# Patient Record
Sex: MALE | Race: WHITE | Employment: FULL TIME | URBAN - METROPOLITAN AREA
[De-identification: names, ages, dates, MRNs, and addresses within clinical notes are randomized per-mention and may not be internally consistent; named-entity substitution may affect disease eponyms.]

---

## 2018-01-01 ENCOUNTER — HOSPITAL ENCOUNTER (OUTPATIENT)
Dept: RADIATION ONCOLOGY | Age: 53
Discharge: HOME | End: 2018-08-20
Payer: COMMERCIAL

## 2018-01-01 ENCOUNTER — HOSPITAL ENCOUNTER (OUTPATIENT)
Dept: INFUSION THERAPY | Age: 53
Discharge: HOME | End: 2018-12-07
Payer: COMMERCIAL

## 2018-01-01 ENCOUNTER — HOSPITAL ENCOUNTER (OUTPATIENT)
Dept: RADIATION ONCOLOGY | Age: 53
End: 2018-01-01
Payer: COMMERCIAL

## 2018-01-01 ENCOUNTER — HOSPITAL ENCOUNTER (OUTPATIENT)
Dept: LAB | Age: 53
Discharge: HOME | End: 2018-12-05
Payer: COMMERCIAL

## 2018-01-01 ENCOUNTER — HOSPITAL ENCOUNTER (OUTPATIENT)
Dept: PET IMAGING | Age: 53
Discharge: HOME | End: 2018-12-27
Payer: COMMERCIAL

## 2018-01-01 ENCOUNTER — HOSPITAL ENCOUNTER (OUTPATIENT)
Dept: RADIATION ONCOLOGY | Age: 53
Discharge: HOME | End: 2018-09-04
Payer: COMMERCIAL

## 2018-01-01 ENCOUNTER — HOSPITAL ENCOUNTER (OUTPATIENT)
Dept: RADIATION ONCOLOGY | Age: 53
Discharge: HOME | End: 2018-08-31
Payer: COMMERCIAL

## 2018-01-01 ENCOUNTER — HOSPITAL ENCOUNTER (OUTPATIENT)
Dept: RADIATION ONCOLOGY | Age: 53
Discharge: HOME | End: 2018-08-23
Payer: COMMERCIAL

## 2018-01-01 ENCOUNTER — HOSPITAL ENCOUNTER (OUTPATIENT)
Dept: RADIATION ONCOLOGY | Age: 53
Discharge: HOME | End: 2018-09-26
Payer: COMMERCIAL

## 2018-01-01 ENCOUNTER — PATIENT OUTREACH (OUTPATIENT)
Dept: CASE MANAGEMENT | Age: 53
End: 2018-01-01

## 2018-01-01 ENCOUNTER — HOSPITAL ENCOUNTER (OUTPATIENT)
Dept: INFUSION THERAPY | Age: 53
Discharge: HOME | End: 2018-11-23
Payer: COMMERCIAL

## 2018-01-01 ENCOUNTER — APPOINTMENT (OUTPATIENT)
Dept: RADIATION ONCOLOGY | Age: 53
End: 2018-01-01

## 2018-01-01 ENCOUNTER — HOSPITAL ENCOUNTER (OUTPATIENT)
Dept: INFUSION THERAPY | Age: 53
Discharge: HOME | End: 2018-11-09
Payer: COMMERCIAL

## 2018-01-01 ENCOUNTER — HOSPITAL ENCOUNTER (OUTPATIENT)
Dept: LAB | Age: 53
Discharge: HOME | End: 2018-11-21
Payer: COMMERCIAL

## 2018-01-01 ENCOUNTER — HOSPITAL ENCOUNTER (OUTPATIENT)
Dept: LAB | Age: 53
Discharge: HOME | End: 2018-12-19
Payer: COMMERCIAL

## 2018-01-01 ENCOUNTER — HOSPITAL ENCOUNTER (OUTPATIENT)
Dept: RADIATION ONCOLOGY | Age: 53
Discharge: HOME | End: 2018-08-29
Payer: COMMERCIAL

## 2018-01-01 ENCOUNTER — HOSPITAL ENCOUNTER (OUTPATIENT)
Dept: RADIATION ONCOLOGY | Age: 53
Discharge: HOME | End: 2018-09-06
Payer: COMMERCIAL

## 2018-01-01 ENCOUNTER — HOSPITAL ENCOUNTER (OUTPATIENT)
Dept: CT IMAGING | Age: 53
Discharge: HOME | End: 2018-10-17
Attending: INTERNAL MEDICINE
Payer: COMMERCIAL

## 2018-01-01 ENCOUNTER — HOSPITAL ENCOUNTER (OUTPATIENT)
Dept: INFUSION THERAPY | Age: 53
Discharge: HOME | End: 2018-12-19
Payer: COMMERCIAL

## 2018-01-01 ENCOUNTER — HOSPITAL ENCOUNTER (OUTPATIENT)
Dept: RADIATION ONCOLOGY | Age: 53
Discharge: HOME | End: 2018-09-18
Payer: COMMERCIAL

## 2018-01-01 ENCOUNTER — HOSPITAL ENCOUNTER (OUTPATIENT)
Dept: RADIATION ONCOLOGY | Age: 53
Discharge: HOME | End: 2018-09-11
Payer: COMMERCIAL

## 2018-01-01 ENCOUNTER — HOSPITAL ENCOUNTER (OUTPATIENT)
Dept: RADIATION ONCOLOGY | Age: 53
Discharge: HOME | End: 2018-09-12
Payer: COMMERCIAL

## 2018-01-01 ENCOUNTER — HOSPITAL ENCOUNTER (OUTPATIENT)
Dept: RADIATION ONCOLOGY | Age: 53
Discharge: HOME | End: 2018-09-13
Payer: COMMERCIAL

## 2018-01-01 ENCOUNTER — HOSPITAL ENCOUNTER (OUTPATIENT)
Dept: RADIATION ONCOLOGY | Age: 53
Discharge: HOME | End: 2018-09-17
Payer: COMMERCIAL

## 2018-01-01 ENCOUNTER — HOSPITAL ENCOUNTER (OUTPATIENT)
Dept: LAB | Age: 53
Discharge: HOME | End: 2018-09-18
Payer: COMMERCIAL

## 2018-01-01 ENCOUNTER — HOSPITAL ENCOUNTER (OUTPATIENT)
Dept: RADIATION ONCOLOGY | Age: 53
Discharge: HOME | End: 2018-08-28
Payer: COMMERCIAL

## 2018-01-01 ENCOUNTER — HOSPITAL ENCOUNTER (OUTPATIENT)
Dept: RADIATION ONCOLOGY | Age: 53
Discharge: HOME | End: 2018-08-24
Payer: COMMERCIAL

## 2018-01-01 ENCOUNTER — HOSPITAL ENCOUNTER (OUTPATIENT)
Dept: RADIATION ONCOLOGY | Age: 53
End: 2018-01-01

## 2018-01-01 ENCOUNTER — HOSPITAL ENCOUNTER (OUTPATIENT)
Dept: LAB | Age: 53
Discharge: HOME | End: 2018-08-27
Payer: COMMERCIAL

## 2018-01-01 ENCOUNTER — ANESTHESIA (OUTPATIENT)
Dept: SURGERY | Age: 53
End: 2018-01-01
Payer: COMMERCIAL

## 2018-01-01 ENCOUNTER — HOSPITAL ENCOUNTER (OUTPATIENT)
Dept: RADIATION ONCOLOGY | Age: 53
Discharge: HOME | End: 2018-09-19
Payer: COMMERCIAL

## 2018-01-01 ENCOUNTER — HOSPITAL ENCOUNTER (OUTPATIENT)
Dept: RADIATION ONCOLOGY | Age: 53
Discharge: HOME | End: 2018-08-27
Payer: COMMERCIAL

## 2018-01-01 ENCOUNTER — HOSPITAL ENCOUNTER (OUTPATIENT)
Dept: INFUSION THERAPY | Age: 53
Discharge: HOME | End: 2018-11-07
Payer: COMMERCIAL

## 2018-01-01 ENCOUNTER — HOSPITAL ENCOUNTER (OUTPATIENT)
Dept: LAB | Age: 53
Discharge: HOME | End: 2018-09-12
Payer: COMMERCIAL

## 2018-01-01 ENCOUNTER — HOSPITAL ENCOUNTER (OUTPATIENT)
Dept: LAB | Age: 53
Discharge: HOME | End: 2018-09-04
Payer: COMMERCIAL

## 2018-01-01 ENCOUNTER — HOSPITAL ENCOUNTER (OUTPATIENT)
Dept: RADIATION ONCOLOGY | Age: 53
Discharge: HOME | End: 2018-09-25
Payer: COMMERCIAL

## 2018-01-01 ENCOUNTER — HOSPITAL ENCOUNTER (OUTPATIENT)
Dept: PET IMAGING | Age: 53
Discharge: HOME | End: 2018-11-06
Payer: COMMERCIAL

## 2018-01-01 ENCOUNTER — TELEPHONE (OUTPATIENT)
Dept: RADIATION ONCOLOGY | Age: 53
End: 2018-01-01

## 2018-01-01 ENCOUNTER — HOSPITAL ENCOUNTER (OUTPATIENT)
Dept: INFUSION THERAPY | Age: 53
Discharge: HOME | End: 2018-11-21
Payer: COMMERCIAL

## 2018-01-01 ENCOUNTER — HOSPITAL ENCOUNTER (OUTPATIENT)
Dept: RADIATION ONCOLOGY | Age: 53
Discharge: HOME | End: 2018-09-28
Payer: COMMERCIAL

## 2018-01-01 ENCOUNTER — HOSPITAL ENCOUNTER (OUTPATIENT)
Dept: RADIATION ONCOLOGY | Age: 53
Discharge: HOME | End: 2018-09-24
Payer: COMMERCIAL

## 2018-01-01 ENCOUNTER — HOSPITAL ENCOUNTER (OUTPATIENT)
Dept: LAB | Age: 53
Discharge: HOME | End: 2018-10-24
Payer: COMMERCIAL

## 2018-01-01 ENCOUNTER — HOSPITAL ENCOUNTER (OUTPATIENT)
Dept: LAB | Age: 53
Discharge: HOME | End: 2018-11-06
Payer: COMMERCIAL

## 2018-01-01 ENCOUNTER — HOSPITAL ENCOUNTER (OUTPATIENT)
Dept: RADIATION ONCOLOGY | Age: 53
Discharge: HOME | End: 2018-08-30
Payer: COMMERCIAL

## 2018-01-01 ENCOUNTER — HOSPITAL ENCOUNTER (OUTPATIENT)
Dept: RADIATION ONCOLOGY | Age: 53
Discharge: HOME | End: 2018-10-24
Payer: COMMERCIAL

## 2018-01-01 ENCOUNTER — HOSPITAL ENCOUNTER (OUTPATIENT)
Dept: INFUSION THERAPY | Age: 53
Discharge: HOME | End: 2018-12-21
Payer: COMMERCIAL

## 2018-01-01 ENCOUNTER — DOCUMENTATION ONLY (OUTPATIENT)
Dept: HEMATOLOGY | Age: 53
End: 2018-01-01

## 2018-01-01 ENCOUNTER — ANESTHESIA EVENT (OUTPATIENT)
Dept: SURGERY | Age: 53
End: 2018-01-01
Payer: COMMERCIAL

## 2018-01-01 ENCOUNTER — HOSPITAL ENCOUNTER (OUTPATIENT)
Dept: RADIATION ONCOLOGY | Age: 53
Discharge: HOME | End: 2018-09-07
Payer: COMMERCIAL

## 2018-01-01 ENCOUNTER — HOSPITAL ENCOUNTER (OUTPATIENT)
Dept: RADIATION ONCOLOGY | Age: 53
Discharge: HOME | End: 2018-09-27
Payer: COMMERCIAL

## 2018-01-01 ENCOUNTER — HOSPITAL ENCOUNTER (OUTPATIENT)
Dept: INFUSION THERAPY | Age: 53
Discharge: HOME | End: 2018-12-05
Payer: COMMERCIAL

## 2018-01-01 ENCOUNTER — HOSPITAL ENCOUNTER (OUTPATIENT)
Dept: RADIATION ONCOLOGY | Age: 53
Discharge: HOME | End: 2018-09-20
Payer: COMMERCIAL

## 2018-01-01 ENCOUNTER — HOSPITAL ENCOUNTER (OUTPATIENT)
Dept: RADIATION ONCOLOGY | Age: 53
Discharge: HOME | End: 2018-09-14
Payer: COMMERCIAL

## 2018-01-01 ENCOUNTER — HOSPITAL ENCOUNTER (OUTPATIENT)
Dept: RADIATION ONCOLOGY | Age: 53
Discharge: HOME | End: 2018-08-21
Payer: COMMERCIAL

## 2018-01-01 ENCOUNTER — HOSPITAL ENCOUNTER (OUTPATIENT)
Dept: LAB | Age: 53
Discharge: HOME | End: 2018-09-25
Payer: COMMERCIAL

## 2018-01-01 ENCOUNTER — HOSPITAL ENCOUNTER (OUTPATIENT)
Dept: RADIATION ONCOLOGY | Age: 53
Discharge: HOME | End: 2018-09-10
Payer: COMMERCIAL

## 2018-01-01 ENCOUNTER — HOSPITAL ENCOUNTER (OUTPATIENT)
Dept: RADIATION ONCOLOGY | Age: 53
Discharge: HOME | End: 2018-09-05
Payer: COMMERCIAL

## 2018-01-01 ENCOUNTER — HOSPITAL ENCOUNTER (OUTPATIENT)
Dept: INTERVENTIONAL RADIOLOGY/VASCULAR | Age: 53
Discharge: HOME | End: 2018-10-29
Attending: INTERNAL MEDICINE | Admitting: INTERNAL MEDICINE
Payer: COMMERCIAL

## 2018-01-01 ENCOUNTER — HOSPITAL ENCOUNTER (OUTPATIENT)
Dept: LAB | Age: 53
Discharge: HOME | End: 2018-08-20
Payer: COMMERCIAL

## 2018-01-01 ENCOUNTER — HOSPITAL ENCOUNTER (OUTPATIENT)
Dept: RADIATION ONCOLOGY | Age: 53
Discharge: HOME | End: 2018-08-22
Payer: COMMERCIAL

## 2018-01-01 VITALS
SYSTOLIC BLOOD PRESSURE: 151 MMHG | DIASTOLIC BLOOD PRESSURE: 87 MMHG | HEART RATE: 70 BPM | RESPIRATION RATE: 18 BRPM | WEIGHT: 158.4 LBS | TEMPERATURE: 97.9 F | OXYGEN SATURATION: 100 % | BODY MASS INDEX: 23.06 KG/M2

## 2018-01-01 VITALS
WEIGHT: 155.8 LBS | OXYGEN SATURATION: 100 % | BODY MASS INDEX: 23.01 KG/M2 | HEART RATE: 84 BPM | SYSTOLIC BLOOD PRESSURE: 160 MMHG | TEMPERATURE: 98.5 F | DIASTOLIC BLOOD PRESSURE: 83 MMHG

## 2018-01-01 VITALS
RESPIRATION RATE: 20 BRPM | OXYGEN SATURATION: 100 % | OXYGEN SATURATION: 100 % | RESPIRATION RATE: 18 BRPM | DIASTOLIC BLOOD PRESSURE: 76 MMHG | TEMPERATURE: 98 F | WEIGHT: 155 LBS | BODY MASS INDEX: 23.22 KG/M2 | WEIGHT: 158.8 LBS | SYSTOLIC BLOOD PRESSURE: 165 MMHG | HEART RATE: 69 BPM | HEART RATE: 82 BPM | SYSTOLIC BLOOD PRESSURE: 129 MMHG | DIASTOLIC BLOOD PRESSURE: 79 MMHG | TEMPERATURE: 97.8 F | BODY MASS INDEX: 22.79 KG/M2

## 2018-01-01 VITALS
TEMPERATURE: 98 F | SYSTOLIC BLOOD PRESSURE: 154 MMHG | DIASTOLIC BLOOD PRESSURE: 86 MMHG | OXYGEN SATURATION: 100 % | HEART RATE: 85 BPM | RESPIRATION RATE: 18 BRPM

## 2018-01-01 VITALS
DIASTOLIC BLOOD PRESSURE: 85 MMHG | WEIGHT: 160 LBS | OXYGEN SATURATION: 99 % | RESPIRATION RATE: 18 BRPM | BODY MASS INDEX: 23.29 KG/M2 | TEMPERATURE: 98.1 F | SYSTOLIC BLOOD PRESSURE: 156 MMHG | HEART RATE: 70 BPM

## 2018-01-01 VITALS
BODY MASS INDEX: 22.85 KG/M2 | RESPIRATION RATE: 16 BRPM | DIASTOLIC BLOOD PRESSURE: 76 MMHG | TEMPERATURE: 98.6 F | OXYGEN SATURATION: 99 % | HEART RATE: 85 BPM | WEIGHT: 157 LBS | SYSTOLIC BLOOD PRESSURE: 126 MMHG

## 2018-01-01 VITALS
RESPIRATION RATE: 18 BRPM | SYSTOLIC BLOOD PRESSURE: 196 MMHG | HEART RATE: 67 BPM | WEIGHT: 154.5 LBS | DIASTOLIC BLOOD PRESSURE: 94 MMHG | BODY MASS INDEX: 22.17 KG/M2

## 2018-01-01 VITALS — DIASTOLIC BLOOD PRESSURE: 85 MMHG | SYSTOLIC BLOOD PRESSURE: 157 MMHG

## 2018-01-01 VITALS — DIASTOLIC BLOOD PRESSURE: 88 MMHG | SYSTOLIC BLOOD PRESSURE: 151 MMHG | HEART RATE: 65 BPM

## 2018-01-01 DIAGNOSIS — C20 RECTAL ADENOCARCINOMA (HCC): ICD-10-CM

## 2018-01-01 DIAGNOSIS — C20 RECTAL ADENOCARCINOMA (HCC): Primary | ICD-10-CM

## 2018-01-01 DIAGNOSIS — C20 RECTAL CANCER (HCC): ICD-10-CM

## 2018-01-01 LAB
ALBUMIN SERPL-MCNC: 3.1 G/DL (ref 3.5–5)
ALBUMIN SERPL-MCNC: 3.2 G/DL (ref 3.5–5)
ALBUMIN SERPL-MCNC: 3.3 G/DL (ref 3.5–5)
ALBUMIN SERPL-MCNC: 3.4 G/DL (ref 3.5–5)
ALBUMIN SERPL-MCNC: 3.5 G/DL (ref 3.5–5)
ALBUMIN/GLOB SERPL: 0.7 {RATIO} (ref 1.2–3.5)
ALBUMIN/GLOB SERPL: 0.7 {RATIO} (ref 1.2–3.5)
ALBUMIN/GLOB SERPL: 0.8 {RATIO} (ref 1.2–3.5)
ALBUMIN/GLOB SERPL: 0.9 {RATIO} (ref 1.2–3.5)
ALP SERPL-CCNC: 87 U/L (ref 50–136)
ALP SERPL-CCNC: 88 U/L (ref 50–136)
ALP SERPL-CCNC: 89 U/L (ref 50–136)
ALP SERPL-CCNC: 91 U/L (ref 50–136)
ALP SERPL-CCNC: 92 U/L (ref 50–136)
ALP SERPL-CCNC: 93 U/L (ref 50–136)
ALP SERPL-CCNC: 95 U/L (ref 50–136)
ALP SERPL-CCNC: 96 U/L (ref 50–136)
ALP SERPL-CCNC: 97 U/L (ref 50–136)
ALT SERPL-CCNC: 18 U/L (ref 12–65)
ALT SERPL-CCNC: 19 U/L (ref 12–65)
ALT SERPL-CCNC: 20 U/L (ref 12–65)
ALT SERPL-CCNC: 20 U/L (ref 12–65)
ALT SERPL-CCNC: 21 U/L (ref 12–65)
ALT SERPL-CCNC: 21 U/L (ref 12–65)
ALT SERPL-CCNC: 22 U/L (ref 12–65)
ALT SERPL-CCNC: 23 U/L (ref 12–65)
ALT SERPL-CCNC: 23 U/L (ref 12–65)
ALT SERPL-CCNC: 24 U/L (ref 12–65)
ALT SERPL-CCNC: 29 U/L (ref 12–65)
ANION GAP SERPL CALC-SCNC: 5 MMOL/L (ref 7–16)
ANION GAP SERPL CALC-SCNC: 6 MMOL/L (ref 7–16)
ANION GAP SERPL CALC-SCNC: 7 MMOL/L (ref 7–16)
ANION GAP SERPL CALC-SCNC: 8 MMOL/L (ref 7–16)
AST SERPL-CCNC: 11 U/L (ref 15–37)
AST SERPL-CCNC: 13 U/L (ref 15–37)
AST SERPL-CCNC: 14 U/L (ref 15–37)
AST SERPL-CCNC: 15 U/L (ref 15–37)
AST SERPL-CCNC: 17 U/L (ref 15–37)
AST SERPL-CCNC: 17 U/L (ref 15–37)
AST SERPL-CCNC: 19 U/L (ref 15–37)
AST SERPL-CCNC: 19 U/L (ref 15–37)
BASOPHILS # BLD: 0 K/UL (ref 0–0.2)
BASOPHILS # BLD: 0.1 K/UL (ref 0–0.2)
BASOPHILS NFR BLD: 0 % (ref 0–2)
BASOPHILS NFR BLD: 1 % (ref 0–2)
BILIRUB SERPL-MCNC: 0.4 MG/DL (ref 0.2–1.1)
BILIRUB SERPL-MCNC: 0.5 MG/DL (ref 0.2–1.1)
BILIRUB SERPL-MCNC: 0.6 MG/DL (ref 0.2–1.1)
BILIRUB SERPL-MCNC: 0.7 MG/DL (ref 0.2–1.1)
BILIRUB SERPL-MCNC: 0.8 MG/DL (ref 0.2–1.1)
BILIRUB SERPL-MCNC: 1 MG/DL (ref 0.2–1.1)
BUN SERPL-MCNC: 4 MG/DL (ref 6–23)
BUN SERPL-MCNC: 5 MG/DL (ref 6–23)
BUN SERPL-MCNC: 6 MG/DL (ref 6–23)
BUN SERPL-MCNC: 7 MG/DL (ref 6–23)
BUN SERPL-MCNC: 7 MG/DL (ref 6–23)
BUN SERPL-MCNC: 8 MG/DL (ref 6–23)
CALCIUM SERPL-MCNC: 8.3 MG/DL (ref 8.3–10.4)
CALCIUM SERPL-MCNC: 8.4 MG/DL (ref 8.3–10.4)
CALCIUM SERPL-MCNC: 8.5 MG/DL (ref 8.3–10.4)
CALCIUM SERPL-MCNC: 8.5 MG/DL (ref 8.3–10.4)
CALCIUM SERPL-MCNC: 8.6 MG/DL (ref 8.3–10.4)
CALCIUM SERPL-MCNC: 8.7 MG/DL (ref 8.3–10.4)
CALCIUM SERPL-MCNC: 8.7 MG/DL (ref 8.3–10.4)
CALCIUM SERPL-MCNC: 8.8 MG/DL (ref 8.3–10.4)
CALCIUM SERPL-MCNC: 8.8 MG/DL (ref 8.3–10.4)
CEA SERPL-MCNC: 2 NG/ML (ref 0–3)
CEA SERPL-MCNC: 2.1 NG/ML (ref 0–3)
CEA SERPL-MCNC: 2.5 NG/ML (ref 0–3)
CEA SERPL-MCNC: 2.5 NG/ML (ref 0–3)
CEA SERPL-MCNC: 3.6 NG/ML (ref 0–3)
CHLORIDE SERPL-SCNC: 101 MMOL/L (ref 98–107)
CHLORIDE SERPL-SCNC: 101 MMOL/L (ref 98–107)
CHLORIDE SERPL-SCNC: 102 MMOL/L (ref 98–107)
CHLORIDE SERPL-SCNC: 103 MMOL/L (ref 98–107)
CHLORIDE SERPL-SCNC: 105 MMOL/L (ref 98–107)
CHLORIDE SERPL-SCNC: 106 MMOL/L (ref 98–107)
CHLORIDE SERPL-SCNC: 107 MMOL/L (ref 98–107)
CO2 SERPL-SCNC: 25 MMOL/L (ref 21–32)
CO2 SERPL-SCNC: 26 MMOL/L (ref 21–32)
CO2 SERPL-SCNC: 26 MMOL/L (ref 21–32)
CO2 SERPL-SCNC: 27 MMOL/L (ref 21–32)
CO2 SERPL-SCNC: 28 MMOL/L (ref 21–32)
CO2 SERPL-SCNC: 30 MMOL/L (ref 21–32)
CO2 SERPL-SCNC: 31 MMOL/L (ref 21–32)
CO2 SERPL-SCNC: 31 MMOL/L (ref 21–32)
CREAT SERPL-MCNC: 0.71 MG/DL (ref 0.8–1.5)
CREAT SERPL-MCNC: 0.82 MG/DL (ref 0.8–1.5)
CREAT SERPL-MCNC: 0.83 MG/DL (ref 0.8–1.5)
CREAT SERPL-MCNC: 0.83 MG/DL (ref 0.8–1.5)
CREAT SERPL-MCNC: 0.86 MG/DL (ref 0.8–1.5)
CREAT SERPL-MCNC: 0.87 MG/DL (ref 0.8–1.5)
CREAT SERPL-MCNC: 0.88 MG/DL (ref 0.8–1.5)
CREAT SERPL-MCNC: 0.89 MG/DL (ref 0.8–1.5)
CREAT SERPL-MCNC: 0.94 MG/DL (ref 0.8–1.5)
CREAT SERPL-MCNC: 0.98 MG/DL (ref 0.8–1.5)
CREAT SERPL-MCNC: 1.03 MG/DL (ref 0.8–1.5)
DIFFERENTIAL METHOD BLD: ABNORMAL
DIFFERENTIAL METHOD BLD: NORMAL
EOSINOPHIL # BLD: 0.1 K/UL (ref 0–0.8)
EOSINOPHIL # BLD: 0.2 K/UL (ref 0–0.8)
EOSINOPHIL NFR BLD: 1 % (ref 0.5–7.8)
EOSINOPHIL NFR BLD: 2 % (ref 0.5–7.8)
EOSINOPHIL NFR BLD: 3 % (ref 0.5–7.8)
ERYTHROCYTE [DISTWIDTH] IN BLOOD BY AUTOMATED COUNT: 13.6 % (ref 11.9–14.6)
ERYTHROCYTE [DISTWIDTH] IN BLOOD BY AUTOMATED COUNT: 13.6 % (ref 11.9–14.6)
ERYTHROCYTE [DISTWIDTH] IN BLOOD BY AUTOMATED COUNT: 14.8 % (ref 11.9–14.6)
ERYTHROCYTE [DISTWIDTH] IN BLOOD BY AUTOMATED COUNT: 14.9 % (ref 11.9–14.6)
ERYTHROCYTE [DISTWIDTH] IN BLOOD BY AUTOMATED COUNT: 15.3 % (ref 11.9–14.6)
ERYTHROCYTE [DISTWIDTH] IN BLOOD BY AUTOMATED COUNT: 15.6 % (ref 11.9–14.6)
ERYTHROCYTE [DISTWIDTH] IN BLOOD BY AUTOMATED COUNT: 15.8 % (ref 11.9–14.6)
ERYTHROCYTE [DISTWIDTH] IN BLOOD BY AUTOMATED COUNT: 16.2 % (ref 11.9–14.6)
ERYTHROCYTE [DISTWIDTH] IN BLOOD BY AUTOMATED COUNT: 16.8 % (ref 11.9–14.6)
ERYTHROCYTE [DISTWIDTH] IN BLOOD BY AUTOMATED COUNT: 17.4 % (ref 11.9–14.6)
ERYTHROCYTE [DISTWIDTH] IN BLOOD BY AUTOMATED COUNT: 18.5 % (ref 11.9–14.6)
FERRITIN SERPL-MCNC: 211 NG/ML (ref 8–388)
GLOBULIN SER CALC-MCNC: 3.7 G/DL (ref 2.3–3.5)
GLOBULIN SER CALC-MCNC: 3.8 G/DL (ref 2.3–3.5)
GLOBULIN SER CALC-MCNC: 4 G/DL (ref 2.3–3.5)
GLOBULIN SER CALC-MCNC: 4 G/DL (ref 2.3–3.5)
GLOBULIN SER CALC-MCNC: 4.1 G/DL (ref 2.3–3.5)
GLOBULIN SER CALC-MCNC: 4.3 G/DL (ref 2.3–3.5)
GLOBULIN SER CALC-MCNC: 4.3 G/DL (ref 2.3–3.5)
GLOBULIN SER CALC-MCNC: 4.4 G/DL (ref 2.3–3.5)
GLOBULIN SER CALC-MCNC: 4.5 G/DL (ref 2.3–3.5)
GLUCOSE SERPL-MCNC: 102 MG/DL (ref 65–100)
GLUCOSE SERPL-MCNC: 104 MG/DL (ref 65–100)
GLUCOSE SERPL-MCNC: 104 MG/DL (ref 65–100)
GLUCOSE SERPL-MCNC: 108 MG/DL (ref 65–100)
GLUCOSE SERPL-MCNC: 109 MG/DL (ref 65–100)
GLUCOSE SERPL-MCNC: 133 MG/DL (ref 65–100)
GLUCOSE SERPL-MCNC: 145 MG/DL (ref 65–100)
GLUCOSE SERPL-MCNC: 161 MG/DL (ref 65–100)
GLUCOSE SERPL-MCNC: 182 MG/DL (ref 65–100)
GLUCOSE SERPL-MCNC: 86 MG/DL (ref 65–100)
GLUCOSE SERPL-MCNC: 91 MG/DL (ref 65–100)
HCT VFR BLD AUTO: 40.2 % (ref 41.1–50.3)
HCT VFR BLD AUTO: 40.2 % (ref 41.1–50.3)
HCT VFR BLD AUTO: 42.8 % (ref 41.1–50.3)
HCT VFR BLD AUTO: 43.2 % (ref 41.1–50.3)
HCT VFR BLD AUTO: 43.7 % (ref 41.1–50.3)
HCT VFR BLD AUTO: 44 % (ref 41.1–50.3)
HCT VFR BLD AUTO: 44.9 % (ref 41.1–50.3)
HCT VFR BLD AUTO: 45.2 % (ref 41.1–50.3)
HCT VFR BLD AUTO: 45.6 % (ref 41.1–50.3)
HCT VFR BLD AUTO: 45.8 % (ref 41.1–50.3)
HCT VFR BLD AUTO: 48.5 % (ref 41.1–50.3)
HGB BLD-MCNC: 13.5 G/DL (ref 13.6–17.2)
HGB BLD-MCNC: 13.7 G/DL (ref 13.6–17.2)
HGB BLD-MCNC: 14.3 G/DL (ref 13.6–17.2)
HGB BLD-MCNC: 14.6 G/DL (ref 13.6–17.2)
HGB BLD-MCNC: 14.9 G/DL (ref 13.6–17.2)
HGB BLD-MCNC: 14.9 G/DL (ref 13.6–17.2)
HGB BLD-MCNC: 15.2 G/DL (ref 13.6–17.2)
HGB BLD-MCNC: 15.2 G/DL (ref 13.6–17.2)
HGB BLD-MCNC: 15.3 G/DL (ref 13.6–17.2)
HGB BLD-MCNC: 15.4 G/DL (ref 13.6–17.2)
HGB BLD-MCNC: 15.7 G/DL (ref 13.6–17.2)
HGB RETIC QN AUTO: 43 PG (ref 29–35)
IMM GRANULOCYTES # BLD: 0 K/UL (ref 0–0.5)
IMM GRANULOCYTES # BLD: 0.1 K/UL (ref 0–0.5)
IMM GRANULOCYTES # BLD: 0.2 K/UL (ref 0–0.5)
IMM GRANULOCYTES NFR BLD AUTO: 0 % (ref 0–5)
IMM GRANULOCYTES NFR BLD AUTO: 1 % (ref 0–5)
IMM GRANULOCYTES NFR BLD AUTO: 2 % (ref 0–5)
IMM RETICS NFR: 17.6 % (ref 2.3–13.4)
IRON SATN MFR SERPL: 22 %
IRON SERPL-MCNC: 77 UG/DL (ref 35–150)
LYMPHOCYTES # BLD: 0.5 K/UL (ref 0.5–4.6)
LYMPHOCYTES # BLD: 0.6 K/UL (ref 0.5–4.6)
LYMPHOCYTES # BLD: 0.8 K/UL (ref 0.5–4.6)
LYMPHOCYTES # BLD: 1.5 K/UL (ref 0.5–4.6)
LYMPHOCYTES NFR BLD: 10 % (ref 13–44)
LYMPHOCYTES NFR BLD: 11 % (ref 13–44)
LYMPHOCYTES NFR BLD: 11 % (ref 13–44)
LYMPHOCYTES NFR BLD: 12 % (ref 13–44)
LYMPHOCYTES NFR BLD: 19 % (ref 13–44)
LYMPHOCYTES NFR BLD: 7 % (ref 13–44)
LYMPHOCYTES NFR BLD: 8 % (ref 13–44)
LYMPHOCYTES NFR BLD: 9 % (ref 13–44)
LYMPHOCYTES NFR BLD: 9 % (ref 13–44)
MAGNESIUM SERPL-MCNC: 2 MG/DL (ref 1.8–2.4)
MAGNESIUM SERPL-MCNC: 2 MG/DL (ref 1.8–2.4)
MAGNESIUM SERPL-MCNC: 2.2 MG/DL (ref 1.8–2.4)
MAGNESIUM SERPL-MCNC: 2.3 MG/DL (ref 1.8–2.4)
MCH RBC QN AUTO: 29.1 PG (ref 26.1–32.9)
MCH RBC QN AUTO: 29.6 PG (ref 26.1–32.9)
MCH RBC QN AUTO: 29.9 PG (ref 26.1–32.9)
MCH RBC QN AUTO: 30.1 PG (ref 26.1–32.9)
MCH RBC QN AUTO: 30.5 PG (ref 26.1–32.9)
MCH RBC QN AUTO: 30.9 PG (ref 26.1–32.9)
MCH RBC QN AUTO: 30.9 PG (ref 26.1–32.9)
MCH RBC QN AUTO: 31.1 PG (ref 26.1–32.9)
MCH RBC QN AUTO: 31.2 PG (ref 26.1–32.9)
MCH RBC QN AUTO: 31.2 PG (ref 26.1–32.9)
MCH RBC QN AUTO: 31.6 PG (ref 26.1–32.9)
MCHC RBC AUTO-ENTMCNC: 32.4 G/DL (ref 31.4–35)
MCHC RBC AUTO-ENTMCNC: 33.3 G/DL (ref 31.4–35)
MCHC RBC AUTO-ENTMCNC: 33.4 G/DL (ref 31.4–35)
MCHC RBC AUTO-ENTMCNC: 33.6 G/DL (ref 31.4–35)
MCHC RBC AUTO-ENTMCNC: 33.8 G/DL (ref 31.4–35)
MCHC RBC AUTO-ENTMCNC: 33.9 G/DL (ref 31.4–35)
MCHC RBC AUTO-ENTMCNC: 34.1 G/DL (ref 31.4–35)
MCV RBC AUTO: 88.9 FL (ref 79.6–97.8)
MCV RBC AUTO: 89 FL (ref 79.6–97.8)
MCV RBC AUTO: 89.6 FL (ref 79.6–97.8)
MCV RBC AUTO: 89.8 FL (ref 79.6–97.8)
MCV RBC AUTO: 90.2 FL (ref 79.6–97.8)
MCV RBC AUTO: 90.7 FL (ref 79.6–97.8)
MCV RBC AUTO: 91.3 FL (ref 79.6–97.8)
MCV RBC AUTO: 91.4 FL (ref 79.6–97.8)
MCV RBC AUTO: 92.6 FL (ref 79.6–97.8)
MCV RBC AUTO: 92.6 FL (ref 79.6–97.8)
MCV RBC AUTO: 93.4 FL (ref 79.6–97.8)
MONOCYTES # BLD: 0.6 K/UL (ref 0.1–1.3)
MONOCYTES # BLD: 0.7 K/UL (ref 0.1–1.3)
MONOCYTES # BLD: 0.8 K/UL (ref 0.1–1.3)
MONOCYTES # BLD: 0.9 K/UL (ref 0.1–1.3)
MONOCYTES # BLD: 1.1 K/UL (ref 0.1–1.3)
MONOCYTES NFR BLD: 10 % (ref 4–12)
MONOCYTES NFR BLD: 11 % (ref 4–12)
MONOCYTES NFR BLD: 12 % (ref 4–12)
MONOCYTES NFR BLD: 13 % (ref 4–12)
MONOCYTES NFR BLD: 14 % (ref 4–12)
MONOCYTES NFR BLD: 8 % (ref 4–12)
MONOCYTES NFR BLD: 8 % (ref 4–12)
NEUTS SEG # BLD: 3.4 K/UL (ref 1.7–8.2)
NEUTS SEG # BLD: 4 K/UL (ref 1.7–8.2)
NEUTS SEG # BLD: 4.3 K/UL (ref 1.7–8.2)
NEUTS SEG # BLD: 4.6 K/UL (ref 1.7–8.2)
NEUTS SEG # BLD: 4.6 K/UL (ref 1.7–8.2)
NEUTS SEG # BLD: 4.8 K/UL (ref 1.7–8.2)
NEUTS SEG # BLD: 5.4 K/UL (ref 1.7–8.2)
NEUTS SEG # BLD: 5.5 K/UL (ref 1.7–8.2)
NEUTS SEG # BLD: 5.6 K/UL (ref 1.7–8.2)
NEUTS SEG # BLD: 5.6 K/UL (ref 1.7–8.2)
NEUTS SEG # BLD: 8.9 K/UL (ref 1.7–8.2)
NEUTS SEG NFR BLD: 69 % (ref 43–78)
NEUTS SEG NFR BLD: 73 % (ref 43–78)
NEUTS SEG NFR BLD: 73 % (ref 43–78)
NEUTS SEG NFR BLD: 74 % (ref 43–78)
NEUTS SEG NFR BLD: 75 % (ref 43–78)
NEUTS SEG NFR BLD: 76 % (ref 43–78)
NEUTS SEG NFR BLD: 76 % (ref 43–78)
NEUTS SEG NFR BLD: 77 % (ref 43–78)
NEUTS SEG NFR BLD: 79 % (ref 43–78)
NRBC # BLD: 0 K/UL (ref 0–0.2)
NRBC # BLD: 0.01 K/UL (ref 0–0.2)
PLATELET # BLD AUTO: 151 K/UL (ref 150–450)
PLATELET # BLD AUTO: 152 K/UL (ref 150–450)
PLATELET # BLD AUTO: 231 K/UL (ref 150–450)
PLATELET # BLD AUTO: 232 K/UL (ref 150–450)
PLATELET # BLD AUTO: 235 K/UL (ref 150–450)
PLATELET # BLD AUTO: 239 K/UL (ref 150–450)
PLATELET # BLD AUTO: 240 K/UL (ref 150–450)
PLATELET # BLD AUTO: 279 K/UL (ref 150–450)
PLATELET # BLD AUTO: 290 K/UL (ref 150–450)
PLATELET # BLD AUTO: 307 K/UL (ref 150–450)
PLATELET # BLD AUTO: 333 K/UL (ref 150–450)
PMV BLD AUTO: 8.3 FL (ref 9.4–12.3)
PMV BLD AUTO: 8.4 FL (ref 9.4–12.3)
PMV BLD AUTO: 8.5 FL (ref 9.4–12.3)
PMV BLD AUTO: 8.8 FL (ref 9.4–12.3)
PMV BLD AUTO: 9 FL (ref 9.4–12.3)
PMV BLD AUTO: 9.2 FL (ref 9.4–12.3)
PMV BLD AUTO: 9.3 FL (ref 9.4–12.3)
PMV BLD AUTO: 9.6 FL (ref 9.4–12.3)
PMV BLD AUTO: 9.7 FL (ref 9.4–12.3)
POTASSIUM SERPL-SCNC: 3 MMOL/L (ref 3.5–5.1)
POTASSIUM SERPL-SCNC: 3.3 MMOL/L (ref 3.5–5.1)
POTASSIUM SERPL-SCNC: 3.4 MMOL/L (ref 3.5–5.1)
POTASSIUM SERPL-SCNC: 3.5 MMOL/L (ref 3.5–5.1)
POTASSIUM SERPL-SCNC: 3.6 MMOL/L (ref 3.5–5.1)
POTASSIUM SERPL-SCNC: 3.7 MMOL/L (ref 3.5–5.1)
POTASSIUM SERPL-SCNC: 3.7 MMOL/L (ref 3.5–5.1)
POTASSIUM SERPL-SCNC: 3.8 MMOL/L (ref 3.5–5.1)
POTASSIUM SERPL-SCNC: 3.9 MMOL/L (ref 3.5–5.1)
PROT SERPL-MCNC: 6.8 G/DL (ref 6.3–8.2)
PROT SERPL-MCNC: 6.9 G/DL (ref 6.3–8.2)
PROT SERPL-MCNC: 7.2 G/DL (ref 6.3–8.2)
PROT SERPL-MCNC: 7.3 G/DL (ref 6.3–8.2)
PROT SERPL-MCNC: 7.4 G/DL (ref 6.3–8.2)
PROT SERPL-MCNC: 7.4 G/DL (ref 6.3–8.2)
PROT SERPL-MCNC: 7.5 G/DL (ref 6.3–8.2)
PROT SERPL-MCNC: 7.5 G/DL (ref 6.3–8.2)
PROT SERPL-MCNC: 7.6 G/DL (ref 6.3–8.2)
PROT SERPL-MCNC: 7.7 G/DL (ref 6.3–8.2)
PROT SERPL-MCNC: 7.7 G/DL (ref 6.3–8.2)
PROT UR-MCNC: 17 MG/DL
PROT UR-MCNC: 19 MG/DL
PROT UR-MCNC: 26 MG/DL
PROT UR-MCNC: <5 MG/DL
RBC # BLD AUTO: 4.34 M/UL (ref 4.23–5.67)
RBC # BLD AUTO: 4.34 M/UL (ref 4.23–5.67)
RBC # BLD AUTO: 4.58 M/UL (ref 4.23–5.67)
RBC # BLD AUTO: 4.73 M/UL (ref 4.23–5.67)
RBC # BLD AUTO: 4.78 M/UL (ref 4.23–5.67)
RBC # BLD AUTO: 4.88 M/UL (ref 4.23–5.67)
RBC # BLD AUTO: 4.95 M/UL (ref 4.23–5.67)
RBC # BLD AUTO: 5.08 M/UL (ref 4.23–5.67)
RBC # BLD AUTO: 5.11 M/UL (ref 4.23–5.67)
RBC # BLD AUTO: 5.13 M/UL (ref 4.23–5.67)
RBC # BLD AUTO: 5.4 M/UL (ref 4.23–5.67)
RETICS # AUTO: 0.08 M/UL (ref 0.03–0.1)
RETICS/RBC NFR AUTO: 1.7 % (ref 0.3–2)
SODIUM SERPL-SCNC: 137 MMOL/L (ref 136–145)
SODIUM SERPL-SCNC: 138 MMOL/L (ref 136–145)
SODIUM SERPL-SCNC: 139 MMOL/L (ref 136–145)
SODIUM SERPL-SCNC: 140 MMOL/L (ref 136–145)
TIBC SERPL-MCNC: 357 UG/DL (ref 250–450)
WBC # BLD AUTO: 11.3 K/UL (ref 4.3–11.1)
WBC # BLD AUTO: 4.7 K/UL (ref 4.3–11.1)
WBC # BLD AUTO: 5.4 K/UL (ref 4.3–11.1)
WBC # BLD AUTO: 5.8 K/UL (ref 4.3–11.1)
WBC # BLD AUTO: 5.9 K/UL (ref 4.3–11.1)
WBC # BLD AUTO: 6 K/UL (ref 4.3–11.1)
WBC # BLD AUTO: 6.6 K/UL (ref 4.3–11.1)
WBC # BLD AUTO: 7.1 K/UL (ref 4.3–11.1)
WBC # BLD AUTO: 7.2 K/UL (ref 4.3–11.1)
WBC # BLD AUTO: 7.3 K/UL (ref 4.3–11.1)
WBC # BLD AUTO: 7.9 K/UL (ref 4.3–11.1)

## 2018-01-01 PROCEDURE — 82378 CARCINOEMBRYONIC ANTIGEN: CPT

## 2018-01-01 PROCEDURE — 77336 RADIATION PHYSICS CONSULT: CPT

## 2018-01-01 PROCEDURE — 77412 RADIATION TX DELIVERY LVL 3: CPT

## 2018-01-01 PROCEDURE — 74011000258 HC RX REV CODE- 258: Performed by: INTERNAL MEDICINE

## 2018-01-01 PROCEDURE — 96375 TX/PRO/DX INJ NEW DRUG ADDON: CPT

## 2018-01-01 PROCEDURE — 96368 THER/DIAG CONCURRENT INF: CPT

## 2018-01-01 PROCEDURE — 36415 COLL VENOUS BLD VENIPUNCTURE: CPT

## 2018-01-01 PROCEDURE — 80053 COMPREHEN METABOLIC PANEL: CPT

## 2018-01-01 PROCEDURE — 96523 IRRIG DRUG DELIVERY DEVICE: CPT

## 2018-01-01 PROCEDURE — 83735 ASSAY OF MAGNESIUM: CPT

## 2018-01-01 PROCEDURE — 85025 COMPLETE CBC W/AUTO DIFF WBC: CPT

## 2018-01-01 PROCEDURE — 77307 TELETHX ISODOSE PLAN CPLX: CPT

## 2018-01-01 PROCEDURE — 96413 CHEMO IV INFUSION 1 HR: CPT

## 2018-01-01 PROCEDURE — 74011250636 HC RX REV CODE- 250/636: Performed by: INTERNAL MEDICINE

## 2018-01-01 PROCEDURE — 74011250636 HC RX REV CODE- 250/636

## 2018-01-01 PROCEDURE — 96411 CHEMO IV PUSH ADDL DRUG: CPT

## 2018-01-01 PROCEDURE — 74011636320 HC RX REV CODE- 636/320: Performed by: INTERNAL MEDICINE

## 2018-01-01 PROCEDURE — 74011250637 HC RX REV CODE- 250/637: Performed by: INTERNAL MEDICINE

## 2018-01-01 PROCEDURE — 96415 CHEMO IV INFUSION ADDL HR: CPT

## 2018-01-01 PROCEDURE — 74011250636 HC RX REV CODE- 250/636: Performed by: PHYSICIAN ASSISTANT

## 2018-01-01 PROCEDURE — 77030037400 HC ADH TISS HI VISC EXOFIN CHMP -B

## 2018-01-01 PROCEDURE — 90686 IIV4 VACC NO PRSV 0.5 ML IM: CPT | Performed by: INTERNAL MEDICINE

## 2018-01-01 PROCEDURE — 90471 IMMUNIZATION ADMIN: CPT

## 2018-01-01 PROCEDURE — 77331 SPECIAL RADIATION DOSIMETRY: CPT

## 2018-01-01 PROCEDURE — 96417 CHEMO IV INFUS EACH ADDL SEQ: CPT

## 2018-01-01 PROCEDURE — 74177 CT ABD & PELVIS W/CONTRAST: CPT

## 2018-01-01 PROCEDURE — 84156 ASSAY OF PROTEIN URINE: CPT

## 2018-01-01 PROCEDURE — 77030003560 HC NDL HUBR BARD -A

## 2018-01-01 PROCEDURE — 77030031139 HC SUT VCRL2 J&J -A

## 2018-01-01 PROCEDURE — 96367 TX/PROPH/DG ADDL SEQ IV INF: CPT

## 2018-01-01 PROCEDURE — C1894 INTRO/SHEATH, NON-LASER: HCPCS

## 2018-01-01 PROCEDURE — 77001 FLUOROGUIDE FOR VEIN DEVICE: CPT

## 2018-01-01 PROCEDURE — 77417 THER RADIOLOGY PORT IMAGE(S): CPT

## 2018-01-01 PROCEDURE — C1788 PORT, INDWELLING, IMP: HCPCS

## 2018-01-01 PROCEDURE — 77334 RADIATION TREATMENT AID(S): CPT

## 2018-01-01 PROCEDURE — 99211 OFF/OP EST MAY X REQ PHY/QHP: CPT

## 2018-01-01 PROCEDURE — 74011250637 HC RX REV CODE- 250/637: Performed by: NURSE PRACTITIONER

## 2018-01-01 PROCEDURE — 74011000258 HC RX REV CODE- 258: Performed by: NURSE PRACTITIONER

## 2018-01-01 PROCEDURE — 77280 THER RAD SIMULAJ FIELD SMPL: CPT

## 2018-01-01 PROCEDURE — 85046 RETICYTE/HGB CONCENTRATE: CPT

## 2018-01-01 PROCEDURE — 76060000032 HC ANESTHESIA 0.5 TO 1 HR

## 2018-01-01 PROCEDURE — A9552 F18 FDG: HCPCS

## 2018-01-01 PROCEDURE — 74011250636 HC RX REV CODE- 250/636: Performed by: NURSE PRACTITIONER

## 2018-01-01 PROCEDURE — 82728 ASSAY OF FERRITIN: CPT

## 2018-01-01 PROCEDURE — 77030031131 HC SUT MXN P COVD -B

## 2018-01-01 PROCEDURE — 83550 IRON BINDING TEST: CPT

## 2018-01-01 RX ORDER — SODIUM CHLORIDE 0.9 % (FLUSH) 0.9 %
5-10 SYRINGE (ML) INJECTION
Status: COMPLETED | OUTPATIENT
Start: 2018-01-01 | End: 2018-01-01

## 2018-01-01 RX ORDER — FLUOROURACIL 50 MG/ML
400 INJECTION, SOLUTION INTRAVENOUS ONCE
Status: COMPLETED | OUTPATIENT
Start: 2018-01-01 | End: 2018-01-01

## 2018-01-01 RX ORDER — ONDANSETRON 2 MG/ML
8 INJECTION INTRAMUSCULAR; INTRAVENOUS ONCE
Status: COMPLETED | OUTPATIENT
Start: 2018-01-01 | End: 2018-01-01

## 2018-01-01 RX ORDER — SODIUM CHLORIDE, SODIUM LACTATE, POTASSIUM CHLORIDE, CALCIUM CHLORIDE 600; 310; 30; 20 MG/100ML; MG/100ML; MG/100ML; MG/100ML
INJECTION, SOLUTION INTRAVENOUS
Status: DISCONTINUED | OUTPATIENT
Start: 2018-01-01 | End: 2018-01-01 | Stop reason: HOSPADM

## 2018-01-01 RX ORDER — SODIUM CHLORIDE 9 MG/ML
25 INJECTION, SOLUTION INTRAVENOUS CONTINUOUS
Status: ACTIVE | OUTPATIENT
Start: 2018-01-01 | End: 2018-01-01

## 2018-01-01 RX ORDER — METOPROLOL TARTRATE 25 MG/1
50 TABLET, FILM COATED ORAL ONCE
Status: COMPLETED | OUTPATIENT
Start: 2018-01-01 | End: 2018-01-01

## 2018-01-01 RX ORDER — POTASSIUM CHLORIDE 29.8 MG/ML
20 INJECTION INTRAVENOUS ONCE
Status: COMPLETED | OUTPATIENT
Start: 2018-01-01 | End: 2018-01-01

## 2018-01-01 RX ORDER — LIDOCAINE HYDROCHLORIDE 20 MG/ML
40-120 INJECTION, SOLUTION INFILTRATION; PERINEURAL ONCE
Status: COMPLETED | OUTPATIENT
Start: 2018-01-01 | End: 2018-01-01

## 2018-01-01 RX ORDER — PROPOFOL 10 MG/ML
INJECTION, EMULSION INTRAVENOUS
Status: DISCONTINUED | OUTPATIENT
Start: 2018-01-01 | End: 2018-01-01 | Stop reason: HOSPADM

## 2018-01-01 RX ORDER — CEFAZOLIN SODIUM/WATER 2 G/20 ML
2 SYRINGE (ML) INTRAVENOUS ONCE
Status: COMPLETED | OUTPATIENT
Start: 2018-01-01 | End: 2018-01-01

## 2018-01-01 RX ORDER — SODIUM CHLORIDE 9 MG/ML
10 INJECTION INTRAMUSCULAR; INTRAVENOUS; SUBCUTANEOUS AS NEEDED
Status: ACTIVE | OUTPATIENT
Start: 2018-01-01 | End: 2018-01-01

## 2018-01-01 RX ORDER — SODIUM CHLORIDE 0.9 % (FLUSH) 0.9 %
10 SYRINGE (ML) INJECTION AS NEEDED
Status: ACTIVE | OUTPATIENT
Start: 2018-01-01 | End: 2018-01-01

## 2018-01-01 RX ORDER — HEPARIN SODIUM (PORCINE) LOCK FLUSH IV SOLN 100 UNIT/ML 100 UNIT/ML
300 SOLUTION INTRAVENOUS AS NEEDED
Status: DISCONTINUED | OUTPATIENT
Start: 2018-01-01 | End: 2018-01-01 | Stop reason: HOSPADM

## 2018-01-01 RX ORDER — HEPARIN 100 UNIT/ML
300-500 SYRINGE INTRAVENOUS AS NEEDED
Status: ACTIVE | OUTPATIENT
Start: 2018-01-01 | End: 2018-01-01

## 2018-01-01 RX ORDER — LIDOCAINE HYDROCHLORIDE 20 MG/ML
INJECTION, SOLUTION EPIDURAL; INFILTRATION; INTRACAUDAL; PERINEURAL AS NEEDED
Status: DISCONTINUED | OUTPATIENT
Start: 2018-01-01 | End: 2018-01-01 | Stop reason: HOSPADM

## 2018-01-01 RX ORDER — DEXTROSE MONOHYDRATE 50 MG/ML
25 INJECTION, SOLUTION INTRAVENOUS CONTINUOUS
Status: ACTIVE | OUTPATIENT
Start: 2018-01-01 | End: 2018-01-01

## 2018-01-01 RX ORDER — HEPARIN 100 UNIT/ML
500 SYRINGE INTRAVENOUS AS NEEDED
Status: DISCONTINUED | OUTPATIENT
Start: 2018-01-01 | End: 2018-01-01 | Stop reason: HOSPADM

## 2018-01-01 RX ORDER — HEPARIN SODIUM (PORCINE) LOCK FLUSH IV SOLN 100 UNIT/ML 100 UNIT/ML
500 SOLUTION INTRAVENOUS ONCE
Status: COMPLETED | OUTPATIENT
Start: 2018-01-01 | End: 2018-01-01

## 2018-01-01 RX ORDER — SODIUM CHLORIDE 0.9 % (FLUSH) 0.9 %
10 SYRINGE (ML) INJECTION
Status: COMPLETED | OUTPATIENT
Start: 2018-01-01 | End: 2018-01-01

## 2018-01-01 RX ORDER — SODIUM CHLORIDE 0.9 % (FLUSH) 0.9 %
10-30 SYRINGE (ML) INJECTION AS NEEDED
Status: DISCONTINUED | OUTPATIENT
Start: 2018-01-01 | End: 2018-01-01 | Stop reason: HOSPADM

## 2018-01-01 RX ORDER — HEPARIN 100 UNIT/ML
300-500 SYRINGE INTRAVENOUS AS NEEDED
Status: DISPENSED | OUTPATIENT
Start: 2018-01-01 | End: 2018-01-01

## 2018-01-01 RX ORDER — HEPARIN SODIUM 200 [USP'U]/100ML
1000 INJECTION, SOLUTION INTRAVENOUS
Status: DISCONTINUED | OUTPATIENT
Start: 2018-01-01 | End: 2018-01-01 | Stop reason: HOSPADM

## 2018-01-01 RX ORDER — CEFAZOLIN SODIUM IN 0.9 % NACL 2 G/50 ML
2 INTRAVENOUS SOLUTION, PIGGYBACK (ML) INTRAVENOUS ONCE
Status: DISCONTINUED | OUTPATIENT
Start: 2018-01-01 | End: 2018-01-01 | Stop reason: SDUPTHER

## 2018-01-01 RX ORDER — PROPOFOL 10 MG/ML
INJECTION, EMULSION INTRAVENOUS AS NEEDED
Status: DISCONTINUED | OUTPATIENT
Start: 2018-01-01 | End: 2018-01-01 | Stop reason: HOSPADM

## 2018-01-01 RX ORDER — POTASSIUM CHLORIDE 750 MG/1
40 TABLET, EXTENDED RELEASE ORAL
Status: COMPLETED | OUTPATIENT
Start: 2018-01-01 | End: 2018-01-01

## 2018-01-01 RX ADMIN — Medication 10 ML: at 14:47

## 2018-01-01 RX ADMIN — PROPOFOL 140 MCG/KG/MIN: 10 INJECTION, EMULSION INTRAVENOUS at 13:38

## 2018-01-01 RX ADMIN — OXALIPLATIN 150 MG: 5 INJECTION, SOLUTION, CONCENTRATE INTRAVENOUS at 10:50

## 2018-01-01 RX ADMIN — DIATRIZOATE MEGLUMINE AND DIATRIZOATE SODIUM 10 ML: 660; 100 LIQUID ORAL; RECTAL at 14:47

## 2018-01-01 RX ADMIN — OXALIPLATIN 150 MG: 5 INJECTION, SOLUTION, CONCENTRATE INTRAVENOUS at 13:47

## 2018-01-01 RX ADMIN — HEPARIN SODIUM (PORCINE) LOCK FLUSH IV SOLN 100 UNIT/ML 500 UNITS: 100 SOLUTION at 13:57

## 2018-01-01 RX ADMIN — Medication 10 ML: at 10:30

## 2018-01-01 RX ADMIN — HEPARIN SODIUM 2000 UNITS: 5000 INJECTION, SOLUTION INTRAVENOUS; SUBCUTANEOUS at 13:55

## 2018-01-01 RX ADMIN — SODIUM CHLORIDE 100 ML: 900 INJECTION, SOLUTION INTRAVENOUS at 10:30

## 2018-01-01 RX ADMIN — Medication 10 ML: at 14:10

## 2018-01-01 RX ADMIN — SODIUM CHLORIDE, SODIUM LACTATE, POTASSIUM CHLORIDE, CALCIUM CHLORIDE: 600; 310; 30; 20 INJECTION, SOLUTION INTRAVENOUS at 13:16

## 2018-01-01 RX ADMIN — BEVACIZUMAB 350 MG: 400 INJECTION, SOLUTION INTRAVENOUS at 09:50

## 2018-01-01 RX ADMIN — LEUCOVORIN CALCIUM 740 MG: 350 INJECTION, POWDER, LYOPHILIZED, FOR SOLUTION INTRAMUSCULAR; INTRAVENOUS at 10:50

## 2018-01-01 RX ADMIN — DIATRIZOATE MEGLUMINE AND DIATRIZOATE SODIUM 10 ML: 660; 100 LIQUID ORAL; RECTAL at 13:25

## 2018-01-01 RX ADMIN — Medication 10 ML: at 11:55

## 2018-01-01 RX ADMIN — DEXTROSE MONOHYDRATE 25 ML/HR: 5 INJECTION, SOLUTION INTRAVENOUS at 13:46

## 2018-01-01 RX ADMIN — DEXAMETHASONE SODIUM PHOSPHATE 12 MG: 4 INJECTION, SOLUTION INTRAMUSCULAR; INTRAVENOUS at 09:40

## 2018-01-01 RX ADMIN — SODIUM CHLORIDE 25 ML/HR: 900 INJECTION, SOLUTION INTRAVENOUS at 09:55

## 2018-01-01 RX ADMIN — Medication 2 G: at 13:40

## 2018-01-01 RX ADMIN — LIDOCAINE HYDROCHLORIDE 100 MG: 20 INJECTION, SOLUTION EPIDURAL; INFILTRATION; INTRACAUDAL; PERINEURAL at 13:38

## 2018-01-01 RX ADMIN — ONDANSETRON 8 MG: 2 INJECTION INTRAMUSCULAR; INTRAVENOUS at 11:35

## 2018-01-01 RX ADMIN — INFLUENZA VIRUS VACCINE 0.5 ML: 15; 15; 15; 15 SUSPENSION INTRAMUSCULAR at 14:43

## 2018-01-01 RX ADMIN — DIATRIZOATE MEGLUMINE AND DIATRIZOATE SODIUM 15 ML: 660; 100 LIQUID ORAL; RECTAL at 10:30

## 2018-01-01 RX ADMIN — IOPAMIDOL 100 ML: 755 INJECTION, SOLUTION INTRAVENOUS at 10:30

## 2018-01-01 RX ADMIN — FLUOROURACIL 740 MG: 50 INJECTION, SOLUTION INTRAVENOUS at 12:55

## 2018-01-01 RX ADMIN — ONDANSETRON 8 MG: 2 INJECTION INTRAMUSCULAR; INTRAVENOUS at 10:12

## 2018-01-01 RX ADMIN — Medication 10 ML: at 15:51

## 2018-01-01 RX ADMIN — PROPOFOL 50 MG: 10 INJECTION, EMULSION INTRAVENOUS at 13:38

## 2018-01-01 RX ADMIN — Medication 20 ML: at 16:00

## 2018-01-01 RX ADMIN — FLUOROURACIL 740 MG: 50 INJECTION, SOLUTION INTRAVENOUS at 15:52

## 2018-01-01 RX ADMIN — FLUOROURACIL 740 MG: 50 INJECTION, SOLUTION INTRAVENOUS at 14:30

## 2018-01-01 RX ADMIN — POTASSIUM CHLORIDE 20 MEQ: 400 INJECTION, SOLUTION INTRAVENOUS at 12:20

## 2018-01-01 RX ADMIN — SODIUM CHLORIDE, PRESERVATIVE FREE 500 UNITS: 5 INJECTION INTRAVENOUS at 14:10

## 2018-01-01 RX ADMIN — DEXTROSE MONOHYDRATE 25 ML/HR: 5 INJECTION, SOLUTION INTRAVENOUS at 12:10

## 2018-01-01 RX ADMIN — LEUCOVORIN CALCIUM 740 MG: 350 INJECTION, POWDER, LYOPHILIZED, FOR SOLUTION INTRAMUSCULAR; INTRAVENOUS at 12:30

## 2018-01-01 RX ADMIN — OXALIPLATIN 150 MG: 5 INJECTION, SOLUTION, CONCENTRATE INTRAVENOUS at 12:30

## 2018-01-01 RX ADMIN — HEPARIN 500 UNITS: 100 SYRINGE at 13:25

## 2018-01-01 RX ADMIN — BEVACIZUMAB 350 MG: 400 INJECTION, SOLUTION INTRAVENOUS at 13:03

## 2018-01-01 RX ADMIN — DEXTROSE MONOHYDRATE 25 ML/HR: 5 INJECTION, SOLUTION INTRAVENOUS at 10:50

## 2018-01-01 RX ADMIN — ONDANSETRON 8 MG: 2 INJECTION INTRAMUSCULAR; INTRAVENOUS at 12:09

## 2018-01-01 RX ADMIN — Medication 10 ML: at 08:00

## 2018-01-01 RX ADMIN — Medication 10 ML: at 16:00

## 2018-01-01 RX ADMIN — DEXAMETHASONE SODIUM PHOSPHATE 12 MG: 4 INJECTION, SOLUTION INTRAMUSCULAR; INTRAVENOUS at 10:14

## 2018-01-01 RX ADMIN — PROPOFOL 40 MG: 10 INJECTION, EMULSION INTRAVENOUS at 13:39

## 2018-01-01 RX ADMIN — DEXAMETHASONE SODIUM PHOSPHATE 12 MG: 4 INJECTION, SOLUTION INTRAMUSCULAR; INTRAVENOUS at 12:10

## 2018-01-01 RX ADMIN — LEUCOVORIN CALCIUM 740 MG: 200 INJECTION, POWDER, LYOPHILIZED, FOR SOLUTION INTRAMUSCULAR; INTRAVENOUS at 13:47

## 2018-01-01 RX ADMIN — METOPROLOL TARTRATE 50 MG: 25 TABLET ORAL at 10:10

## 2018-01-01 RX ADMIN — POTASSIUM CHLORIDE 40 MEQ: 10 TABLET, EXTENDED RELEASE ORAL at 11:58

## 2018-01-01 RX ADMIN — Medication 10 ML: at 13:25

## 2018-01-01 RX ADMIN — BEVACIZUMAB 350 MG: 400 INJECTION, SOLUTION INTRAVENOUS at 10:00

## 2018-01-01 RX ADMIN — HEPARIN 500 UNITS: 100 SYRINGE at 16:01

## 2018-01-01 RX ADMIN — DEXAMETHASONE SODIUM PHOSPHATE 12 MG: 4 INJECTION, SOLUTION INTRAMUSCULAR; INTRAVENOUS at 11:37

## 2018-01-01 RX ADMIN — SODIUM CHLORIDE 25 ML/HR: 900 INJECTION, SOLUTION INTRAVENOUS at 13:00

## 2018-01-01 RX ADMIN — HEPARIN 500 UNITS: 100 SYRINGE at 16:00

## 2018-01-01 RX ADMIN — DEXTROSE MONOHYDRATE 100 ML/HR: 5 INJECTION, SOLUTION INTRAVENOUS at 09:30

## 2018-01-01 RX ADMIN — FLUOROURACIL 740 MG: 50 INJECTION, SOLUTION INTRAVENOUS at 12:49

## 2018-01-01 RX ADMIN — SODIUM CHLORIDE 25 ML/HR: 900 INJECTION, SOLUTION INTRAVENOUS at 09:34

## 2018-01-01 RX ADMIN — LIDOCAINE HYDROCHLORIDE 110 MG: 20 INJECTION, SOLUTION INFILTRATION; PERINEURAL at 13:49

## 2018-01-01 RX ADMIN — ONDANSETRON 8 MG: 2 INJECTION INTRAMUSCULAR; INTRAVENOUS at 09:30

## 2018-05-30 PROBLEM — R73.02 IGT (IMPAIRED GLUCOSE TOLERANCE): Status: ACTIVE | Noted: 2018-05-30

## 2018-05-30 PROBLEM — Z12.5 SCREENING PSA (PROSTATE SPECIFIC ANTIGEN): Status: ACTIVE | Noted: 2018-05-30

## 2018-05-30 PROBLEM — E78.00 HYPERCHOLESTEROLEMIA: Status: ACTIVE | Noted: 2018-05-30

## 2018-05-30 PROBLEM — D75.1 POLYCYTHEMIA SECONDARY TO SMOKING: Status: ACTIVE | Noted: 2018-05-30

## 2018-05-31 PROBLEM — M72.2 PLANTAR FASCIITIS, RIGHT: Status: ACTIVE | Noted: 2018-05-31

## 2018-05-31 PROBLEM — N52.01 ERECTILE DYSFUNCTION DUE TO ARTERIAL INSUFFICIENCY: Status: ACTIVE | Noted: 2018-05-31

## 2018-05-31 PROBLEM — Z12.11 SCREEN FOR COLON CANCER: Status: ACTIVE | Noted: 2018-05-31

## 2018-05-31 PROBLEM — R03.0 ELEVATED BLOOD PRESSURE READING: Status: ACTIVE | Noted: 2018-05-31

## 2018-06-26 ENCOUNTER — HOSPITAL ENCOUNTER (INPATIENT)
Age: 53
LOS: 7 days | Discharge: HOME HEALTH | DRG: 330 | End: 2018-07-03
Attending: EMERGENCY MEDICINE | Admitting: INTERNAL MEDICINE
Payer: COMMERCIAL

## 2018-06-26 ENCOUNTER — APPOINTMENT (OUTPATIENT)
Dept: CT IMAGING | Age: 53
DRG: 330 | End: 2018-06-26
Attending: EMERGENCY MEDICINE
Payer: COMMERCIAL

## 2018-06-26 ENCOUNTER — APPOINTMENT (OUTPATIENT)
Dept: GENERAL RADIOLOGY | Age: 53
DRG: 330 | End: 2018-06-26
Attending: STUDENT IN AN ORGANIZED HEALTH CARE EDUCATION/TRAINING PROGRAM
Payer: COMMERCIAL

## 2018-06-26 DIAGNOSIS — K62.89 RECTAL MASS: Primary | ICD-10-CM

## 2018-06-26 DIAGNOSIS — K56.609 INTESTINAL OBSTRUCTION, UNSPECIFIED CAUSE, UNSPECIFIED WHETHER PARTIAL OR COMPLETE (HCC): ICD-10-CM

## 2018-06-26 PROBLEM — R10.84 GENERALIZED ABDOMINAL PAIN: Status: ACTIVE | Noted: 2018-06-26

## 2018-06-26 PROBLEM — R11.2 NAUSEA WITH VOMITING: Status: ACTIVE | Noted: 2018-06-26

## 2018-06-26 PROBLEM — D72.829 LEUKOCYTOSIS: Status: ACTIVE | Noted: 2018-06-26

## 2018-06-26 LAB
ALBUMIN SERPL-MCNC: 3.6 G/DL (ref 3.5–5)
ALBUMIN/GLOB SERPL: 0.7 {RATIO} (ref 1.2–3.5)
ALP SERPL-CCNC: 94 U/L (ref 50–136)
ALT SERPL-CCNC: 28 U/L (ref 12–65)
ANION GAP SERPL CALC-SCNC: 13 MMOL/L (ref 7–16)
AST SERPL-CCNC: 34 U/L (ref 15–37)
BACTERIA URNS QL MICRO: 0 /HPF
BASOPHILS # BLD: 0 K/UL (ref 0–0.2)
BASOPHILS NFR BLD: 0 % (ref 0–2)
BILIRUB SERPL-MCNC: 0.9 MG/DL (ref 0.2–1.1)
BUN SERPL-MCNC: 13 MG/DL (ref 6–23)
CALCIUM SERPL-MCNC: 9.3 MG/DL (ref 8.3–10.4)
CASTS URNS QL MICRO: ABNORMAL /LPF
CHLORIDE SERPL-SCNC: 98 MMOL/L (ref 98–107)
CO2 SERPL-SCNC: 27 MMOL/L (ref 21–32)
CREAT SERPL-MCNC: 0.94 MG/DL (ref 0.8–1.5)
DIFFERENTIAL METHOD BLD: ABNORMAL
EOSINOPHIL # BLD: 0 K/UL (ref 0–0.8)
EOSINOPHIL NFR BLD: 0 % (ref 0.5–7.8)
EPI CELLS #/AREA URNS HPF: ABNORMAL /HPF
ERYTHROCYTE [DISTWIDTH] IN BLOOD BY AUTOMATED COUNT: 13.3 % (ref 11.9–14.6)
GLOBULIN SER CALC-MCNC: 5 G/DL (ref 2.3–3.5)
GLUCOSE SERPL-MCNC: 151 MG/DL (ref 65–100)
HCT VFR BLD AUTO: 52.3 % (ref 41.1–50.3)
HGB BLD-MCNC: 18.6 G/DL (ref 13.6–17.2)
IMM GRANULOCYTES # BLD: 0.1 K/UL (ref 0–0.5)
IMM GRANULOCYTES NFR BLD AUTO: 1 % (ref 0–5)
LYMPHOCYTES # BLD: 1.1 K/UL (ref 0.5–4.6)
LYMPHOCYTES NFR BLD: 7 % (ref 13–44)
MCH RBC QN AUTO: 31.4 PG (ref 26.1–32.9)
MCHC RBC AUTO-ENTMCNC: 35.6 G/DL (ref 31.4–35)
MCV RBC AUTO: 88.3 FL (ref 79.6–97.8)
MONOCYTES # BLD: 0.7 K/UL (ref 0.1–1.3)
MONOCYTES NFR BLD: 4 % (ref 4–12)
NEUTS SEG # BLD: 14.8 K/UL (ref 1.7–8.2)
NEUTS SEG NFR BLD: 88 % (ref 43–78)
PLATELET # BLD AUTO: 369 K/UL (ref 150–450)
PMV BLD AUTO: 10.9 FL (ref 10.8–14.1)
POTASSIUM SERPL-SCNC: 3.8 MMOL/L (ref 3.5–5.1)
PROT SERPL-MCNC: 8.6 G/DL (ref 6.3–8.2)
RBC # BLD AUTO: 5.92 M/UL (ref 4.23–5.67)
RBC #/AREA URNS HPF: >100 /HPF
SODIUM SERPL-SCNC: 138 MMOL/L (ref 136–145)
WBC # BLD AUTO: 16.7 K/UL (ref 4.3–11.1)
WBC URNS QL MICRO: ABNORMAL /HPF

## 2018-06-26 PROCEDURE — 74177 CT ABD & PELVIS W/CONTRAST: CPT

## 2018-06-26 PROCEDURE — 96360 HYDRATION IV INFUSION INIT: CPT | Performed by: EMERGENCY MEDICINE

## 2018-06-26 PROCEDURE — 96361 HYDRATE IV INFUSION ADD-ON: CPT | Performed by: EMERGENCY MEDICINE

## 2018-06-26 PROCEDURE — 80053 COMPREHEN METABOLIC PANEL: CPT | Performed by: EMERGENCY MEDICINE

## 2018-06-26 PROCEDURE — 74011000258 HC RX REV CODE- 258: Performed by: EMERGENCY MEDICINE

## 2018-06-26 PROCEDURE — 65270000029 HC RM PRIVATE

## 2018-06-26 PROCEDURE — 74011636320 HC RX REV CODE- 636/320: Performed by: EMERGENCY MEDICINE

## 2018-06-26 PROCEDURE — 81003 URINALYSIS AUTO W/O SCOPE: CPT | Performed by: EMERGENCY MEDICINE

## 2018-06-26 PROCEDURE — 74011250636 HC RX REV CODE- 250/636: Performed by: EMERGENCY MEDICINE

## 2018-06-26 PROCEDURE — 85025 COMPLETE CBC W/AUTO DIFF WBC: CPT | Performed by: EMERGENCY MEDICINE

## 2018-06-26 PROCEDURE — 81015 MICROSCOPIC EXAM OF URINE: CPT | Performed by: EMERGENCY MEDICINE

## 2018-06-26 PROCEDURE — 99284 EMERGENCY DEPT VISIT MOD MDM: CPT | Performed by: EMERGENCY MEDICINE

## 2018-06-26 RX ORDER — MORPHINE SULFATE 2 MG/ML
4 INJECTION, SOLUTION INTRAMUSCULAR; INTRAVENOUS ONCE
Status: COMPLETED | OUTPATIENT
Start: 2018-06-26 | End: 2018-06-26

## 2018-06-26 RX ORDER — ONDANSETRON 2 MG/ML
4 INJECTION INTRAMUSCULAR; INTRAVENOUS
Status: COMPLETED | OUTPATIENT
Start: 2018-06-26 | End: 2018-06-26

## 2018-06-26 RX ORDER — SODIUM CHLORIDE 0.9 % (FLUSH) 0.9 %
10 SYRINGE (ML) INJECTION
Status: COMPLETED | OUTPATIENT
Start: 2018-06-26 | End: 2018-06-26

## 2018-06-26 RX ADMIN — MORPHINE SULFATE 4 MG: 2 INJECTION, SOLUTION INTRAMUSCULAR; INTRAVENOUS at 21:54

## 2018-06-26 RX ADMIN — SODIUM CHLORIDE 1000 ML: 900 INJECTION, SOLUTION INTRAVENOUS at 18:01

## 2018-06-26 RX ADMIN — SODIUM CHLORIDE 100 ML: 900 INJECTION, SOLUTION INTRAVENOUS at 19:17

## 2018-06-26 RX ADMIN — Medication 10 ML: at 19:17

## 2018-06-26 RX ADMIN — ONDANSETRON 4 MG: 2 INJECTION, SOLUTION INTRAMUSCULAR; INTRAVENOUS at 21:54

## 2018-06-26 RX ADMIN — IOPAMIDOL 100 ML: 755 INJECTION, SOLUTION INTRAVENOUS at 19:17

## 2018-06-26 RX ADMIN — DIATRIZOATE MEGLUMINE AND DIATRIZOATE SODIUM 15 ML: 660; 100 LIQUID ORAL; RECTAL at 18:01

## 2018-06-26 NOTE — PROGRESS NOTES
Patient has been ordered for over an hour and has not received oral contrast, notified Alli Dupree in ER to let RN know

## 2018-06-26 NOTE — ED PROVIDER NOTES
HPI Comments: 55-year-old male presents with complaint of intermittent blood intermixed with stools over the past 2 months. States she was evaluated by his primary care physician and scheduled for colonoscopy on today. States that he took laxatives in preparation for colonoscopy and has not been able to have bowel movement therefore did undergo colonoscopy on today. States he's had only small \"teaspoon\" of stool today. Reports diffuse abdominal discomfort and cramping. States no flatus passed today. Patient does report history of previous hernia repair. Denies fever, chills, shortness of breath, dizziness, weakness, urinary symptoms, testicular pain or swelling, dizziness, weakness. Reports mild nausea and several episodes of nonbilious nonbloody emesis earlier today. Patient underwent KUB that his primary care physician's office with evidence of air-fluid levels indicating likely obstruction and was sent to ED for CT abdomen and pelvis. Patient is a 46 y.o. male presenting with constipation. The history is provided by the patient. No  was used. Constipation    This is a new problem. The current episode started 6 to 12 hours ago. Associated symptoms include abdominal pain and constipation. Pertinent negatives include no dysuria, no chills, no fever, no nausea, no back pain and no vomiting. He has tried nothing for the symptoms.         Past Medical History:   Diagnosis Date    Chest pain 8/18/2016    GERD (gastroesophageal reflux disease)     Tobacco abuse 8/18/2016       Past Surgical History:   Procedure Laterality Date    HX HERNIA REPAIR      HX ORTHOPAEDIC      arm         Family History:   Problem Relation Age of Onset    Hypertension Mother     Heart Disease Mother     No Known Problems Father        Social History     Social History    Marital status:      Spouse name: N/A    Number of children: N/A    Years of education: N/A     Occupational History    Not on file.     Social History Main Topics    Smoking status: Current Every Day Smoker     Packs/day: 1.00     Years: 30.00     Types: Cigarettes     Start date: 5/31/1988    Smokeless tobacco: Never Used    Alcohol use 8.4 oz/week     14 Cans of beer, 0 Standard drinks or equivalent per week    Drug use: No    Sexual activity: Not on file     Other Topics Concern    Not on file     Social History Narrative         ALLERGIES: Zithromax [azithromycin]    Review of Systems   Constitutional: Negative for chills and fever. HENT: Negative for congestion, facial swelling and sore throat. Respiratory: Negative for cough and shortness of breath. Cardiovascular: Negative for chest pain, palpitations and leg swelling. Gastrointestinal: Positive for abdominal pain, blood in stool and constipation. Negative for nausea and vomiting. Endocrine: Negative for polyphagia. Genitourinary: Negative for dysuria and hematuria. Musculoskeletal: Negative for back pain, joint swelling, myalgias and neck pain. Skin: Negative for pallor and wound. Neurological: Negative for dizziness, weakness, numbness and headaches. Vitals:    06/26/18 1603   BP: (!) 162/115   Pulse: (!) 103   Resp: 16   Temp: 98.3 °F (36.8 °C)   SpO2: 98%   Weight: 68 kg (150 lb)   Height: 5' 9\" (1.753 m)            Physical Exam   Constitutional: He is oriented to person, place, and time. He appears well-developed and well-nourished. Pt in NAD. HENT:   Head: Normocephalic. Mouth/Throat: Oropharynx is clear and moist. No oropharyngeal exudate. Eyes: Conjunctivae and EOM are normal. Pupils are equal, round, and reactive to light. Neck: Normal range of motion. No JVD present. No tracheal deviation present. Cardiovascular: Regular rhythm, normal heart sounds and intact distal pulses. Tachycardic. Pulses 2+ and equal throughout. Pulmonary/Chest: Effort normal and breath sounds normal. He has no rales. He exhibits no tenderness. CTAB.    Abdominal: Soft. He exhibits distension. There is tenderness. There is no rebound and no guarding. Diffuse abdominal TTP. No rebound or guarding. No CVAT. Musculoskeletal: Normal range of motion. He exhibits no edema, tenderness or deformity. Neurological: He is alert and oriented to person, place, and time. No cranial nerve deficit. Coordination normal.   Skin: Skin is warm and dry. No rash noted. No erythema. Nursing note and vitals reviewed. MDM  Number of Diagnoses or Management Options  Intestinal obstruction, unspecified cause, unspecified whether partial or complete [facility]): new and requires workup  Rectal mass: new and requires workup  Diagnosis management comments: General Surgery consulted. Recommend NGT, IVF hydration, NPO, and GI consult. Additionally recommend consultation with Hospitalist for admission. Amount and/or Complexity of Data Reviewed  Clinical lab tests: ordered and reviewed  Tests in the radiology section of CPT®: ordered and reviewed  Tests in the medicine section of CPT®: ordered and reviewed  Review and summarize past medical records: yes  Independent visualization of images, tracings, or specimens: yes    Risk of Complications, Morbidity, and/or Mortality  Presenting problems: moderate  Diagnostic procedures: moderate  Management options: moderate    Patient Progress  Patient progress: stable        ED Course   Comment By Time   CT abd/pelvis IMPRESSION:  Fluid-filled small and large bowel loops with increased soft tissue  suggesting an obstructing high rectal cancer.  Ash Covarrubias MD 06/26 2031       Procedures      Results Include:    Recent Results (from the past 24 hour(s))   CBC WITH AUTOMATED DIFF    Collection Time: 06/26/18  4:05 PM   Result Value Ref Range    WBC 16.7 (H) 4.3 - 11.1 K/uL    RBC 5.92 (H) 4.23 - 5.67 M/uL    HGB 18.6 (H) 13.6 - 17.2 g/dL    HCT 52.3 (H) 41.1 - 50.3 %    MCV 88.3 79.6 - 97.8 FL    MCH 31.4 26.1 - 32.9 PG    MCHC 35.6 (H) 31.4 - 35.0 g/dL    RDW 13.3 11.9 - 14.6 %    PLATELET 196 988 - 164 K/uL    MPV 10.9 10.8 - 14.1 FL    DF AUTOMATED      NEUTROPHILS 88 (H) 43 - 78 %    LYMPHOCYTES 7 (L) 13 - 44 %    MONOCYTES 4 4.0 - 12.0 %    EOSINOPHILS 0 (L) 0.5 - 7.8 %    BASOPHILS 0 0.0 - 2.0 %    IMMATURE GRANULOCYTES 1 0.0 - 5.0 %    ABS. NEUTROPHILS 14.8 (H) 1.7 - 8.2 K/UL    ABS. LYMPHOCYTES 1.1 0.5 - 4.6 K/UL    ABS. MONOCYTES 0.7 0.1 - 1.3 K/UL    ABS. EOSINOPHILS 0.0 0.0 - 0.8 K/UL    ABS. BASOPHILS 0.0 0.0 - 0.2 K/UL    ABS. IMM. GRANS. 0.1 0.0 - 0.5 K/UL   METABOLIC PANEL, COMPREHENSIVE    Collection Time: 06/26/18  4:05 PM   Result Value Ref Range    Sodium 138 136 - 145 mmol/L    Potassium 3.8 3.5 - 5.1 mmol/L    Chloride 98 98 - 107 mmol/L    CO2 27 21 - 32 mmol/L    Anion gap 13 7 - 16 mmol/L    Glucose 151 (H) 65 - 100 mg/dL    BUN 13 6 - 23 MG/DL    Creatinine 0.94 0.8 - 1.5 MG/DL    GFR est AA >60 >60 ml/min/1.73m2    GFR est non-AA >60 >60 ml/min/1.73m2    Calcium 9.3 8.3 - 10.4 MG/DL    Bilirubin, total 0.9 0.2 - 1.1 MG/DL    ALT (SGPT) 28 12 - 65 U/L    AST (SGOT) 34 15 - 37 U/L    Alk. phosphatase 94 50 - 136 U/L    Protein, total 8.6 (H) 6.3 - 8.2 g/dL    Albumin 3.6 3.5 - 5.0 g/dL    Globulin 5.0 (H) 2.3 - 3.5 g/dL    A-G Ratio 0.7 (L) 1.2 - 3.5           Maya Lugo MD; 6/26/2018 @5:26 PM Voice dictation software was used during the making of this note. This software is not perfect and grammatical and other typographical errors may be present.   This note has not been proofread for errors.  ===================================================================

## 2018-06-26 NOTE — IP AVS SNAPSHOT
Joni Mark 
 
 
 [address] [facility] 322 [facility] 
[phone] Patient: Nat North 
MRN: [id number] :[id number] About your hospitalization You were admitted on:  2018 You last received care in the:  [facility] 7 MED SURG You were discharged on:  July 3, 2018 Why you were hospitalized Your primary diagnosis was: Bowel Obstruction (Hcc) Your diagnoses also included:  Rectal Mass, Elevated Blood Pressure Reading, Tobacco Abuse, Leukocytosis, Generalized Abdominal Pain, Nausea With Vomiting Follow-up Information Follow up With Details Comments Contact Info Wayne Dougherty MD On 2018 3:30 pm 3[address] [facility] 360 [facility] [address] 
[phone] Portia Diaz DO On 7/10/2018 3 pm  [address] [facility] [address] 
[phone] Your Scheduled Appointments Tuesday July 10, 2018  3:00 PM EDT Office Visit with Portia Diaz DO  
[facility] ([address] [place] [address]) [address] [facility] [address]  
[phone] 2018  3:30 PM EDT Global Post Op with Wayne Dougherty MD  
[facility] - MAIN ([facility] MAIN) [facility] [address]  
[phone] Discharge Orders None A check ramana indicates which time of day the medication should be taken. My Medications START taking these medications Instructions Each Dose to Equal  
 Morning Noon Evening Bedtime  
 carvedilol 6.25 mg tablet Commonly known as:  Deborah Peng Your next dose is: This evening Take 1 Tab by mouth two (2) times daily (with meals). 6.25 mg  
    
  
   
   
  
   
  
 lisinopril 10 mg tablet Commonly known as:  Tenzin Parryenstein Start taking on:  2018 Your next dose is:  Tomorrow Morning Take 1 Tab by mouth daily.   
 10 mg  
    
  
   
   
   
  
 pantoprazole 40 mg tablet Commonly known as:  PROTONIX Start taking on:  7/4/2018 Your next dose is:  Tomorrow Morning Take 1 Tab by mouth Daily (before breakfast). 40 mg  
    
  
   
   
   
  
 potassium chloride 20 mEq packet Commonly known as:  KLOR-CON Your next dose is: This evening Take 2 Packets by mouth two (2) times daily (with meals) for 4 days. 40 mEq Where to Get Your Medications Information on where to get these meds will be given to you by the nurse or doctor. ! Ask your nurse or doctor about these medications  
  carvedilol 6.25 mg tablet  
 lisinopril 10 mg tablet  
 pantoprazole 40 mg tablet  
 potassium chloride 20 mEq packet Discharge Instructions Colostomy: What to Expect at [facility] Your Recovery After a colostomy you can expect to feel better and stronger each day, but you may get tired quickly at first. Your belly may be sore, and you will probably need pain medicine for a week or two. Your stoma will be swollen at first. This is normal. 
You may have very loose stools in your colostomy bag for a while. In time your stools may become firmer, but they will be less solid than before your surgery. You may also have a lot of gas pass into your colostomy bag in the weeks after surgery. This will decrease as you heal. 
How quickly you get better depends, in part, on whether you had a laparoscopic or open surgery. But you will probably need at least 6 weeks to get back to your normal routine. This care sheet gives you a general idea about how long it will take for you to recover. But each person recovers at a different pace. Follow the steps below to get better as quickly as possible. How can you care for yourself at home? Activity ? · Rest when you feel tired. Getting enough sleep will help you recover. ? · Try to walk each day.  Start by walking a little more than you did the day before. Bit by bit, increase the amount you walk. Walking boosts blood flow and helps prevent pneumonia. ? · Avoid strenuous activities, such as biking, jogging, weight lifting, or aerobic exercise, until your doctor says it is okay. ? · For at least 6 weeks, avoid lifting anything that would make you strain. This may include heavy grocery bags and milk containers, a heavy briefcase or backpack, cat litter or dog food bags, a vacuum , or a child. ? · Ask your doctor when you can drive again. ? · You will probably need to take 6 weeks off from work. It depends on the type of work you do and how you feel. ? · You can take a bath or shower as usual. You can bathe with your colostomy bag on or off. ? · Ask your doctor when it is okay for you to have sex. Diet ? · You may need to follow a low-fiber diet for the first few weeks after your surgery. ? · Drink plenty of fluids (unless your doctor tells you not to). Medicines ? · Your doctor will tell you if and when you can restart your medicines. He or she will also give you instructions about taking any new medicines. ? · If you take blood thinners, such as warfarin (Coumadin), clopidogrel (Plavix), or aspirin, be sure to talk to your doctor. He or she will tell you if and when to start taking those medicines again. Make sure that you understand exactly what your doctor wants you to do. ? · Take pain medicines exactly as directed. ¨ If the doctor gave you a prescription medicine for pain, take it as prescribed. ¨ If you are not taking a prescription pain medicine, ask your doctor if you can take an over-the-counter medicine. ? · If your doctor prescribed antibiotics, take them as directed. Do not stop taking them just because you feel better. You need to take the full course of antibiotics. ?  · If you think your pain medicine is making you sick to your stomach: 
 ¨ Take your medicine after meals (unless your doctor has told you not to). ¨ Ask your doctor for a different pain medicine. Incision care ? · If you have strips of tape on the cut (incision) the doctor made, leave the tape on for a week or until it falls off. Or follow your doctor's instructions for removing the tape. ? · Wash the area daily with warm, soapy water, and pat it dry. Don't use hydrogen peroxide or alcohol, which can slow healing. You may cover the area with a gauze bandage if it weeps or rubs against clothing. Change the bandage every day. ? · Keep the area clean and dry. Other instructions ? · Keep the area around your stoma clean and dry. ? · Follow all instructions from your doctor or ostomy nurse. ? · Empty and replace your colostomy bag as often as directed by your doctor or ostomy nurse. Follow-up care is a key part of your treatment and safety. Be sure to make and go to all appointments, and call your doctor if you are having problems. It's also a good idea to know your test results and keep a list of the medicines you take. When should you call for help? Call 911 anytime you think you may need emergency care. For example, call if: 
? · You passed out (lost consciousness). ? · You are short of breath. ?Call your doctor now or seek immediate medical care if: 
? · You have pain that does not get better after you take your pain medicine. ? · You have signs of infection, such as: 
¨ Increased pain, swelling, warmth, or redness. ¨ Red streaks leading from the stoma. ¨ Pus draining from the stoma. ¨ A fever. ? · You are sick to your stomach or cannot drink fluids. ? · You cannot pass stools or gas. ? · Bright red blood has soaked through the bandage over your incision. ? · You have loose stitches, or your incision comes open. ? · You have signs of a blood clot in your leg (called a deep vein thrombosis), such as: ¨ Pain in your calf, back of knee, thigh, or groin. ¨ Redness and swelling in your leg or groin. ? · You have a problem with your stoma. ? Watch closely for changes in your health, and be sure to contact your doctor if you have any problems. Where can you learn more? Go to http://addi-rebecca.info/. Enter R587 in the search box to learn more about \"Colostomy: What to Expect at Home. \" Current as of: May 12, 2017 Content Version: 11.4 © 8691-3873 myThings. Care instructions adapted under license by Spotjournal (which disclaims liability or warranty for this information). If you have questions about a medical condition or this instruction, always ask your healthcare professional. [address] any warranty or liability for your use of this information. Ostomy Care: Care Instructions Your Care Instructions When a part of your intestine doesn't work as it should, a doctor can do surgery to make an opening in your belly and bring a part of your intestine to the surface of your skin. This opening is called an ostomy. There are two types. A colostomy is an ostomy of the colon. An ileostomy is an ostomy of the small intestine. With an ostomy, waste no longer leaves your body from your anus. It leaves your body through the part of your intestine at the ostomy opening. This part of the intestine is called the stoma. There's no muscle around the stoma. So you can't control when waste or gas leaves your body. Now your waste automatically goes from the stoma into a plastic bag (pouch) around the stoma. This pouch will block the smell of the waste. It can't be seen when you are wearing clothes. You can learn to take care of your ostomy. Good care can make living with a stoma easier. It can help keep a good seal between the skin and the pouch. This can prevent your skin from getting irritated. Follow-up care is a key part of your treatment and safety. Be sure to make and go to all appointments, and call your doctor if you are having problems. It's also a good idea to know your test results and keep a list of the medicines you take. How can you care for yourself at home? · If the skin under your pouch is red, irritated, or itchy, you need to treat your skin. Follow these steps: ¨ Gently remove the pouch. ¨ Clean the skin under the pouch with water. ¨ Dry the skin. ¨ Sprinkle ostomy powder on the skin. Then gently wipe off the extra powder. ¨ Reattach or replace the pouch. · If you continue to have skin irritation, talk to your ostomy nurse. · Follow all instructions from your ostomy nurse. · Empty and replace your ostomy pouch as often as your nurse recommends. · Be safe with medicines. Take your medicines exactly as prescribed. Call your doctor if you think you are having a problem with your medicine. You will get more details on the specific medicines your doctor prescribes. When should you call for help? Call your doctor now or seek immediate medical care if: 
? · You are vomiting. ? · You have new or worse belly pain. ? · You have a fever. ? · You cannot pass stools or gas. ? Watch closely for changes in your health, and be sure to contact your doctor if: 
? · Your stoma turns pale or changes color. ? · Your stoma swells or bleeds. ? · You have little or no waste going into your pouch. Where can you learn more? Go to http://addi-rebecca.info/. Enter 27 508 289 in the search box to learn more about \"Ostomy Care: Care Instructions. \" Current as of: May 12, 2017 Content Version: 11.4 © 0587-8881 Snocap. Care instructions adapted under license by [employer]ubeTree (which disclaims liability or warranty for this information).  If you have questions about a medical condition or this instruction, always ask your healthcare professional. [place] [address] any warranty or liability for your use of this information. DISCHARGE SUMMARY from Nurse PATIENT INSTRUCTIONS: 
 
 
F-face looks uneven A-arms unable to move or move unevenly S-speech slurred or non-existent T-time-call 911 as soon as signs and symptoms begin-DO NOT go Back to bed or wait to see if you get better-TIME IS BRAIN. Warning Signs of HEART ATTACK Call 911 if you have these symptoms: 
? Chest discomfort. Most heart attacks involve discomfort in the center of the chest that lasts more than a few minutes, or that goes away and comes back. It can feel like uncomfortable pressure, squeezing, fullness, or pain. ? Discomfort in other areas of the upper body. Symptoms can include pain or discomfort in one or both arms, the back, neck, jaw, or stomach. ? Shortness of breath with or without chest discomfort. ? Other signs may include breaking out in a cold sweat, nausea, or lightheadedness. Don't wait more than five minutes to call [address]! Fast action can save your life. Calling 911 is almost always the fastest way to get lifesaving treatment. Emergency Medical Services staff can begin treatment when they arrive  up to an hour sooner than if someone gets to the hospital by car. The discharge information has been reviewed with the patient. The patient verbalized understanding. Discharge medications reviewed with the patient and appropriate educational materials and side effects teaching were provided. ___________________________________________________________________________________________________________________________________ Introducing [facility]!    
 Dear Amanda Yi: 
 Thank you for requesting a [employer] account. Our records indicate that you already have an active [employer] account. You can access your account anytime at [email or web address] Did you know that you can access your hospital and ER discharge instructions at any time in [employer]? You can also review all of your test results from your hospital stay or ER visit. Additional Information If you have questions, please visit the Frequently Asked Questions section of the [employer] website at [email or web address] Remember, [employer] is NOT to be used for urgent needs. For medical emergencies, dial 911. Now available from your iPhone and Android! Introducing Clarence Berman As a [facility] patient, I wanted to make you aware of our electronic visit tool called Clarence Berman. TreeRing [employer] allows you to connect within minutes with a medical provider 24 hours a day, seven days a week via a mobile device or tablet or logging into a secure website from your computer. You can access Clarence Berman from anywhere in the [place]. A virtual visit might be right for you when you have a simple condition and feel like you just dont want to get out of bed, or cant get away from work for an appointment, when your regular [facility] provider is not available (evenings, weekends or holidays), or when youre out of town and need minor care. Electronic visits cost only $49 and if the Food.eeude 24/7 provider determines a prescription is needed to treat your condition, one can be electronically transmitted to a nearby pharmacy*. Please take a moment to enroll today if you have not already done so. The enrollment process is free and takes just a few minutes. To enroll, please download the [employer] kalpesh to your tablet or phone, or visit [email or web address] to enroll on your computer. And, as an [address] [place] [address] patient with a [employer] account, the results of your visits will be scanned into your electronic medical record and your primary care provider will be able to view the scanned results. We urge you to continue to see your regular [facility] provider for your ongoing medical care. And while your primary care provider may not be the one available when you seek a Clarence Riggsfin virtual visit, the peace of mind you get from getting a real diagnosis real time can be priceless. For more information on Clarence Magnet Systemssharifin, view our Frequently Asked Questions (FAQs) at [email or web address]. Sincerely, 
 
Mayo Baxter MD 
Chief Medical Officer Boo[employer] Zoila Frank *:  certain medications cannot be prescribed via [employer] Unresulted Labs-Please follow up with your PCP about these lab tests Order Current Status CULTURE, BLOOD Preliminary result CULTURE, BLOOD Preliminary result Providers Seen During Your Hospitalization Provider Specialty Primary office phone [address] [facility]., [place] Emergency Medicine [phone] [facility] Ricmoncharlotte Jollyo, [address] [place] [address] Internal Medicine [phone] Your Primary Care Physician (PCP) Primary Care Physician Office Phone Office Fax Tono Stone [phone] You are allergic to the following Allergen Reactions Zithromax (Azithromycin) Rash Recent Documentation Height Weight BMI Smoking Status 1.753 m 68 kg 22.15 kg/m2 Current Every Day Smoker Emergency Contacts Name Discharge Info Relation Home Work Mobile Merced Boss  Spouse [3] [phone] Patient Belongings The following personal items are in your possession at time of discharge: 
  Dental Appliances: None  Visual Aid: Glasses             Clothing: At bedside Please provide this summary of care documentation to your next provider. Signatures-by signing, you are acknowledging that this After Visit Summary has been reviewed with you and you have received a copy. Patient Signature:  ____________________________________________________________ Date:  ____________________________________________________________  
  
Samreen Shevlin Provider Signature:  ____________________________________________________________ Date:  ____________________________________________________________

## 2018-06-26 NOTE — ED TRIAGE NOTES
Pt states he has been passing blood in his stool for one or 2 months. Pt reports he was supposed to have a colonoscopy but that the laxatives backed him up and now he can only have small teaspoon stools.  Pt reports his abd is sore and cramping as well

## 2018-06-27 ENCOUNTER — ANESTHESIA (OUTPATIENT)
Dept: ENDOSCOPY | Age: 53
DRG: 330 | End: 2018-06-27
Payer: COMMERCIAL

## 2018-06-27 ENCOUNTER — ANESTHESIA EVENT (OUTPATIENT)
Dept: ENDOSCOPY | Age: 53
DRG: 330 | End: 2018-06-27
Payer: COMMERCIAL

## 2018-06-27 ENCOUNTER — APPOINTMENT (OUTPATIENT)
Dept: GENERAL RADIOLOGY | Age: 53
DRG: 330 | End: 2018-06-27
Attending: INTERNAL MEDICINE
Payer: COMMERCIAL

## 2018-06-27 LAB
ALBUMIN SERPL-MCNC: 2.8 G/DL (ref 3.5–5)
ALBUMIN/GLOB SERPL: 0.8 {RATIO} (ref 1.2–3.5)
ALP SERPL-CCNC: 67 U/L (ref 50–136)
ALT SERPL-CCNC: 19 U/L (ref 12–65)
ANION GAP SERPL CALC-SCNC: 12 MMOL/L (ref 7–16)
AST SERPL-CCNC: 17 U/L (ref 15–37)
BILIRUB SERPL-MCNC: 0.9 MG/DL (ref 0.2–1.1)
BUN SERPL-MCNC: 16 MG/DL (ref 6–23)
CALCIUM SERPL-MCNC: 8.1 MG/DL (ref 8.3–10.4)
CEA SERPL-MCNC: 2.6 NG/ML (ref 0–3)
CHLORIDE SERPL-SCNC: 101 MMOL/L (ref 98–107)
CO2 SERPL-SCNC: 27 MMOL/L (ref 21–32)
CREAT SERPL-MCNC: 0.65 MG/DL (ref 0.8–1.5)
ERYTHROCYTE [DISTWIDTH] IN BLOOD BY AUTOMATED COUNT: 13.4 % (ref 11.9–14.6)
GLOBULIN SER CALC-MCNC: 3.5 G/DL (ref 2.3–3.5)
GLUCOSE SERPL-MCNC: 115 MG/DL (ref 65–100)
HCT VFR BLD AUTO: 47.2 % (ref 41.1–50.3)
HGB BLD-MCNC: 16.3 G/DL (ref 13.6–17.2)
MCH RBC QN AUTO: 30.8 PG (ref 26.1–32.9)
MCHC RBC AUTO-ENTMCNC: 34.5 G/DL (ref 31.4–35)
MCV RBC AUTO: 89.1 FL (ref 79.6–97.8)
PLATELET # BLD AUTO: 317 K/UL (ref 150–450)
PMV BLD AUTO: 10.2 FL (ref 10.8–14.1)
POTASSIUM SERPL-SCNC: 3.6 MMOL/L (ref 3.5–5.1)
PROT SERPL-MCNC: 6.3 G/DL (ref 6.3–8.2)
RBC # BLD AUTO: 5.3 M/UL (ref 4.23–5.67)
SODIUM SERPL-SCNC: 140 MMOL/L (ref 136–145)
WBC # BLD AUTO: 18.4 K/UL (ref 4.3–11.1)

## 2018-06-27 PROCEDURE — 82378 CARCINOEMBRYONIC ANTIGEN: CPT | Performed by: SURGERY

## 2018-06-27 PROCEDURE — 77030009426 HC FCPS BIOP ENDOSC BSC -B: Performed by: INTERNAL MEDICINE

## 2018-06-27 PROCEDURE — 74018 RADEX ABDOMEN 1 VIEW: CPT

## 2018-06-27 PROCEDURE — 74011250636 HC RX REV CODE- 250/636: Performed by: ANESTHESIOLOGY

## 2018-06-27 PROCEDURE — 65270000029 HC RM PRIVATE

## 2018-06-27 PROCEDURE — 80053 COMPREHEN METABOLIC PANEL: CPT | Performed by: SURGERY

## 2018-06-27 PROCEDURE — 76060000032 HC ANESTHESIA 0.5 TO 1 HR: Performed by: INTERNAL MEDICINE

## 2018-06-27 PROCEDURE — 74011250636 HC RX REV CODE- 250/636: Performed by: INTERNAL MEDICINE

## 2018-06-27 PROCEDURE — 74011000250 HC RX REV CODE- 250

## 2018-06-27 PROCEDURE — 77030008477 HC STYL SATN SLP COVD -A: Performed by: ANESTHESIOLOGY

## 2018-06-27 PROCEDURE — 88305 TISSUE EXAM BY PATHOLOGIST: CPT | Performed by: INTERNAL MEDICINE

## 2018-06-27 PROCEDURE — 74011250637 HC RX REV CODE- 250/637

## 2018-06-27 PROCEDURE — 74011250636 HC RX REV CODE- 250/636

## 2018-06-27 PROCEDURE — 0DBP8ZX EXCISION OF RECTUM, VIA NATURAL OR ARTIFICIAL OPENING ENDOSCOPIC, DIAGNOSTIC: ICD-10-PCS | Performed by: INTERNAL MEDICINE

## 2018-06-27 PROCEDURE — 77030020263 HC SOL INJ SOD CL0.9% LFCR 1000ML

## 2018-06-27 PROCEDURE — 85027 COMPLETE CBC AUTOMATED: CPT | Performed by: SURGERY

## 2018-06-27 PROCEDURE — 76040000025: Performed by: INTERNAL MEDICINE

## 2018-06-27 PROCEDURE — 74011000250 HC RX REV CODE- 250: Performed by: ANESTHESIOLOGY

## 2018-06-27 PROCEDURE — 77030008703 HC TU ET UNCUF COVD -A: Performed by: ANESTHESIOLOGY

## 2018-06-27 PROCEDURE — 74011000250 HC RX REV CODE- 250: Performed by: NURSE PRACTITIONER

## 2018-06-27 PROCEDURE — 77030020255 HC SOL INJ LR 1000ML BG

## 2018-06-27 RX ORDER — SUCCINYLCHOLINE CHLORIDE 20 MG/ML
INJECTION INTRAMUSCULAR; INTRAVENOUS AS NEEDED
Status: DISCONTINUED | OUTPATIENT
Start: 2018-06-27 | End: 2018-06-27 | Stop reason: HOSPADM

## 2018-06-27 RX ORDER — PROPOFOL 10 MG/ML
INJECTION, EMULSION INTRAVENOUS AS NEEDED
Status: DISCONTINUED | OUTPATIENT
Start: 2018-06-27 | End: 2018-06-27 | Stop reason: HOSPADM

## 2018-06-27 RX ORDER — SODIUM CHLORIDE, SODIUM LACTATE, POTASSIUM CHLORIDE, CALCIUM CHLORIDE 600; 310; 30; 20 MG/100ML; MG/100ML; MG/100ML; MG/100ML
100 INJECTION, SOLUTION INTRAVENOUS CONTINUOUS
Status: DISCONTINUED | OUTPATIENT
Start: 2018-06-27 | End: 2018-06-27 | Stop reason: HOSPADM

## 2018-06-27 RX ORDER — SODIUM CHLORIDE 0.9 % (FLUSH) 0.9 %
5-10 SYRINGE (ML) INJECTION AS NEEDED
Status: DISCONTINUED | OUTPATIENT
Start: 2018-06-27 | End: 2018-07-03 | Stop reason: HOSPADM

## 2018-06-27 RX ORDER — SODIUM CHLORIDE 0.9 % (FLUSH) 0.9 %
5-10 SYRINGE (ML) INJECTION EVERY 8 HOURS
Status: DISCONTINUED | OUTPATIENT
Start: 2018-06-27 | End: 2018-07-03 | Stop reason: HOSPADM

## 2018-06-27 RX ORDER — SODIUM CHLORIDE 0.9 % (FLUSH) 0.9 %
5-10 SYRINGE (ML) INJECTION AS NEEDED
Status: DISCONTINUED | OUTPATIENT
Start: 2018-06-27 | End: 2018-06-27 | Stop reason: HOSPADM

## 2018-06-27 RX ORDER — MORPHINE SULFATE 2 MG/ML
2 INJECTION, SOLUTION INTRAMUSCULAR; INTRAVENOUS
Status: DISCONTINUED | OUTPATIENT
Start: 2018-06-27 | End: 2018-06-29

## 2018-06-27 RX ORDER — ONDANSETRON 2 MG/ML
INJECTION INTRAMUSCULAR; INTRAVENOUS AS NEEDED
Status: DISCONTINUED | OUTPATIENT
Start: 2018-06-27 | End: 2018-06-27 | Stop reason: HOSPADM

## 2018-06-27 RX ORDER — NALOXONE HYDROCHLORIDE 0.4 MG/ML
0.4 INJECTION, SOLUTION INTRAMUSCULAR; INTRAVENOUS; SUBCUTANEOUS AS NEEDED
Status: DISCONTINUED | OUTPATIENT
Start: 2018-06-27 | End: 2018-07-03 | Stop reason: HOSPADM

## 2018-06-27 RX ORDER — ENALAPRILAT 1.25 MG/ML
0.62 INJECTION INTRAVENOUS EVERY 6 HOURS
Status: DISCONTINUED | OUTPATIENT
Start: 2018-06-27 | End: 2018-07-01

## 2018-06-27 RX ORDER — ALBUTEROL SULFATE 90 UG/1
AEROSOL, METERED RESPIRATORY (INHALATION) AS NEEDED
Status: DISCONTINUED | OUTPATIENT
Start: 2018-06-27 | End: 2018-06-27 | Stop reason: HOSPADM

## 2018-06-27 RX ORDER — ONDANSETRON 2 MG/ML
4 INJECTION INTRAMUSCULAR; INTRAVENOUS
Status: DISCONTINUED | OUTPATIENT
Start: 2018-06-27 | End: 2018-07-03 | Stop reason: HOSPADM

## 2018-06-27 RX ORDER — ROCURONIUM BROMIDE 10 MG/ML
INJECTION, SOLUTION INTRAVENOUS AS NEEDED
Status: DISCONTINUED | OUTPATIENT
Start: 2018-06-27 | End: 2018-06-27 | Stop reason: HOSPADM

## 2018-06-27 RX ORDER — FAMOTIDINE 20 MG/1
20 TABLET, FILM COATED ORAL AS NEEDED
Status: DISCONTINUED | OUTPATIENT
Start: 2018-06-27 | End: 2018-06-27 | Stop reason: HOSPADM

## 2018-06-27 RX ORDER — IBUPROFEN 200 MG
1 TABLET ORAL
Status: DISCONTINUED | OUTPATIENT
Start: 2018-06-27 | End: 2018-07-03 | Stop reason: HOSPADM

## 2018-06-27 RX ORDER — LIDOCAINE HYDROCHLORIDE 20 MG/ML
INJECTION, SOLUTION EPIDURAL; INFILTRATION; INTRACAUDAL; PERINEURAL AS NEEDED
Status: DISCONTINUED | OUTPATIENT
Start: 2018-06-27 | End: 2018-06-27 | Stop reason: HOSPADM

## 2018-06-27 RX ORDER — SODIUM CHLORIDE 9 MG/ML
75 INJECTION, SOLUTION INTRAVENOUS CONTINUOUS
Status: DISCONTINUED | OUTPATIENT
Start: 2018-06-27 | End: 2018-06-28

## 2018-06-27 RX ADMIN — SODIUM CHLORIDE 75 ML/HR: 900 INJECTION, SOLUTION INTRAVENOUS at 04:31

## 2018-06-27 RX ADMIN — ALBUTEROL SULFATE 3 PUFF: 90 AEROSOL, METERED RESPIRATORY (INHALATION) at 15:00

## 2018-06-27 RX ADMIN — Medication 10 ML: at 10:07

## 2018-06-27 RX ADMIN — ENALAPRILAT 0.62 MG: 1.25 INJECTION INTRAVENOUS at 17:46

## 2018-06-27 RX ADMIN — ONDANSETRON 4 MG: 2 INJECTION INTRAMUSCULAR; INTRAVENOUS at 14:56

## 2018-06-27 RX ADMIN — SODIUM CHLORIDE, SODIUM LACTATE, POTASSIUM CHLORIDE, AND CALCIUM CHLORIDE 100 ML/HR: 600; 310; 30; 20 INJECTION, SOLUTION INTRAVENOUS at 13:55

## 2018-06-27 RX ADMIN — SODIUM CHLORIDE 75 ML/HR: 900 INJECTION, SOLUTION INTRAVENOUS at 10:06

## 2018-06-27 RX ADMIN — MORPHINE SULFATE 2 MG: 2 INJECTION, SOLUTION INTRAMUSCULAR; INTRAVENOUS at 20:30

## 2018-06-27 RX ADMIN — Medication 10 ML: at 17:46

## 2018-06-27 RX ADMIN — Medication 10 ML: at 20:31

## 2018-06-27 RX ADMIN — ROCURONIUM BROMIDE 5 MG: 10 INJECTION, SOLUTION INTRAVENOUS at 14:46

## 2018-06-27 RX ADMIN — SUCCINYLCHOLINE CHLORIDE 140 MG: 20 INJECTION INTRAMUSCULAR; INTRAVENOUS at 14:46

## 2018-06-27 RX ADMIN — LIDOCAINE HYDROCHLORIDE 80 MG: 20 INJECTION, SOLUTION EPIDURAL; INFILTRATION; INTRACAUDAL; PERINEURAL at 14:46

## 2018-06-27 RX ADMIN — PROPOFOL 200 MG: 10 INJECTION, EMULSION INTRAVENOUS at 14:46

## 2018-06-27 RX ADMIN — FAMOTIDINE 20 MG: 10 INJECTION, SOLUTION INTRAVENOUS at 14:00

## 2018-06-27 NOTE — INTERVAL H&P NOTE
H&P Update:  Demond Kay was seen and examined. History and physical has been reviewed.  Significant clinical changes have occurred as noted:  none    Signed By: Kris Dumont MD     June 27, 2018 1:57 PM

## 2018-06-27 NOTE — ROUTINE PROCESS
Patient's NG tube came loose and started coming out. Zeus Rimes and I stopped the tube from completely coming out and re-advanced the tube down. I heard air bolus in the abdomen. Radiology in patient's room to get KUB.

## 2018-06-27 NOTE — CONSULTS
H&P/Consult Note/Progress Note/Office Note:   Harjit Georges  MRN: [id number]  :1965  Age:52 y.o.    HPI: Harjit Georges is a 46 y.o. male who was sent to the ER yesterday by his gastroenterologist after a failed bowel prep prior to a colonoscopy. He has PMHx of tobacco abuse and elevated blood pressure. He reportedly has been having blood in his stool for about 2 months. He was being seen by GI and had a colonoscopy scheduled. He took the bowel prep but only had a \"teaspoon\" of results with vomiting after attempting to take in prep. In the ER,  A CT abdomen/pelvis was obtained that is concerning for fluid-filled small and large bowel loops with increased soft tissue suggesting an obstructing high rectal cancer. He has never had a colonoscopy. He denies abdominal pain at the present and reports BM this morning. He has an NGT with brown-tinged output. We are consulted for surgical intervention of obstructing mass.       Past Medical History:   Diagnosis Date    Chest pain 2016    GERD (gastroesophageal reflux disease)     Tobacco abuse 2016     Past Surgical History:   Procedure Laterality Date    HX HERNIA REPAIR      HX ORTHOPAEDIC      arm     Current Facility-Administered Medications   Medication Dose Route Frequency    sodium chloride (NS) flush 5-10 mL  5-10 mL IntraVENous Q8H    sodium chloride (NS) flush 5-10 mL  5-10 mL IntraVENous PRN    morphine injection 2 mg  2 mg IntraVENous Q3H PRN    ondansetron (ZOFRAN) injection 4 mg  4 mg IntraVENous Q4H PRN    naloxone (NARCAN) injection 0.4 mg  0.4 mg IntraVENous PRN    nicotine (NICODERM CQ) 14 mg/24 hr patch 1 Patch  1 Patch TransDERmal DAILY PRN    0.9% sodium chloride infusion  75 mL/hr IntraVENous CONTINUOUS     Zithromax [azithromycin]  Social History     Social History    Marital status:      Spouse name: N/A    Number of children: N/A    Years of education: N/A     Social History Main Topics    Smoking status: Current Every Day Smoker     Packs/day: 1.00     Years: 30.00     Types: Cigarettes     Start date: 5/31/1988    Smokeless tobacco: Never Used    Alcohol use 8.4 oz/week     14 Cans of beer, 0 Standard drinks or equivalent per week    Drug use: No    Sexual activity: Not Asked     Other Topics Concern    None     Social History Narrative     History   Smoking Status    Current Every Day Smoker    Packs/day: 1.00    Years: 30.00    Types: Cigarettes    Start date: 5/31/1988   Smokeless Tobacco    Never Used     Family History   Problem Relation Age of Onset    Hypertension Mother     Heart Disease Mother     No Known Problems Father      ROS: The patient has no difficulty with chest pain or shortness of breath. No fever or chills. Comprehensive review of systems was otherwise unremarkable except as noted above. Physical Exam:   Visit Vitals    /81 (BP 1 Location: Right arm, BP Patient Position: Sitting)    Pulse 75    Temp 98.6 °F (37 °C)    Resp 20    Ht 5' 9\" (1.753 m)    Wt 150 lb (68 kg)    SpO2 96%    BMI 22.15 kg/m2     Constitutional: Alert, oriented, cooperative patient in no acute distress; appears stated age    Eyes:Sclera are clear. EOMs intact  ENMT: no external lesions gross hearing normal; no obvious neck masses, no ear or lip lesions, nares normal  CV: RRR. Normal perfusion  Resp: No JVD. Breathing is  non-labored; no audible wheezing. GI: soft and non-distended, non-tender    Rectal: deferred  Musculoskeletal: unremarkable with normal function. No embolic signs or cyanosis.    Neuro:  Oriented; moves all 4; no focal deficits  Psychiatric: normal affect and mood, no memory impairment    Recent vitals (if inpt):  Patient Vitals for the past 24 hrs:   BP Temp Pulse Resp SpO2 Height Weight   06/27/18 1121 163/81 98.6 °F (37 °C) 75 20 96 % - -   06/27/18 0745 138/87 98.3 °F (36.8 °C) 72 20 96 % - -   06/27/18 0702 - - 69 - 97 % - -   06/27/18 0029 (!) 150/97 - - - 95 % - -   06/27/18 0004 158/79 - - - 94 % - -   06/26/18 2359 158/79 - - - 94 % - -   06/26/18 2329 151/88 - - - 94 % - -   06/26/18 2259 (!) 145/99 - - - 94 % - -   06/26/18 2229 (!) 146/92 - - - 95 % - -   06/26/18 1603 (!) 162/115 98.3 °F (36.8 °C) (!) 103 16 98 % 5' 9\" (1.753 m) 150 lb (68 kg)   06/26/18 1551 - - - - 98 % - -       Labs:  Recent Labs      06/27/18   0430   WBC  18.4*   HGB  16.3   PLT  317   NA  140   K  3.6   CL  101   CO2  27   BUN  16   CREA  0.65*   GLU  115*   TBILI  0.9   SGOT  17   ALT  19   AP  67       Lab Results   Component Value Date/Time    WBC 18.4 (H) 06/27/2018 04:30 AM    HGB 16.3 06/27/2018 04:30 AM    PLATELET 994 94/09/6501 04:30 AM    Sodium 140 06/27/2018 04:30 AM    Potassium 3.6 06/27/2018 04:30 AM    Chloride 101 06/27/2018 04:30 AM    CO2 27 06/27/2018 04:30 AM    BUN 16 06/27/2018 04:30 AM    Creatinine 0.65 (L) 06/27/2018 04:30 AM    Glucose 115 (H) 06/27/2018 04:30 AM    Bilirubin, total 0.9 06/27/2018 04:30 AM    AST (SGOT) 17 06/27/2018 04:30 AM    ALT (SGPT) 19 06/27/2018 04:30 AM    Alk. phosphatase 67 06/27/2018 04:30 AM       I reviewed recent labs and recent radiologic studies. CT Results (most recent):    Results from Hospital Encounter encounter on 06/26/18   CT ABD PELV W CONT   Narrative CT ABDOMEN AND PELVIS WITH CONTRAST. HISTORY: Abdominal pain and cramping. Blood in stool. COMPARISON: None    TECHNIQUE: 5 mm axial scans from above the diaphragms to the pubic symphysis  following oral and 100 cc intravenous contrast without acute complication. Intravenous contrast was given to increase the sensitivity to acute  inflammation. Radiation dose reduction techniques were used for this study. Our CT scanners use one or more of the following: Automated exposure control,  adjustment of the mA and or kV according to patient size, iterative  reconstruction. FINDINGS:   Abdomen: No free air. The lung bases are clear.  There is fluid in the distal  esophagus, probably reflux. Oral contrast has not left the stomach. Small bowel  loops are fluid-filled and mildly dilated throughout. No transition zone  appreciated. Large bowel loops are also fluid-filled. Small hypoattenuating foci  in the liver, probably subcentimeter cysts. The spleen is small and  homogeneous. . No calcified gallstones. The biliary tree is not dilated. The  pancreas is unremarkable. No free fluid, acute inflammatory changes or  adenopathy. The kidneys enhance uniformly. No radiopaque renal calculi. No  hydronephrosis. The adrenal glands are normal size. Aorta is normal caliber. Pelvis: The colon is fluid-filled down to the level of the rectosigmoid where  there is increased soft tissue suggesting an obstructing high rectal mass. The  urinary bladder unremarkable. The prostate is mildly enlarged. Seminal vesicles  symmetric. Impression IMPRESSION:  Fluid-filled small and large bowel loops with increased soft tissue  suggesting an obstructing high rectal cancer. XR Results (most recent):    Results from Hospital Encounter encounter on 06/26/18   XR ABD (KUB)   Narrative KUB    CLINICAL INDICATION:  Follow-up bowel obstruction, feeding tube placement,  rectal mass    COMPARISON: 6/26/2018 radiograph and CT    TECHNIQUE: A single AP supine view of the abdomen 7:27 AM upright    FINDINGS:  There is an enteric tube with tip projecting over left upper abdomen  likely at the level of the gastric fundus, and side port near the distal  esophagus. If positioning in the gastric body is desired this should be advanced  at least 7 cm. The bowel gas pattern demonstrates a few nonspecific loops overall decreased in  prominence since the prior CT. There are mild linear opacities in the left lung  base, likely atelectasis or pneumonitis. No evidence of free air. Impression IMPRESSION:    1. Enteric tube likely entering proximal stomach. Consider advancement.   2. Decreasing gaseous prominence of bowel loops since CT yesterday. I independently reviewed radiology images for studies I described above or studies I have ordered. Admission date (for inpatients): 6/26/2018   * No surgery date entered *  Procedure(s):  SIGMOIDOSCOPY FLEXIBLE    ASSESSMENT/PLAN: Kristine Duvall is a 46 y.o. Male with sign and symptoms consistent with obstructing colon cancer. Dr. Rosie Epstein plans to do a flex-sig today to assess mass and obtain biopsy. Problem List  Date Reviewed: 6/27/2018          Codes Class Noted    * (Principal)Bowel obstruction ([facility] [address] ICD-10-CM: X04.332  ICD-9-CM: 560.9  6/26/2018        Rectal mass ICD-10-CM: K62.9  ICD-9-CM: 787.99  6/26/2018        Leukocytosis ICD-10-CM: D72.829  ICD-9-CM: 288.60  6/26/2018        Generalized abdominal pain ICD-10-CM: R10.84  ICD-9-CM: 789.07  6/26/2018        Nausea with vomiting ICD-10-CM: R11.2  ICD-9-CM: 787.01  6/26/2018        Erectile dysfunction due to arterial insufficiency ICD-10-CM: N52.01  ICD-9-CM: 607.84  5/31/2018        Plantar fasciitis, right ICD-10-CM: M72.2  ICD-9-CM: 728.71  5/31/2018        Screen for colon cancer ICD-10-CM: Z12.11  ICD-9-CM: V76.51  5/31/2018        Elevated blood pressure reading ICD-10-CM: R03.0  ICD-9-CM: 796.2  5/31/2018        Polycythemia secondary to smoking ICD-10-CM: D75.1  ICD-9-CM: 289.0  5/30/2018        IGT (impaired glucose tolerance) ICD-10-CM: R73.02  ICD-9-CM: 790.22  5/30/2018        Hypercholesterolemia ICD-10-CM: E78.00  ICD-9-CM: 272.0  5/30/2018    Overview Signed 6/4/2018  2:20 PM by Colette Sanchez     ASCVD 10 year risk is 16.0%. Mod to high dose statin indicated. (based on b/p 180/90, 6/4/18)                 Screening PSA (prostate specific antigen) ICD-10-CM: Z12.5  ICD-9-CM: V76.44  5/30/2018        Tobacco abuse (Chronic) ICD-10-CM: Z72.0  ICD-9-CM: 305.1  8/18/2016            Principal Problem:     Bowel obstruction ([facility] [address] (6/26/2018)    Active Problems:    Tobacco abuse (8/18/2016)      Elevated blood pressure reading (5/31/2018)      Rectal mass (6/26/2018)      Leukocytosis (6/26/2018)      Generalized abdominal pain (6/26/2018)      Nausea with vomiting (6/26/2018)       NPO  NGT  Await flex-sig/ pathology  Surgical decision/timing per Dr. Gala Mesa    Signed:  Amada Seip, NP     I have seen and examined the patient with the Nurse Practitioner and agree with the above assessment and plan. Very suspicious for rectal cancer. Await flex sig results. Patient reports large stool and gas output. Monie Pleitez.  Gala Mesa MD

## 2018-06-27 NOTE — PROGRESS NOTES
Progress Note    2018  Admit Date: 2018  4:21 PM   NAME: Raymon Rashid   :  1965   MRN:  [id number]   Attending: Zo Upton. Minerva Arauz MD  PCP:  Grey Almanza DO  Treatment Team: Attending Provider: Zo Upton. Minerva Arauz MD; Consulting Provider: Romana Sans, MD; Consulting Provider: Silvia Brambila MD; Utilization Review: Linda Carrasco RN; Charge Nurse: Trish Schirmer    Full Code   SUBJECTIVE:   Mr. Rogelio Fontanez is a 47 yo male with PMH of GERD, tobacco use who presented from GI office for ileus. Pt was seen by GI in the office for change in bowel habit, lower abd pain, rectal bleeding and was scheduled for a colonoscopy however unable to complete prep.  abd xray showed obstruction. Pt sent to ED for CT which showed findings of fluid filled small and large bowel loops with increased soft tissue suggesting an obstruction high rectal cancer. CEA 2.6.  GI consulted, performing flex sig today   Pt has NGT to low intermittent suction. Denies CP, SOB. Reports abd discomfort improved after NGT placement. Past Medical History:   Diagnosis Date    Chest pain 2016    GERD (gastroesophageal reflux disease)     Tobacco abuse 2016     Recent Results (from the past 24 hour(s))   CBC WITH AUTOMATED DIFF    Collection Time: 18  4:05 PM   Result Value Ref Range    WBC 16.7 (H) 4.3 - 11.1 K/uL    RBC 5.92 (H) 4.23 - 5.67 M/uL    HGB 18.6 (H) 13.6 - 17.2 g/dL    HCT 52.3 (H) 41.1 - 50.3 %    MCV 88.3 79.6 - 97.8 FL    MCH 31.4 26.1 - 32.9 PG    MCHC 35.6 (H) 31.4 - 35.0 g/dL    RDW 13.3 11.9 - 14.6 %    PLATELET 857 131 - 778 K/uL    MPV 10.9 10.8 - 14.1 FL    DF AUTOMATED      NEUTROPHILS 88 (H) 43 - 78 %    LYMPHOCYTES 7 (L) 13 - 44 %    MONOCYTES 4 4.0 - 12.0 %    EOSINOPHILS 0 (L) 0.5 - 7.8 %    BASOPHILS 0 0.0 - 2.0 %    IMMATURE GRANULOCYTES 1 0.0 - 5.0 %    ABS. NEUTROPHILS 14.8 (H) 1.7 - 8.2 K/UL    ABS. LYMPHOCYTES 1.1 0.5 - 4.6 K/UL    ABS. MONOCYTES 0.7 0.1 - 1.3 K/UL    ABS. EOSINOPHILS 0.0 0.0 - 0.8 K/UL    ABS. BASOPHILS 0.0 0.0 - 0.2 K/UL    ABS. IMM. GRANS. 0.1 0.0 - 0.5 K/UL   METABOLIC PANEL, COMPREHENSIVE    Collection Time: 06/26/18  4:05 PM   Result Value Ref Range    Sodium 138 136 - 145 mmol/L    Potassium 3.8 3.5 - 5.1 mmol/L    Chloride 98 98 - 107 mmol/L    CO2 27 21 - 32 mmol/L    Anion gap 13 7 - 16 mmol/L    Glucose 151 (H) 65 - 100 mg/dL    BUN 13 6 - 23 MG/DL    Creatinine 0.94 0.8 - 1.5 MG/DL    GFR est AA >60 >60 ml/min/1.73m2    GFR est non-AA >60 >60 ml/min/1.73m2    Calcium 9.3 8.3 - 10.4 MG/DL    Bilirubin, total 0.9 0.2 - 1.1 MG/DL    ALT (SGPT) 28 12 - 65 U/L    AST (SGOT) 34 15 - 37 U/L    Alk. phosphatase 94 50 - 136 U/L    Protein, total 8.6 (H) 6.3 - 8.2 g/dL    Albumin 3.6 3.5 - 5.0 g/dL    Globulin 5.0 (H) 2.3 - 3.5 g/dL    A-G Ratio 0.7 (L) 1.2 - 3.5     URINE MICROSCOPIC    Collection Time: 06/26/18  8:08 PM   Result Value Ref Range    WBC 0-3 0 /hpf    RBC >100 (H) 0 /hpf    Epithelial cells 0-3 0 /hpf    Bacteria 0 0 /hpf    Casts 0-3 0 /lpf   CEA    Collection Time: 06/27/18  4:30 AM   Result Value Ref Range    CEA 2.6 0.0 - 3.0 ng/mL   METABOLIC PANEL, COMPREHENSIVE    Collection Time: 06/27/18  4:30 AM   Result Value Ref Range    Sodium 140 136 - 145 mmol/L    Potassium 3.6 3.5 - 5.1 mmol/L    Chloride 101 98 - 107 mmol/L    CO2 27 21 - 32 mmol/L    Anion gap 12 7 - 16 mmol/L    Glucose 115 (H) 65 - 100 mg/dL    BUN 16 6 - 23 MG/DL    Creatinine 0.65 (L) 0.8 - 1.5 MG/DL    GFR est AA >60 >60 ml/min/1.73m2    GFR est non-AA >60 >60 ml/min/1.73m2    Calcium 8.1 (L) 8.3 - 10.4 MG/DL    Bilirubin, total 0.9 0.2 - 1.1 MG/DL    ALT (SGPT) 19 12 - 65 U/L    AST (SGOT) 17 15 - 37 U/L    Alk.  phosphatase 67 50 - 136 U/L    Protein, total 6.3 6.3 - 8.2 g/dL    Albumin 2.8 (L) 3.5 - 5.0 g/dL    Globulin 3.5 2.3 - 3.5 g/dL    A-G Ratio 0.8 (L) 1.2 - 3.5     CBC W/O DIFF    Collection Time: 06/27/18  4:30 AM   Result Value Ref Range    WBC 18.4 (H) 4.3 - 11.1 K/uL    RBC 5.30 4.23 - 5.67 M/uL    HGB 16.3 13.6 - 17.2 g/dL    HCT 47.2 41.1 - 50.3 %    MCV 89.1 79.6 - 97.8 FL    MCH 30.8 26.1 - 32.9 PG    MCHC 34.5 31.4 - 35.0 g/dL    RDW 13.4 11.9 - 14.6 %    PLATELET 239 819 - 003 K/uL    MPV 10.2 (L) 10.8 - 14.1 FL     Allergies   Allergen Reactions    Zithromax [Azithromycin] Rash     Current Facility-Administered Medications   Medication Dose Route Frequency Provider Last Rate Last Dose    sodium chloride (NS) flush 5-10 mL  5-10 mL IntraVENous Q8H Benitez Brown MD        sodium chloride (NS) flush 5-10 mL  5-10 mL IntraVENous PRN Barbarann Flies. Herbert Brown MD   10 mL at 18 1007    morphine injection 2 mg  2 mg IntraVENous Q3H PRN Barbarann Flies. Herbert Brown MD        ondansetron [facility]) injection 4 mg  4 mg IntraVENous Q4H PRN Barbarann Flies. Herbert Brown MD        [facility]) injection 0.4 mg  0.4 mg IntraVENous PRN Barbarann Flies. Herbert Brown MD        nicotine (NICODERM CQ) 14 mg/24 hr patch 1 Patch  1 Patch TransDERmal DAILY PRN Barbarann Flies. Herbert Brown MD        0.9% sodium chloride infusion  75 mL/hr IntraVENous CONTINUOUS Barbarann Flies.  Herbert Brown MD 75 mL/hr at 18 1006 75 mL/hr at 18 1006    lactated Ringers infusion  100 mL/hr IntraVENous CONTINUOUS Alicia Fernandes  mL/hr at 18 1355 100 mL/hr at 18 1355    famotidine (PEPCID) tablet 20 mg  20 mg Oral PRN Alicia Fernandes MD           Review of Systems negative with exception of pertinent positives noted above  PHYSICAL EXAM     Visit Vitals    BP (!) 152/93    Pulse 63    Temp 98.6 °F (37 °C)    Resp 18    Ht 5' 9\" (1.753 m)    Wt 68 kg (150 lb)    SpO2 97%    BMI 22.15 kg/m2      Temp (24hrs), Av.4 °F (36.9 °C), Min:98.3 °F (36.8 °C), Max:98.6 °F (37 °C)    Oxygen Therapy  O2 Sat (%): 97 % (18 1436)  Pulse via Oximetry: 74 beats per minute (18 1351)  O2 Device: Room air (18 1436)  No intake or output data in the 24 hours ending 18 1449     General: No acute distress    Lungs: CTA bilaterally. Resp even and non labored  Heart:  S1S2 present without murmurs rubs gallops. RRR. No edema  Abdomen: Soft, non distended. Mild tenderness on exam.  BS hypoactive  Extremities: Moves ext spontaneously. No cyanosis  Neurologic:  A/O X4. No focal deficits. Results summary of Diagnostic Studies/Procedures copied from within [facility] EMR:        [address] Problems    Diagnosis Date Noted    Bowel obstruction ([facility] [address] 06/26/2018    Rectal mass 06/26/2018    Leukocytosis 06/26/2018    Generalized abdominal pain 06/26/2018    Nausea with vomiting 06/26/2018    Elevated blood pressure reading 05/31/2018    Tobacco abuse 08/18/2016     Plan: Bowel obstruction:  Has NGT to low intermittent suction. Plans for flex sig today. GI following. Surgery consulted. HTN:  Persistent. Start vasotec Q6 hours IV then will need transition to PO anti-hypertensive on DC. Notes, labs, VS, diagnostic testing reviewed  Time spent with pt 20 min        DVT Prophylaxis: SCDs  Plan of Care Discussed with: Supervising MD Dr. Jey Whitt, care team, pt   Breanna Downey, BARB         Addendum:    Flex sig showing obstructing rectal mass. Follow pathology. General Surgery consulted.

## 2018-06-27 NOTE — PROGRESS NOTES
TRANSFER - IN REPORT:    Verbal report received from Theodore(name) on Tanner Neighbors  being received from 724(unit) for routine progression of care      Report consisted of patients Situation, Background, Assessment and   Recommendations(SBAR). Information from the following report(s) Kardex was reviewed with the receiving nurse. Opportunity for questions and clarification was provided. Assessment completed upon patients arrival to unit and care assumed.

## 2018-06-27 NOTE — ED NOTES
TRANSFER - OUT REPORT:    Verbal report given to Vivien(name) on Harjit Grounds  being transferred to [facility](unit) for routine progression of care       Report consisted of patients Situation, Background, Assessment and   Recommendations(SBAR). Information from the following report(s) SBAR was reviewed with the receiving nurse. Lines:   Peripheral IV 06/26/18 Left Forearm (Active)   Site Assessment Clean, dry, & intact 6/26/2018  4:04 PM   Phlebitis Assessment 0 6/26/2018  4:04 PM   Infiltration Assessment 0 6/26/2018  4:04 PM   Dressing Status Clean, dry, & intact 6/26/2018  4:04 PM        Opportunity for questions and clarification was provided.       Patient transported with:   [employer]

## 2018-06-27 NOTE — PROGRESS NOTES
06/27/18 0745   Dual Skin Pressure Injury Assessment   Dual Skin Pressure Injury Assessment WDL   Second Care Provider (Based on [address] [place] [address]) Colbert Dakins, RN     Patient has linear scar in the groin area from hernia repair surgery. . Patient also has scarring on right upper extremity from an arterial surgery. Several other scars noted on both upper extremities. Patient states these small scars are from occupational injuries.

## 2018-06-27 NOTE — ANESTHESIA PREPROCEDURE EVALUATION
Anesthetic History   No history of anesthetic complications            Review of Systems / Medical History  Patient summary reviewed and pertinent labs reviewed    Pulmonary    COPD: mild      Smoker         Neuro/Psych   Within defined limits           Cardiovascular                  Exercise tolerance: >4 METS     GI/Hepatic/Renal     GERD: well controlled          Comments: Prob. Bowel obstruction, has ng tube in place.  Endo/Other  Within defined limits           Other Findings              Physical Exam    Airway  Mallampati: II  TM Distance: 4 - 6 cm  Neck ROM: normal range of motion   Mouth opening: Normal     Cardiovascular  Regular rate and rhythm,  S1 and S2 normal,  no murmur, click, rub, or gallop  Rhythm: regular  Rate: normal         Dental    Dentition: Caps/crowns     Pulmonary        Wheezes:bilateral         Abdominal         Other Findings            Anesthetic Plan    ASA: 2  Anesthesia type: general          Induction: Intravenous  Anesthetic plan and risks discussed with: Patient

## 2018-06-27 NOTE — PERIOP NOTES
TRANSFER - OUT REPORT:    Verbal report given to [facility], RN(name) on Flaco Gonzalez  being transferred to [facility] [id number]  (unit) for routine post - op       Report consisted of patients Situation, Background, Assessment and   Recommendations(SBAR). Information from the following report(s) Kardex, OR Summary, Procedure Summary, Intake/Output and MAR was reviewed with the receiving nurse. Lines:   Peripheral IV 06/26/18 Left Forearm (Active)   Site Assessment Clean, dry, & intact 6/27/2018  3:10 PM   Phlebitis Assessment 0 6/27/2018  3:10 PM   Infiltration Assessment 0 6/27/2018  3:10 PM   Dressing Status Clean, dry, & intact; Occlusive 6/27/2018  3:10 PM   Dressing Type Transparent;Tape 6/27/2018  3:10 PM   Hub Color/Line Status Pink; Infusing 6/27/2018  3:10 PM   Alcohol Cap Used No 6/27/2018  3:10 PM        Opportunity for questions and clarification was provided. Patient transported with:   O2 @ 2 liters    VTE prophylaxis orders have not been written for [facility].    Patient and family given floor number and nurses name. Family updated re: pt status after security code verified.

## 2018-06-27 NOTE — ROUTINE PROCESS
TRANSFER - IN REPORT:    Verbal report received from Kate Kyle RN on Cherie Beltran  being received from PACU. Report consisted of patients Situation, Background, Assessment and   Recommendations(SBAR). Information from the following report(s) Procedure Summary was reviewed with the receiving nurse. Opportunity for questions and clarification was provided. Assessment completed upon patients arrival to unit and care assumed.

## 2018-06-27 NOTE — CONSULTS
[facility] Consult Note       Primary GI Physician: Dr. Souleymane Sidhu    Referring Physician:  Dr. Anabela Arroyo Date:  6/27/2018    Admit Date:  6/26/2018    Chief Complaint:  GI Bleeding, obstruction    Subjective:     History of Present Illness:  Patient is a 46 y.o. male with PMH of but not limited to tobacco abuse, HTN, who is seen in consultation at the request of Dr. Wong Fung for GI bleeding and obstruction. Mr. Inga Charles was seen in our office on 6/20/18 by Adriano King NP for change in bowel habit, lower abdominal pain, and rectal bleeding without prior colonoscopy. He was scheduled for colonoscopy on 6/26/18. However, he called because he stated that the prep was not working. An Abdominal xray was ordered which showed obstruction. He was sent to the ED. In the ED, a CT of the A/P was performed with findings of  fluid-filled small and large bowel loops with increased soft tissue suggesting an obstructing high rectal cancer. Labs with HGB 18.6, MCV 88.3, CEA 2.6. Mr. Inga Charles reports that over the past several months he began having smaller caliber stools a few times daily. He reports that he felt like he completely evacuated stools. Reports that he was having small amounts of bright red blood mixed in with stool. Recently, had an episode of pure blood in the toilet bowl. Denies any nausea/vomiting leading up to his colon prep. He states that when he started his colon prep he started having \"horrible nausea and vomiting\". He also developed an abdominal pain which wrapped around his mid abdomen. This pain is described as sharp and severe. This pain has improved since arrival to the ED and placement of an NGT to LIWS. He denies any unexplained weight loss. Denies any family history of colon cancer/colon polyps. He has never had a colonoscopy.      PMH:  Past Medical History:   Diagnosis Date    Chest pain 8/18/2016    GERD (gastroesophageal reflux disease)     Tobacco abuse 8/18/2016 PSH:  Past Surgical History:   Procedure Laterality Date    HX HERNIA REPAIR      HX ORTHOPAEDIC      arm       Allergies: Allergies   Allergen Reactions    Zithromax [Azithromycin] Rash       Home Medications:  Prior to Admission medications    Not on [facility] Medications:  Current Facility-Administered Medications   Medication Dose Route Frequency    sodium chloride (NS) flush 5-10 mL  5-10 mL IntraVENous Q8H    sodium chloride (NS) flush 5-10 mL  5-10 mL IntraVENous PRN    morphine injection 2 mg  2 mg IntraVENous Q3H PRN    ondansetron (ZOFRAN) injection 4 mg  4 mg IntraVENous Q4H PRN    naloxone (NARCAN) injection 0.4 mg  0.4 mg IntraVENous PRN    nicotine (NICODERM CQ) 14 mg/24 hr patch 1 Patch  1 Patch TransDERmal DAILY PRN    0.9% sodium chloride infusion  75 mL/hr IntraVENous CONTINUOUS     No current outpatient prescriptions on file. Social History:  Social History   Substance Use Topics    Smoking status: Current Every Day Smoker     Packs/day: 1.00     Years: 30.00     Types: Cigarettes     Start date: 5/31/1988    Smokeless tobacco: Never Used    Alcohol use 8.4 oz/week     14 Cans of beer, 0 Standard drinks or equivalent per week       Pt denies any history of drug use, blood transfusions, or tattoos. Family History:  Family History   Problem Relation Age of Onset    Hypertension Mother     Heart Disease Mother     No Known Problems Father        Review of Systems:  A detailed 10 system ROS is obtained, with pertinent positives as listed above. All others are negative. Diet:  NPO    Objective:     Physical Exam:  Vitals:  Visit Vitals    BP (!) 150/97    Pulse 69    Temp 98.3 °F (36.8 °C)    Resp 16    Ht 5' 9\" (1.753 m)    Wt 68 kg (150 lb)    SpO2 97%    BMI 22.15 kg/m2     Gen:  Pt is alert, cooperative, no acute distress  Skin:  Extremities and face reveal no rashes. HEENT: Sclerae anicteric. Extra-occular muscles are intact.   No oral ulcers. No abnormal pigmentation of the lips. The neck is supple. Cardiovascular: Regular rate and rhythm. No murmurs, gallops, or rubs. Respiratory:  Comfortable breathing with no accessory muscle use. Clear breath sounds anteriorly with no wheezes, rales, or rhonchi. GI:  Abdomen Distended, tight, tender to palpation in periumbilical region to deep palpation. Absent bowel sounds. No enlargement of the liver or spleen. No masses palpable. Rectal:  Deferred  Musculoskeletal:  No pitting edema of the lower legs. Neurological:  Gross memory appears intact. Patient is alert and oriented. Psychiatric:  Mood appears appropriate with judgement intact. Lymphatic:  No cervical or supraclavicular adenopathy. Laboratory:    Recent Labs      06/27/18   0430  06/26/18   1605   WBC  18.4*  16.7*   HGB  16.3  18.6*   HCT  47.2  52.3*   PLT  317  369   MCV  89.1  88.3   NA  140  138   K  3.6  3.8   CL  101  98   CO2  27  27   BUN  16  13   CREA  0.65*  0.94   CA  8.1*  9.3   GLU  115*  151*   AP  67  94   SGOT  17  34   ALT  19  28   TBILI  0.9  0.9   ALB  2.8*  3.6   TP  6.3  8.6*      CT A/P 6/26/2018  FINDINGS:   Abdomen: No free air. The lung bases are clear. There is fluid in the distal  esophagus, probably reflux. Oral contrast has not left the stomach. Small bowel  loops are fluid-filled and mildly dilated throughout. No transition zone  appreciated. Large bowel loops are also fluid-filled. Small hypoattenuating foci  in the liver, probably subcentimeter cysts. The spleen is small and  homogeneous. . No calcified gallstones. The biliary tree is not dilated. The  pancreas is unremarkable. No free fluid, acute inflammatory changes or  adenopathy. The kidneys enhance uniformly. No radiopaque renal calculi. No  hydronephrosis. The adrenal glands are normal size. Aorta is normal caliber.     Pelvis:  The colon is fluid-filled down to the level of the rectosigmoid where  there is increased soft tissue suggesting an obstructing high rectal mass. The  urinary bladder unremarkable. The prostate is mildly enlarged. Seminal vesicles  symmetric.      IMPRESSION  IMPRESSION:  Fluid-filled small and large bowel loops with increased soft tissue  suggesting an obstructing high rectal cancer. --------------------------------------------------------------------------------------------------------------  KUB 6/27/2018  IMPRESSION  IMPRESSION:    1. Enteric tube likely entering proximal stomach. Consider advancement. 2. Decreasing gaseous prominence of bowel loops since CT yesterday.       Assessment:     Principal Problem: Bowel obstruction ([address] (6/26/2018)    Active Problems:    Tobacco abuse (8/18/2016)      Elevated blood pressure reading (5/31/2018)      Rectal mass (6/26/2018)      Leukocytosis (6/26/2018)      Generalized abdominal pain (6/26/2018)      Nausea with vomiting (6/26/2018)       Patient is a 46 y.o. male with PMH of but not limited to tobacco abuse, HTN, who is seen in consultation at the request of Dr. Sabina Robbins for GI bleeding and CT scan findings concerning for high rectal mass resulting in obstruction. He was prepping for a colonoscopy for change in bowel pattern, rectal bleeding, no prior colonoscopy but the became nauseated and experienced abdominal pain with the prep. An abdominal Xray showed possible obstruction and CT scan showed possible high rectal mass. Plan:     1. Agree with NGT to LIWS  2. NPO  3. Pain control per primary  4. Plan for Flex Sig today with Dr. Shawn Miller to further evaluate findings seen on CT scan. Risk of procedure was discussed with patient and his wife at the bedside to include risk of infection, bleeding, perforation, sedation reaction, exacerbation of underlying illnesses, and death. He voiced understanding and wishes to plan for procedure. Thank you for this kind consultation. We will follow along with you.      Sun Schwab NP    Patient is seen and examined in collaboration with Dr. Herman Montes. Assessment and plan as per Dr. Herman Montes. ATTENDING NOTE:  I have seen the patient and agree with the above assessment and plan. Will proceed with flexible sigmoidoscopy today to evaluate distal colonic obstruction.     Aleksandr Quintanilla MD

## 2018-06-27 NOTE — ANESTHESIA POSTPROCEDURE EVALUATION
Post-Anesthesia Evaluation and Assessment    Patient: James Kamara MRN: [id number]  SSN: [id number]    YOB: 1965  Age: 46 y.o. Sex: male       Cardiovascular Function/Vital Signs  Visit Vitals    BP (P) 150/82 (BP 1 Location: Right arm, BP Patient Position: At rest)    Pulse 62    Temp (P) 36.7 °C (98.1 °F)    Resp (P) 17    Ht 5' 9\" (1.753 m)    Wt 68 kg (150 lb)    SpO2 100%    BMI 22.15 kg/m2       Patient is status post general anesthesia for Procedure(s):  SIGMOIDOSCOPY FLEXIBLE  COLON BIOPSY. Nausea/Vomiting: None    Postoperative hydration reviewed and adequate. Pain:  Pain Scale 1: (P) Numeric (0 - 10) (06/27/18 1536)  Pain Intensity 1: (P) 0 (06/27/18 1536)   Managed    Neurological Status:   Neuro (WDL): (P) Exceptions to WDL (06/27/18 1536)  Neuro  Neurologic State: (P) Alert (06/27/18 1536)  Orientation Level: (P) Oriented X4 (06/27/18 1536)  Cognition: (P) Appropriate decision making; Appropriate for age attention/concentration; Appropriate safety awareness; Follows commands (06/27/18 1536)  Speech: (P) Clear (06/27/18 1536)  LUE Motor Response: (P) Purposeful (06/27/18 1536)  LLE Motor Response: (P) Purposeful (06/27/18 1536)  RUE Motor Response: (P) Purposeful (06/27/18 1536)  RLE Motor Response: (P) Purposeful (06/27/18 1536)   At baseline    Mental Status and Level of Consciousness: Arousable    Pulmonary Status:   O2 Device: (P) Nasal cannula (06/27/18 1536)   Adequate oxygenation and airway patent    Complications related to anesthesia: None    Post-anesthesia assessment completed.  No concerns    Signed By: Joshua Harris MD     June 27, 2018

## 2018-06-27 NOTE — H&P
HOSPITALIST H&P  NAME:  Michiel Hodgkins   Age:  46 y.o.  :   1965   MRN:   [id number]  PCP: Yves Sauer DO  Consulting MD:    HPI:   Lazarus Irving is a 45 yo M with with history of tobacco abuse (smoker) and elevated blood pressure (not treated for hypertension), who presents to the ED at recommendation of his gastroenterologist today. He has been having bloody stools for several months and was going to have colonoscopy today. He took prep last night and states he only had a few 'teaspoon-sized' stools and vomited after each stage of prep. He went to GI office today and colonoscopy cancelled and he had x-ray of his abdomen and told to come to the ED. In the ED, CT abdomen/pelvis shows 'Fluid-filled small and large bowel loops with increased soft tissue suggesting an obstructing high rectal cancer.'  He reports he has intermittent significant abdominal pain that was 'doubling me over' earlier in the day. Pain controlled at the moment. Dr. Prudencio Calvillo (ER physician) discussed case with general surgery attending who recommended NGT placement, IVF, NPO, GI consult, and surgery consult to be done tomorrow. He was seen with his wife, Eddie Rome, at bedside. Hospitalist service asked to admit.     Complete ROS done and is as stated in HPI or otherwise negative  Past Medical History:   Diagnosis Date    Chest pain 2016    GERD (gastroesophageal reflux disease)     Tobacco abuse 2016      Past Surgical History:   Procedure Laterality Date    HX HERNIA REPAIR      HX ORTHOPAEDIC      arm      None     Allergies   Allergen Reactions    Zithromax [Azithromycin] Rash      Social History   Substance Use Topics    Smoking status: Current Every Day Smoker     Packs/day: 1.00     Years: 30.00     Types: Cigarettes     Start date: 1988    Smokeless tobacco: Never Used    Alcohol use 8.4 oz/week     14 Cans of beer, 0 Standard drinks or equivalent per week      Family History Problem Relation Age of Onset    Hypertension Mother     Heart Disease Mother     No Known Problems Father       Objective:     Visit Vitals    BP (!) 150/97    Pulse (!) 103    Temp 98.3 °F (36.8 °C)    Resp 16    Ht 5' 9\" (1.753 m)    Wt 68 kg (150 lb)    SpO2 95%    BMI 22.15 kg/m2      Temp (24hrs), Av.3 °F (36.8 °C), Min:98.3 °F (36.8 °C), Max:98.3 °F (36.8 °C)    Patient Vitals for the past 24 hrs:   Temp Pulse Resp BP SpO2   18 002 - - - (!) 150/97 95 %   18 0004 - - - 158/79 94 %   18 2359 - - - 158/79 94 %   18 2329 - - - 151/88 94 %   18 2259 - - - (!) 145/99 94 %   18 2229 - - - (!) 146/92 95 %   18 1603 98.3 °F (36.8 °C) (!) 103 16 (!) 162/115 98 %   18 1551 - - - - 98 %       Oxygen Therapy  O2 Sat (%): 95 % (18)  Pulse via Oximetry: 85 beats per minute (18 002)  O2 Device: Room air (18 1603)  Physical Exam:  General:    Alert, cooperative, no distress but appears slightly uncomfortable, appears stated age. Head:   Normocephalic, without obvious abnormality, atraumatic. Nose:  Nares normal. No drainage or sinus tenderness. Lungs:   Clear to auscultation bilaterally. No Wheezing or Rhonchi. No rales. Heart:   Regular rate and rhythm,  no murmur, rub or gallop. Abdomen:   Distended, + BS occasional high pitched, tenderness to palpation diffuse but worse over upper abdomen  Extremities: No cyanosis. No edema. No clubbing  Skin:     Texture, turgor normal. No rashes or lesions.   Not Jaundiced  Neurologic: Alert and oriented x 3, no focal deficits   Data Review:   Recent Results (from the past 24 hour(s))   CBC WITH AUTOMATED DIFF    Collection Time: 18  4:05 PM   Result Value Ref Range    WBC 16.7 (H) 4.3 - 11.1 K/uL    RBC 5.92 (H) 4.23 - 5.67 M/uL    HGB 18.6 (H) 13.6 - 17.2 g/dL    HCT 52.3 (H) 41.1 - 50.3 %    MCV 88.3 79.6 - 97.8 FL    MCH 31.4 26.1 - 32.9 PG    MCHC 35.6 (H) 31.4 - 35.0 g/dL RDW 13.3 11.9 - 14.6 %    PLATELET 582 006 - 359 K/uL    MPV 10.9 10.8 - 14.1 FL    DF AUTOMATED      NEUTROPHILS 88 (H) 43 - 78 %    LYMPHOCYTES 7 (L) 13 - 44 %    MONOCYTES 4 4.0 - 12.0 %    EOSINOPHILS 0 (L) 0.5 - 7.8 %    BASOPHILS 0 0.0 - 2.0 %    IMMATURE GRANULOCYTES 1 0.0 - 5.0 %    ABS. NEUTROPHILS 14.8 (H) 1.7 - 8.2 K/UL    ABS. LYMPHOCYTES 1.1 0.5 - 4.6 K/UL    ABS. MONOCYTES 0.7 0.1 - 1.3 K/UL    ABS. EOSINOPHILS 0.0 0.0 - 0.8 K/UL    ABS. BASOPHILS 0.0 0.0 - 0.2 K/UL    ABS. IMM. GRANS. 0.1 0.0 - 0.5 K/UL   METABOLIC PANEL, COMPREHENSIVE    Collection Time: 06/26/18  4:05 PM   Result Value Ref Range    Sodium 138 136 - 145 mmol/L    Potassium 3.8 3.5 - 5.1 mmol/L    Chloride 98 98 - 107 mmol/L    CO2 27 21 - 32 mmol/L    Anion gap 13 7 - 16 mmol/L    Glucose 151 (H) 65 - 100 mg/dL    BUN 13 6 - 23 MG/DL    Creatinine 0.94 0.8 - 1.5 MG/DL    GFR est AA >60 >60 ml/min/1.73m2    GFR est non-AA >60 >60 ml/min/1.73m2    Calcium 9.3 8.3 - 10.4 MG/DL    Bilirubin, total 0.9 0.2 - 1.1 MG/DL    ALT (SGPT) 28 12 - 65 U/L    AST (SGOT) 34 15 - 37 U/L    Alk. phosphatase 94 50 - 136 U/L    Protein, total 8.6 (H) 6.3 - 8.2 g/dL    Albumin 3.6 3.5 - 5.0 g/dL    Globulin 5.0 (H) 2.3 - 3.5 g/dL    A-G Ratio 0.7 (L) 1.2 - 3.5     URINE MICROSCOPIC    Collection Time: 06/26/18  8:08 PM   Result Value Ref Range    WBC 0-3 0 /hpf    RBC >100 (H) 0 /hpf    Epithelial cells 0-3 0 /hpf    Bacteria 0 0 /hpf    Casts 0-3 0 /lpf     Imaging /Procedures /Studies   CT ABD PELV W CONT   Final Result   IMPRESSION:  Fluid-filled small and large bowel loops with increased soft tissue   suggesting an obstructing high rectal cancer. Assessment and Plan:      Active Hospital Problems    Diagnosis Date Noted    Bowel obstruction ([facility] [address] 06/26/2018    Rectal mass 06/26/2018    Leukocytosis 06/26/2018    Generalized abdominal pain 06/26/2018    Nausea with vomiting 06/26/2018    Elevated blood pressure reading 05/31/2018    Tobacco abuse 08/18/2016       PLAN  · Admit to med/surg bed. · NG tube already ordered and awaiting placement. ·  Provide IVF. · NPO with GI and general surgery consults tomorrow. · PRN morphine for severe pain. · Symptomatic nausea treatment. · Elevated blood pressure- monitor. Start antihypertensive treatment if he remains elevated. Admission plan discussed with patient and his wife, Amadou Winter, at bedside. Code Status: Full    Anticipated discharge: 3-4 days pending clinical course    Signed By: Lilian Velasquez.  Bindu Ling MD     June 27, 2018

## 2018-06-27 NOTE — ROUTINE PROCESS
TRANSFER - OUT REPORT:    Verbal report given to Elva RN(name) on Craighead Genera  being transferred to GI lab(unit) for ordered procedure       Report consisted of patients Situation, Background, Assessment and   Recommendations(SBAR). Information from the following report(s) SBAR was reviewed with the receiving nurse. Lines:   Peripheral IV 06/26/18 Left Forearm (Active)   Site Assessment Clean, dry, & intact 6/27/2018  7:45 AM   Phlebitis Assessment 0 6/27/2018  7:45 AM   Infiltration Assessment 0 6/27/2018  7:45 AM   Dressing Status Clean, dry, & intact 6/27/2018  7:45 AM   Dressing Type Transparent 6/27/2018  7:45 AM   Hub Color/Line Status Pink;Flushed 6/27/2018  7:45 AM   Alcohol Cap Used No 6/27/2018  7:45 AM        Opportunity for questions and clarification was provided.       Patient transported with:  Patient

## 2018-06-27 NOTE — OP NOTES
Gastroenterology Procedure Note           Procedure:  Flexible sigmoidoscopy    Date of Procedure:  6/27/2018    Patient:  Dinorah Bal     1965    Indication:  Distal colonic obstruction on CT scan    Sedation:  General anesthesia    Pre-Procedure Exam:    Mental status:  alert and oriented  Airway:  normal oropharyngeal airway and neck mobility  CV:  regular rate and rhythm   Respiratory:  clear to auscultation    Procedure:  A History and Physical has been performed, and patient medication allergies have been  reviewed. Risks of perforation, hemorrhage, adverse drug reaction, and aspiration were discussed. Informed consent was obtained for the procedure, including sedation. Patient was intubated and remained on mechanical ventilation throughout the procedure for airway protection. The patient was placed in the left lateral decubitus position. The heart rate, oxygen saturations, blood pressure, and response to care were monitored throughout the procedure. After performing a rectal exam, the colonoscope was passed through the anus and advanced to an obstructing mass in the rectum. The quality of prep was adequate. A careful inspection was made as the scope was withdrawn, including a retroflexed view of the rectum. The patient tolerated the procedure well. Findings:     ANUS:  Digital exam reveals a firm rectal mass. RECTUM:  At 5-6 cm from the anal verge there a completely obstructing  circumferential mass which could not be traversed. The inner diameter is approximately 3-4 mm. This was extensively biopsied. SIGMOID COLON:  The sigmoid could not be examined due to the obstructing mass in the rectum. Specimen:  Yes    Estimated Blood Loss:  Minimal    Implant:  None           Impression:    Rectal mass, concerning for malignancy. Plan:  1. Follow-up pathology results. 2. Surgery consult. 3. NPO, NG decompression.     Signed:  Archana Vera MD  6/27/2018  2:03 PM

## 2018-06-27 NOTE — H&P (VIEW-ONLY)
[facility] Consult Note       Primary GI Physician: Dr. Jarad Lopez    Referring Physician:  Dr. Li Dahl Date:  6/27/2018    Admit Date:  6/26/2018    Chief Complaint:  GI Bleeding, obstruction    Subjective:     History of Present Illness:  Patient is a 46 y.o. male with PMH of but not limited to tobacco abuse, HTN, who is seen in consultation at the request of Dr. Leatha Euceda for GI bleeding and obstruction. Mr. Yohana Iqbal was seen in our office on 6/20/18 by Mo Renner NP for change in bowel habit, lower abdominal pain, and rectal bleeding without prior colonoscopy. He was scheduled for colonoscopy on 6/26/18. However, he called because he stated that the prep was not working. An Abdominal xray was ordered which showed obstruction. He was sent to the ED. In the ED, a CT of the A/P was performed with findings of  fluid-filled small and large bowel loops with increased soft tissue suggesting an obstructing high rectal cancer. Labs with HGB 18.6, MCV 88.3, CEA 2.6. Mr. Yohana Iqbal reports that over the past several months he began having smaller caliber stools a few times daily. He reports that he felt like he completely evacuated stools. Reports that he was having small amounts of bright red blood mixed in with stool. Recently, had an episode of pure blood in the toilet bowl. Denies any nausea/vomiting leading up to his colon prep. He states that when he started his colon prep he started having \"horrible nausea and vomiting\". He also developed an abdominal pain which wrapped around his mid abdomen. This pain is described as sharp and severe. This pain has improved since arrival to the ED and placement of an NGT to LIWS. He denies any unexplained weight loss. Denies any family history of colon cancer/colon polyps. He has never had a colonoscopy.      PMH:  Past Medical History:   Diagnosis Date    Chest pain 8/18/2016    GERD (gastroesophageal reflux disease)     Tobacco abuse 8/18/2016 PSH:  Past Surgical History:   Procedure Laterality Date    HX HERNIA REPAIR      HX ORTHOPAEDIC      arm       Allergies: Allergies   Allergen Reactions    Zithromax [Azithromycin] Rash       Home Medications:  Prior to Admission medications    Not on [facility] Medications:  Current Facility-Administered Medications   Medication Dose Route Frequency    sodium chloride (NS) flush 5-10 mL  5-10 mL IntraVENous Q8H    sodium chloride (NS) flush 5-10 mL  5-10 mL IntraVENous PRN    morphine injection 2 mg  2 mg IntraVENous Q3H PRN    ondansetron (ZOFRAN) injection 4 mg  4 mg IntraVENous Q4H PRN    naloxone (NARCAN) injection 0.4 mg  0.4 mg IntraVENous PRN    nicotine (NICODERM CQ) 14 mg/24 hr patch 1 Patch  1 Patch TransDERmal DAILY PRN    0.9% sodium chloride infusion  75 mL/hr IntraVENous CONTINUOUS     No current outpatient prescriptions on file. Social History:  Social History   Substance Use Topics    Smoking status: Current Every Day Smoker     Packs/day: 1.00     Years: 30.00     Types: Cigarettes     Start date: 5/31/1988    Smokeless tobacco: Never Used    Alcohol use 8.4 oz/week     14 Cans of beer, 0 Standard drinks or equivalent per week       Pt denies any history of drug use, blood transfusions, or tattoos. Family History:  Family History   Problem Relation Age of Onset    Hypertension Mother     Heart Disease Mother     No Known Problems Father        Review of Systems:  A detailed 10 system ROS is obtained, with pertinent positives as listed above. All others are negative. Diet:  NPO    Objective:     Physical Exam:  Vitals:  Visit Vitals    BP (!) 150/97    Pulse 69    Temp 98.3 °F (36.8 °C)    Resp 16    Ht 5' 9\" (1.753 m)    Wt 68 kg (150 lb)    SpO2 97%    BMI 22.15 kg/m2     Gen:  Pt is alert, cooperative, no acute distress  Skin:  Extremities and face reveal no rashes. HEENT: Sclerae anicteric. Extra-occular muscles are intact.   No oral ulcers. No abnormal pigmentation of the lips. The neck is supple. Cardiovascular: Regular rate and rhythm. No murmurs, gallops, or rubs. Respiratory:  Comfortable breathing with no accessory muscle use. Clear breath sounds anteriorly with no wheezes, rales, or rhonchi. GI:  Abdomen Distended, tight, tender to palpation in periumbilical region to deep palpation. Absent bowel sounds. No enlargement of the liver or spleen. No masses palpable. Rectal:  Deferred  Musculoskeletal:  No pitting edema of the lower legs. Neurological:  Gross memory appears intact. Patient is alert and oriented. Psychiatric:  Mood appears appropriate with judgement intact. Lymphatic:  No cervical or supraclavicular adenopathy. Laboratory:    Recent Labs      06/27/18   0430  06/26/18   1605   WBC  18.4*  16.7*   HGB  16.3  18.6*   HCT  47.2  52.3*   PLT  317  369   MCV  89.1  88.3   NA  140  138   K  3.6  3.8   CL  101  98   CO2  27  27   BUN  16  13   CREA  0.65*  0.94   CA  8.1*  9.3   GLU  115*  151*   AP  67  94   SGOT  17  34   ALT  19  28   TBILI  0.9  0.9   ALB  2.8*  3.6   TP  6.3  8.6*      CT A/P 6/26/2018  FINDINGS:   Abdomen: No free air. The lung bases are clear. There is fluid in the distal  esophagus, probably reflux. Oral contrast has not left the stomach. Small bowel  loops are fluid-filled and mildly dilated throughout. No transition zone  appreciated. Large bowel loops are also fluid-filled. Small hypoattenuating foci  in the liver, probably subcentimeter cysts. The spleen is small and  homogeneous. . No calcified gallstones. The biliary tree is not dilated. The  pancreas is unremarkable. No free fluid, acute inflammatory changes or  adenopathy. The kidneys enhance uniformly. No radiopaque renal calculi. No  hydronephrosis. The adrenal glands are normal size. Aorta is normal caliber.     Pelvis:  The colon is fluid-filled down to the level of the rectosigmoid where  there is increased soft tissue suggesting an obstructing high rectal mass. The  urinary bladder unremarkable. The prostate is mildly enlarged. Seminal vesicles  symmetric.      IMPRESSION  IMPRESSION:  Fluid-filled small and large bowel loops with increased soft tissue  suggesting an obstructing high rectal cancer. --------------------------------------------------------------------------------------------------------------  KUB 6/27/2018  IMPRESSION  IMPRESSION:    1. Enteric tube likely entering proximal stomach. Consider advancement. 2. Decreasing gaseous prominence of bowel loops since CT yesterday.       Assessment:     Principal Problem: Bowel obstruction ([address] (6/26/2018)    Active Problems:    Tobacco abuse (8/18/2016)      Elevated blood pressure reading (5/31/2018)      Rectal mass (6/26/2018)      Leukocytosis (6/26/2018)      Generalized abdominal pain (6/26/2018)      Nausea with vomiting (6/26/2018)       Patient is a 46 y.o. male with PMH of but not limited to tobacco abuse, HTN, who is seen in consultation at the request of Dr. Juju Pathak for GI bleeding and CT scan findings concerning for high rectal mass resulting in obstruction. He was prepping for a colonoscopy for change in bowel pattern, rectal bleeding, no prior colonoscopy but the became nauseated and experienced abdominal pain with the prep. An abdominal Xray showed possible obstruction and CT scan showed possible high rectal mass. Plan:     1. Agree with NGT to LIWS  2. NPO  3. Pain control per primary  4. Plan for Flex Sig today with Dr. Brandon Schultz to further evaluate findings seen on CT scan. Risk of procedure was discussed with patient and his wife at the bedside to include risk of infection, bleeding, perforation, sedation reaction, exacerbation of underlying illnesses, and death. He voiced understanding and wishes to plan for procedure. Thank you for this kind consultation. We will follow along with you.      Sona Bowman NP    Patient is seen and examined in collaboration with Dr. Rajeev Gu. Assessment and plan as per Dr. Rajeev Gu. ATTENDING NOTE:  I have seen the patient and agree with the above assessment and plan. Will proceed with flexible sigmoidoscopy today to evaluate distal colonic obstruction.     Jasper Layton MD

## 2018-06-27 NOTE — ROUTINE PROCESS
TRANSFER - IN REPORT:    Verbal report received from NATHANAEL Martines on Ciro Marte  being received from ED for routine progression of care      Report consisted of patients Situation, Background, Assessment and   Recommendations(SBAR). Information from the following report(s) SBAR was reviewed with the receiving nurse. Opportunity for questions and clarification was provided. Assessment completed upon patients arrival to unit and care assumed.

## 2018-06-28 ENCOUNTER — ANESTHESIA EVENT (OUTPATIENT)
Dept: SURGERY | Age: 53
DRG: 330 | End: 2018-06-28
Payer: COMMERCIAL

## 2018-06-28 ENCOUNTER — APPOINTMENT (OUTPATIENT)
Dept: GENERAL RADIOLOGY | Age: 53
DRG: 330 | End: 2018-06-28
Attending: SURGERY
Payer: COMMERCIAL

## 2018-06-28 ENCOUNTER — APPOINTMENT (OUTPATIENT)
Dept: GENERAL RADIOLOGY | Age: 53
DRG: 330 | End: 2018-06-28
Attending: NURSE PRACTITIONER
Payer: COMMERCIAL

## 2018-06-28 LAB
ANION GAP SERPL CALC-SCNC: 11 MMOL/L (ref 7–16)
BASOPHILS # BLD: 0 K/UL (ref 0–0.2)
BASOPHILS NFR BLD: 0 % (ref 0–2)
BUN SERPL-MCNC: 15 MG/DL (ref 6–23)
CALCIUM SERPL-MCNC: 7.9 MG/DL (ref 8.3–10.4)
CHLORIDE SERPL-SCNC: 103 MMOL/L (ref 98–107)
CO2 SERPL-SCNC: 29 MMOL/L (ref 21–32)
CREAT SERPL-MCNC: 0.73 MG/DL (ref 0.8–1.5)
DIFFERENTIAL METHOD BLD: ABNORMAL
EOSINOPHIL # BLD: 0 K/UL (ref 0–0.8)
EOSINOPHIL NFR BLD: 0 % (ref 0.5–7.8)
ERYTHROCYTE [DISTWIDTH] IN BLOOD BY AUTOMATED COUNT: 13.4 % (ref 11.9–14.6)
GLUCOSE SERPL-MCNC: 88 MG/DL (ref 65–100)
HCT VFR BLD AUTO: 46.1 % (ref 41.1–50.3)
HGB BLD-MCNC: 15.6 G/DL (ref 13.6–17.2)
IMM GRANULOCYTES # BLD: 0 K/UL (ref 0–0.5)
IMM GRANULOCYTES NFR BLD AUTO: 0 % (ref 0–5)
LYMPHOCYTES # BLD: 1.4 K/UL (ref 0.5–4.6)
LYMPHOCYTES NFR BLD: 12 % (ref 13–44)
MCH RBC QN AUTO: 30.5 PG (ref 26.1–32.9)
MCHC RBC AUTO-ENTMCNC: 33.8 G/DL (ref 31.4–35)
MCV RBC AUTO: 90.2 FL (ref 79.6–97.8)
MONOCYTES # BLD: 1.1 K/UL (ref 0.1–1.3)
MONOCYTES NFR BLD: 9 % (ref 4–12)
NEUTS SEG # BLD: 9.8 K/UL (ref 1.7–8.2)
NEUTS SEG NFR BLD: 79 % (ref 43–78)
PLATELET # BLD AUTO: 301 K/UL (ref 150–450)
PMV BLD AUTO: 10.5 FL (ref 10.8–14.1)
POTASSIUM SERPL-SCNC: 3.9 MMOL/L (ref 3.5–5.1)
RBC # BLD AUTO: 5.11 M/UL (ref 4.23–5.67)
SODIUM SERPL-SCNC: 143 MMOL/L (ref 136–145)
WBC # BLD AUTO: 12.4 K/UL (ref 4.3–11.1)

## 2018-06-28 PROCEDURE — 74018 RADEX ABDOMEN 1 VIEW: CPT

## 2018-06-28 PROCEDURE — 80048 BASIC METABOLIC PNL TOTAL CA: CPT | Performed by: INTERNAL MEDICINE

## 2018-06-28 PROCEDURE — 77030020263 HC SOL INJ SOD CL0.9% LFCR 1000ML

## 2018-06-28 PROCEDURE — C9113 INJ PANTOPRAZOLE SODIUM, VIA: HCPCS | Performed by: NURSE PRACTITIONER

## 2018-06-28 PROCEDURE — 74011250636 HC RX REV CODE- 250/636: Performed by: NURSE PRACTITIONER

## 2018-06-28 PROCEDURE — 74011000250 HC RX REV CODE- 250: Performed by: NURSE PRACTITIONER

## 2018-06-28 PROCEDURE — 36415 COLL VENOUS BLD VENIPUNCTURE: CPT | Performed by: INTERNAL MEDICINE

## 2018-06-28 PROCEDURE — 74011250636 HC RX REV CODE- 250/636: Performed by: SURGERY

## 2018-06-28 PROCEDURE — 74011250637 HC RX REV CODE- 250/637: Performed by: FAMILY MEDICINE

## 2018-06-28 PROCEDURE — 65270000029 HC RM PRIVATE

## 2018-06-28 PROCEDURE — 74011250636 HC RX REV CODE- 250/636: Performed by: INTERNAL MEDICINE

## 2018-06-28 PROCEDURE — 85025 COMPLETE CBC W/AUTO DIFF WBC: CPT | Performed by: INTERNAL MEDICINE

## 2018-06-28 RX ORDER — FAMOTIDINE 20 MG/1
20 TABLET, FILM COATED ORAL ONCE
Status: CANCELLED | OUTPATIENT
Start: 2018-06-28 | End: 2018-06-28

## 2018-06-28 RX ORDER — SODIUM CHLORIDE 0.9 % (FLUSH) 0.9 %
5-10 SYRINGE (ML) INJECTION EVERY 8 HOURS
Status: CANCELLED | OUTPATIENT
Start: 2018-06-28

## 2018-06-28 RX ORDER — PANTOPRAZOLE SODIUM 40 MG/10ML
40 INJECTION, POWDER, LYOPHILIZED, FOR SOLUTION INTRAVENOUS
Status: DISCONTINUED | OUTPATIENT
Start: 2018-06-28 | End: 2018-07-02

## 2018-06-28 RX ORDER — DEXTROSE, SODIUM CHLORIDE, AND POTASSIUM CHLORIDE 5; .9; .15 G/100ML; G/100ML; G/100ML
75 INJECTION INTRAVENOUS CONTINUOUS
Status: DISCONTINUED | OUTPATIENT
Start: 2018-06-28 | End: 2018-07-02

## 2018-06-28 RX ORDER — SODIUM CHLORIDE 0.9 % (FLUSH) 0.9 %
5-10 SYRINGE (ML) INJECTION AS NEEDED
Status: CANCELLED | OUTPATIENT
Start: 2018-06-28

## 2018-06-28 RX ADMIN — PANTOPRAZOLE SODIUM 40 MG: 40 INJECTION, POWDER, FOR SOLUTION INTRAVENOUS at 11:56

## 2018-06-28 RX ADMIN — ENALAPRILAT 0.62 MG: 1.25 INJECTION INTRAVENOUS at 05:50

## 2018-06-28 RX ADMIN — ENALAPRILAT 0.62 MG: 1.25 INJECTION INTRAVENOUS at 12:00

## 2018-06-28 RX ADMIN — DEXTROSE MONOHYDRATE, SODIUM CHLORIDE, AND POTASSIUM CHLORIDE 125 ML/HR: 50; 9; 1.49 INJECTION, SOLUTION INTRAVENOUS at 23:02

## 2018-06-28 RX ADMIN — Medication 10 ML: at 14:00

## 2018-06-28 RX ADMIN — MORPHINE SULFATE 2 MG: 2 INJECTION, SOLUTION INTRAMUSCULAR; INTRAVENOUS at 20:29

## 2018-06-28 RX ADMIN — Medication 5 ML: at 20:30

## 2018-06-28 RX ADMIN — ENALAPRILAT 0.62 MG: 1.25 INJECTION INTRAVENOUS at 00:06

## 2018-06-28 RX ADMIN — DEXTROSE MONOHYDRATE, SODIUM CHLORIDE, AND POTASSIUM CHLORIDE 125 ML/HR: 50; 9; 1.49 INJECTION, SOLUTION INTRAVENOUS at 12:00

## 2018-06-28 RX ADMIN — CHLORASEPTIC 1 SPRAY: 1.5 LIQUID ORAL at 23:02

## 2018-06-28 RX ADMIN — ENALAPRILAT 0.62 MG: 1.25 INJECTION INTRAVENOUS at 17:17

## 2018-06-28 RX ADMIN — ONDANSETRON 4 MG: 2 INJECTION INTRAMUSCULAR; INTRAVENOUS at 11:56

## 2018-06-28 RX ADMIN — ENALAPRILAT 0.62 MG: 1.25 INJECTION INTRAVENOUS at 23:02

## 2018-06-28 RX ADMIN — Medication 5 ML: at 05:51

## 2018-06-28 NOTE — PROGRESS NOTES
Problem: Interdisciplinary Rounds  Goal: Interdisciplinary Rounds  Outcome: Progressing Towards Goal  Interdisciplinary team rounds were held 6/27/2018 with the following team members:Care Management, Nurse Practitioner, Occupational Therapy and . Patient going for sigmoidoscopy tomorrow. Plan of care discussed. See clinical pathway and/or care plan for interventions and desired outcomes.

## 2018-06-28 NOTE — PROGRESS NOTES
Hospitalist Progress Note     Admit Date:  2018  4:21 PM   Name:  Alanis House   Age:  46 y.o.  :  1965   MRN:  [id number]   PCP:  Hattie Gerber DO  Treatment Team: Attending Provider: Maryanne Quigley. Rocio Fatima MD; Utilization Review: Crissy Quan RN; Consulting Provider: Ruddy Yuan MD    Subjective:   Pt is a 47 yo male with PMG of GERD, tobacco use. PT presented from GI office with ileus. Pt experienced change in bowel habit, lower abd pain, rectal bleeding, and was scheduled for colonoscopy but unable to compete the prep. An xray confirmed obstruction. In ER a CT showed fluid filled large and small daria loops with increased soft tissue suggestive of high rectal cancer obstruction. His CEA as 2.6. Pt had flex sig on . An NG tube was placed to low intermittent suction. PT denies other sx such as CP and SOB. Abd pain improved after NG tube placed. PT was seen by the ostomy nurse this afternoon and she marked 3 possible locations for stomas, surgery planned for tomorrow. Pt upset, large number of family members present for support.         Objective:   Patient Vitals for the past 24 hrs:   Temp Pulse Resp BP SpO2   18 1530 98.6 °F (37 °C) 74 20 146/87 98 %   18 1351 98.8 °F (37.1 °C) 72 20 148/87 96 %   18 0730 98.5 °F (36.9 °C) 64 20 142/79 96 %   18 0517 98.1 °F (36.7 °C) (!) 59 17 137/73 95 %   18 2343 99.3 °F (37.4 °C) 66 17 151/79 96 %   18 2025 99.2 °F (37.3 °C) 68 17 162/84 96 %   18 1700 99 °F (37.2 °C) 69 18 (!) 172/95 97 %     Oxygen Therapy  O2 Sat (%): 98 % (18 1530)  Pulse via Oximetry: 68 beats per minute (18 1647)  O2 Device: Nasal cannula (18 1647)  O2 Flow Rate (L/min): 2 l/min (18 1647)    Intake/Output Summary (Last 24 hours) at 18 1651  Last data filed at 18 1803   Gross per 24 hour   Intake                0 ml   Output              450 ml   Net             -450 ml General:    Well nourished. Awake and alert. Head:  Normocephalic, atraumatic  Eyes:  Extraocular movements intact, normal sclera  CV:   RRR. No  Murmurs, clicks, or gallops  Lungs:   Unlabored, no cyanosis  Abdomen:   Soft, nondistended, some diffuse tenderness. Extremities: Warm and dry. No cyanosis or edema. Skin:     No rashes or jaundice. Neuro:  No gross focal deficits  Psych:  Mood and affect appropriate    Data Review:  I have reviewed all labs, meds, telemetry events, and studies from the last 24 hours. Recent Results (from the past 24 hour(s))   METABOLIC PANEL, BASIC    Collection Time: 06/28/18  5:30 AM   Result Value Ref Range    Sodium 143 136 - 145 mmol/L    Potassium 3.9 3.5 - 5.1 mmol/L    Chloride 103 98 - 107 mmol/L    CO2 29 21 - 32 mmol/L    Anion gap 11 7 - 16 mmol/L    Glucose 88 65 - 100 mg/dL    BUN 15 6 - 23 MG/DL    Creatinine 0.73 (L) 0.8 - 1.5 MG/DL    GFR est AA >60 >60 ml/min/1.73m2    GFR est non-AA >60 >60 ml/min/1.73m2    Calcium 7.9 (L) 8.3 - 10.4 MG/DL   CBC WITH AUTOMATED DIFF    Collection Time: 06/28/18  5:30 AM   Result Value Ref Range    WBC 12.4 (H) 4.3 - 11.1 K/uL    RBC 5.11 4.23 - 5.67 M/uL    HGB 15.6 13.6 - 17.2 g/dL    HCT 46.1 41.1 - 50.3 %    MCV 90.2 79.6 - 97.8 FL    MCH 30.5 26.1 - 32.9 PG    MCHC 33.8 31.4 - 35.0 g/dL    RDW 13.4 11.9 - 14.6 %    PLATELET 482 684 - 293 K/uL    MPV 10.5 (L) 10.8 - 14.1 FL    DF AUTOMATED      NEUTROPHILS 79 (H) 43 - 78 %    LYMPHOCYTES 12 (L) 13 - 44 %    MONOCYTES 9 4.0 - 12.0 %    EOSINOPHILS 0 (L) 0.5 - 7.8 %    BASOPHILS 0 0.0 - 2.0 %    IMMATURE GRANULOCYTES 0 0.0 - 5.0 %    ABS. NEUTROPHILS 9.8 (H) 1.7 - 8.2 K/UL    ABS. LYMPHOCYTES 1.4 0.5 - 4.6 K/UL    ABS. MONOCYTES 1.1 0.1 - 1.3 K/UL    ABS. EOSINOPHILS 0.0 0.0 - 0.8 K/UL    ABS. BASOPHILS 0.0 0.0 - 0.2 K/UL    ABS. IMM.  GRANS. 0.0 0.0 - 0.5 K/UL        All Micro Results     None          Current Meds:  Current Facility-Administered Medications Medication Dose Route Frequency    pantoprazole (PROTONIX) injection 40 mg  40 mg IntraVENous ACB    dextrose 5% - 0.9% NaCl with KCl 20 mEq/L infusion  125 mL/hr IntraVENous CONTINUOUS    sodium chloride (NS) flush 5-10 mL  5-10 mL IntraVENous Q8H    sodium chloride (NS) flush 5-10 mL  5-10 mL IntraVENous PRN    morphine injection 2 mg  2 mg IntraVENous Q3H PRN    ondansetron (ZOFRAN) injection 4 mg  4 mg IntraVENous Q4H PRN    naloxone (NARCAN) injection 0.4 mg  0.4 mg IntraVENous PRN    nicotine (NICODERM CQ) 14 mg/24 hr patch 1 Patch  1 Patch TransDERmal DAILY PRN    enalaprilat (VASOTEC) injection 0.625 mg  0.625 mg IntraVENous Q6H       Diet:  DIET NPO  DIET NPO    Other Studies (last 24 hours):  Xr Abd (kub)    Result Date: 6/28/2018  PORTABLE ABDOMEN, June 28, 2018 at 1432 hours: CLINICAL HISTORY:  Nasogastric tube repositioning. COMPARISON:  KUB at 1137 hours today. FINDINGS:  AP supine image demonstrates a nasogastric tube with the proximal sidehole at just above the gastroesophageal junction. Multiple mildly dilated small bowel loops are evident in the still upper abdomen. IMPRESSION:  Nasogastric tube proximal sidehole has been advanced slightly below the gastroesophageal junction, but further advancement by approximately 5 cm is suggested. Xr Abd (kub)    Result Date: 6/28/2018  KUB: CLINICAL HISTORY:  Periumbilical pain today with a history of rectal bleeding. COMPARISON:  June 27, 2018 at 1810 hours. FINDINGS:  AP supine images demonstrates a moderate amount of stool scattered in the colon with multiple mildly dilated small bowel loops. No abnormal soft tissue mass is noted. Nasogastric tube proximal sidehole is just above the gastroesophageal junction. IMPRESSION:  1. NO SIGNIFICANT INTERVAL CHANGE FROM JUNE 27. 2.  NASOGASTRIC TUBE PROXIMAL SIDEHOLE IS JUST ABOVE THE GASTROESOPHAGEAL JUNCTION. ADVANCEMENT BY APPROXIMATELY 10 CM IS SUGGESTED.     Xr Abd (kub)    Result Date: 6/27/2018  KUB HISTORY: NG tube placement. A single AP view of the abdomen was obtained in the supine position at 1815 hours. Comparison was made to the prior exam performed earlier on the same day. Findings: A nasogastric tube is unchanged at the tip overlying the left upper quadrant. There are dilated loops of large and small bowel, more pronounced than seen previously. Assessment for free intraperitoneal air suboptimal on this supine technique. IMPRESSION: Interval progression of dilated loops of large and small bowel and compared to the previous exam performed earlier on the same day. These findings could be secondary to a distal colonic obstruction given the abnormal CT findings. Assessment and Plan:     Hospital Problems as of 6/28/2018  Date Reviewed: 6/27/2018          Codes Class Noted - Resolved POA    * (Principal)Bowel obstruction ([facility] [address] ICD-10-CM: W61.818  ICD-9-CM: 560.9  6/26/2018 - Present Yes        Rectal mass ICD-10-CM: K62.9  ICD-9-CM: 787.99  6/26/2018 - Present Yes        Leukocytosis ICD-10-CM: D72.829  ICD-9-CM: 288.60  6/26/2018 - Present Yes        Generalized abdominal pain ICD-10-CM: R10.84  ICD-9-CM: 789.07  6/26/2018 - Present Yes        Nausea with vomiting ICD-10-CM: R11.2  ICD-9-CM: 787.01  6/26/2018 - Present Yes        Elevated blood pressure reading ICD-10-CM: R03.0  ICD-9-CM: 796.2  5/31/2018 - Present Yes        Tobacco abuse (Chronic) ICD-10-CM: Z72.0  ICD-9-CM: 305.1  8/18/2016 - Present Yes              A/P:    1.Bowel obstruction  NG tube to low intermittent suction  Surgery 6/29  Ostomy nurse will begin teaching post op    2. HTN  Enalaprilat IV q 6 hr - will need to transition to PO post op    DC planning/Dispo:  Home with [facility]  DVT ppx:  SCDs    Case reviewed with supervising MD Izabella Gautam MD    Signed:  DONALD Bashir

## 2018-06-28 NOTE — WOUND CARE
Marked for ostomy. Brief discussion of ostomy cares and plans for teaching post op with patient and spouse, opportunity for questions given, all answered. Will begin ostomy teaching early next week.

## 2018-06-28 NOTE — PROGRESS NOTES
Problem: Nutrition Deficit  Goal: *Optimize nutritional status  Nutrition  Reason for assessment: Referral received from nursing admission Malnutrition Screening Tool for recently lost 14-23# without trying and eating poorly due to decreased appetite. Assessment:   Diet order(s): NPO  Food/Nutrition Patient History:  Pt reports a UBW of ~158 pounds prior to a few months ago. Note that he has had hematochezia for ~2 months PTA. He is sp flex sig yesterday revealing obstruction d/t rectal mass. Potential plan for surgery tomorrow (diverting colostomy or ileostomy). NGT to LIS. Anthropometrics:Height: 5' 9\" (175.3 cm),  Weight: 68 kg (150 lb),  , Body mass index is 22.15 kg/(m^2). BMI class of normal weight. WT / BMI 6/26/2018 5/31/2018 9/23/2016   WEIGHT 150 lb 154 lb 155 lb 12.8 oz   Per weights listed in EMR, potential for a 4 pound, 2.5% clinically insignificant weight loss within 1 month. Macronutrient needs:  EER:  9977-1394 kcal /day (25-30 kcal/kg listed BW)  EPR:  73-95 grams protein/day (1-1.3 grams/kg IBW)  Max CHO: 419 grams CHO/day (4mg/kgIBW/minute)  Fluid: 1ml/kcal  Intake/Comparative Standards: Per RD meal rounds: NPO. Nutrition Diagnosis: Inadequate oral intake r/t altered GI function as evidenced by pt with obstructing mass in rectum, NPO, meeting 0% of needs. Intervention:  Meals and snacks: NPO  If patient's diet is not expected to advance beyond NPO/CLQ diet within the next 7-10 days, IV nutrition should be initiated. Please consult RD if IV nutrition is warranted. Discharge nutrition plan: Too soon to determine.     Zeeshan Ledesma [address], [address], [facility] [address], [phone]

## 2018-06-28 NOTE — PROGRESS NOTES
H&P/Consult Note/Progress Note/Office Note:   Nato Coulter  MRN: [id number]  :1965  Age:52 y.o.    HPI: Nato Coulter is a 46 y.o. male who was sent to the ER yesterday by his gastroenterologist after a failed bowel prep prior to a colonoscopy. He has PMHx of tobacco abuse and elevated blood pressure. He reportedly has been having blood in his stool for about 2 months. He was being seen by GI and had a colonoscopy scheduled. He took the bowel prep but only had a \"teaspoon\" of results with vomiting after attempting to take in prep. In the ER,  A CT abdomen/pelvis was obtained that is concerning for fluid-filled small and large bowel loops with increased soft tissue suggesting an obstructing high rectal cancer. He has never had a colonoscopy. He denies abdominal pain at the present and reports BM this morning. He has an NGT with brown-tinged output. We are consulted for surgical intervention of obstructing mass. 18: Patient up on edge of bed. Complains of abdominal pain, hiccups, and burping.  AF, VSS  Past Medical History:   Diagnosis Date    Chest pain 2016    GERD (gastroesophageal reflux disease)     Tobacco abuse 2016     Past Surgical History:   Procedure Laterality Date    HX HERNIA REPAIR      HX ORTHOPAEDIC      arm     Current Facility-Administered Medications   Medication Dose Route Frequency    pantoprazole (PROTONIX) injection 40 mg  40 mg IntraVENous ACB    dextrose 5% - 0.9% NaCl with KCl 20 mEq/L infusion  125 mL/hr IntraVENous CONTINUOUS    sodium chloride (NS) flush 5-10 mL  5-10 mL IntraVENous Q8H    sodium chloride (NS) flush 5-10 mL  5-10 mL IntraVENous PRN    morphine injection 2 mg  2 mg IntraVENous Q3H PRN    ondansetron (ZOFRAN) injection 4 mg  4 mg IntraVENous Q4H PRN    naloxone (NARCAN) injection 0.4 mg  0.4 mg IntraVENous PRN    nicotine (NICODERM CQ) 14 mg/24 hr patch 1 Patch  1 Patch TransDERmal DAILY PRN    enalaprilat (VASOTEC) injection 0.625 mg  0.625 mg IntraVENous Q6H     Zithromax [azithromycin]  Social History     Social History    Marital status:      Spouse name: N/A    Number of children: N/A    Years of education: N/A     Social History Main Topics    Smoking status: Current Every Day Smoker     Packs/day: 1.00     Years: 30.00     Types: Cigarettes     Start date: 5/31/1988    Smokeless tobacco: Never Used    Alcohol use 8.4 oz/week     14 Cans of beer, 0 Standard drinks or equivalent per week    Drug use: No    Sexual activity: Not Asked     Other Topics Concern    None     Social History Narrative     History   Smoking Status    Current Every Day Smoker    Packs/day: 1.00    Years: 30.00    Types: Cigarettes    Start date: 5/31/1988   Smokeless Tobacco    Never Used     Family History   Problem Relation Age of Onset    Hypertension Mother     Heart Disease Mother     No Known Problems Father      ROS: The patient has no difficulty with chest pain or shortness of breath. No fever or chills. Comprehensive review of systems was otherwise unremarkable except as noted above. Physical Exam:   Visit Vitals    /79 (BP 1 Location: Right arm, BP Patient Position: At rest)    Pulse 64    Temp 98.5 °F (36.9 °C)    Resp 20    Ht 5' 9\" (1.753 m)    Wt 150 lb (68 kg)    SpO2 96%    BMI 22.15 kg/m2     Constitutional: Alert, oriented, cooperative patient in no acute distress; appears stated age    Eyes:Sclera are clear. EOMs intact  ENMT: no external lesions gross hearing normal; no obvious neck masses, no ear or lip lesions, nares normal  CV: RRR. Normal perfusion  Resp: No JVD. Breathing is  non-labored; no audible wheezing. GI: soft and non-distended, non-tender    Rectal: firm, nearly obstructing circumferential mass, that is proximal to prostate, but is fixed in the pelvis circumferentially  Musculoskeletal: unremarkable with normal function. No embolic signs or cyanosis.    Neuro: Oriented; moves all 4; no focal deficits  Psychiatric: normal affect and mood, no memory impairment    Recent vitals (if inpt):  Patient Vitals for the past 24 hrs:   BP Temp Pulse Resp SpO2   06/28/18 0730 142/79 98.5 °F (36.9 °C) 64 20 96 %   06/28/18 0517 137/73 98.1 °F (36.7 °C) (!) 59 17 95 %   06/27/18 2343 151/79 99.3 °F (37.4 °C) 66 17 96 %   06/27/18 2025 162/84 99.2 °F (37.3 °C) 68 17 96 %   06/27/18 1700 (!) 172/95 99 °F (37.2 °C) 69 18 97 %   06/27/18 1647 136/68 - 68 16 98 %   06/27/18 1549 - - - - 100 %   06/27/18 1548 - - 74 17 100 %   06/27/18 1541 152/86 - 77 16 100 %   06/27/18 1540 - - 70 16 100 %   06/27/18 1536 150/82 98.1 °F (36.7 °C) 62 17 100 %   06/27/18 1531 153/82 - 66 16 100 %   06/27/18 1526 142/81 - 68 17 100 %   06/27/18 1521 146/78 - 67 16 100 %   06/27/18 1516 137/75 - 65 16 100 %   06/27/18 1511 147/79 - 71 14 100 %   06/27/18 1510 147/79 97.9 °F (36.6 °C) 73 12 99 %   06/27/18 1436 (!) 152/93 - 63 18 97 %   06/27/18 1351 (!) 174/95 - 74 14 97 %   06/27/18 1121 163/81 98.6 °F (37 °C) 75 20 96 %       Labs:  Recent Labs      06/28/18   0530  06/27/18   0430   WBC  12.4*  18.4*   HGB  15.6  16.3   PLT  301  317   NA  143  140   K  3.9  3.6   CL  103  101   CO2  29  27   BUN  15  16   CREA  0.73*  0.65*   GLU  88  115*   TBILI   --   0.9   SGOT   --   17   ALT   --   19   AP   --   67       Lab Results   Component Value Date/Time    WBC 12.4 (H) 06/28/2018 05:30 AM    HGB 15.6 06/28/2018 05:30 AM    PLATELET 488 62/71/3253 05:30 AM    Sodium 143 06/28/2018 05:30 AM    Potassium 3.9 06/28/2018 05:30 AM    Chloride 103 06/28/2018 05:30 AM    CO2 29 06/28/2018 05:30 AM    BUN 15 06/28/2018 05:30 AM    Creatinine 0.73 (L) 06/28/2018 05:30 AM    Glucose 88 06/28/2018 05:30 AM    Bilirubin, total 0.9 06/27/2018 04:30 AM    AST (SGOT) 17 06/27/2018 04:30 AM    ALT (SGPT) 19 06/27/2018 04:30 AM    Alk.  phosphatase 67 06/27/2018 04:30 AM       I reviewed recent labs and recent radiologic studies. CT Results (most recent):    Results from Hospital Encounter encounter on 06/26/18   CT ABD PELV W CONT   Narrative CT ABDOMEN AND PELVIS WITH CONTRAST. HISTORY: Abdominal pain and cramping. Blood in stool. COMPARISON: None    TECHNIQUE: 5 mm axial scans from above the diaphragms to the pubic symphysis  following oral and 100 cc intravenous contrast without acute complication. Intravenous contrast was given to increase the sensitivity to acute  inflammation. Radiation dose reduction techniques were used for this study. Our CT scanners use one or more of the following: Automated exposure control,  adjustment of the mA and or kV according to patient size, iterative  reconstruction. FINDINGS:   Abdomen: No free air. The lung bases are clear. There is fluid in the distal  esophagus, probably reflux. Oral contrast has not left the stomach. Small bowel  loops are fluid-filled and mildly dilated throughout. No transition zone  appreciated. Large bowel loops are also fluid-filled. Small hypoattenuating foci  in the liver, probably subcentimeter cysts. The spleen is small and  homogeneous. . No calcified gallstones. The biliary tree is not dilated. The  pancreas is unremarkable. No free fluid, acute inflammatory changes or  adenopathy. The kidneys enhance uniformly. No radiopaque renal calculi. No  hydronephrosis. The adrenal glands are normal size. Aorta is normal caliber. Pelvis: The colon is fluid-filled down to the level of the rectosigmoid where  there is increased soft tissue suggesting an obstructing high rectal mass. The  urinary bladder unremarkable. The prostate is mildly enlarged. Seminal vesicles  symmetric. Impression IMPRESSION:  Fluid-filled small and large bowel loops with increased soft tissue  suggesting an obstructing high rectal cancer.         XR Results (most recent):    Results from Hospital Encounter encounter on 06/26/18   XR ABD (KUB)   Narrative KUB    HISTORY: NG tube placement. A single AP view of the abdomen was obtained in the supine position at 1815  hours. Comparison was made to the prior exam performed earlier on the same day. Findings: A nasogastric tube is unchanged at the tip overlying the left upper  quadrant. There are dilated loops of large and small bowel, more pronounced than  seen previously. Assessment for free intraperitoneal air suboptimal on this  supine technique. Impression IMPRESSION: Interval progression of dilated loops of large and small bowel and  compared to the previous exam performed earlier on the same day. These findings  could be secondary to a distal colonic obstruction given the abnormal CT  findings. I independently reviewed radiology images for studies I described above or studies I have ordered. Admission date (for inpatients): 6/26/2018   * No surgery date entered *  Procedure(s):  SIGMOIDOSCOPY FLEXIBLE  COLON BIOPSY    ASSESSMENT/PLAN: Jose Guadalupe Valle is a 46 y.o. Male with sign and symptoms consistent with obstructing colon cancer. Dr. Brandon Schultz plans to do a flex-sig today to assess mass and obtain biopsy.      Problem List  Date Reviewed: 6/27/2018          Codes Class Noted    * (Principal)Bowel obstruction ([address] ICD-10-CM: R39.143  ICD-9-CM: 560.9  6/26/2018        Rectal mass ICD-10-CM: K62.9  ICD-9-CM: 787.99  6/26/2018        Leukocytosis ICD-10-CM: D72.829  ICD-9-CM: 288.60  6/26/2018        Generalized abdominal pain ICD-10-CM: R10.84  ICD-9-CM: 789.07  6/26/2018        Nausea with vomiting ICD-10-CM: R11.2  ICD-9-CM: 787.01  6/26/2018        Erectile dysfunction due to arterial insufficiency ICD-10-CM: N52.01  ICD-9-CM: 607.84  5/31/2018        Plantar fasciitis, right ICD-10-CM: M72.2  ICD-9-CM: 728.71  5/31/2018        Screen for colon cancer ICD-10-CM: Z12.11  ICD-9-CM: V76.51  5/31/2018        Elevated blood pressure reading ICD-10-CM: R03.0  ICD-9-CM: 796.2  5/31/2018        Polycythemia secondary to smoking ICD-10-CM: D75.1  ICD-9-CM: 289.0  5/30/2018        IGT (impaired glucose tolerance) ICD-10-CM: R73.02  ICD-9-CM: 790.22  5/30/2018        Hypercholesterolemia ICD-10-CM: E78.00  ICD-9-CM: 272.0  5/30/2018    Overview Signed 6/4/2018  2:20 PM by Lizbeth Sanchez     ASCVD 10 year risk is 16.0%. Mod to high dose statin indicated. (based on b/p 180/90, 6/4/18)                 Screening PSA (prostate specific antigen) ICD-10-CM: Z12.5  ICD-9-CM: V76.44  5/30/2018        Tobacco abuse (Chronic) ICD-10-CM: Z72.0  ICD-9-CM: 305.1  8/18/2016            Principal Problem: Bowel obstruction ([address] (6/26/2018)    Active Problems:    Tobacco abuse (8/18/2016)      Elevated blood pressure reading (5/31/2018)      Rectal mass (6/26/2018)      Leukocytosis (6/26/2018)      Generalized abdominal pain (6/26/2018)      Nausea with vomiting (6/26/2018)       Mass suspicious for rectal cancer; had in depth conversation with patient and spouse about likely diagnosis and current/future treatment options  NPO  NGT  Await flex-sig/ pathology  KUB today  Wound care for ostomy marking  Obtain consent  To OR tomorrow for diverting colostomy with Dr. Bora Kerr discussed the patient's condition and treatment options with the patient. Discussed risks of surgery in language the patient could understand including bleeding, infection, need for further surgery, abscess, fistula, SBO, DVT, PE, heart attack, stroke, renal failure, respiratory failure, ventilatory dependence, and death. The patient voiced understanding of all this and all questions were answered. Alternatives to surgery were discussed also and risks of the alternatives. The patient requested that we proceed with surgery. Signed:  Irene Green NP     I have seen and examined the patient with the Nurse Practitioner and agree with the above assessment and plan.   I had a tatyana discussion with patient and wife regarding findings of near complete obstructing mass in mid rectum. On digital exam, the lesion is palpable and lies proximal to the prostate but is circumferential and fixed in the pelvis. He denies pain in the pelvis. Regardless of pathology findings, he requires diversion and then can continue with work-up. I plan to most likely perform a diverting transverse colostomy so not to impact potential reach of L colon for low pelvic reconstruction. We are planning on placing a port if possible. Plan will then be for MRI of pelvis to stage rectal tumor. Wound Care marked patient for potential stoma sites. If path returns as carcinoma as expected then will present at [facility] as likely candidate for neoadjuvant chemo-radiation. Whit Harrington.  Bora Kerr MD

## 2018-06-28 NOTE — ANESTHESIA PREPROCEDURE EVALUATION
Anesthetic History   No history of anesthetic complications            Review of Systems / Medical History  Patient summary reviewed and pertinent labs reviewed    Pulmonary    COPD: mild      Smoker         Neuro/Psych   Within defined limits           Cardiovascular                  Exercise tolerance: >4 METS     GI/Hepatic/Renal     GERD: well controlled          Comments: SBO.  Endo/Other  Within defined limits           Other Findings              Physical Exam    Airway  Mallampati: II  TM Distance: 4 - 6 cm  Neck ROM: normal range of motion   Mouth opening: Normal     Cardiovascular  Regular rate and rhythm,  S1 and S2 normal,  no murmur, click, rub, or gallop  Rhythm: regular  Rate: normal         Dental    Dentition: Caps/crowns     Pulmonary        Wheezes:bilateral         Abdominal         Other Findings            Anesthetic Plan    ASA: 2  Anesthesia type: general          Induction: Intravenous  Anesthetic plan and risks discussed with: Patient      Underwent colonoscopy 6/27 without issue

## 2018-06-28 NOTE — PROGRESS NOTES
GI DAILY PROGRESS NOTE    Admit Date:  6/26/2018    Today's Date:  6/28/2018    CC:  Rectal mass    Subjective:     Patient and wife are tearful. No complaints at this time. However, did mention to Dr. Stephanie Gray that he was having some nausea and hiccups. Anxiously awaiting surgery planned for tomorrow with Dr. Stephanie Gray. Flex Sig 6/27/2018  Findings:      ANUS:  Digital exam reveals a firm rectal mass. RECTUM:  At 5-6 cm from the anal verge there a completely obstructing  circumferential mass which could not be traversed. The inner diameter is approximately 3-4 mm. This was extensively biopsied. SIGMOID COLON:  The sigmoid could not be examined due to the obstructing mass in the rectum.     Specimen: Yes     Estimated Blood Loss:  Minimal     Implant:  None      Impression:    Rectal mass, concerning for malignancy.       Medications:   Current Facility-Administered Medications   Medication Dose Route Frequency    sodium chloride (NS) flush 5-10 mL  5-10 mL IntraVENous Q8H    sodium chloride (NS) flush 5-10 mL  5-10 mL IntraVENous PRN    morphine injection 2 mg  2 mg IntraVENous Q3H PRN    ondansetron (ZOFRAN) injection 4 mg  4 mg IntraVENous Q4H PRN    naloxone (NARCAN) injection 0.4 mg  0.4 mg IntraVENous PRN    nicotine (NICODERM CQ) 14 mg/24 hr patch 1 Patch  1 Patch TransDERmal DAILY PRN    0.9% sodium chloride infusion  75 mL/hr IntraVENous CONTINUOUS    enalaprilat (VASOTEC) injection 0.625 mg  0.625 mg IntraVENous Q6H       Review of Systems:  ROS was obtained, with pertinent positives as listed above. No chest pain or SOB. Diet:      Objective:   Vitals:  Visit Vitals    /79 (BP 1 Location: Right arm, BP Patient Position: At rest)    Pulse 64    Temp 98.5 °F (36.9 °C)    Resp 20    Ht 5' 9\" (1.753 m)    Wt 68 kg (150 lb)    SpO2 96%    BMI 22.15 kg/m2     Intake/Output:     06/26 1901 - 06/28 0700  In: 700 [P.O.:100;  I.V.:600]  Out: 450   Exam:  General appearance: no acute distress  EENT: NGT in place. Extremities: extremities normal, atraumatic, no cyanosis or edema  Neuro:  alert and oriented  Psych: tearful    Data Review (Labs):    Recent Labs      06/28/18   0530  06/27/18   0430  06/26/18   1605   WBC  12.4*  18.4*  16.7*   HGB  15.6  16.3  18.6*   HCT  46.1  47.2  52.3*   PLT  301  317  369   MCV  90.2  89.1  88.3   NA  143  140  138   K  3.9  3.6  3.8   CL  103  101  98   CO2  29  27  27   BUN  15  16  13   CREA  0.73*  0.65*  0.94   CA  7.9*  8.1*  9.3   GLU  88  115*  151*   AP   --   67  94   SGOT   --   17  34   ALT   --   19  28   TBILI   --   0.9  0.9   ALB   --   2.8*  3.6   TP   --   6.3  8.6*       Assessment:     Principal Problem: Bowel obstruction ([address] (6/26/2018)    Active Problems:    Tobacco abuse (8/18/2016)      Elevated blood pressure reading (5/31/2018)      Rectal mass (6/26/2018)      Leukocytosis (6/26/2018)      Generalized abdominal pain (6/26/2018)      Nausea with vomiting (6/26/2018)      Patient is a 46 y.o. male with PMH of but not limited to tobacco abuse, HTN, who is seen in consultation at the request of Dr. Hamilton Parisi for GI bleeding and CT scan findings concerning for high rectal mass resulting in obstruction. He was prepping for a colonoscopy for change in bowel pattern, rectal bleeding, no prior colonoscopy but then became nauseated and experienced abdominal pain with the prep. An abdominal Xray showed possible obstruction and CT scan showed possible rectal mass. S/P Flex Sig on 6/27/18 with findings outlined above. Plan is for surgery tomorrow with Dr. Lupe Richards. Complains today of nausea and belching. Tearful and emotional support was provided. Plan:     1. Follow-up path results  2. Appreciate Surgery  3. NPO, NGT to LIWS  4. Will need recall surveillance colonoscopy in 10 months. 5. Will add in PPI     We will sign-off. Please do not hesitate to contact us for any questions/concerns.      Ginger Melgar NP    Patient is seen and examined in collaboration with Dr. Jennifer Rollins. Assessment and plan as per Dr. Jennifer Rollins. ATTENDING NOTE:  I agree with the above assessment and plan. Patient found with obstructing rectal mass. Follow-up pathology results. Surgery planned as per Dr. Sheng Kelley. We will sign off, but do not hesitate to contact us for any additional assistance. We will arrange for a GI follow-up office visit in 4 weeks. Patient will be contacted by our office.     Janak Wolfe MD

## 2018-06-29 ENCOUNTER — ANESTHESIA (OUTPATIENT)
Dept: SURGERY | Age: 53
DRG: 330 | End: 2018-06-29
Payer: COMMERCIAL

## 2018-06-29 LAB
ANION GAP SERPL CALC-SCNC: 4 MMOL/L (ref 7–16)
BASOPHILS # BLD: 0 K/UL (ref 0–0.2)
BASOPHILS NFR BLD: 0 % (ref 0–2)
BUN SERPL-MCNC: 12 MG/DL (ref 6–23)
CALCIUM SERPL-MCNC: 7.8 MG/DL (ref 8.3–10.4)
CHLORIDE SERPL-SCNC: 107 MMOL/L (ref 98–107)
CO2 SERPL-SCNC: 30 MMOL/L (ref 21–32)
CREAT SERPL-MCNC: 0.87 MG/DL (ref 0.8–1.5)
DIFFERENTIAL METHOD BLD: ABNORMAL
EOSINOPHIL # BLD: 0.1 K/UL (ref 0–0.8)
EOSINOPHIL NFR BLD: 1 % (ref 0.5–7.8)
ERYTHROCYTE [DISTWIDTH] IN BLOOD BY AUTOMATED COUNT: 13.1 % (ref 11.9–14.6)
GLUCOSE SERPL-MCNC: 128 MG/DL (ref 65–100)
HCT VFR BLD AUTO: 42.2 % (ref 41.1–50.3)
HGB BLD-MCNC: 14.2 G/DL (ref 13.6–17.2)
IMM GRANULOCYTES # BLD: 0 K/UL (ref 0–0.5)
IMM GRANULOCYTES NFR BLD AUTO: 1 % (ref 0–5)
LYMPHOCYTES # BLD: 1.7 K/UL (ref 0.5–4.6)
LYMPHOCYTES NFR BLD: 21 % (ref 13–44)
MCH RBC QN AUTO: 30.4 PG (ref 26.1–32.9)
MCHC RBC AUTO-ENTMCNC: 33.6 G/DL (ref 31.4–35)
MCV RBC AUTO: 90.4 FL (ref 79.6–97.8)
MONOCYTES # BLD: 0.5 K/UL (ref 0.1–1.3)
MONOCYTES NFR BLD: 6 % (ref 4–12)
NEUTS SEG # BLD: 6 K/UL (ref 1.7–8.2)
NEUTS SEG NFR BLD: 71 % (ref 43–78)
PLATELET # BLD AUTO: 264 K/UL (ref 150–450)
PMV BLD AUTO: 10.5 FL (ref 10.8–14.1)
POTASSIUM SERPL-SCNC: 4.6 MMOL/L (ref 3.5–5.1)
RBC # BLD AUTO: 4.67 M/UL (ref 4.23–5.67)
SODIUM SERPL-SCNC: 141 MMOL/L (ref 136–145)
WBC # BLD AUTO: 8.4 K/UL (ref 4.3–11.1)

## 2018-06-29 PROCEDURE — 77030010541: Performed by: SURGERY

## 2018-06-29 PROCEDURE — 74011000250 HC RX REV CODE- 250

## 2018-06-29 PROCEDURE — 0D9W0ZX DRAINAGE OF PERITONEUM, OPEN APPROACH, DIAGNOSTIC: ICD-10-PCS | Performed by: SURGERY

## 2018-06-29 PROCEDURE — 74011000258 HC RX REV CODE- 258: Performed by: NURSE PRACTITIONER

## 2018-06-29 PROCEDURE — 77030034850: Performed by: SURGERY

## 2018-06-29 PROCEDURE — 74011250636 HC RX REV CODE- 250/636: Performed by: NURSE PRACTITIONER

## 2018-06-29 PROCEDURE — 74011250636 HC RX REV CODE- 250/636: Performed by: ANESTHESIOLOGY

## 2018-06-29 PROCEDURE — 77030011264 HC ELECTRD BLD EXT COVD -A: Performed by: SURGERY

## 2018-06-29 PROCEDURE — 85025 COMPLETE CBC W/AUTO DIFF WBC: CPT | Performed by: INTERNAL MEDICINE

## 2018-06-29 PROCEDURE — 77030008467 HC STPLR SKN COVD -B: Performed by: SURGERY

## 2018-06-29 PROCEDURE — 77030011640 HC PAD GRND REM COVD -A: Performed by: SURGERY

## 2018-06-29 PROCEDURE — 77030008703 HC TU ET UNCUF COVD -A: Performed by: ANESTHESIOLOGY

## 2018-06-29 PROCEDURE — 74011250636 HC RX REV CODE- 250/636

## 2018-06-29 PROCEDURE — 77030002966 HC SUT PDS J&J -A: Performed by: SURGERY

## 2018-06-29 PROCEDURE — 77030028529: Performed by: SURGERY

## 2018-06-29 PROCEDURE — 80048 BASIC METABOLIC PNL TOTAL CA: CPT | Performed by: INTERNAL MEDICINE

## 2018-06-29 PROCEDURE — 77030002996 HC SUT SLK J&J -A: Performed by: SURGERY

## 2018-06-29 PROCEDURE — 0D1L0Z4 BYPASS TRANSVERSE COLON TO CUTANEOUS, OPEN APPROACH: ICD-10-PCS | Performed by: SURGERY

## 2018-06-29 PROCEDURE — 77030019908 HC STETH ESOPH SIMS -A: Performed by: ANESTHESIOLOGY

## 2018-06-29 PROCEDURE — 77030021678 HC GLIDESCP STAT DISP VERT -B: Performed by: ANESTHESIOLOGY

## 2018-06-29 PROCEDURE — 88342 IMHCHEM/IMCYTCHM 1ST ANTB: CPT | Performed by: SURGERY

## 2018-06-29 PROCEDURE — 88341 IMHCHEM/IMCYTCHM EA ADD ANTB: CPT | Performed by: SURGERY

## 2018-06-29 PROCEDURE — 76010000173 HC OR TIME 3 TO 3.5 HR INTENSV-TIER 1: Performed by: SURGERY

## 2018-06-29 PROCEDURE — 76210000017 HC OR PH I REC 1.5 TO 2 HR: Performed by: SURGERY

## 2018-06-29 PROCEDURE — 88112 CYTOPATH CELL ENHANCE TECH: CPT | Performed by: SURGERY

## 2018-06-29 PROCEDURE — 88305 TISSUE EXAM BY PATHOLOGIST: CPT | Performed by: SURGERY

## 2018-06-29 PROCEDURE — 77030012935 HC DRSG AQUACEL BMS -B: Performed by: SURGERY

## 2018-06-29 PROCEDURE — 77030018836 HC SOL IRR NACL ICUM -A: Performed by: SURGERY

## 2018-06-29 PROCEDURE — 65270000029 HC RM PRIVATE

## 2018-06-29 PROCEDURE — 77030032490 HC SLV COMPR SCD KNE COVD -B: Performed by: SURGERY

## 2018-06-29 PROCEDURE — 74011250636 HC RX REV CODE- 250/636: Performed by: INTERNAL MEDICINE

## 2018-06-29 PROCEDURE — 74011000250 HC RX REV CODE- 250: Performed by: NURSE PRACTITIONER

## 2018-06-29 PROCEDURE — 77030010293 HC STPLR INT TI J&J -B: Performed by: SURGERY

## 2018-06-29 PROCEDURE — 77030020782 HC GWN BAIR PAWS FLX 3M -B: Performed by: ANESTHESIOLOGY

## 2018-06-29 PROCEDURE — 76060000037 HC ANESTHESIA 3 TO 3.5 HR: Performed by: SURGERY

## 2018-06-29 PROCEDURE — 74011250636 HC RX REV CODE- 250/636: Performed by: SURGERY

## 2018-06-29 PROCEDURE — 77030008477 HC STYL SATN SLP COVD -A: Performed by: ANESTHESIOLOGY

## 2018-06-29 PROCEDURE — C9113 INJ PANTOPRAZOLE SODIUM, VIA: HCPCS | Performed by: NURSE PRACTITIONER

## 2018-06-29 PROCEDURE — 77030016154: Performed by: SURGERY

## 2018-06-29 PROCEDURE — 36415 COLL VENOUS BLD VENIPUNCTURE: CPT | Performed by: INTERNAL MEDICINE

## 2018-06-29 PROCEDURE — 0DBP8ZX EXCISION OF RECTUM, VIA NATURAL OR ARTIFICIAL OPENING ENDOSCOPIC, DIAGNOSTIC: ICD-10-PCS | Performed by: SURGERY

## 2018-06-29 PROCEDURE — 77030003029 HC SUT VCRL J&J -B: Performed by: SURGERY

## 2018-06-29 RX ORDER — PROPOFOL 10 MG/ML
INJECTION, EMULSION INTRAVENOUS AS NEEDED
Status: DISCONTINUED | OUTPATIENT
Start: 2018-06-29 | End: 2018-06-29 | Stop reason: HOSPADM

## 2018-06-29 RX ORDER — ACETAMINOPHEN 10 MG/ML
1000 INJECTION, SOLUTION INTRAVENOUS
Status: COMPLETED | OUTPATIENT
Start: 2018-06-29 | End: 2018-06-29

## 2018-06-29 RX ORDER — SODIUM CHLORIDE 0.9 % (FLUSH) 0.9 %
5-10 SYRINGE (ML) INJECTION AS NEEDED
Status: DISCONTINUED | OUTPATIENT
Start: 2018-06-29 | End: 2018-06-29 | Stop reason: HOSPADM

## 2018-06-29 RX ORDER — SODIUM CHLORIDE, SODIUM LACTATE, POTASSIUM CHLORIDE, CALCIUM CHLORIDE 600; 310; 30; 20 MG/100ML; MG/100ML; MG/100ML; MG/100ML
150 INJECTION, SOLUTION INTRAVENOUS CONTINUOUS
Status: DISCONTINUED | OUTPATIENT
Start: 2018-06-29 | End: 2018-06-29 | Stop reason: HOSPADM

## 2018-06-29 RX ORDER — GLYCOPYRROLATE 0.2 MG/ML
INJECTION INTRAMUSCULAR; INTRAVENOUS AS NEEDED
Status: DISCONTINUED | OUTPATIENT
Start: 2018-06-29 | End: 2018-06-29 | Stop reason: HOSPADM

## 2018-06-29 RX ORDER — FENTANYL CITRATE 50 UG/ML
100 INJECTION, SOLUTION INTRAMUSCULAR; INTRAVENOUS ONCE
Status: DISCONTINUED | OUTPATIENT
Start: 2018-06-29 | End: 2018-06-29 | Stop reason: HOSPADM

## 2018-06-29 RX ORDER — FENTANYL CITRATE 50 UG/ML
INJECTION, SOLUTION INTRAMUSCULAR; INTRAVENOUS AS NEEDED
Status: DISCONTINUED | OUTPATIENT
Start: 2018-06-29 | End: 2018-06-29 | Stop reason: HOSPADM

## 2018-06-29 RX ORDER — NEOSTIGMINE METHYLSULFATE 1 MG/ML
INJECTION INTRAVENOUS AS NEEDED
Status: DISCONTINUED | OUTPATIENT
Start: 2018-06-29 | End: 2018-06-29 | Stop reason: HOSPADM

## 2018-06-29 RX ORDER — ACETAMINOPHEN 500 MG
1000 TABLET ORAL
Status: DISCONTINUED | OUTPATIENT
Start: 2018-06-29 | End: 2018-06-29 | Stop reason: HOSPADM

## 2018-06-29 RX ORDER — MORPHINE SULFATE 2 MG/ML
2-6 INJECTION, SOLUTION INTRAMUSCULAR; INTRAVENOUS
Status: DISCONTINUED | OUTPATIENT
Start: 2018-06-29 | End: 2018-07-03 | Stop reason: HOSPADM

## 2018-06-29 RX ORDER — ONDANSETRON 2 MG/ML
INJECTION INTRAMUSCULAR; INTRAVENOUS AS NEEDED
Status: DISCONTINUED | OUTPATIENT
Start: 2018-06-29 | End: 2018-06-29 | Stop reason: HOSPADM

## 2018-06-29 RX ORDER — LIDOCAINE HYDROCHLORIDE 10 MG/ML
0.1 INJECTION INFILTRATION; PERINEURAL AS NEEDED
Status: DISCONTINUED | OUTPATIENT
Start: 2018-06-29 | End: 2018-06-29 | Stop reason: HOSPADM

## 2018-06-29 RX ORDER — SODIUM CHLORIDE 9 MG/ML
50 INJECTION, SOLUTION INTRAVENOUS CONTINUOUS
Status: DISCONTINUED | OUTPATIENT
Start: 2018-06-29 | End: 2018-06-29 | Stop reason: HOSPADM

## 2018-06-29 RX ORDER — ROCURONIUM BROMIDE 10 MG/ML
INJECTION, SOLUTION INTRAVENOUS AS NEEDED
Status: DISCONTINUED | OUTPATIENT
Start: 2018-06-29 | End: 2018-06-29 | Stop reason: HOSPADM

## 2018-06-29 RX ORDER — LIDOCAINE HYDROCHLORIDE 20 MG/ML
INJECTION, SOLUTION EPIDURAL; INFILTRATION; INTRACAUDAL; PERINEURAL AS NEEDED
Status: DISCONTINUED | OUTPATIENT
Start: 2018-06-29 | End: 2018-06-29 | Stop reason: HOSPADM

## 2018-06-29 RX ORDER — HYDROCODONE BITARTRATE AND ACETAMINOPHEN 5; 325 MG/1; MG/1
1 TABLET ORAL AS NEEDED
Status: DISCONTINUED | OUTPATIENT
Start: 2018-06-29 | End: 2018-06-29 | Stop reason: HOSPADM

## 2018-06-29 RX ORDER — MIDAZOLAM HYDROCHLORIDE 1 MG/ML
2 INJECTION, SOLUTION INTRAMUSCULAR; INTRAVENOUS
Status: COMPLETED | OUTPATIENT
Start: 2018-06-29 | End: 2018-06-29

## 2018-06-29 RX ORDER — HYDROMORPHONE HYDROCHLORIDE 2 MG/ML
0.5 INJECTION, SOLUTION INTRAMUSCULAR; INTRAVENOUS; SUBCUTANEOUS
Status: DISCONTINUED | OUTPATIENT
Start: 2018-06-29 | End: 2018-06-29 | Stop reason: HOSPADM

## 2018-06-29 RX ADMIN — FENTANYL CITRATE 25 MCG: 50 INJECTION, SOLUTION INTRAMUSCULAR; INTRAVENOUS at 14:12

## 2018-06-29 RX ADMIN — FENTANYL CITRATE 25 MCG: 50 INJECTION, SOLUTION INTRAMUSCULAR; INTRAVENOUS at 15:37

## 2018-06-29 RX ADMIN — FENTANYL CITRATE 25 MCG: 50 INJECTION, SOLUTION INTRAMUSCULAR; INTRAVENOUS at 14:57

## 2018-06-29 RX ADMIN — GLYCOPYRROLATE 0.4 MG: 0.2 INJECTION INTRAMUSCULAR; INTRAVENOUS at 16:00

## 2018-06-29 RX ADMIN — ONDANSETRON 4 MG: 2 INJECTION INTRAMUSCULAR; INTRAVENOUS at 15:45

## 2018-06-29 RX ADMIN — MORPHINE SULFATE 4 MG: 2 INJECTION, SOLUTION INTRAMUSCULAR; INTRAVENOUS at 19:50

## 2018-06-29 RX ADMIN — ACETAMINOPHEN 1000 MG: 10 INJECTION, SOLUTION INTRAVENOUS at 17:20

## 2018-06-29 RX ADMIN — CEFOXITIN 2 G: 2 INJECTION, POWDER, FOR SOLUTION INTRAVENOUS at 13:13

## 2018-06-29 RX ADMIN — ENALAPRILAT 0.62 MG: 1.25 INJECTION INTRAVENOUS at 06:01

## 2018-06-29 RX ADMIN — SODIUM CHLORIDE, SODIUM LACTATE, POTASSIUM CHLORIDE, AND CALCIUM CHLORIDE: 600; 310; 30; 20 INJECTION, SOLUTION INTRAVENOUS at 13:02

## 2018-06-29 RX ADMIN — ROCURONIUM BROMIDE 15 MG: 10 INJECTION, SOLUTION INTRAVENOUS at 14:30

## 2018-06-29 RX ADMIN — ROCURONIUM BROMIDE 10 MG: 10 INJECTION, SOLUTION INTRAVENOUS at 13:45

## 2018-06-29 RX ADMIN — FENTANYL CITRATE 50 MCG: 50 INJECTION, SOLUTION INTRAMUSCULAR; INTRAVENOUS at 16:03

## 2018-06-29 RX ADMIN — NEOSTIGMINE METHYLSULFATE 3 MG: 1 INJECTION INTRAVENOUS at 16:00

## 2018-06-29 RX ADMIN — FENTANYL CITRATE 50 MCG: 50 INJECTION, SOLUTION INTRAMUSCULAR; INTRAVENOUS at 13:04

## 2018-06-29 RX ADMIN — PROPOFOL 200 MG: 10 INJECTION, EMULSION INTRAVENOUS at 13:17

## 2018-06-29 RX ADMIN — FENTANYL CITRATE 50 MCG: 50 INJECTION, SOLUTION INTRAMUSCULAR; INTRAVENOUS at 13:17

## 2018-06-29 RX ADMIN — FENTANYL CITRATE 35 MCG: 50 INJECTION, SOLUTION INTRAMUSCULAR; INTRAVENOUS at 16:13

## 2018-06-29 RX ADMIN — SODIUM CHLORIDE, SODIUM LACTATE, POTASSIUM CHLORIDE, AND CALCIUM CHLORIDE: 600; 310; 30; 20 INJECTION, SOLUTION INTRAVENOUS at 13:56

## 2018-06-29 RX ADMIN — FENTANYL CITRATE 15 MCG: 50 INJECTION, SOLUTION INTRAMUSCULAR; INTRAVENOUS at 15:53

## 2018-06-29 RX ADMIN — HYDROMORPHONE HYDROCHLORIDE 0.5 MG: 2 INJECTION, SOLUTION INTRAMUSCULAR; INTRAVENOUS; SUBCUTANEOUS at 16:54

## 2018-06-29 RX ADMIN — FENTANYL CITRATE 25 MCG: 50 INJECTION, SOLUTION INTRAMUSCULAR; INTRAVENOUS at 14:34

## 2018-06-29 RX ADMIN — Medication 5 ML: at 14:00

## 2018-06-29 RX ADMIN — ENALAPRILAT 0.62 MG: 1.25 INJECTION INTRAVENOUS at 18:31

## 2018-06-29 RX ADMIN — HYDROMORPHONE HYDROCHLORIDE 0.5 MG: 2 INJECTION, SOLUTION INTRAMUSCULAR; INTRAVENOUS; SUBCUTANEOUS at 16:48

## 2018-06-29 RX ADMIN — PANTOPRAZOLE SODIUM 40 MG: 40 INJECTION, POWDER, FOR SOLUTION INTRAVENOUS at 10:46

## 2018-06-29 RX ADMIN — LIDOCAINE HYDROCHLORIDE 100 MG: 20 INJECTION, SOLUTION EPIDURAL; INFILTRATION; INTRACAUDAL; PERINEURAL at 13:17

## 2018-06-29 RX ADMIN — ROCURONIUM BROMIDE 50 MG: 10 INJECTION, SOLUTION INTRAVENOUS at 13:17

## 2018-06-29 RX ADMIN — MORPHINE SULFATE 4 MG: 2 INJECTION, SOLUTION INTRAMUSCULAR; INTRAVENOUS at 22:56

## 2018-06-29 RX ADMIN — Medication 5 ML: at 06:01

## 2018-06-29 RX ADMIN — MIDAZOLAM HYDROCHLORIDE 2 MG: 1 INJECTION, SOLUTION INTRAMUSCULAR; INTRAVENOUS at 13:10

## 2018-06-29 NOTE — ROUTINE PROCESS
TRANSFER - IN REPORT:    Verbal report received from David Mckinnon RN  on Hutson Luciana  being received from [phone] for ordered procedure      Report consisted of patients Situation, Background, Assessment and   Recommendations(SBAR). Information from the following report(s) SBAR, Kardex, Intake/Output, MAR, Recent Results and Med Rec Status was reviewed with the receiving nurse. Screening Assessment for C Diff:     1. Three (3) or more diarrheal (liquid unformed) stools in less than 24 hours  no     2. If yes, has patient off laxatives for more than 24 hours? Not applicable     3. Was a stool specimen sent for C. Difficile toxin A and B? Not applicable     4. Was the patient placed on contact isolation? Not applicable    Opportunity for questions and clarification was provided. Assessment completed upon patients arrival to unit and care assumed.

## 2018-06-29 NOTE — PROGRESS NOTES
H&P/Consult Note/Progress Note/Office Note:   Lexy Israel  MRN: [id number]  :1965  Age:52 y.o.    HPI: Lexy Israel is a 46 y.o. male who was sent to the ER yesterday by his gastroenterologist after a failed bowel prep prior to a colonoscopy. He has PMHx of tobacco abuse and elevated blood pressure. He reportedly has been having blood in his stool for about 2 months. He was being seen by GI and had a colonoscopy scheduled. He took the bowel prep but only had a \"teaspoon\" of results with vomiting after attempting to take in prep. In the ER,  A CT abdomen/pelvis was obtained that is concerning for fluid-filled small and large bowel loops with increased soft tissue suggesting an obstructing high rectal cancer. He has never had a colonoscopy. He denies abdominal pain at the present and reports BM this morning. He has an NGT with brown-tinged output. We are consulted for surgical intervention of obstructing mass. 18: Patient up on edge of bed. Complains of abdominal pain, hiccups, and burping. AF, VSS  18: Pt resting in bed. C/o sore throat from NG tube. +BM. To OR later today for diverting colostomy. AF, VSS.       Past Medical History:   Diagnosis Date    Chest pain 2016    GERD (gastroesophageal reflux disease)     Tobacco abuse 2016     Past Surgical History:   Procedure Laterality Date    FLEXIBLE SIGMOIDOSCOPY N/A 2018    SIGMOIDOSCOPY FLEXIBLE performed by Won Brandt MD at [facility] ENDOSCOPY    HX HERNIA REPAIR      HX ORTHOPAEDIC      arm     Current Facility-Administered Medications   Medication Dose Route Frequency    pantoprazole (PROTONIX) injection 40 mg  40 mg IntraVENous ACB    dextrose 5% - 0.9% NaCl with KCl 20 mEq/L infusion  125 mL/hr IntraVENous CONTINUOUS    phenol throat spray (CHLORASEPTIC) 1 Spray  1 Spray Oral PRN    sodium chloride (NS) flush 5-10 mL  5-10 mL IntraVENous Q8H    sodium chloride (NS) flush 5-10 mL  5-10 mL IntraVENous PRN    morphine injection 2 mg  2 mg IntraVENous Q3H PRN    ondansetron (ZOFRAN) injection 4 mg  4 mg IntraVENous Q4H PRN    naloxone (NARCAN) injection 0.4 mg  0.4 mg IntraVENous PRN    nicotine (NICODERM CQ) 14 mg/24 hr patch 1 Patch  1 Patch TransDERmal DAILY PRN    enalaprilat (VASOTEC) injection 0.625 mg  0.625 mg IntraVENous Q6H     Zithromax [azithromycin]  Social History     Social History    Marital status:      Spouse name: N/A    Number of children: N/A    Years of education: N/A     Social History Main Topics    Smoking status: Current Every Day Smoker     Packs/day: 1.00     Years: 30.00     Types: Cigarettes     Start date: 5/31/1988    Smokeless tobacco: Never Used    Alcohol use 8.4 oz/week     14 Cans of beer, 0 Standard drinks or equivalent per week    Drug use: No    Sexual activity: Not Asked     Other Topics Concern    None     Social History Narrative     History   Smoking Status    Current Every Day Smoker    Packs/day: 1.00    Years: 30.00    Types: Cigarettes    Start date: 5/31/1988   Smokeless Tobacco    Never Used     Family History   Problem Relation Age of Onset    Hypertension Mother     Heart Disease Mother     No Known Problems Father      ROS: Comprehensive review of systems was otherwise unremarkable except as noted above. Physical Exam:   Visit Vitals    /76    Pulse 65    Temp 98.5 °F (36.9 °C)    Resp 16    Ht 5' 9\" (1.753 m)    Wt 150 lb (68 kg)    SpO2 98%    BMI 22.15 kg/m2     Constitutional: Alert, cooperative, no acute distress; appears stated age    Eyes:Sclera are clear. ENMT: no external lesions gross hearing normal; no obvious neck masses, no ear or lip lesions, nares normal  CV: RRR. Normal perfusion  Resp: No JVD. Breathing is  non-labored; no audible wheezing.     GI: soft and non-distended, non-tender    Rectal: firm, nearly obstructing circumferential mass, that is proximal to prostate, but is fixed in the pelvis circumferentially  Musculoskeletal: unremarkable with normal function. No embolic signs or cyanosis. Neuro:  Oriented; moves all 4; no focal deficits  Psychiatric: normal affect and mood, no memory impairment    Recent vitals (if inpt):  Patient Vitals for the past 24 hrs:   BP Temp Pulse Resp SpO2   06/29/18 0810 133/76 98.5 °F (36.9 °C) 65 16 98 %   06/29/18 0554 137/89 98.6 °F (37 °C) 62 17 97 %   06/28/18 2310 143/80 98.5 °F (36.9 °C) 63 14 95 %   06/28/18 1945 155/83 98.1 °F (36.7 °C) 70 17 98 %   06/28/18 1530 146/87 98.6 °F (37 °C) 74 20 98 %   06/28/18 1351 148/87 98.8 °F (37.1 °C) 72 20 96 %       Labs:  Recent Labs      06/29/18   0505   06/27/18   0430   WBC  8.4   < >  18.4*   HGB  14.2   < >  16.3   PLT  264   < >  317   NA  141   < >  140   K  4.6   < >  3.6   CL  107   < >  101   CO2  30   < >  27   BUN  12   < >  16   CREA  0.87   < >  0.65*   GLU  128*   < >  115*   TBILI   --    --   0.9   SGOT   --    --   17   ALT   --    --   19   AP   --    --   67    < > = values in this interval not displayed. Lab Results   Component Value Date/Time    WBC 8.4 06/29/2018 05:05 AM    HGB 14.2 06/29/2018 05:05 AM    PLATELET 388 13/12/2635 05:05 AM    Sodium 141 06/29/2018 05:05 AM    Potassium 4.6 06/29/2018 05:05 AM    Chloride 107 06/29/2018 05:05 AM    CO2 30 06/29/2018 05:05 AM    BUN 12 06/29/2018 05:05 AM    Creatinine 0.87 06/29/2018 05:05 AM    Glucose 128 (H) 06/29/2018 05:05 AM    Bilirubin, total 0.9 06/27/2018 04:30 AM    AST (SGOT) 17 06/27/2018 04:30 AM    ALT (SGPT) 19 06/27/2018 04:30 AM    Alk. phosphatase 67 06/27/2018 04:30 AM       I reviewed recent labs and recent radiologic studies.   CT Results (most recent):    6/28/18 PORTABLE ABDOMEN, June 28, 2018 at 1432 hours:     CLINICAL HISTORY:  Nasogastric tube repositioning.     COMPARISON:  KUB at 1137 hours today.     FINDINGS:  AP supine image demonstrates a nasogastric tube with the proximal  sidehole at just above the gastroesophageal junction. Multiple mildly dilated  small bowel loops are evident in the still upper abdomen.     IMPRESSION:  Nasogastric tube proximal sidehole has been advanced slightly below  the gastroesophageal junction, but further advancement by approximately 5 cm is  suggested. Results from [facility] encounter on 06/26/18   CT ABD PELV W CONT   Narrative CT ABDOMEN AND PELVIS WITH CONTRAST. HISTORY: Abdominal pain and cramping. Blood in stool. COMPARISON: None    TECHNIQUE: 5 mm axial scans from above the diaphragms to the pubic symphysis  following oral and 100 cc intravenous contrast without acute complication. Intravenous contrast was given to increase the sensitivity to acute  inflammation. Radiation dose reduction techniques were used for this study. Our CT scanners use one or more of the following: Automated exposure control,  adjustment of the mA and or kV according to patient size, iterative  reconstruction. FINDINGS:   Abdomen: No free air. The lung bases are clear. There is fluid in the distal  esophagus, probably reflux. Oral contrast has not left the stomach. Small bowel  loops are fluid-filled and mildly dilated throughout. No transition zone  appreciated. Large bowel loops are also fluid-filled. Small hypoattenuating foci  in the liver, probably subcentimeter cysts. The spleen is small and  homogeneous. . No calcified gallstones. The biliary tree is not dilated. The  pancreas is unremarkable. No free fluid, acute inflammatory changes or  adenopathy. The kidneys enhance uniformly. No radiopaque renal calculi. No  hydronephrosis. The adrenal glands are normal size. Aorta is normal caliber. Pelvis: The colon is fluid-filled down to the level of the rectosigmoid where  there is increased soft tissue suggesting an obstructing high rectal mass. The  urinary bladder unremarkable. The prostate is mildly enlarged. Seminal vesicles  symmetric.           Impression IMPRESSION:  Fluid-filled small and large bowel loops with increased soft tissue  suggesting an obstructing high rectal cancer. XR Results (most recent):    Results from Hospital Encounter encounter on 06/26/18   XR ABD (KUB)   Narrative PORTABLE ABDOMEN, June 28, 2018 at 1432 hours:    CLINICAL HISTORY:  Nasogastric tube repositioning. COMPARISON:  KUB at 1137 hours today. FINDINGS:  AP supine image demonstrates a nasogastric tube with the proximal  sidehole at just above the gastroesophageal junction. Multiple mildly dilated  small bowel loops are evident in the still upper abdomen. Impression IMPRESSION:  Nasogastric tube proximal sidehole has been advanced slightly below  the gastroesophageal junction, but further advancement by approximately 5 cm is  suggested. I independently reviewed radiology images for studies I described above or studies I have ordered. Admission date (for inpatients): 6/26/2018   * No surgery date entered *  Procedure(s):  SIGMOIDOSCOPY FLEXIBLE  COLON BIOPSY    ASSESSMENT/PLAN: Kristine Duvall is a 46 y.o. Male with sign and symptoms consistent with obstructing colon cancer. Dr. Rosie Epstein plans to do a flex-sig today to assess mass and obtain biopsy.      Problem List  Date Reviewed: 6/27/2018          Codes Class Noted    * (Principal)Bowel obstruction ([facility] [address] ICD-10-CM: M21.959  ICD-9-CM: 560.9  6/26/2018        Rectal mass ICD-10-CM: K62.9  ICD-9-CM: 787.99  6/26/2018        Leukocytosis ICD-10-CM: D72.829  ICD-9-CM: 288.60  6/26/2018        Generalized abdominal pain ICD-10-CM: R10.84  ICD-9-CM: 789.07  6/26/2018        Nausea with vomiting ICD-10-CM: R11.2  ICD-9-CM: 787.01  6/26/2018        Erectile dysfunction due to arterial insufficiency ICD-10-CM: N52.01  ICD-9-CM: 607.84  5/31/2018        Plantar fasciitis, right ICD-10-CM: M72.2  ICD-9-CM: 728.71  5/31/2018        Screen for colon cancer ICD-10-CM: Z12.11  ICD-9-CM: V76.51  5/31/2018        Elevated blood pressure reading ICD-10-CM: R03.0  ICD-9-CM: 796.2  5/31/2018        Polycythemia secondary to smoking ICD-10-CM: D75.1  ICD-9-CM: 289.0  5/30/2018        IGT (impaired glucose tolerance) ICD-10-CM: R73.02  ICD-9-CM: 790.22  5/30/2018        Hypercholesterolemia ICD-10-CM: E78.00  ICD-9-CM: 272.0  5/30/2018    Overview Signed 6/4/2018  2:20 PM by Stanley Sanchez     ASCVD 10 year risk is 16.0%. Mod to high dose statin indicated. (based on b/p 180/90, 6/4/18)                 Screening PSA (prostate specific antigen) ICD-10-CM: Z12.5  ICD-9-CM: V76.44  5/30/2018        Tobacco abuse (Chronic) ICD-10-CM: Z72.0  ICD-9-CM: 305.1  8/18/2016            Principal Problem:     Bowel obstruction ([address] (6/26/2018)    Active Problems:    Tobacco abuse (8/18/2016)      Elevated blood pressure reading (5/31/2018)      Rectal mass (6/26/2018)      Leukocytosis (6/26/2018)      Generalized abdominal pain (6/26/2018)      Nausea with vomiting (6/26/2018)       Mass suspicious for rectal cancer - To OR today for diverting colostomy by Dr. Fernando Espinosa  NPO  NGT  Await flex-sig/ pathology  Lovenox on hold  KUB 6/28/18      Signed:  MELISSA Eden-BC

## 2018-06-29 NOTE — BRIEF OP NOTE
BRIEF OPERATIVE NOTE    Date of Procedure: 6/29/2018   Preoperative Diagnosis: OBSTRUCTING RECTAL MASS  Postoperative Diagnosis: OBSTRUCTING RECTAL MASS    Procedure(s):  R TRANSVERSE COLOSTOMY, EUA WITH BIOSPY  Surgeon(s) and Role:     * Patience Anthony MD - Primary         Surgical Assistant: none    Surgical Staff:  Circ-1: Paty Garcia RN  Circ-2: Omi Caban RN  Circ-Relief: Marcel Rivas RN  Scrub Tech-1: Kiersten Smith  Scrub Tech-2: Chidi Slade  Scrub Tech-Relief: Jameson Mello; Rossi Rowe CNA  Event Time In   Incision Start 1341   Incision Close 1601     Anesthesia: General   Estimated Blood Loss: ~150 cc  Specimens:   ID Type Source Tests Collected by Time Destination   1 : RECTAL MASS Preservative   Patience Anthony MD 6/29/2018 1557 Pathology   1 : PERITONEAL FLUID Fresh Peritoneal Fluid  Patience Anthony MD 6/29/2018 1350 Cytology      Findings: Anesthesia discovered blister like lesion in lateral distal pharynx that may be source of patient's complaint of throat pain. Tried to access and place stoma through small RUQ opening. Serous low volume ascites with sample sent for cytology. No obvious peritoneal disease. Attempted to perform via colostomy site in RUQ, but unable to mobilize colon into wound and restricted by small bowel distension. Upper midline incision allowed for proper identification of Transverse colon and mobilization by  from greater omentum. Still limited mobility but able to bring up loop for true loop colostomy. Stoma fairly flush. Both limbs patent after maturation. Redraped and EUA performed with fixed mass at ~7 cm from anal verge. 3 large biopsies obtained.   Complications: none apparent  Implants: * No implants in log *    Patience Anthony MD

## 2018-06-29 NOTE — PROGRESS NOTES
Problem: Interdisciplinary Rounds  Goal: Interdisciplinary Rounds  Outcome: Progressing Towards Goal  Interdisciplinary team rounds were held 6/28/18 with the following team members:Care Management, Physical Therapy, Physician and . Plan of care discussed. See clinical pathway and/or care plan for interventions and desired outcomes.

## 2018-06-29 NOTE — ANESTHESIA POSTPROCEDURE EVALUATION
Post-Anesthesia Evaluation and Assessment    Patient: Lisa Crespo MRN: [id number]  SSN: [id number]    YOB: 1965  Age: 46 y.o. Sex: male       Cardiovascular Function/Vital Signs  Visit Vitals    /79    Pulse 81    Temp 36.6 °C (97.9 °F)    Resp 16    Ht 5' 9\" (1.753 m)    Wt 68 kg (150 lb)    SpO2 98%    BMI 22.15 kg/m2       Patient is status post general anesthesia for Procedure(s):  COLOSTOMY VS ILEOSTOMY, EUA WITH BIOSPY. Nausea/Vomiting: None    Postoperative hydration reviewed and adequate. Pain:  Pain Scale 1: Numeric (0 - 10) (06/29/18 1654)  Pain Intensity 1: 7 (06/29/18 1654)   Managed    Neurological Status:   Neuro (WDL): Exceptions to WDL (06/29/18 1654)  Neuro  Neurologic State: Alert (06/29/18 1654)  Orientation Level: Oriented X4 (06/29/18 1654)  Cognition: Follows commands (06/29/18 1654)  Speech: Clear (06/29/18 1654)  LUE Motor Response: Purposeful (06/29/18 1654)  LLE Motor Response: Purposeful (06/29/18 1654)  RUE Motor Response: Purposeful (06/29/18 1654)  RLE Motor Response: Purposeful (06/29/18 1654)   At baseline    Mental Status and Level of Consciousness: Arousable    Pulmonary Status:   O2 Device: Nasal cannula (06/29/18 1654)   Adequate oxygenation and airway patent    Complications related to anesthesia: None    Post-anesthesia assessment completed.  No concerns    Signed By: Olaf Bansal MD     June 29, 2018

## 2018-06-29 NOTE — PROGRESS NOTES
CM met with patient and wife to introduce the role of case management, and to assess for possible discharge needs. Patient left for surgical procedure after completing assessment with CM. Patient lives with wife in a 2 level home. He reports they are able to remain on ground level for all needs. He has a bathtub without any grab bars. He denies having any DME at home at this time. Patient's wife works full time. Patient's 2 daughters live nearby, and do not work during the daytime. They would be available to assist in his care as needed after discharge per patient and spouse. One of his daughters is a caregiver for patient's mother at this time as well. Patient reports being fully independent with driving, ambulating, and managing ADLs prior to admission. He reports that he works full time. Patient does not use home oxygen, nor does he receive dialysis services. Patient confirmed his insurance as listed, and denied any difficulties affording RX. He confirmed his PCP and reported last contact approximately 1 month ago. He has no history of HH or STR. He does not have a HCPOA or living will. Patient and spouse expressed interest in meeting with  regarding this need. CM discussed plan for discharge with patient and spouse, including possible HH services for education and wound care after surgical procedure. Patient was initially interested in [facility] services, but then informed he would rather have family assist him in care after discharge. Patient and spouse agreed to consider discharge options and recommendations of medical team after his procedure. CM will follow up with family regarding discharge needs. No defined plan for discharge at this time. Wife - Archana Freitas [phone]  Daughter - Gee Blanca [phone]    Care Management Interventions  PCP Verified by CM: Yes  Last Visit to PCP: 05/29/18  Mode of Transport at Discharge:  Other (see comment) Cody Phelps -  at [facility])  Transition of Care Consult (CM Consult): Discharge Planning  Discharge Durable Medical Equipment: No (Patient declined need for DME at this time.)  Physical Therapy Consult: No  Occupational Therapy Consult: No  Speech Therapy Consult: No  Current Support Network: Own Home, Lives with Spouse, Family Lives Nearby  Confirm Follow Up Transport: Family  Plan discussed with Pt/Family/Caregiver: Yes  Freedom of Choice Offered: Yes  Discharge Location  Discharge Placement: Unable to determine at this time

## 2018-06-29 NOTE — PERIOP NOTES
TRANSFER - OUT REPORT:    Verbal report given to Brianne(name) on Benito Avilez  being transferred to 724(unit) for routine post - op       Report consisted of patients Situation, Background, Assessment and   Recommendations(SBAR). Information from the following report(s) SBAR, OR Summary, Procedure Summary, Intake/Output, MAR and Cardiac Rhythm NSR was reviewed with the receiving nurse. Lines:   Peripheral IV 06/26/18 Left Forearm (Active)   Site Assessment Clean, dry, & intact 6/29/2018  4:54 PM   Phlebitis Assessment 0 6/29/2018  4:54 PM   Infiltration Assessment 0 6/29/2018  4:54 PM   Dressing Status Clean, dry, & intact 6/29/2018  4:54 PM   Dressing Type Tape;Transparent 6/29/2018  4:54 PM   Hub Color/Line Status Infusing;Pink 6/29/2018  4:54 PM   Alcohol Cap Used No 6/29/2018  4:54 PM       Peripheral IV Left Arm (Active)   Site Assessment Clean, dry, & intact 6/29/2018  4:54 PM   Phlebitis Assessment 0 6/29/2018  4:54 PM   Infiltration Assessment 0 6/29/2018  4:54 PM   Dressing Status Clean, dry, & intact 6/29/2018  4:54 PM   Dressing Type Tape;Transparent 6/29/2018  4:54 PM   Hub Color/Line Status Green;Capped 6/29/2018  4:54 PM   Alcohol Cap Used No 6/29/2018  4:54 PM        Opportunity for questions and clarification was provided. Patient transported with:   O2 @ 2 liters    [facility]    Patient and family given floor number and nurses name. Family updated re: pt status after security code verified.

## 2018-06-29 NOTE — PROGRESS NOTES
Hospitalist Progress Note    2018  Admit Date: 2018  4:21 PM   NAME: Alanis House   :  1965   MRN:  [id number]   Attending: Maryanne Quigley. Rocio Fatima MD  PCP:  Hattie Gerber DO    SUBJECTIVE:     Pt is a 45 yo male with pmh GI bleed x 2 months who was scheduled for colonoscopy but could not tolerate prep. He was sent for CT abdomen instead which lead to findings of high colon mass, concern for obstructing high rectal cancer. NG tube was placed. Pt underwent surgery today with ostomy placement. Pt assessed in PACU. His pain is well controlled. Ostomy in place. Hemodynamically stable. Review of Systems negative with exception of pertinent positives noted above  PHYSICAL EXAM     Visit Vitals    /75 (BP 1 Location: Right arm, BP Patient Position: At rest)    Pulse 68    Temp 98 °F (36.7 °C)    Resp 18    Ht 5' 9\" (1.753 m)    Wt 68 kg (150 lb)    SpO2 100%    BMI 22.15 kg/m2      Temp (24hrs), Av.3 °F (36.8 °C), Min:97.8 °F (36.6 °C), Max:98.6 °F (37 °C)    Oxygen Therapy  O2 Sat (%): 100 % (18 1144)  Pulse via Oximetry: 62 beats per minute (18 0554)  O2 Device: Room air (18 1144)  O2 Flow Rate (L/min): 2 l/min (18 1647)    Intake/Output Summary (Last 24 hours) at 18 1437  Last data filed at 18 1400   Gross per 24 hour   Intake             1000 ml   Output              440 ml   Net              560 ml      General: No acute distress    Lungs:  CTA Bilaterally.    Heart:  Regular rate and rhythm,  No murmur, rub, or gallop  Abdomen: Soft, Non distended, Non tender, Positive bowel sounds, ostomy site   Extremities: No cyanosis, clubbing or edema  Neurologic:  No focal deficits    ASSESSMENT      Active Hospital Problems    Diagnosis Date Noted    Bowel obstruction ([address] 2018    Rectal mass 2018    Leukocytosis 2018    Generalized abdominal pain 2018    Nausea with vomiting 2018    Elevated blood pressure reading 05/31/2018    Tobacco abuse 08/18/2016     A/P:    Rectal mass suspicious for rectal cancer - s/p surgery for diversion with ostomy and biopsies taken. Ostomy nurse following. Path pending treatment course. HTN- Cont IV Enalapril until cleared post op for PO    DC planning- SW following with plans for home with home health.      Signed By: Radha Martinez NP     June 29, 2018

## 2018-06-30 ENCOUNTER — APPOINTMENT (OUTPATIENT)
Dept: GENERAL RADIOLOGY | Age: 53
DRG: 330 | End: 2018-06-30
Attending: NURSE PRACTITIONER
Payer: COMMERCIAL

## 2018-06-30 LAB
ANION GAP SERPL CALC-SCNC: 8 MMOL/L (ref 7–16)
BASOPHILS # BLD: 0 K/UL (ref 0–0.2)
BASOPHILS NFR BLD: 0 % (ref 0–2)
BUN SERPL-MCNC: 8 MG/DL (ref 6–23)
CALCIUM SERPL-MCNC: 7.4 MG/DL (ref 8.3–10.4)
CHLORIDE SERPL-SCNC: 104 MMOL/L (ref 98–107)
CO2 SERPL-SCNC: 26 MMOL/L (ref 21–32)
CREAT SERPL-MCNC: 0.71 MG/DL (ref 0.8–1.5)
DIFFERENTIAL METHOD BLD: ABNORMAL
EOSINOPHIL # BLD: 0 K/UL (ref 0–0.8)
EOSINOPHIL NFR BLD: 0 % (ref 0.5–7.8)
ERYTHROCYTE [DISTWIDTH] IN BLOOD BY AUTOMATED COUNT: 13.2 % (ref 11.9–14.6)
GLUCOSE SERPL-MCNC: 140 MG/DL (ref 65–100)
HCT VFR BLD AUTO: 42.4 % (ref 41.1–50.3)
HGB BLD-MCNC: 14.1 G/DL (ref 13.6–17.2)
IMM GRANULOCYTES # BLD: 0 K/UL (ref 0–0.5)
IMM GRANULOCYTES NFR BLD AUTO: 0 % (ref 0–5)
LACTATE SERPL-SCNC: 2.2 MMOL/L (ref 0.4–2)
LACTATE SERPL-SCNC: 2.9 MMOL/L (ref 0.4–2)
LYMPHOCYTES # BLD: 0.7 K/UL (ref 0.5–4.6)
LYMPHOCYTES NFR BLD: 8 % (ref 13–44)
MCH RBC QN AUTO: 30.3 PG (ref 26.1–32.9)
MCHC RBC AUTO-ENTMCNC: 33.3 G/DL (ref 31.4–35)
MCV RBC AUTO: 91.2 FL (ref 79.6–97.8)
MONOCYTES # BLD: 1.2 K/UL (ref 0.1–1.3)
MONOCYTES NFR BLD: 15 % (ref 4–12)
NEUTS SEG # BLD: 5.9 K/UL (ref 1.7–8.2)
NEUTS SEG NFR BLD: 77 % (ref 43–78)
PLATELET # BLD AUTO: 263 K/UL (ref 150–450)
PMV BLD AUTO: 10.7 FL (ref 10.8–14.1)
POTASSIUM SERPL-SCNC: 4.3 MMOL/L (ref 3.5–5.1)
PROCALCITONIN SERPL-MCNC: 0.4 NG/ML
RBC # BLD AUTO: 4.65 M/UL (ref 4.23–5.67)
SODIUM SERPL-SCNC: 138 MMOL/L (ref 136–145)
WBC # BLD AUTO: 7.7 K/UL (ref 4.3–11.1)

## 2018-06-30 PROCEDURE — 87040 BLOOD CULTURE FOR BACTERIA: CPT | Performed by: NURSE PRACTITIONER

## 2018-06-30 PROCEDURE — 83605 ASSAY OF LACTIC ACID: CPT | Performed by: NURSE PRACTITIONER

## 2018-06-30 PROCEDURE — 84145 PROCALCITONIN (PCT): CPT | Performed by: NURSE PRACTITIONER

## 2018-06-30 PROCEDURE — 36415 COLL VENOUS BLD VENIPUNCTURE: CPT | Performed by: SURGERY

## 2018-06-30 PROCEDURE — 65270000029 HC RM PRIVATE

## 2018-06-30 PROCEDURE — 74011250636 HC RX REV CODE- 250/636: Performed by: SURGERY

## 2018-06-30 PROCEDURE — 74011000250 HC RX REV CODE- 250: Performed by: NURSE PRACTITIONER

## 2018-06-30 PROCEDURE — 77030027138 HC INCENT SPIROMETER -A

## 2018-06-30 PROCEDURE — 94640 AIRWAY INHALATION TREATMENT: CPT

## 2018-06-30 PROCEDURE — 74011250636 HC RX REV CODE- 250/636: Performed by: INTERNAL MEDICINE

## 2018-06-30 PROCEDURE — 77030013140 HC MSK NEB VYRM -A

## 2018-06-30 PROCEDURE — 74011250636 HC RX REV CODE- 250/636: Performed by: NURSE PRACTITIONER

## 2018-06-30 PROCEDURE — 74011000250 HC RX REV CODE- 250

## 2018-06-30 PROCEDURE — C9113 INJ PANTOPRAZOLE SODIUM, VIA: HCPCS | Performed by: NURSE PRACTITIONER

## 2018-06-30 PROCEDURE — 74011250637 HC RX REV CODE- 250/637: Performed by: NURSE PRACTITIONER

## 2018-06-30 PROCEDURE — 71045 X-RAY EXAM CHEST 1 VIEW: CPT

## 2018-06-30 PROCEDURE — 74011250637 HC RX REV CODE- 250/637: Performed by: INTERNAL MEDICINE

## 2018-06-30 PROCEDURE — 80048 BASIC METABOLIC PNL TOTAL CA: CPT | Performed by: SURGERY

## 2018-06-30 PROCEDURE — 85025 COMPLETE CBC W/AUTO DIFF WBC: CPT | Performed by: SURGERY

## 2018-06-30 RX ORDER — ACETAMINOPHEN 325 MG/1
650 TABLET ORAL
Status: DISCONTINUED | OUTPATIENT
Start: 2018-06-30 | End: 2018-07-03 | Stop reason: HOSPADM

## 2018-06-30 RX ORDER — IPRATROPIUM BROMIDE AND ALBUTEROL SULFATE 2.5; .5 MG/3ML; MG/3ML
3 SOLUTION RESPIRATORY (INHALATION)
Status: DISCONTINUED | OUTPATIENT
Start: 2018-06-30 | End: 2018-07-03 | Stop reason: HOSPADM

## 2018-06-30 RX ORDER — IPRATROPIUM BROMIDE AND ALBUTEROL SULFATE 2.5; .5 MG/3ML; MG/3ML
SOLUTION RESPIRATORY (INHALATION)
Status: COMPLETED
Start: 2018-06-30 | End: 2018-06-30

## 2018-06-30 RX ORDER — CALCIUM CARBONATE 200(500)MG
200 TABLET,CHEWABLE ORAL
Status: DISCONTINUED | OUTPATIENT
Start: 2018-06-30 | End: 2018-07-03 | Stop reason: HOSPADM

## 2018-06-30 RX ADMIN — IPRATROPIUM BROMIDE AND ALBUTEROL SULFATE 3 ML: .5; 3 SOLUTION RESPIRATORY (INHALATION) at 14:34

## 2018-06-30 RX ADMIN — DEXTROSE MONOHYDRATE, SODIUM CHLORIDE, AND POTASSIUM CHLORIDE 125 ML/HR: 50; 9; 1.49 INJECTION, SOLUTION INTRAVENOUS at 11:26

## 2018-06-30 RX ADMIN — ENALAPRILAT 0.62 MG: 1.25 INJECTION INTRAVENOUS at 00:47

## 2018-06-30 RX ADMIN — MORPHINE SULFATE 2 MG: 2 INJECTION, SOLUTION INTRAMUSCULAR; INTRAVENOUS at 05:44

## 2018-06-30 RX ADMIN — PANTOPRAZOLE SODIUM 40 MG: 40 INJECTION, POWDER, FOR SOLUTION INTRAVENOUS at 05:43

## 2018-06-30 RX ADMIN — Medication 5 ML: at 23:55

## 2018-06-30 RX ADMIN — ENALAPRILAT 0.62 MG: 1.25 INJECTION INTRAVENOUS at 05:44

## 2018-06-30 RX ADMIN — CALCIUM CARBONATE (ANTACID) CHEW TAB 500 MG 200 MG: 500 CHEW TAB at 18:05

## 2018-06-30 RX ADMIN — MORPHINE SULFATE 2 MG: 2 INJECTION, SOLUTION INTRAMUSCULAR; INTRAVENOUS at 10:52

## 2018-06-30 RX ADMIN — ENALAPRILAT 0.62 MG: 1.25 INJECTION INTRAVENOUS at 23:54

## 2018-06-30 RX ADMIN — ENALAPRILAT 0.62 MG: 1.25 INJECTION INTRAVENOUS at 18:00

## 2018-06-30 RX ADMIN — ONDANSETRON 4 MG: 2 INJECTION INTRAMUSCULAR; INTRAVENOUS at 23:54

## 2018-06-30 RX ADMIN — DEXTROSE MONOHYDRATE, SODIUM CHLORIDE, AND POTASSIUM CHLORIDE 125 ML/HR: 50; 9; 1.49 INJECTION, SOLUTION INTRAVENOUS at 01:47

## 2018-06-30 RX ADMIN — ACETAMINOPHEN 650 MG: 325 TABLET ORAL at 12:42

## 2018-06-30 RX ADMIN — ENALAPRILAT 0.62 MG: 1.25 INJECTION INTRAVENOUS at 11:26

## 2018-06-30 NOTE — PROGRESS NOTES
Temperature rechecked. 100.1F oral. Will recheck in one hour to ensure it continues to come down with Tylenol given.

## 2018-06-30 NOTE — PROGRESS NOTES
H&P/Consult Note/Progress Note/Office Note:   Anaya Edwards  MRN: [id number]  :1965  Age:52 y.o.    HPI: Anaya Edwards is a 46 y.o. male who was sent to the ER yesterday by his gastroenterologist after a failed bowel prep prior to a colonoscopy. He has PMHx of tobacco abuse and elevated blood pressure. He reportedly has been having blood in his stool for about 2 months. He was being seen by GI and had a colonoscopy scheduled. He took the bowel prep but only had a \"teaspoon\" of results with vomiting after attempting to take in prep. In the ER,  A CT abdomen/pelvis was obtained that is concerning for fluid-filled small and large bowel loops with increased soft tissue suggesting an obstructing high rectal cancer. He has never had a colonoscopy. He denies abdominal pain at the present and reports BM this morning. He has an NGT with brown-tinged output. We are consulted for surgical intervention of obstructing mass. 18: Patient up on edge of bed. Complains of abdominal pain, hiccups, and burping. AF, VSS  18: Pt resting in bed. C/o sore throat from NG tube. +BM. To OR later today for diverting colostomy. AF, VSS.  18: Pt awake in bed. NG tube with no output overnight. Colostomy bag full of air. AF, VSS.           Past Medical History:   Diagnosis Date    Chest pain 2016    GERD (gastroesophageal reflux disease)     Tobacco abuse 2016     Past Surgical History:   Procedure Laterality Date    FLEXIBLE SIGMOIDOSCOPY N/A 2018    SIGMOIDOSCOPY FLEXIBLE performed by Herbert De Leon MD at [facility] ENDOSCOPY    HX HERNIA REPAIR      HX ORTHOPAEDIC      arm     Current Facility-Administered Medications   Medication Dose Route Frequency    acetaminophen (TYLENOL) tablet 650 mg  650 mg Oral Q6H PRN    morphine injection 2-6 mg  2-6 mg IntraVENous Q3H PRN    pantoprazole (PROTONIX) injection 40 mg  40 mg IntraVENous ACB    dextrose 5% - 0.9% NaCl with KCl 20 mEq/L infusion  125 mL/hr IntraVENous CONTINUOUS    phenol throat spray (CHLORASEPTIC) 1 Spray  1 Spray Oral PRN    sodium chloride (NS) flush 5-10 mL  5-10 mL IntraVENous Q8H    sodium chloride (NS) flush 5-10 mL  5-10 mL IntraVENous PRN    ondansetron (ZOFRAN) injection 4 mg  4 mg IntraVENous Q4H PRN    naloxone (NARCAN) injection 0.4 mg  0.4 mg IntraVENous PRN    nicotine (NICODERM CQ) 14 mg/24 hr patch 1 Patch  1 Patch TransDERmal DAILY PRN    enalaprilat (VASOTEC) injection 0.625 mg  0.625 mg IntraVENous Q6H     Zithromax [azithromycin]  Social History     Social History    Marital status:      Spouse name: N/A    Number of children: N/A    Years of education: N/A     Social History Main Topics    Smoking status: Current Every Day Smoker     Packs/day: 1.00     Years: 30.00     Types: Cigarettes     Start date: 5/31/1988    Smokeless tobacco: Never Used    Alcohol use 8.4 oz/week     14 Cans of beer, 0 Standard drinks or equivalent per week    Drug use: No    Sexual activity: Not Asked     Other Topics Concern    None     Social History Narrative     History   Smoking Status    Current Every Day Smoker    Packs/day: 1.00    Years: 30.00    Types: Cigarettes    Start date: 5/31/1988   Smokeless Tobacco    Never Used     Family History   Problem Relation Age of Onset    Hypertension Mother     Heart Disease Mother     No Known Problems Father      ROS: Comprehensive review of systems was otherwise unremarkable except as noted above. Physical Exam:   Visit Vitals    /81    Pulse 76    Temp 100.1 °F (37.8 °C)    Resp 22    Ht 5' 9\" (1.753 m)    Wt 150 lb (68 kg)    SpO2 92%    BMI 22.15 kg/m2     Constitutional: Alert, cooperative, no acute distress; appears stated age    Eyes:Sclera are clear. ENMT: no external lesions gross hearing normal; no obvious neck masses, no ear or lip lesions, nares normal  CV: RRR. Normal perfusion  Resp: No JVD.   Breathing is  non-labored; no audible wheezing. GI: soft, appropriately tender, non-distended, colostomy bag full of air, post-op dressing c/d/i    Rectal: firm, nearly obstructing circumferential mass, that is proximal to prostate, but is fixed in the pelvis circumferentially  Musculoskeletal: unremarkable with normal function. No embolic signs or cyanosis.    Neuro:  Oriented; moves all 4; no focal deficits  Psychiatric: normal affect and mood, no memory impairment    Recent vitals (if inpt):  Patient Vitals for the past 24 hrs:   BP Temp Pulse Resp SpO2   06/30/18 1341 - 100.1 °F (37.8 °C) - - -   06/30/18 1205 144/81 (!) 101.6 °F (38.7 °C) 76 22 92 %   06/30/18 0735 136/80 99.6 °F (37.6 °C) 85 20 91 %   06/30/18 0340 138/80 99.2 °F (37.3 °C) 78 16 95 %   06/30/18 0004 123/78 99.4 °F (37.4 °C) 80 16 92 %   06/29/18 1850 130/82 - 87 - -   06/29/18 1835 141/83 - 90 - -   06/29/18 1820 137/80 - 85 - -   06/29/18 1805 143/82 99.1 °F (37.3 °C) 91 16 97 %   06/29/18 1750 140/87 - 89 - 99 %   06/29/18 1745 134/79 - 93 - 98 %   06/29/18 1740 133/77 - 91 - 99 %   06/29/18 1735 135/70 - 96 - 99 %   06/29/18 1731 150/90 - 95 - 99 %   06/29/18 1726 154/87 - 92 - 99 %   06/29/18 1721 137/85 - 93 - 99 %   06/29/18 1716 157/85 - 92 - 99 %   06/29/18 1704 - - 92 - 99 %   06/29/18 1654 - 97.9 °F (36.6 °C) 90 16 100 %   06/29/18 1644 - - 89 - 99 %   06/29/18 1634 171/79 - 81 - 98 %   06/29/18 1629 199/78 - 87 - 100 %   06/29/18 1624 199/78 98.1 °F (36.7 °C) 77 16 100 %       Labs:  Recent Labs      06/30/18   0506   WBC  7.7   HGB  14.1   PLT  263   NA  138   K  4.3   CL  104   CO2  26   BUN  8   CREA  0.71*   GLU  140*       Lab Results   Component Value Date/Time    WBC 7.7 06/30/2018 05:06 AM    HGB 14.1 06/30/2018 05:06 AM    PLATELET 694 43/23/9557 05:06 AM    Sodium 138 06/30/2018 05:06 AM    Potassium 4.3 06/30/2018 05:06 AM    Chloride 104 06/30/2018 05:06 AM    CO2 26 06/30/2018 05:06 AM    BUN 8 06/30/2018 05:06 AM    Creatinine 0.71 (L) 06/30/2018 05:06 AM    Glucose 140 (H) 06/30/2018 05:06 AM    Bilirubin, total 0.9 06/27/2018 04:30 AM    AST (SGOT) 17 06/27/2018 04:30 AM    ALT (SGPT) 19 06/27/2018 04:30 AM    Alk. phosphatase 67 06/27/2018 04:30 AM       I reviewed recent labs and recent radiologic studies. CT Results (most recent):    6/28/18 PORTABLE ABDOMEN, June 28, 2018 at 1432 hours:     CLINICAL HISTORY:  Nasogastric tube repositioning.     COMPARISON:  KUB at 1137 hours today.     FINDINGS:  AP supine image demonstrates a nasogastric tube with the proximal  sidehole at just above the gastroesophageal junction. Multiple mildly dilated  small bowel loops are evident in the still upper abdomen.     IMPRESSION:  Nasogastric tube proximal sidehole has been advanced slightly below  the gastroesophageal junction, but further advancement by approximately 5 cm is  suggested. Results from [place] encounter on 06/26/18   CT ABD PELV W CONT   Narrative CT ABDOMEN AND PELVIS WITH CONTRAST. HISTORY: Abdominal pain and cramping. Blood in stool. COMPARISON: None    TECHNIQUE: 5 mm axial scans from above the diaphragms to the pubic symphysis  following oral and 100 cc intravenous contrast without acute complication. Intravenous contrast was given to increase the sensitivity to acute  inflammation. Radiation dose reduction techniques were used for this study. Our CT scanners use one or more of the following: Automated exposure control,  adjustment of the mA and or kV according to patient size, iterative  reconstruction. FINDINGS:   Abdomen: No free air. The lung bases are clear. There is fluid in the distal  esophagus, probably reflux. Oral contrast has not left the stomach. Small bowel  loops are fluid-filled and mildly dilated throughout. No transition zone  appreciated. Large bowel loops are also fluid-filled. Small hypoattenuating foci  in the liver, probably subcentimeter cysts. The spleen is small and  homogeneous. . No calcified gallstones. The biliary tree is not dilated. The  pancreas is unremarkable. No free fluid, acute inflammatory changes or  adenopathy. The kidneys enhance uniformly. No radiopaque renal calculi. No  hydronephrosis. The adrenal glands are normal size. Aorta is normal caliber. Pelvis: The colon is fluid-filled down to the level of the rectosigmoid where  there is increased soft tissue suggesting an obstructing high rectal mass. The  urinary bladder unremarkable. The prostate is mildly enlarged. Seminal vesicles  symmetric. Impression IMPRESSION:  Fluid-filled small and large bowel loops with increased soft tissue  suggesting an obstructing high rectal cancer. XR Results (most recent):    Results from Hospital Encounter encounter on 06/26/18   XR CHEST SNGL V   Narrative Chest X-ray    INDICATION:   Postop fever and cough    A portable AP view of the chest was obtained. FINDINGS: There is increased infiltrate/atelectasis in the left lung base. .  The  heart size is normal.  The bony thorax is intact. Impression IMPRESSION: Minimal left lower lobe infiltrate/atelectasis          I independently reviewed radiology images for studies I described above or studies I have ordered. Admission date (for inpatients): 6/26/2018   * No surgery date entered *  Procedure(s):  COLOSTOMY VS ILEOSTOMY, EUA WITH BIOSPY    ASSESSMENT/PLAN: Anaya Edwards is a 46 y.o. Male with sign and symptoms consistent with obstructing colon cancer. Dr. Gutierrez Prieto plans to do a flex-sig today to assess mass and obtain biopsy.      Problem List  Date Reviewed: 6/29/2018          Codes Class Noted    * (Principal)[facility] ([facility] ICD-10-CM: O14.682  ICD-9-CM: 560.9  6/26/2018        Rectal mass ICD-10-CM: K62.9  ICD-9-CM: 787.99  6/26/2018        Leukocytosis ICD-10-CM: V78.132  ICD-9-CM: 288.60  6/26/2018        Generalized abdominal pain ICD-10-CM: R10.84  ICD-9-CM: 789.07  6/26/2018        Nausea with vomiting ICD-10-CM: R11.2  ICD-9-CM: 787.01  6/26/2018        Erectile dysfunction due to arterial insufficiency ICD-10-CM: N52.01  ICD-9-CM: 607.84  5/31/2018        Plantar fasciitis, right ICD-10-CM: M72.2  ICD-9-CM: 728.71  5/31/2018        Screen for colon cancer ICD-10-CM: Z12.11  ICD-9-CM: V76.51  5/31/2018        Elevated blood pressure reading ICD-10-CM: R03.0  ICD-9-CM: 796.2  5/31/2018        Polycythemia secondary to smoking ICD-10-CM: D75.1  ICD-9-CM: 289.0  5/30/2018        IGT (impaired glucose tolerance) ICD-10-CM: R73.02  ICD-9-CM: 790.22  5/30/2018        Hypercholesterolemia ICD-10-CM: E78.00  ICD-9-CM: 272.0  5/30/2018    Overview Signed 6/4/2018  2:20 PM by Garrett Thais Sanchez     ASCVD 10 year risk is 16.0%. Mod to high dose statin indicated. (based on b/p 180/90, 6/4/18)                 Screening PSA (prostate specific antigen) ICD-10-CM: Z12.5  ICD-9-CM: V76.44  5/30/2018        Tobacco abuse (Chronic) ICD-10-CM: Z72.0  ICD-9-CM: 305.1  8/18/2016            Principal Problem:     Bowel obstruction ([address] (6/26/2018)    Active Problems:    Tobacco abuse (8/18/2016)      Elevated blood pressure reading (5/31/2018)      Rectal mass (6/26/2018)      Leukocytosis (6/26/2018)      Generalized abdominal pain (6/26/2018)      Nausea with vomiting (6/26/2018)       Rectal mass suspicious for rectal cancer - POD 1 s/p surgery for diversion with ostomy and biopsies taken  Advance to Clear Liquid diet  DC NGT  Do not remove abdominal dressing - Surgeon will remove  Await flex-sig/ pathology  Ok to resume Lovenox    Signed:  MELISSA Castro-BC

## 2018-06-30 NOTE — PROGRESS NOTES
MEWS of 4. Consulted doctor . Tylenol ordered along with labs from doctor. Will give medication and continue to monitor and call dr if needed.

## 2018-06-30 NOTE — PROGRESS NOTES
Pt with new onset fever and LA 2.9. Chest x ray with atelectasis vs infiltrate. Pt is smoker and has chronic cough, he denies change. Add on procal, follow cultures and fever trend. CBC in am.  Hold off on abx at this time.  Discussed with Anoop Valdez MD.

## 2018-07-01 LAB
ERYTHROCYTE [DISTWIDTH] IN BLOOD BY AUTOMATED COUNT: 13 % (ref 11.9–14.6)
HCT VFR BLD AUTO: 42.9 % (ref 41.1–50.3)
HGB BLD-MCNC: 14.4 G/DL (ref 13.6–17.2)
MCH RBC QN AUTO: 30.2 PG (ref 26.1–32.9)
MCHC RBC AUTO-ENTMCNC: 33.6 G/DL (ref 31.4–35)
MCV RBC AUTO: 89.9 FL (ref 79.6–97.8)
PLATELET # BLD AUTO: 307 K/UL (ref 150–450)
PMV BLD AUTO: 10.3 FL (ref 10.8–14.1)
RBC # BLD AUTO: 4.77 M/UL (ref 4.23–5.67)
WBC # BLD AUTO: 7.3 K/UL (ref 4.3–11.1)

## 2018-07-01 PROCEDURE — 94760 N-INVAS EAR/PLS OXIMETRY 1: CPT

## 2018-07-01 PROCEDURE — 74011000250 HC RX REV CODE- 250: Performed by: INTERNAL MEDICINE

## 2018-07-01 PROCEDURE — 74011250636 HC RX REV CODE- 250/636: Performed by: NURSE PRACTITIONER

## 2018-07-01 PROCEDURE — 36415 COLL VENOUS BLD VENIPUNCTURE: CPT | Performed by: NURSE PRACTITIONER

## 2018-07-01 PROCEDURE — C9113 INJ PANTOPRAZOLE SODIUM, VIA: HCPCS | Performed by: NURSE PRACTITIONER

## 2018-07-01 PROCEDURE — 94640 AIRWAY INHALATION TREATMENT: CPT

## 2018-07-01 PROCEDURE — 74011250637 HC RX REV CODE- 250/637: Performed by: NURSE PRACTITIONER

## 2018-07-01 PROCEDURE — 74011000250 HC RX REV CODE- 250: Performed by: NURSE PRACTITIONER

## 2018-07-01 PROCEDURE — 65270000029 HC RM PRIVATE

## 2018-07-01 PROCEDURE — 85027 COMPLETE CBC AUTOMATED: CPT | Performed by: NURSE PRACTITIONER

## 2018-07-01 PROCEDURE — 74011250636 HC RX REV CODE- 250/636: Performed by: SURGERY

## 2018-07-01 RX ORDER — CARVEDILOL 6.25 MG/1
6.25 TABLET ORAL 2 TIMES DAILY WITH MEALS
Status: DISCONTINUED | OUTPATIENT
Start: 2018-07-01 | End: 2018-07-03 | Stop reason: HOSPADM

## 2018-07-01 RX ORDER — LISINOPRIL 5 MG/1
10 TABLET ORAL DAILY
Status: DISCONTINUED | OUTPATIENT
Start: 2018-07-02 | End: 2018-07-03 | Stop reason: HOSPADM

## 2018-07-01 RX ORDER — ALPRAZOLAM 0.5 MG/1
0.5 TABLET ORAL
Status: DISCONTINUED | OUTPATIENT
Start: 2018-07-01 | End: 2018-07-03 | Stop reason: HOSPADM

## 2018-07-01 RX ADMIN — CARVEDILOL 6.25 MG: 6.25 TABLET, FILM COATED ORAL at 16:41

## 2018-07-01 RX ADMIN — DEXTROSE MONOHYDRATE, SODIUM CHLORIDE, AND POTASSIUM CHLORIDE 125 ML/HR: 50; 9; 1.49 INJECTION, SOLUTION INTRAVENOUS at 00:54

## 2018-07-01 RX ADMIN — PANTOPRAZOLE SODIUM 40 MG: 40 INJECTION, POWDER, FOR SOLUTION INTRAVENOUS at 05:17

## 2018-07-01 RX ADMIN — IPRATROPIUM BROMIDE AND ALBUTEROL SULFATE 3 ML: .5; 3 SOLUTION RESPIRATORY (INHALATION) at 07:45

## 2018-07-01 RX ADMIN — ENALAPRILAT 0.62 MG: 1.25 INJECTION INTRAVENOUS at 12:13

## 2018-07-01 RX ADMIN — ENALAPRILAT 0.62 MG: 1.25 INJECTION INTRAVENOUS at 05:17

## 2018-07-01 RX ADMIN — Medication 10 ML: at 05:19

## 2018-07-01 RX ADMIN — IPRATROPIUM BROMIDE AND ALBUTEROL SULFATE 3 ML: .5; 3 SOLUTION RESPIRATORY (INHALATION) at 15:53

## 2018-07-01 RX ADMIN — DEXTROSE MONOHYDRATE, SODIUM CHLORIDE, AND POTASSIUM CHLORIDE 125 ML/HR: 50; 9; 1.49 INJECTION, SOLUTION INTRAVENOUS at 12:15

## 2018-07-01 NOTE — PROGRESS NOTES
H&P/Consult Note/Progress Note/Office Note:   Mer Steen  MRN: [id number]  :1965  Age:52 y.o.    HPI: Mer Steen is a 46 y.o. male who was sent to the ER yesterday by his gastroenterologist after a failed bowel prep prior to a colonoscopy. He has PMHx of tobacco abuse and elevated blood pressure. He reportedly has been having blood in his stool for about 2 months. He was being seen by GI and had a colonoscopy scheduled. He took the bowel prep but only had a \"teaspoon\" of results with vomiting after attempting to take in prep. In the ER,  A CT abdomen/pelvis was obtained that is concerning for fluid-filled small and large bowel loops with increased soft tissue suggesting an obstructing high rectal cancer. He has never had a colonoscopy. He denies abdominal pain at the present and reports BM this morning. He has an NGT with brown-tinged output. We are consulted for surgical intervention of obstructing mass. 18: Patient up on edge of bed. Complains of abdominal pain, hiccups, and burping. AF, VSS  18: Pt resting in bed. C/o sore throat from NG tube. +BM. To OR later today for diverting colostomy. AF, VSS.  18: Pt awake in bed. NG tube with no output overnight. Colostomy bag full of air. AF, VSS.    18: Pt up in room. Reports has emptied full colostomy bag 3x in last 12 hours also passing brown stool from rectum. Also c/o nausea, vomiting, and abdominal distention. Recommended replacing NG tube, however, pt does not want to replace tube. AF, VSS.           Past Medical History:   Diagnosis Date    Chest pain 2016    GERD (gastroesophageal reflux disease)     Tobacco abuse 2016     Past Surgical History:   Procedure Laterality Date    FLEXIBLE SIGMOIDOSCOPY N/A 2018    SIGMOIDOSCOPY FLEXIBLE performed by Sona [facility] at [facility] ENDOSCOPY    HX HERNIA REPAIR      HX ORTHOPAEDIC      arm     Current Facility-Administered Medications   Medication Dose Route Frequency    carvedilol (COREG) tablet 6.25 mg  6.25 mg Oral BID WITH MEALS    ALPRAZolam (XANAX) tablet 0.5 mg  0.5 mg Oral Q8H PRN    [START ON 7/2/2018] lisinopril (PRINIVIL, ZESTRIL) tablet 10 mg  10 mg Oral DAILY    acetaminophen (TYLENOL) tablet 650 mg  650 mg Oral Q6H PRN    albuterol-ipratropium (DUO-NEB) 2.5 MG-0.5 MG/3 ML  3 mL Nebulization Q6H RT    calcium carbonate (TUMS) chewable tablet 200 mg [elemental]  200 mg Oral TID PRN    morphine injection 2-6 mg  2-6 mg IntraVENous Q3H PRN    pantoprazole (PROTONIX) injection 40 mg  40 mg IntraVENous ACB    dextrose 5% - 0.9% NaCl with KCl 20 mEq/L infusion  75 mL/hr IntraVENous CONTINUOUS    phenol throat spray (CHLORASEPTIC) 1 Spray  1 Spray Oral PRN    sodium chloride (NS) flush 5-10 mL  5-10 mL IntraVENous Q8H    sodium chloride (NS) flush 5-10 mL  5-10 mL IntraVENous PRN    ondansetron (ZOFRAN) injection 4 mg  4 mg IntraVENous Q4H PRN    naloxone (NARCAN) injection 0.4 mg  0.4 mg IntraVENous PRN    nicotine (NICODERM CQ) 14 mg/24 hr patch 1 Patch  1 Patch TransDERmal DAILY PRN     Zithromax [azithromycin]  Social History     Social History    Marital status:      Spouse name: N/A    Number of children: N/A    Years of education: N/A     Social History Main Topics    Smoking status: Current Every Day Smoker     Packs/day: 1.00     Years: 30.00     Types: Cigarettes     Start date: 5/31/1988    Smokeless tobacco: Never Used    Alcohol use 8.4 oz/week     14 Cans of beer, 0 Standard drinks or equivalent per week    Drug use: No    Sexual activity: Not Asked     Other Topics Concern    None     Social History Narrative     History   Smoking Status    Current Every Day Smoker    Packs/day: 1.00    Years: 30.00    Types: Cigarettes    Start date: 5/31/1988   Smokeless Tobacco    Never Used     Family History   Problem Relation Age of Onset    Hypertension Mother     Heart Disease Mother     No Known Problems Father ROS: Comprehensive review of systems was otherwise unremarkable except as noted above. Physical Exam:   Visit Vitals    BP (!) 144/94 (BP 1 Location: Right arm, BP Patient Position: At rest)  Comment: RN notified    Pulse 94    Temp 98.3 °F (36.8 °C)    Resp 14    Ht 5' 9\" (1.753 m)    Wt 150 lb (68 kg)    SpO2 96%    BMI 22.15 kg/m2     Constitutional: Alert, cooperative, no acute distress; appears stated age    Eyes:Sclera are clear. ENMT: no external lesions gross hearing normal; no obvious neck masses, no ear or lip lesions, nares normal  CV: RRR. Normal perfusion  Resp: No JVD. Breathing is  non-labored; no audible wheezing. GI: appropriately tender, distended, colostomy bag with small amount of liquid brown stool, post-op dressing c/d/i    Musculoskeletal: unremarkable with normal function. No embolic signs or cyanosis.    Neuro:  Oriented; moves all 4; no focal deficits  Psychiatric: normal affect and mood, no memory impairment    Recent vitals (if inpt):  Patient Vitals for the past 24 hrs:   BP Temp Pulse Resp SpO2   07/01/18 1148 (!) 144/94 98.3 °F (36.8 °C) 94 14 96 %   07/01/18 0755 (!) 171/97 98.7 °F (37.1 °C) 88 14 96 %   07/01/18 0745 - - - - 96 %   07/01/18 0516 137/87 98.1 °F (36.7 °C) 97 16 96 %   07/01/18 0015 125/79 98.8 °F (37.1 °C) 93 20 97 %   06/30/18 1913 131/82 98.5 °F (36.9 °C) 98 20 95 %       Labs:  Recent Labs      07/01/18   0543  06/30/18   0506   WBC  7.3  7.7   HGB  14.4  14.1   PLT  307  263   NA   --   138   K   --   4.3   CL   --   104   CO2   --   26   BUN   --   8   CREA   --   0.71*   GLU   --   140*       Lab Results   Component Value Date/Time    WBC 7.3 07/01/2018 05:43 AM    HGB 14.4 07/01/2018 05:43 AM    PLATELET 648 26/50/6092 05:43 AM    Sodium 138 06/30/2018 05:06 AM    Potassium 4.3 06/30/2018 05:06 AM    Chloride 104 06/30/2018 05:06 AM    CO2 26 06/30/2018 05:06 AM    BUN 8 06/30/2018 05:06 AM    Creatinine 0.71 (L) 06/30/2018 05:06 AM Glucose 140 (H) 06/30/2018 05:06 AM    Bilirubin, total 0.9 06/27/2018 04:30 AM    AST (SGOT) 17 06/27/2018 04:30 AM    ALT (SGPT) 19 06/27/2018 04:30 AM    Alk. phosphatase 67 06/27/2018 04:30 AM       I reviewed recent labs and recent radiologic studies. CT Results (most recent):    6/28/18 PORTABLE ABDOMEN, June 28, 2018 at 1432 hours:     CLINICAL HISTORY:  Nasogastric tube repositioning.     COMPARISON:  KUB at 1137 hours today.     FINDINGS:  AP supine image demonstrates a nasogastric tube with the proximal  sidehole at just above the gastroesophageal junction. Multiple mildly dilated  small bowel loops are evident in the still upper abdomen.     IMPRESSION:  Nasogastric tube proximal sidehole has been advanced slightly below  the gastroesophageal junction, but further advancement by approximately 5 cm is  suggested. Results from [place] encounter on 06/26/18   CT ABD PELV W CONT   Narrative CT ABDOMEN AND PELVIS WITH CONTRAST. HISTORY: Abdominal pain and cramping. Blood in stool. COMPARISON: None    TECHNIQUE: 5 mm axial scans from above the diaphragms to the pubic symphysis  following oral and 100 cc intravenous contrast without acute complication. Intravenous contrast was given to increase the sensitivity to acute  inflammation. Radiation dose reduction techniques were used for this study. Our CT scanners use one or more of the following: Automated exposure control,  adjustment of the mA and or kV according to patient size, iterative  reconstruction. FINDINGS:   Abdomen: No free air. The lung bases are clear. There is fluid in the distal  esophagus, probably reflux. Oral contrast has not left the stomach. Small bowel  loops are fluid-filled and mildly dilated throughout. No transition zone  appreciated. Large bowel loops are also fluid-filled. Small hypoattenuating foci  in the liver, probably subcentimeter cysts. The spleen is small and  homogeneous. . No calcified gallstones. The biliary tree is not dilated. The  pancreas is unremarkable. No free fluid, acute inflammatory changes or  adenopathy. The kidneys enhance uniformly. No radiopaque renal calculi. No  hydronephrosis. The adrenal glands are normal size. Aorta is normal caliber. Pelvis: The colon is fluid-filled down to the level of the rectosigmoid where  there is increased soft tissue suggesting an obstructing high rectal mass. The  urinary bladder unremarkable. The prostate is mildly enlarged. Seminal vesicles  symmetric. Impression IMPRESSION:  Fluid-filled small and large bowel loops with increased soft tissue  suggesting an obstructing high rectal cancer. XR Results (most recent):    Results from Hospital Encounter encounter on 06/26/18   XR CHEST SNGL V   Narrative Chest X-ray    INDICATION:   Postop fever and cough    A portable AP view of the chest was obtained. FINDINGS: There is increased infiltrate/atelectasis in the left lung base. .  The  heart size is normal.  The bony thorax is intact. Impression IMPRESSION: Minimal left lower lobe infiltrate/atelectasis          I independently reviewed radiology images for studies I described above or studies I have ordered. Admission date (for inpatients): 6/26/2018   * No surgery date entered *  Procedure(s):  COLOSTOMY VS ILEOSTOMY, EUA WITH BIOSPY    ASSESSMENT/PLAN: Mer Steen is a 46 y.o. Male with sign and symptoms consistent with obstructing colon cancer. Dr. Cardoso Esters plans to do a flex-sig today to assess mass and obtain biopsy.      Problem List  Date Reviewed: 6/29/2018          Codes Class Noted    * (Principal)Bowel obstruction ([facility] ICD-10-CM: U05.858  ICD-9-CM: 560.9  6/26/2018        Rectal mass ICD-10-CM: K62.9  ICD-9-CM: 787.99  6/26/2018        Leukocytosis ICD-10-CM: D48.769  ICD-9-CM: 288.60  6/26/2018        Generalized abdominal pain ICD-10-CM: R10.84  ICD-9-CM: 789.07  6/26/2018        Nausea with vomiting ICD-10-CM: R11.2  ICD-9-CM: 787.01  6/26/2018        Erectile dysfunction due to arterial insufficiency ICD-10-CM: N52.01  ICD-9-CM: 607.84  5/31/2018        Plantar fasciitis, right ICD-10-CM: M72.2  ICD-9-CM: 728.71  5/31/2018        Screen for colon cancer ICD-10-CM: Z12.11  ICD-9-CM: V76.51  5/31/2018        Elevated blood pressure reading ICD-10-CM: R03.0  ICD-9-CM: 796.2  5/31/2018        Polycythemia secondary to smoking ICD-10-CM: D75.1  ICD-9-CM: 289.0  5/30/2018        IGT (impaired glucose tolerance) ICD-10-CM: R73.02  ICD-9-CM: 790.22  5/30/2018        Hypercholesterolemia ICD-10-CM: E78.00  ICD-9-CM: 272.0  5/30/2018    Overview Signed 6/4/2018  2:20 PM by Garrett Thais Sanchez     ASCVD 10 year risk is 16.0%. Mod to high dose statin indicated. (based on b/p 180/90, 6/4/18)                 Screening PSA (prostate specific antigen) ICD-10-CM: Z12.5  ICD-9-CM: V76.44  5/30/2018        Tobacco abuse (Chronic) ICD-10-CM: Z72.0  ICD-9-CM: 305.1  8/18/2016            Principal Problem: Bowel obstruction ([address] (6/26/2018)    Active Problems:    Tobacco abuse (8/18/2016)      Elevated blood pressure reading (5/31/2018)      Rectal mass (6/26/2018)      Leukocytosis (6/26/2018)      Generalized abdominal pain (6/26/2018)      Nausea with vomiting (6/26/2018)       Rectal mass suspicious for rectal cancer - POD 2 s/p surgery for diversion with ostomy and biopsies taken  Clear Liquid diet - limit intake if continues n/v.    Replace NGT if vomits again  Do not remove abdominal dressing - Surgeon will remove  Await flex-sig/ pathology  Ok to resume Lovenox    Signed:  MELISSA Castro-BC

## 2018-07-01 NOTE — PROGRESS NOTES
Problem: Falls - Risk of  Goal: *Absence of Falls  Document Sue Fall Risk and appropriate interventions in the flowsheet.    Outcome: Progressing Towards Goal  Fall Risk Interventions:            Medication Interventions: Bed/chair exit alarm, Patient to call before getting OOB    Elimination Interventions: Call light in reach, Patient to call for help with toileting needs

## 2018-07-01 NOTE — PROGRESS NOTES
Hospitalist Progress Note    2018  Admit Date: 2018  4:21 PM   NAME: Khurram Drummond   :  1965   MRN:  [id number]   Attending: Lilian Ling MD  PCP:  Michelle Ventura DO    SUBJECTIVE:     Patient is a 45 yo male with PMH of GI bleed x 2 months who was scheduled for colonoscopy, but was scheduled for CT after not being able to tolerate prep. CT abdomen suspicious for obstructing high rectal cancer. Pt underwent surgery with ostomy placement. Patient anxious today, States he hasn't slept. Abdomen firm and distended with heartburn. Patient states general surgery offering option to replace NG tube. Colostomy with liquid brown stool. Review of Systems negative with exception of pertinent positives noted above  PHYSICAL EXAM     Visit Vitals    BP (!) 144/94 (BP 1 Location: Right arm, BP Patient Position: At rest)  Comment: RN notified    Pulse 94    Temp 98.3 °F (36.8 °C)    Resp 14    Ht 5' 9\" (1.753 m)    Wt 68 kg (150 lb)    SpO2 96%    BMI 22.15 kg/m2      Temp (24hrs), Av.9 °F (37.2 °C), Min:98.1 °F (36.7 °C), Max:100 °F (37.8 °C)    Oxygen Therapy  O2 Sat (%): 96 % (18 1148)  Pulse via Oximetry: 97 beats per minute (18 0745)  O2 Device: Room air (18 0745)  O2 Flow Rate (L/min): 0 l/min (18 1201)  No intake or output data in the 24 hours ending 18 1346   General: No acute distress    Lungs:  CTA Bilaterally. Heart:  Regular rate and rhythm,  No murmur, rub, or gallop  Abdomen: Soft, Non distended, Non tender, Positive bowel sounds.  Colostomy patent  Extremities: No cyanosis, clubbing or edema  Neurologic:  No focal deficits    ASSESSMENT      Active Hospital Problems    Diagnosis Date Noted    Bowel obstruction ([address] 2018    Rectal mass 2018    Leukocytosis 2018    Generalized abdominal pain 2018    Nausea with vomiting 2018    Elevated blood pressure reading 2018    Tobacco abuse 2016 A/P:     Rectal mass suspicious for rectal cancer -   -POD 2 s/p surgery for diversion with ostomy and biopsies taken. Ostomy nurse following.   - Path pending treatment course. -CLD -Advance diet per surgery. HTN  - Change IV  Enalapril to lisinopril 10mg qd  - Add Coreg    Tobacco abuse- Cessation recommended. Incentive spirometer. DC planning- SW following with plans for home with home health.    Plan of care discussed with Dr. Chelsey Layne By: Brooke Brown NP     July 1, 2018

## 2018-07-02 PROCEDURE — 74011250636 HC RX REV CODE- 250/636: Performed by: NURSE PRACTITIONER

## 2018-07-02 PROCEDURE — 77030010522

## 2018-07-02 PROCEDURE — 74011250637 HC RX REV CODE- 250/637: Performed by: NURSE PRACTITIONER

## 2018-07-02 PROCEDURE — C9113 INJ PANTOPRAZOLE SODIUM, VIA: HCPCS | Performed by: NURSE PRACTITIONER

## 2018-07-02 PROCEDURE — 65270000029 HC RM PRIVATE

## 2018-07-02 PROCEDURE — 77030010541

## 2018-07-02 RX ORDER — PANTOPRAZOLE SODIUM 40 MG/1
40 TABLET, DELAYED RELEASE ORAL
Status: DISCONTINUED | OUTPATIENT
Start: 2018-07-03 | End: 2018-07-03 | Stop reason: HOSPADM

## 2018-07-02 RX ORDER — ENOXAPARIN SODIUM 100 MG/ML
40 INJECTION SUBCUTANEOUS EVERY 24 HOURS
Status: DISCONTINUED | OUTPATIENT
Start: 2018-07-02 | End: 2018-07-03 | Stop reason: HOSPADM

## 2018-07-02 RX ADMIN — ENOXAPARIN SODIUM 40 MG: 40 INJECTION SUBCUTANEOUS at 11:40

## 2018-07-02 RX ADMIN — PANTOPRAZOLE SODIUM 40 MG: 40 INJECTION, POWDER, FOR SOLUTION INTRAVENOUS at 05:39

## 2018-07-02 RX ADMIN — LISINOPRIL 10 MG: 5 TABLET ORAL at 08:33

## 2018-07-02 RX ADMIN — Medication 10 ML: at 05:39

## 2018-07-02 RX ADMIN — CARVEDILOL 6.25 MG: 6.25 TABLET, FILM COATED ORAL at 16:39

## 2018-07-02 RX ADMIN — CARVEDILOL 6.25 MG: 6.25 TABLET, FILM COATED ORAL at 08:33

## 2018-07-02 RX ADMIN — Medication 10 ML: at 14:00

## 2018-07-02 RX ADMIN — Medication 10 ML: at 21:18

## 2018-07-02 NOTE — PROGRESS NOTES
Problem: Nutrition Deficit  Goal: *Optimize nutritional status  Nutrition - F/U  Assessment:   Diet order(s): 6/26 NPO, 6/30 CLQ, 7/2 FLQ  Food/Nutrition Patient History:  Pt s/p transverse colostomy and biopsy on 6/29. He reports one episode of emesis this morning but states that it was related to the sight and smell of his new ostomy vs actual nausea r/t surgery. He wants solids today and requests ice-cream with FLQ lunch. Pt states that he isn't going to eat cream based soups that FLQ diet provides. Anthropometrics:Height: 5' 9\" (175.3 cm),  Weight: 68 kg (150 lb), (6/26), source unstated , Body mass index is 22.15 kg/(m^2). BMI class of normal weight. Macronutrient needs:  EER:  8414-7713 kcal /day (25-30 kcal/kg listed BW)  EPR:  73-95 grams protein/day (1-1.3 grams/kg IBW)  Max CHO: 419 grams CHO/day (4mg/kgIBW/minute)  Fluid: 1ml/kcal  Intake/Comparative Standards: NPO/CLQ diet is inadequate in calories/protein. One FLQ meal provided. Intervention:  Meals and snacks: FLQ. Whole milk or 2% milk to each tray. Ice cream each tray. NO creamed soups. Provided pt with those items for lunch today. Supplement: Add ensure enlive TID. Discharge nutrition plan:  Too soon to determine.     Zeeshan Mills [address], [address] [place] [address], [phone]

## 2018-07-02 NOTE — PROGRESS NOTES
Problem: Falls - Risk of  Goal: *Absence of Falls  Document Sue Fall Risk and appropriate interventions in the flowsheet.    Outcome: Progressing Towards Goal  Fall Risk Interventions:            Medication Interventions: Bed/chair exit alarm, Assess postural VS orthostatic hypotension    Elimination Interventions: Bed/chair exit alarm, Call light in reach, Patient to call for help with toileting needs

## 2018-07-02 NOTE — PROGRESS NOTES
Problem: Falls - Risk of  Goal: *Absence of Falls  Document Sue Fall Risk and appropriate interventions in the flowsheet.    Outcome: Progressing Towards Goal  Fall Risk Interventions:            Medication Interventions: Assess postural VS orthostatic hypotension, Bed/chair exit alarm, Patient to call before getting OOB    Elimination Interventions: Bed/chair exit alarm, Call light in reach, Patient to call for help with toileting needs

## 2018-07-02 NOTE — PROGRESS NOTES
Hospitalist Progress Note    2018  Admit Date: 2018  4:21 PM   NAME: Raymon Rashid   :  1965   MRN:  [id number]   Attending: Zo Upton. Minerva Arauz MD  PCP:  Grey Almanza DO    SUBJECTIVE:     Patient is a 45 yo male with PMH of GI bleed x 2 months who was scheduled for colonoscopy, but was scheduled for CT after not being able to tolerate prep. CT abdomen suspicious for obstructing high rectal cancer. Pt underwent surgery  for diversion with ostomy and biopsies taken. Patient having good output. Diet advancing to full liquids today. Denies nausea, vomiting, CP, SOB. Review of Systems negative with exception of pertinent positives noted above  PHYSICAL EXAM     Visit Vitals    /74 (BP 1 Location: Right arm, BP Patient Position: At rest)    Pulse 77    Temp 97.8 °F (36.6 °C)    Resp 18    Ht 5' 9\" (1.753 m)    Wt 68 kg (150 lb)    SpO2 96%    BMI 22.15 kg/m2      Temp (24hrs), Av.3 °F (36.8 °C), Min:97.8 °F (36.6 °C), Max:98.9 °F (37.2 °C)    Oxygen Therapy  O2 Sat (%): 96 % (18 1138)  Pulse via Oximetry: 85 beats per minute (18 1553)  O2 Device: Room air (18 1553)  O2 Flow Rate (L/min): 0 l/min (18 1201)    Intake/Output Summary (Last 24 hours) at 18 1440  Last data filed at 18 0105   Gross per 24 hour   Intake                0 ml   Output              300 ml   Net             -300 ml      General: No acute distress    Lungs:  CTA Bilaterally. Room air  Heart:  Regular rate and rhythm,  No murmur, rub, or gallop  Abdomen: Soft, Non distended, Non tender, Positive bowel sounds.  Colostomy patent  Extremities: No cyanosis, clubbing or edema  Neurologic:  No focal deficits    ASSESSMENT      Active Hospital Problems    Diagnosis Date Noted    Bowel obstruction ([address] 2018    Rectal mass 2018    Leukocytosis 2018    Generalized abdominal pain 2018    Nausea with vomiting 2018    Elevated blood pressure reading 05/31/2018    Tobacco abuse 08/18/2016     A/P:     Rectal mass suspicious for rectal cancer -   -POD 3 s/p surgery for diversion with ostomy and biopsies taken. Ostomy nurse following.   - Path pending treatment course. - FLD - advanced per surgery  - Tolerating diet. DC IVF     HTN  - Continue Lisinopril  - Continue Coreg    Tobacco abuse- Cessation recommended. Incentive spirometer. DC planning- SW following with plans for home with home health.    Plan of care discussed with Dr. Mckee Current By: Yanni Pérez NP     July 2, 2018

## 2018-07-02 NOTE — WOUND CARE
Colostomy lesson, patient with slough over portions of skin level double barrel stoma, moderate yellow liquid output, patient has been independently emptying pouch over weekend, demonstrated emptying. Pouch changed with patient, steps reviewed patient verbalized understanding. Review of literature, and discussion of pouch ordering, prescription need. Will monitor.

## 2018-07-02 NOTE — PROGRESS NOTES
H&P/Consult Note/Progress Note/Office Note:   Haresh Chowdhury  MRN: [id number]  :1965  Age:52 y.o.    HPI: Haresh Chowdhury is a 46 y.o. male who was sent to the ER yesterday by his gastroenterologist after a failed bowel prep prior to a colonoscopy. He has PMHx of tobacco abuse and elevated blood pressure. He reportedly has been having blood in his stool for about 2 months. He was being seen by GI and had a colonoscopy scheduled. He took the bowel prep but only had a \"teaspoon\" of results with vomiting after attempting to take in prep. In the ER,  A CT abdomen/pelvis was obtained that is concerning for fluid-filled small and large bowel loops with increased soft tissue suggesting an obstructing high rectal cancer. He has never had a colonoscopy. He denies abdominal pain at the present and reports BM this morning. He has an NGT with brown-tinged output. We are consulted for surgical intervention of obstructing mass. 18: Patient up on edge of bed. Complains of abdominal pain, hiccups, and burping. AF, VSS  18: Pt resting in bed. C/o sore throat from NG tube. +BM. To OR later today for diverting colostomy. AF, VSS.  18: Pt awake in bed. NG tube with no output overnight. Colostomy bag full of air. AF, VSS.    18: Pt up in room. Reports has emptied full colostomy bag 3x in last 12 hours also passing brown stool from rectum. Also c/o nausea, vomiting, and abdominal distention. Recommended replacing NG tube, however, pt does not want to replace tube. AF, VSS.    18: Patient without complaints. Ostomy functioning with +gas /stool and 300mL/24h output. No recent stool from bottom. Reports vomiting is due to post nasal drip but denies any recent n/v. AF, VSS. Pathology reviewed. Flex-Sig Pathology 18     DIAGNOSIS   RECTAL MASS BIOPSIES: FRAGMENTS OF HYPERPLASTIC POLYP, FOCALLY ULCERATED AND INFLAMED. SEE COMMENT.    Comment   Often hyperplastic large intestinal type mucosa is present at edge of neoplastic mass. If these biopsy fragments represent only a small portion of a much larger mass, unsampled mass could have areas of in situ or even infiltrating carcinoma. Case discussed with Dr. Emilia Khan on 6/ 29/ 18 at approximately 0910 hours. tls/6/28/2018   Electronically signed out on 6/29/2018 09:13 by MIRYAM Paiz M.D.          Past Medical History:   Diagnosis Date    Chest pain 8/18/2016    GERD (gastroesophageal reflux disease)     Tobacco abuse 8/18/2016     Past Surgical History:   Procedure Laterality Date    FLEXIBLE SIGMOIDOSCOPY N/A 6/27/2018    SIGMOIDOSCOPY FLEXIBLE performed by Ajit Valle MD at [facility] ENDOSCOPY    HX HERNIA REPAIR      HX ORTHOPAEDIC      arm     Current Facility-Administered Medications   Medication Dose Route Frequency    enoxaparin (LOVENOX) injection 40 mg  40 mg SubCUTAneous Q24H    carvedilol (COREG) tablet 6.25 mg  6.25 mg Oral BID WITH MEALS    ALPRAZolam (XANAX) tablet 0.5 mg  0.5 mg Oral Q8H PRN    lisinopril (PRINIVIL, ZESTRIL) tablet 10 mg  10 mg Oral DAILY    acetaminophen (TYLENOL) tablet 650 mg  650 mg Oral Q6H PRN    albuterol-ipratropium (DUO-NEB) 2.5 MG-0.5 MG/3 ML  3 mL Nebulization Q6H RT    calcium carbonate (TUMS) chewable tablet 200 mg [elemental]  200 mg Oral TID PRN    morphine injection 2-6 mg  2-6 mg IntraVENous Q3H PRN    pantoprazole (PROTONIX) injection 40 mg  40 mg IntraVENous ACB    dextrose 5% - 0.9% NaCl with KCl 20 mEq/L infusion  75 mL/hr IntraVENous CONTINUOUS    phenol throat spray (CHLORASEPTIC) 1 Spray  1 Spray Oral PRN    sodium chloride (NS) flush 5-10 mL  5-10 mL IntraVENous Q8H    sodium chloride (NS) flush 5-10 mL  5-10 mL IntraVENous PRN    ondansetron (ZOFRAN) injection 4 mg  4 mg IntraVENous Q4H PRN    naloxone (NARCAN) injection 0.4 mg  0.4 mg IntraVENous PRN    nicotine (NICODERM CQ) 14 mg/24 hr patch 1 Patch  1 Patch TransDERmal DAILY PRN     Zithromax [azithromycin]  Social History     Social History    Marital status:      Spouse name: N/A    Number of children: N/A    Years of education: N/A     Social History Main Topics    Smoking status: Current Every Day Smoker     Packs/day: 1.00     Years: 30.00     Types: Cigarettes     Start date: 5/31/1988    Smokeless tobacco: Never Used    Alcohol use 8.4 oz/week     14 Cans of beer, 0 Standard drinks or equivalent per week    Drug use: No    Sexual activity: Not Asked     Other Topics Concern    None     Social History Narrative     History   Smoking Status    Current Every Day Smoker    Packs/day: 1.00    Years: 30.00    Types: Cigarettes    Start date: 5/31/1988   Smokeless Tobacco    Never Used     Family History   Problem Relation Age of Onset    Hypertension Mother     Heart Disease Mother     No Known Problems Father      ROS: Comprehensive review of systems was otherwise unremarkable except as noted above. Physical Exam:   Visit Vitals    /81 (BP 1 Location: Left arm, BP Patient Position: At rest)    Pulse 85    Temp 98.6 °F (37 °C)    Resp 17    Ht 5' 9\" (1.753 m)    Wt 150 lb (68 kg)    SpO2 96%    BMI 22.15 kg/m2     Constitutional: Alert, cooperative, no acute distress; appears stated age    Eyes:Sclera are clear. ENMT: no external lesions gross hearing normal; no obvious neck masses, no ear or lip lesions, nares normal  CV: RRR. Normal perfusion  Resp: No JVD. Breathing is  non-labored; no audible wheezing. GI: appropriately tender, distended, colostomy bag with small amount of liquid brown stool, flush stoma dusky with slough, post-op dressing c/d/i    Musculoskeletal: unremarkable with normal function. No embolic signs or cyanosis.    Neuro:  Oriented; moves all 4; no focal deficits  Psychiatric: normal affect and mood, no memory impairment    Recent vitals (if inpt):  Patient Vitals for the past 24 hrs:   BP Temp Pulse Resp SpO2   07/02/18 0715 131/81 98.6 °F (37 °C) 85 17 96 % 07/02/18 0339 141/87 98.9 °F (37.2 °C) 88 16 94 %   07/01/18 2347 146/65 98 °F (36.7 °C) (!) 59 18 94 %   07/01/18 2009 133/88 98.1 °F (36.7 °C) 86 17 94 %   07/01/18 1553 - - - - 98 %   07/01/18 1522 143/90 98.5 °F (36.9 °C) 99 14 97 %   07/01/18 1148 (!) 144/94 98.3 °F (36.8 °C) 94 14 96 %       Labs:  Recent Labs      07/01/18   0543  06/30/18   0506   WBC  7.3  7.7   HGB  14.4  14.1   PLT  307  263   NA   --   138   K   --   4.3   CL   --   104   CO2   --   26   BUN   --   8   CREA   --   0.71*   GLU   --   140*       Lab Results   Component Value Date/Time    WBC 7.3 07/01/2018 05:43 AM    HGB 14.4 07/01/2018 05:43 AM    PLATELET 058 34/54/1493 05:43 AM    Sodium 138 06/30/2018 05:06 AM    Potassium 4.3 06/30/2018 05:06 AM    Chloride 104 06/30/2018 05:06 AM    CO2 26 06/30/2018 05:06 AM    BUN 8 06/30/2018 05:06 AM    Creatinine 0.71 (L) 06/30/2018 05:06 AM    Glucose 140 (H) 06/30/2018 05:06 AM    Bilirubin, total 0.9 06/27/2018 04:30 AM    AST (SGOT) 17 06/27/2018 04:30 AM    ALT (SGPT) 19 06/27/2018 04:30 AM    Alk. phosphatase 67 06/27/2018 04:30 AM       I reviewed recent labs and recent radiologic studies. CT Results (most recent):    6/28/18 PORTABLE ABDOMEN, June 28, 2018 at 1432 hours:     CLINICAL HISTORY:  Nasogastric tube repositioning.     COMPARISON:  KUB at 1137 hours today.     FINDINGS:  AP supine image demonstrates a nasogastric tube with the proximal  sidehole at just above the gastroesophageal junction. Multiple mildly dilated  small bowel loops are evident in the still upper abdomen.     IMPRESSION:  Nasogastric tube proximal sidehole has been advanced slightly below  the gastroesophageal junction, but further advancement by approximately 5 cm is  suggested. Results from [facility] encounter on 06/26/18   CT ABD PELV W CONT   Narrative CT ABDOMEN AND PELVIS WITH CONTRAST. HISTORY: Abdominal pain and cramping. Blood in stool.     COMPARISON: None    TECHNIQUE: 5 mm axial scans from above the diaphragms to the pubic symphysis  following oral and 100 cc intravenous contrast without acute complication. Intravenous contrast was given to increase the sensitivity to acute  inflammation. Radiation dose reduction techniques were used for this study. Our CT scanners use one or more of the following: Automated exposure control,  adjustment of the mA and or kV according to patient size, iterative  reconstruction. FINDINGS:   Abdomen: No free air. The lung bases are clear. There is fluid in the distal  esophagus, probably reflux. Oral contrast has not left the stomach. Small bowel  loops are fluid-filled and mildly dilated throughout. No transition zone  appreciated. Large bowel loops are also fluid-filled. Small hypoattenuating foci  in the liver, probably subcentimeter cysts. The spleen is small and  homogeneous. . No calcified gallstones. The biliary tree is not dilated. The  pancreas is unremarkable. No free fluid, acute inflammatory changes or  adenopathy. The kidneys enhance uniformly. No radiopaque renal calculi. No  hydronephrosis. The adrenal glands are normal size. Aorta is normal caliber. Pelvis: The colon is fluid-filled down to the level of the rectosigmoid where  there is increased soft tissue suggesting an obstructing high rectal mass. The  urinary bladder unremarkable. The prostate is mildly enlarged. Seminal vesicles  symmetric. Impression IMPRESSION:  Fluid-filled small and large bowel loops with increased soft tissue  suggesting an obstructing high rectal cancer. XR Results (most recent):    Results from Hospital Encounter encounter on 06/26/18   XR CHEST SNGL V   Narrative Chest X-ray    INDICATION:   Postop fever and cough    A portable AP view of the chest was obtained. FINDINGS: There is increased infiltrate/atelectasis in the left lung base. .  The  heart size is normal.  The bony thorax is intact.            Impression IMPRESSION: Minimal left lower lobe infiltrate/atelectasis          I independently reviewed radiology images for studies I described above or studies I have ordered. Admission date (for inpatients): 6/26/2018   * No surgery date entered *  Procedure(s):  COLOSTOMY VS ILEOSTOMY, EUA WITH BIOSPY    ASSESSMENT/PLAN: Toña Madison is a 46 y.o. Male with sign and symptoms consistent with obstructing colon cancer. Dr. Dondra Canavan plans to do a flex-sig today to assess mass and obtain biopsy. Problem List  Date Reviewed: 6/29/2018          Codes Class Noted    * (Principal)Bowel obstruction ([facility] [address] ICD-10-CM: L09.972  ICD-9-CM: 560.9  6/26/2018        Rectal mass ICD-10-CM: K62.9  ICD-9-CM: 787.99  6/26/2018        Leukocytosis ICD-10-CM: D72.829  ICD-9-CM: 288.60  6/26/2018        Generalized abdominal pain ICD-10-CM: R10.84  ICD-9-CM: 789.07  6/26/2018        Nausea with vomiting ICD-10-CM: R11.2  ICD-9-CM: 787.01  6/26/2018        Erectile dysfunction due to arterial insufficiency ICD-10-CM: N52.01  ICD-9-CM: 607.84  5/31/2018        Plantar fasciitis, right ICD-10-CM: M72.2  ICD-9-CM: 728.71  5/31/2018        Screen for colon cancer ICD-10-CM: Z12.11  ICD-9-CM: V76.51  5/31/2018        Elevated blood pressure reading ICD-10-CM: R03.0  ICD-9-CM: 796.2  5/31/2018        Polycythemia secondary to smoking ICD-10-CM: D75.1  ICD-9-CM: 289.0  5/30/2018        IGT (impaired glucose tolerance) ICD-10-CM: R73.02  ICD-9-CM: 790.22  5/30/2018        Hypercholesterolemia ICD-10-CM: E78.00  ICD-9-CM: 272.0  5/30/2018    Overview Signed 6/4/2018  2:20 PM by Agustin Sanchez     ASCVD 10 year risk is 16.0%. Mod to high dose statin indicated. (based on b/p 180/90, 6/4/18)                 Screening PSA (prostate specific antigen) ICD-10-CM: Z12.5  ICD-9-CM: V76.44  5/30/2018        Tobacco abuse (Chronic) ICD-10-CM: Z72.0  ICD-9-CM: 305.1  8/18/2016            Principal Problem:     Bowel obstruction ([facility] [address] (6/26/2018)    Active Problems:    Tobacco abuse (8/18/2016)      Elevated blood pressure reading (5/31/2018)      Rectal mass (6/26/2018)      Leukocytosis (6/26/2018)      Generalized abdominal pain (6/26/2018)      Nausea with vomiting (6/26/2018)       Rectal mass suspicious for rectal cancer - POD 3 s/p surgery for diversion with ostomy and biopsies taken  Advance to FLD   Do not remove abdominal dressing - Surgeon will remove  Await surgical pathology   Lovenox/Protonix for prophylaxis   Wound care for ostomy teaching     Signed:  Frankie Segal NP

## 2018-07-03 VITALS
RESPIRATION RATE: 18 BRPM | DIASTOLIC BLOOD PRESSURE: 76 MMHG | OXYGEN SATURATION: 99 % | SYSTOLIC BLOOD PRESSURE: 125 MMHG | TEMPERATURE: 98.1 F | HEART RATE: 65 BPM | BODY MASS INDEX: 22.22 KG/M2 | HEIGHT: 69 IN | WEIGHT: 150 LBS

## 2018-07-03 LAB
ANION GAP SERPL CALC-SCNC: 12 MMOL/L (ref 7–16)
BASOPHILS # BLD: 0 K/UL (ref 0–0.2)
BASOPHILS NFR BLD: 0 % (ref 0–2)
BUN SERPL-MCNC: 6 MG/DL (ref 6–23)
CALCIUM SERPL-MCNC: 7.9 MG/DL (ref 8.3–10.4)
CHLORIDE SERPL-SCNC: 99 MMOL/L (ref 98–107)
CO2 SERPL-SCNC: 28 MMOL/L (ref 21–32)
CREAT SERPL-MCNC: 0.68 MG/DL (ref 0.8–1.5)
DIFFERENTIAL METHOD BLD: ABNORMAL
EOSINOPHIL # BLD: 0.2 K/UL (ref 0–0.8)
EOSINOPHIL NFR BLD: 2 % (ref 0.5–7.8)
ERYTHROCYTE [DISTWIDTH] IN BLOOD BY AUTOMATED COUNT: 12.9 % (ref 11.9–14.6)
GLUCOSE SERPL-MCNC: 87 MG/DL (ref 65–100)
HCT VFR BLD AUTO: 40.6 % (ref 41.1–50.3)
HGB BLD-MCNC: 13.7 G/DL (ref 13.6–17.2)
IMM GRANULOCYTES # BLD: 0.1 K/UL (ref 0–0.5)
IMM GRANULOCYTES NFR BLD AUTO: 1 % (ref 0–5)
LYMPHOCYTES # BLD: 2.6 K/UL (ref 0.5–4.6)
LYMPHOCYTES NFR BLD: 25 % (ref 13–44)
MCH RBC QN AUTO: 29.8 PG (ref 26.1–32.9)
MCHC RBC AUTO-ENTMCNC: 33.7 G/DL (ref 31.4–35)
MCV RBC AUTO: 88.5 FL (ref 79.6–97.8)
MONOCYTES # BLD: 0.9 K/UL (ref 0.1–1.3)
MONOCYTES NFR BLD: 9 % (ref 4–12)
NEUTS SEG # BLD: 6.7 K/UL (ref 1.7–8.2)
NEUTS SEG NFR BLD: 64 % (ref 43–78)
PLATELET # BLD AUTO: 390 K/UL (ref 150–450)
PLATELET COMMENTS,PCOM: ADEQUATE
PMV BLD AUTO: 10.3 FL (ref 10.8–14.1)
POTASSIUM SERPL-SCNC: 3.1 MMOL/L (ref 3.5–5.1)
RBC # BLD AUTO: 4.59 M/UL (ref 4.23–5.67)
RBC MORPH BLD: ABNORMAL
SODIUM SERPL-SCNC: 139 MMOL/L (ref 136–145)
WBC # BLD AUTO: 10.4 K/UL (ref 4.3–11.1)
WBC MORPH BLD: ABNORMAL

## 2018-07-03 PROCEDURE — 77030010522

## 2018-07-03 PROCEDURE — 74011250637 HC RX REV CODE- 250/637: Performed by: NURSE PRACTITIONER

## 2018-07-03 PROCEDURE — 74011250637 HC RX REV CODE- 250/637: Performed by: INTERNAL MEDICINE

## 2018-07-03 PROCEDURE — 80048 BASIC METABOLIC PNL TOTAL CA: CPT | Performed by: NURSE PRACTITIONER

## 2018-07-03 PROCEDURE — 74011250636 HC RX REV CODE- 250/636: Performed by: NURSE PRACTITIONER

## 2018-07-03 PROCEDURE — 36415 COLL VENOUS BLD VENIPUNCTURE: CPT | Performed by: NURSE PRACTITIONER

## 2018-07-03 PROCEDURE — 85025 COMPLETE CBC W/AUTO DIFF WBC: CPT | Performed by: NURSE PRACTITIONER

## 2018-07-03 RX ORDER — POTASSIUM CHLORIDE 1.5 G/1.77G
40 POWDER, FOR SOLUTION ORAL 2 TIMES DAILY WITH MEALS
Qty: 16 PACKET | Refills: 0 | Status: SHIPPED | OUTPATIENT
Start: 2018-07-03 | End: 2018-07-07

## 2018-07-03 RX ORDER — CARVEDILOL 6.25 MG/1
6.25 TABLET ORAL 2 TIMES DAILY WITH MEALS
Qty: 60 TAB | Refills: 0 | Status: SHIPPED | OUTPATIENT
Start: 2018-07-03 | End: 2018-01-01

## 2018-07-03 RX ORDER — PANTOPRAZOLE SODIUM 40 MG/1
40 TABLET, DELAYED RELEASE ORAL
Qty: 30 TAB | Refills: 0 | Status: SHIPPED | OUTPATIENT
Start: 2018-07-04 | End: 2018-01-01

## 2018-07-03 RX ORDER — LISINOPRIL 10 MG/1
10 TABLET ORAL DAILY
Qty: 30 TAB | Refills: 0 | Status: SHIPPED | OUTPATIENT
Start: 2018-07-04 | End: 2018-01-01

## 2018-07-03 RX ORDER — POTASSIUM CHLORIDE 1.5 G/1.77G
40 POWDER, FOR SOLUTION ORAL 2 TIMES DAILY WITH MEALS
Status: DISCONTINUED | OUTPATIENT
Start: 2018-07-03 | End: 2018-07-03 | Stop reason: HOSPADM

## 2018-07-03 RX ADMIN — ENOXAPARIN SODIUM 40 MG: 40 INJECTION SUBCUTANEOUS at 11:17

## 2018-07-03 RX ADMIN — PANTOPRAZOLE SODIUM 40 MG: 40 TABLET, DELAYED RELEASE ORAL at 05:19

## 2018-07-03 RX ADMIN — CARVEDILOL 6.25 MG: 6.25 TABLET, FILM COATED ORAL at 08:33

## 2018-07-03 RX ADMIN — Medication 10 ML: at 13:30

## 2018-07-03 RX ADMIN — LISINOPRIL 10 MG: 5 TABLET ORAL at 08:33

## 2018-07-03 RX ADMIN — POTASSIUM CHLORIDE 40 MEQ: 1.5 POWDER, FOR SOLUTION ORAL at 09:48

## 2018-07-03 RX ADMIN — Medication 10 ML: at 05:20

## 2018-07-03 NOTE — DISCHARGE INSTRUCTIONS
Colostomy: What to Expect at [address] [place] [address]  After a colostomy you can expect to feel better and stronger each day, but you may get tired quickly at first. Your belly may be sore, and you will probably need pain medicine for a week or two. Your stoma will be swollen at first. This is normal.  You may have very loose stools in your colostomy bag for a while. In time your stools may become firmer, but they will be less solid than before your surgery. You may also have a lot of gas pass into your colostomy bag in the weeks after surgery. This will decrease as you heal.  How quickly you get better depends, in part, on whether you had a laparoscopic or open surgery. But you will probably need at least 6 weeks to get back to your normal routine. This care sheet gives you a general idea about how long it will take for you to recover. But each person recovers at a different pace. Follow the steps below to get better as quickly as possible. How can you care for yourself at home? Activity  ? · Rest when you feel tired. Getting enough sleep will help you recover. ? · Try to walk each day. Start by walking a little more than you did the day before. Bit by bit, increase the amount you walk. Walking boosts blood flow and helps prevent pneumonia. ? · Avoid strenuous activities, such as biking, jogging, weight lifting, or aerobic exercise, until your doctor says it is okay. ? · For at least 6 weeks, avoid lifting anything that would make you strain. This may include heavy grocery bags and milk containers, a heavy briefcase or backpack, cat litter or dog food bags, a vacuum , or a child. ? · Ask your doctor when you can drive again. ? · You will probably need to take 6 weeks off from work. It depends on the type of work you do and how you feel. ? · You can take a bath or shower as usual. You can bathe with your colostomy bag on or off. ? · Ask your doctor when it is okay for you to have sex. Diet  ? · You may need to follow a low-fiber diet for the first few weeks after your surgery. ? · Drink plenty of fluids (unless your doctor tells you not to). Medicines  ? · Your doctor will tell you if and when you can restart your medicines. He or she will also give you instructions about taking any new medicines. ? · If you take blood thinners, such as warfarin (Coumadin), clopidogrel (Plavix), or aspirin, be sure to talk to your doctor. He or she will tell you if and when to start taking those medicines again. Make sure that you understand exactly what your doctor wants you to do. ? · Take pain medicines exactly as directed. ¨ If the doctor gave you a prescription medicine for pain, take it as prescribed. ¨ If you are not taking a prescription pain medicine, ask your doctor if you can take an over-the-counter medicine. ? · If your doctor prescribed antibiotics, take them as directed. Do not stop taking them just because you feel better. You need to take the full course of antibiotics. ? · If you think your pain medicine is making you sick to your stomach:  ¨ Take your medicine after meals (unless your doctor has told you not to). ¨ Ask your doctor for a different pain medicine. Incision care  ? · If you have strips of tape on the cut (incision) the doctor made, leave the tape on for a week or until it falls off. Or follow your doctor's instructions for removing the tape. ? · Wash the area daily with warm, soapy water, and pat it dry. Don't use hydrogen peroxide or alcohol, which can slow healing. You may cover the area with a gauze bandage if it weeps or rubs against clothing. Change the bandage every day. ? · Keep the area clean and dry. Other instructions  ? · Keep the area around your stoma clean and dry. ? · Follow all instructions from your doctor or ostomy nurse. ? · Empty and replace your colostomy bag as often as directed by your doctor or ostomy nurse.    Follow-up care is a key part of your treatment and safety. Be sure to make and go to all appointments, and call your doctor if you are having problems. It's also a good idea to know your test results and keep a list of the medicines you take. When should you call for help? Call 911 anytime you think you may need emergency care. For example, call if:  ? · You passed out (lost consciousness). ? · You are short of breath. ?Call your doctor now or seek immediate medical care if:  ? · You have pain that does not get better after you take your pain medicine. ? · You have signs of infection, such as:  ¨ Increased pain, swelling, warmth, or redness. ¨ Red streaks leading from the stoma. ¨ Pus draining from the stoma. ¨ A fever. ? · You are sick to your stomach or cannot drink fluids. ? · You cannot pass stools or gas. ? · Bright red blood has soaked through the bandage over your incision. ? · You have loose stitches, or your incision comes open. ? · You have signs of a blood clot in your leg (called a deep vein thrombosis), such as:  ¨ Pain in your calf, back of knee, thigh, or groin. ¨ Redness and swelling in your leg or groin. ? · You have a problem with your stoma. ? Watch closely for changes in your health, and be sure to contact your doctor if you have any problems. Where can you learn more? Go to http://addi-rebecca.info/. Enter T767 in the search box to learn more about \"Colostomy: What to Expect at Home. \"  Current as of: May 12, 2017  Content Version: 11.4  © 8837-5305 [employer]. Care instructions adapted under license by [employer] (which disclaims liability or warranty for this information). If you have questions about a medical condition or this instruction, always ask your healthcare professional. [address] any warranty or liability for your use of this information.        Ostomy Care: Care Instructions  Your Care Instructions    When a part of your intestine doesn't work as it should, a doctor can do surgery to make an opening in your belly and bring a part of your intestine to the surface of your skin. This opening is called an ostomy. There are two types. A colostomy is an ostomy of the colon. An ileostomy is an ostomy of the small intestine. With an ostomy, waste no longer leaves your body from your anus. It leaves your body through the part of your intestine at the ostomy opening. This part of the intestine is called the stoma. There's no muscle around the stoma. So you can't control when waste or gas leaves your body. Now your waste automatically goes from the stoma into a plastic bag (pouch) around the stoma. This pouch will block the smell of the waste. It can't be seen when you are wearing clothes. You can learn to take care of your ostomy. Good care can make living with a stoma easier. It can help keep a good seal between the skin and the pouch. This can prevent your skin from getting irritated. Follow-up care is a key part of your treatment and safety. Be sure to make and go to all appointments, and call your doctor if you are having problems. It's also a good idea to know your test results and keep a list of the medicines you take. How can you care for yourself at home? · If the skin under your pouch is red, irritated, or itchy, you need to treat your skin. Follow these steps:  ¨ Gently remove the pouch. ¨ Clean the skin under the pouch with water. ¨ Dry the skin. ¨ Sprinkle ostomy powder on the skin. Then gently wipe off the extra powder. ¨ Reattach or replace the pouch. · If you continue to have skin irritation, talk to your ostomy nurse. · Follow all instructions from your ostomy nurse. · Empty and replace your ostomy pouch as often as your nurse recommends. · Be safe with medicines. Take your medicines exactly as prescribed. Call your doctor if you think you are having a problem with your medicine.  You will get more details on the specific medicines your doctor prescribes. When should you call for help? Call your doctor now or seek immediate medical care if:  ? · You are vomiting. ? · You have new or worse belly pain. ? · You have a fever. ? · You cannot pass stools or gas. ? Watch closely for changes in your health, and be sure to contact your doctor if:  ? · Your stoma turns pale or changes color. ? · Your stoma swells or bleeds. ? · You have little or no waste going into your pouch. Where can you learn more? Go to http://addi-rebecca.info/. Enter 11 146 654 in the search box to learn more about \"Ostomy Care: Care Instructions. \"  Current as of: May 12, 2017  Content Version: 11.4  © 7142-6331 [employer]. Care instructions adapted under license by [employer] (which disclaims liability or warranty for this information). If you have questions about a medical condition or this instruction, always ask your healthcare professional. [place] [address] any warranty or liability for your use of this information. DISCHARGE SUMMARY from Nurse    PATIENT INSTRUCTIONS:    After general anesthesia or intravenous sedation, for 24 hours or while taking prescription Narcotics:  · Limit your activities  · Do not drive and operate hazardous machinery  · Do not make important personal or business decisions  · Do  not drink alcoholic beverages  · If you have not urinated within 8 hours after discharge, please contact your surgeon on call.     Report the following to your surgeon:  · Excessive pain, swelling, redness or odor of or around the surgical area  · Temperature over 100.5  · Nausea and vomiting lasting longer than 4 hours or if unable to take medications  · Any signs of decreased circulation or nerve impairment to extremity: change in color, persistent  numbness, tingling, coldness or increase pain  · Any questions    What to do at Home:  Recommended activity: See surgical instructions, diet as tolerated. *  Please give a list of your current medications to your Primary Care Provider. *  Please update this list whenever your medications are discontinued, doses are      changed, or new medications (including over-the-counter products) are added. *  Please carry medication information at all times in case of emergency situations. These are general instructions for a healthy lifestyle:    No smoking/ No tobacco products/ Avoid exposure to second hand smoke  Surgeon General's Warning:  Quitting smoking now greatly reduces serious risk to your health. Obesity, smoking, and sedentary lifestyle greatly increases your risk for illness    A healthy diet, regular physical exercise & weight monitoring are important for maintaining a healthy lifestyle    You may be retaining fluid if you have a history of heart failure or if you experience any of the following symptoms:  Weight gain of 3 pounds or more overnight or 5 pounds in a week, increased swelling in our hands or feet or shortness of breath while lying flat in bed. Please call your doctor as soon as you notice any of these symptoms; do not wait until your next office visit. Recognize signs and symptoms of STROKE:    F-face looks uneven    A-arms unable to move or move unevenly    S-speech slurred or non-existent    T-time-call 911 as soon as signs and symptoms begin-DO NOT go       Back to bed or wait to see if you get better-TIME IS BRAIN. Warning Signs of HEART ATTACK     Call 911 if you have these symptoms:   Chest discomfort. Most heart attacks involve discomfort in the center of the chest that lasts more than a few minutes, or that goes away and comes back. It can feel like uncomfortable pressure, squeezing, fullness, or pain.  Discomfort in other areas of the upper body. Symptoms can include pain or discomfort in one or both arms, the back, neck, jaw, or stomach.    Shortness of breath with or without chest discomfort.  Other signs may include breaking out in a cold sweat, nausea, or lightheadedness. Don't wait more than five minutes to call 911 - MINUTES MATTER! Fast action can save your life. Calling 911 is almost always the fastest way to get lifesaving treatment. Emergency Medical Services staff can begin treatment when they arrive -- up to an hour sooner than if someone gets to the hospital by car. The discharge information has been reviewed with the patient. The patient verbalized understanding. Discharge medications reviewed with the patient and appropriate educational materials and side effects teaching were provided.   ___________________________________________________________________________________________________________________________________

## 2018-07-03 NOTE — DISCHARGE SUMMARY
Hospitalist Discharge Summary     Patient ID:  Vinod Kapadia  [id number]  5 y.o.  1965  Admit date: 6/26/2018  4:21 PM  Discharge date and time: 7/3/2018  Attending: Marissa Wells. Aleida Lamar MD  PCP:  Regina Anderson DO  Treatment Team: Attending Provider: Marissa Wells. Aleida Lamar MD; Utilization Review: Rain Fang RN; Consulting Provider: Radha Pizarro MD; Care Manager: Yomi Stockton RN    Principal Diagnosis Bowel obstruction [facility])   Principal Problem: Bowel obstruction ([address] (6/26/2018)    Active Problems:    Tobacco abuse (8/18/2016)      Elevated blood pressure reading (5/31/2018)      Rectal mass (6/26/2018)      Leukocytosis (6/26/2018)      Generalized abdominal pain (6/26/2018)      Nausea with vomiting (6/26/2018)             Hospital Course:  Please refer to the admission H&P for details of presentation. In summary, the patient is a 45 yo M with history of tobacco abuse (smoker) and elevated blood pressure (not treated for hypertension), who presents to the ED at recommendation of his gastroenterologist.  He has been having bloody stools for several months and was going to have colonoscopy on day of admission. He took prep the night before and states he only had a few 'teaspoon-sized' stools and vomited after each stage of prep. He went to GI office that day and colonoscopy cancelled and he had x-ray of his abdomen and told to come to the ED. In the ED, CT abdomen/pelvis showed 'Fluid-filled small and large bowel loops with increased soft tissue suggesting an obstructing high rectal cancer.'  He reports he has intermittent significant abdominal pain that was 'doubling me over' earlier in the day. Pt. Came into hospital and was seen by GI and then general surgery with subsequent right transverse colostomy. Pt. Seen by surgery today and cleared by them for discharge. Bx with oncology follow up pending. Labs/nursing notes/vital signs reviewed. Pt. Is stable and ready for discharge.     Significant Diagnostic Studies: Abd XR. CT Abd/Pelvis. CXR. Labs: Results:       Chemistry Recent Labs      07/03/18   0637   GLU  87   NA  139   K  3.1*   CL  99   CO2  28   BUN  6   CREA  0.68*   CA  7.9*   AGAP  12      CBC w/Diff Recent Labs      07/03/18   0637  07/01/18   0543   WBC  10.4  7.3   RBC  4.59  4.77   HGB  13.7  14.4   HCT  40.6*  42.9   PLT  390  307   GRANS  64   --    LYMPH  25   --    EOS  2   --       Cardiac Enzymes No results for input(s): CPK, CKND1, BANDAR in the last 72 hours. No lab exists for component: CKRMB, TROIP   Coagulation No results for input(s): PTP, INR, APTT in the last 72 hours. No lab exists for component: INREXT    Lipid Panel Lab Results   Component Value Date/Time    Cholesterol, total 157 05/31/2018 03:51 PM    HDL Cholesterol 41 05/31/2018 03:51 PM    LDL, calculated 95 05/31/2018 03:51 PM    VLDL, calculated 21 05/31/2018 03:51 PM    Triglyceride 106 05/31/2018 03:51 PM      BNP No results for input(s): BNPP in the last 72 hours. Liver Enzymes No results for input(s): TP, ALB, TBIL, AP, SGOT, GPT in the last 72 hours. No lab exists for component: DBIL   Thyroid Studies Lab Results   Component Value Date/Time    TSH 1.990 08/02/2016 09:28 AM            Discharge Exam:  Visit Vitals    /76 (BP 1 Location: Right arm, BP Patient Position: At rest)    Pulse 65    Temp 98.1 °F (36.7 °C)    Resp 18    Ht 5' 9\" (1.753 m)    Wt 68 kg (150 lb)    SpO2 99%    BMI 22.15 kg/m2     General appearance: alert, cooperative, no distress, appears older than stated age of 46. Lungs: clear to auscultation bilaterally  Heart: regular rate and rhythm, S1, S2 normal, no murmur, click, rub or gallop  Abdomen: soft, non-tender.  Bowel sounds normal. No masses,  no organomegaly  Extremities: no cyanosis or edema  Neurologic: Grossly normal    Disposition: home  Discharge Condition: stable  Patient Instructions:   Current Discharge Medication List      START taking these medications    Details   carvedilol (COREG) 6.25 mg tablet Take 1 Tab by mouth two (2) times daily (with meals). Qty: 60 Tab, Refills: 0      lisinopril (PRINIVIL, ZESTRIL) 10 mg tablet Take 1 Tab by mouth daily. Qty: 30 Tab, Refills: 0      pantoprazole (PROTONIX) 40 mg tablet Take 1 Tab by mouth Daily (before breakfast). Qty: 30 Tab, Refills: 0      potassium chloride (KLOR-CON) 20 mEq packet Take 2 Packets by mouth two (2) times daily (with meals) for 4 days.   Qty: 16 Packet, Refills: 0             Activity: Activity as tolerated  Diet: Low fat, Low cholesterol        Follow-up  ·   Surgery  ·  Oncology  Time spent to discharge patient greater than 30 minutes  Signed:  RYAN Latham  7/3/2018  3:02 PM

## 2018-07-03 NOTE — WOUND CARE
Lengthy Colostomy lesson with patient and daughter, daughter applied pouch to model. Patient and daughter verbalized understanding of steps. Stoma is skin level mix of slough and pin, large amount yellow liquid output. MD may want to consider antidiarrheals. Discussion includes stoma obstructive and stool thickening foods, daughter made notes and patient verbalized understanding. Patient and daughter verbalize they feel they will be able to perform ostomy cares. Short discussion of possible need for convexity as stoma is skin level and the convexity may \"push\" the stoma forward. Discussion of belts and underwear, patient is having difficulty getting his current clothing to fit as he normally wears his pants tight. Discussion of pouch ordering, list of companies given. Will monitor and teach while in acute care, recommend home health for continued ostomy teaching and monitoring sloughing stoma.

## 2018-07-03 NOTE — PROGRESS NOTES
H&P/Consult Note/Progress Note/Office Note:   Demond Kay  MRN: [id number]  :1965  Age:52 y.o.    HPI: Demond Kay is a 46 y.o. male who was sent to the ER yesterday by his gastroenterologist after a failed bowel prep prior to a colonoscopy. He has PMHx of tobacco abuse and elevated blood pressure. He reportedly has been having blood in his stool for about 2 months. He was being seen by GI and had a colonoscopy scheduled. He took the bowel prep but only had a \"teaspoon\" of results with vomiting after attempting to take in prep. In the ER,  A CT abdomen/pelvis was obtained that is concerning for fluid-filled small and large bowel loops with increased soft tissue suggesting an obstructing high rectal cancer. He has never had a colonoscopy. He denies abdominal pain at the present and reports BM this morning. He has an NGT with brown-tinged output. We are consulted for surgical intervention of obstructing mass. 18: Patient up on edge of bed. Complains of abdominal pain, hiccups, and burping. AF, VSS  18: Pt resting in bed. C/o sore throat from NG tube. +BM. To OR later today for diverting colostomy. AF, VSS.  18: Pt awake in bed. NG tube with no output overnight. Colostomy bag full of air. AF, VSS.    18: Pt up in room. Reports has emptied full colostomy bag 3x in last 12 hours also passing brown stool from rectum. Also c/o nausea, vomiting, and abdominal distention. Recommended replacing NG tube, however, pt does not want to replace tube. AF, VSS.    18: Patient without complaints. Ostomy functioning with +gas /stool and 300mL/24h output. No recent stool from bottom. Reports vomiting is due to post nasal drip but denies any recent n/v. AF, VSS. Pathology reviewed. Flex-Sig Pathology 18     DIAGNOSIS   RECTAL MASS BIOPSIES: FRAGMENTS OF HYPERPLASTIC POLYP, FOCALLY ULCERATED AND INFLAMED. SEE COMMENT.    Comment   Often hyperplastic large intestinal type mucosa is present at edge of neoplastic mass. If these biopsy fragments represent only a small portion of a much larger mass, unsampled mass could have areas of in situ or even infiltrating carcinoma. Case discussed with Dr. Janae Malhotra on 6/ 29/ 18 at approximately 0910 hours. tls/6/28/2018   Electronically signed out on 6/29/2018 09:13 by MIRYAM Kahn M.D.     7/3/18: No complaints, wants to go home. Ostomy functioning well. Tolerating soft diet.  No N/V      Past Medical History:   Diagnosis Date    Chest pain 8/18/2016    GERD (gastroesophageal reflux disease)     Tobacco abuse 8/18/2016     Past Surgical History:   Procedure Laterality Date    FLEXIBLE SIGMOIDOSCOPY N/A 6/27/2018    SIGMOIDOSCOPY FLEXIBLE performed by Sona [facility]icas MD at [facility] ENDOSCOPY    HX HERNIA REPAIR      HX ORTHOPAEDIC      arm     Current Facility-Administered Medications   Medication Dose Route Frequency    potassium chloride (KLOR-CON) packet 40 mEq  40 mEq Oral BID WITH MEALS    enoxaparin (LOVENOX) injection 40 mg  40 mg SubCUTAneous Q24H    pantoprazole (PROTONIX) tablet 40 mg  40 mg Oral ACB    carvedilol (COREG) tablet 6.25 mg  6.25 mg Oral BID WITH MEALS    ALPRAZolam (XANAX) tablet 0.5 mg  0.5 mg Oral Q8H PRN    lisinopril (PRINIVIL, ZESTRIL) tablet 10 mg  10 mg Oral DAILY    acetaminophen (TYLENOL) tablet 650 mg  650 mg Oral Q6H PRN    albuterol-ipratropium (DUO-NEB) 2.5 MG-0.5 MG/3 ML  3 mL Nebulization Q6H RT    calcium carbonate (TUMS) chewable tablet 200 mg [elemental]  200 mg Oral TID PRN    morphine injection 2-6 mg  2-6 mg IntraVENous Q3H PRN    phenol throat spray (CHLORASEPTIC) 1 Spray  1 Spray Oral PRN    sodium chloride (NS) flush 5-10 mL  5-10 mL IntraVENous Q8H    sodium chloride (NS) flush 5-10 mL  5-10 mL IntraVENous PRN    ondansetron (ZOFRAN) injection 4 mg  4 mg IntraVENous Q4H PRN    naloxone (NARCAN) injection 0.4 mg  0.4 mg IntraVENous PRN    nicotine (NICODERM CQ) 14 mg/24 hr patch 1 Patch  1 Patch TransDERmal DAILY PRN     Zithromax [azithromycin]  Social History     Social History    Marital status:      Spouse name: N/A    Number of children: N/A    Years of education: N/A     Social History Main Topics    Smoking status: Current Every Day Smoker     Packs/day: 1.00     Years: 30.00     Types: Cigarettes     Start date: 5/31/1988    Smokeless tobacco: Never Used    Alcohol use 8.4 oz/week     14 Cans of beer, 0 Standard drinks or equivalent per week    Drug use: No    Sexual activity: Not Asked     Other Topics Concern    None     Social History Narrative     History   Smoking Status    Current Every Day Smoker    Packs/day: 1.00    Years: 30.00    Types: Cigarettes    Start date: 5/31/1988   Smokeless Tobacco    Never Used     Family History   Problem Relation Age of Onset    Hypertension Mother     Heart Disease Mother     No Known Problems Father      ROS: Comprehensive review of systems was otherwise unremarkable except as noted above. Physical Exam:   Visit Vitals    /70 (BP 1 Location: Right arm, BP Patient Position: Sitting)  Comment (BP Patient Position): Sitting up on the edge of the bed    Pulse 66    Temp 97.9 °F (36.6 °C)    Resp 17    Ht 5' 9\" (1.753 m)    Wt 150 lb (68 kg)    SpO2 97%    BMI 22.15 kg/m2     Constitutional: Alert, cooperative, no acute distress; appears stated age    Eyes:Sclera are clear. ENMT: no external lesions gross hearing normal; no obvious neck masses, no ear or lip lesions, nares normal  CV: RRR. Normal perfusion  Resp: No JVD. Breathing is  non-labored; no audible wheezing. GI: appropriately tender, distended, colostomy bag with small amount of liquid brown stool, flush stoma dusky with slough though improving; 80%pink. post-op dressing c/d/i    Musculoskeletal: unremarkable with normal function. No embolic signs or cyanosis.    Neuro:  Oriented; moves all 4; no focal deficits  Psychiatric: normal affect and mood, no memory impairment    Recent vitals (if inpt):  Patient Vitals for the past 24 hrs:   BP Temp Pulse Resp SpO2   07/03/18 1130 117/70 97.9 °F (36.6 °C) 66 17 97 %   07/03/18 0955 121/68 - - 18 -   07/03/18 0800 123/71 98.1 °F (36.7 °C) 74 17 99 %   07/03/18 0353 110/72 98.7 °F (37.1 °C) 70 18 94 %   07/02/18 2254 100/65 99.1 °F (37.3 °C) 69 18 98 %   07/02/18 1929 129/78 99 °F (37.2 °C) 81 18 96 %   07/02/18 1615 133/78 98.7 °F (37.1 °C) 72 18 96 %       Labs:  Recent Labs      07/03/18   0637   WBC  10.4   HGB  13.7   PLT  390   NA  139   K  3.1*   CL  99   CO2  28   BUN  6   CREA  0.68*   GLU  87       Lab Results   Component Value Date/Time    WBC 10.4 07/03/2018 06:37 AM    HGB 13.7 07/03/2018 06:37 AM    PLATELET 060 63/93/4923 06:37 AM    Sodium 139 07/03/2018 06:37 AM    Potassium 3.1 (L) 07/03/2018 06:37 AM    Chloride 99 07/03/2018 06:37 AM    CO2 28 07/03/2018 06:37 AM    BUN 6 07/03/2018 06:37 AM    Creatinine 0.68 (L) 07/03/2018 06:37 AM    Glucose 87 07/03/2018 06:37 AM    Bilirubin, total 0.9 06/27/2018 04:30 AM    AST (SGOT) 17 06/27/2018 04:30 AM    ALT (SGPT) 19 06/27/2018 04:30 AM    Alk. phosphatase 67 06/27/2018 04:30 AM       I reviewed recent labs and recent radiologic studies. CT Results (most recent):    6/28/18 PORTABLE ABDOMEN, June 28, 2018 at 1432 hours:     CLINICAL HISTORY:  Nasogastric tube repositioning.     COMPARISON:  KUB at 1137 hours today.     FINDINGS:  AP supine image demonstrates a nasogastric tube with the proximal  sidehole at just above the gastroesophageal junction. Multiple mildly dilated  small bowel loops are evident in the still upper abdomen.     IMPRESSION:  Nasogastric tube proximal sidehole has been advanced slightly below  the gastroesophageal junction, but further advancement by approximately 5 cm is  suggested. Results from [facility] encounter on 06/26/18   CT ABD PELV W CONT   Narrative CT ABDOMEN AND PELVIS WITH CONTRAST.     HISTORY: Abdominal pain and cramping. Blood in stool. COMPARISON: None    TECHNIQUE: 5 mm axial scans from above the diaphragms to the pubic symphysis  following oral and 100 cc intravenous contrast without acute complication. Intravenous contrast was given to increase the sensitivity to acute  inflammation. Radiation dose reduction techniques were used for this study. Our CT scanners use one or more of the following: Automated exposure control,  adjustment of the mA and or kV according to patient size, iterative  reconstruction. FINDINGS:   Abdomen: No free air. The lung bases are clear. There is fluid in the distal  esophagus, probably reflux. Oral contrast has not left the stomach. Small bowel  loops are fluid-filled and mildly dilated throughout. No transition zone  appreciated. Large bowel loops are also fluid-filled. Small hypoattenuating foci  in the liver, probably subcentimeter cysts. The spleen is small and  homogeneous. . No calcified gallstones. The biliary tree is not dilated. The  pancreas is unremarkable. No free fluid, acute inflammatory changes or  adenopathy. The kidneys enhance uniformly. No radiopaque renal calculi. No  hydronephrosis. The adrenal glands are normal size. Aorta is normal caliber. Pelvis: The colon is fluid-filled down to the level of the rectosigmoid where  there is increased soft tissue suggesting an obstructing high rectal mass. The  urinary bladder unremarkable. The prostate is mildly enlarged. Seminal vesicles  symmetric. Impression IMPRESSION:  Fluid-filled small and large bowel loops with increased soft tissue  suggesting an obstructing high rectal cancer. XR Results (most recent):    Results from Hospital Encounter encounter on 06/26/18   XR CHEST SNGL V   Narrative Chest X-ray    INDICATION:   Postop fever and cough    A portable AP view of the chest was obtained. FINDINGS: There is increased infiltrate/atelectasis in the left lung base. Dean Henry The  heart size is normal.  The bony thorax is intact. Impression IMPRESSION: Minimal left lower lobe infiltrate/atelectasis          I independently reviewed radiology images for studies I described above or studies I have ordered. Admission date (for inpatients): 6/26/2018   * No surgery date entered *  Procedure(s):  COLOSTOMY VS ILEOSTOMY, EUA WITH BIOSPY    ASSESSMENT/PLAN: Lili Zepeda is a 46 y.o. Male with sign and symptoms consistent with obstructing colon cancer. Dr. Eliseo Garcia plans to do a flex-sig today to assess mass and obtain biopsy. Problem List  Date Reviewed: 6/29/2018          Codes Class Noted    * (Principal)Bowel obstruction ([facility] [address] ICD-10-CM: N94.252  ICD-9-CM: 560.9  6/26/2018        Rectal mass ICD-10-CM: K62.9  ICD-9-CM: 787.99  6/26/2018        Leukocytosis ICD-10-CM: D72.829  ICD-9-CM: 288.60  6/26/2018        Generalized abdominal pain ICD-10-CM: R10.84  ICD-9-CM: 789.07  6/26/2018        Nausea with vomiting ICD-10-CM: R11.2  ICD-9-CM: 787.01  6/26/2018        Erectile dysfunction due to arterial insufficiency ICD-10-CM: N52.01  ICD-9-CM: 607.84  5/31/2018        Plantar fasciitis, right ICD-10-CM: M72.2  ICD-9-CM: 728.71  5/31/2018        Screen for colon cancer ICD-10-CM: Z12.11  ICD-9-CM: V76.51  5/31/2018        Elevated blood pressure reading ICD-10-CM: R03.0  ICD-9-CM: 796.2  5/31/2018        Polycythemia secondary to smoking ICD-10-CM: D75.1  ICD-9-CM: 289.0  5/30/2018        IGT (impaired glucose tolerance) ICD-10-CM: R73.02  ICD-9-CM: 790.22  5/30/2018        Hypercholesterolemia ICD-10-CM: E78.00  ICD-9-CM: 272.0  5/30/2018    Overview Signed 6/4/2018  2:20 PM by Artist Peal Thrift     ASCVD 10 year risk is 16.0%. Mod to high dose statin indicated.  (based on b/p 180/90, 6/4/18)                 Screening PSA (prostate specific antigen) ICD-10-CM: Z12.5  ICD-9-CM: V76.44  5/30/2018        Tobacco abuse (Chronic) ICD-10-CM: Z72.0  ICD-9-CM: 305.1  8/18/2016 Principal Problem:     Bowel obstruction ([facility] [address] (6/26/2018)    Active Problems:    Tobacco abuse (8/18/2016)      Elevated blood pressure reading (5/31/2018)      Rectal mass (6/26/2018)      Leukocytosis (6/26/2018)      Generalized abdominal pain (6/26/2018)      Nausea with vomiting (6/26/2018)       Rectal mass suspicious for rectal cancer - POD 4 s/p surgery for diversion with ostomy and biopsies taken  GI soft diet  Ok to remove dressing at pouch change  Await surgical pathology   Lovenox/Protonix for prophylaxis   Home health/Wound care for ostomy teaching   Ok to discharge home from a surgical standpoint  Follow up in 2 weeks with Dr. Rayshawn Peraza    Signed:  Radha Roy NP

## 2018-07-03 NOTE — PROGRESS NOTES
Met with pt about DC plan, plans to return home with home care with [facility] care-- phone [phone] fax [phone],  Pt will need NSG for colostomy care. Daughter at bedside and requested home care agency. Called spoke with Wild Delgado with [facility] home care will send information for NSG care at NH.

## 2018-07-03 NOTE — WOUND CARE
Patient seen for midline incisional dressing removal and pouch change. Stoma flush and crusted to margins. Oval shape. Incision line stapled, dry and without any drainage or signs of local infection. May be going home later this afternoon.

## 2018-07-04 NOTE — OP NOTES
[address], [address] [facility]  [phone]    OPERATVE REPORT    Name: Madelaine Barajas     Date of Surgery: 6/29/2018  Med Record Number: [id number]   Age: 46 y.o. Sex: male   Preoperative Diagnosis: OBSTRUCTING RECTAL MASS  Postoperative Diagnosis: OBSTRUCTING RECTAL MASS    Procedure(s):  R TRANSVERSE COLOSTOMY, EUA WITH BIOSPY  Surgeon(s) and Role:     * Antione Rodriguez MD - Primary      Surgical Assistant: none     Surgical Staff:  Circ-1: Em Eduardo RN  Circ-2: Erica Caban RN  Circ-Relief: Krishna Langford RN  Scrub Tech-1: Wilian Myrick  Scrub Tech-2: Hattie Slade  Scrub Tech-Relief: Aniyah Coombs; Sheila Oh CNA  Event Time In   Incision Start 1341   Incision Close 1601      Anesthesia: General   Estimated Blood Loss: ~150 cc  Specimens:   ID Type Source Tests Collected by Time Destination   1 : RECTAL MASS Preservative     Antione Rodriguez MD 6/29/2018 1557 Pathology   1 : PERITONEAL FLUID Fresh Peritoneal Fluid   Antione Rodriguez MD 6/29/2018 1350 Cytology       Findings: Anesthesia discovered blister like lesion in lateral distal pharynx that may be source of patient's complaint of throat pain. Tried to access and place stoma through small RUQ opening. Serous low volume ascites with sample sent for cytology. No obvious peritoneal disease. Attempted to perform via colostomy site in RUQ, but unable to mobilize colon into wound and restricted by small bowel distension. Upper midline incision allowed for proper identification of Transverse colon and mobilization by  from greater omentum. Still limited mobility but able to bring up loop for true loop colostomy. Stoma fairly flush. Both limbs patent after maturation. Redraped and EUA performed with fixed mass at ~7 cm from anal verge. 3 large biopsies obtained.   Complications: none apparent  Implants: * No implants in log *    Procedure Description:   The risks, benefits, potential complications, treatment options, and expected outcomes were discussed with the patient pre-operatively. The patient voiced understanding and gave informed consent preoperatively. The patient was taken to the Operating Room, and the [facility] time-out protocol checklist was followed. After the induction of adequate anesthesia, a blisterlike lesion was identified in the lateral distal pharynx that may have been a source of the patient's complaint of throat pain. This would be addressed with ENT postoperatively. There is no airway compromise. The abdomen was prepped and draped in the usual sterile fashion. Preoperative antibiotics were given. The patient had been marked preoperatively by enterostomal therapy for multiple potential stoma sites. Unfortunately the right transverse colon site was not marked. However, using the topography of prior abdominal films and CT scans a site in the right upper quadrant was identified as the likely area of the right transverse colon. A vertical incision was created over the site and carried down to the rectus fascia. Rectus fascia was incised vertically and the rectus muscle spread. The posterior rectus sheath was grasped and incised sharply with the incision extended also vertically. Due to the complete or near complete rectal obstruction there was much dilated small intestine. The transverse colon was not easily identified. A small Ivan retractor was placed within the wound to help dilate the opening to better identify the colon. Transverse colon was identified but it could not be mobilized into the wound. Decision was made to make a small midline incision to help with identification and mobilization of the transverse colon for colostomy. This was performed in the epigastrium. Underlying tissues were divided using the monopolar electrosurgical energy device.   Midline fascia was opened and the peritoneal cavity was entered under direct vision. A medium Ivan retractor was used to better expose this area. Transverse colon was identified. Unfortunately, it was not redundant. It had limited mobility. The apex of the mid transverse colon did not even easily exit the midline incision. The 5 mm LigaSure device was used to mobilize and separate the greater omentum from the transverse colon to improve his mobility. This was performed and mobility did improve and a portion could be advanced to the right upper quadrant stoma site. The mesocolon was partially divided at this apical position and the bowel was encircled with a extra-large vessel loop. This facilitated holding the loop of bowel and placed through the proposed stoma site. There was felt to be adequate reach though it was not the ideal redundant mobilization desired. The greater omentum was draped back over the viscera to serve as a barrier between the midline incision. The midline fascia was closed with running #1 looped PDS suture. Subcutaneous tissues were irrigated and the skin was closed with skin clips. An Aquacel Ag/DuoDERM combo dressing was applied. Attention was directed to the stoma site. A true loop colostomy would be matured. Stay sutures were placed in 4 quadrants between the dermis and the deeper seromuscular walls of both limbs of the colon. As well the replaced more centrally to help hallie the stoma. The colon was then opened longitudinally. Further maturing of the stoma was created using 3-0 Vicryl sutures. The stoma was somewhat flush but appeared viable. Passage of the back side of the DeBakey forcep could easily passed through either limb of the colon with a large release of flatus. The stoma was pouched. Attention was now directed at performing examination under anesthesia. All drapes were removed. The patient was then placed in stirrups in lithotomy position. Perineum was prepped and draped sterilely.   Patient had 3 column grade 3/grade 4 hemorrhoids. There were enlarged but not indurated or thrombosed. Digital rectal exam revealed a normal rectal ampulla with normal prostate. Just superior to the prostate a large obstructing mass was identified. This mass was fixed in the pelvis. The hinged endoscope was used to evaluate the rectal tissue. There was a small distal polyp that was left in place. The redundancy of the rectal tissues prevented clear access to the mass for biopsy. Endoscopic biopsies had returned hyperplastic polyp and repeat biopsies were required. The rigid proctoscope was placed which could insufflate and identify the obstructed area but with the release of insufflation the area cannot be maintained for adequate biopsy. The operating endoscopes were then obtained and placed. Visualization was adequate to perform multiple forcep biopsies using the mediastinal biopsy set. Several large chunks of the mass were obtained and sent to pathology. The rigid proctoscope did help identify the distance from the anal verge at somewhere between 7-9 cm. This instead appears satisfactory for future low anterior resection with EEA anastomosis. Bleeding from the biopsies was present but minimal.  All sponge, instruments, and needle counts were correct. The patient was taken to recovery in good condition.     Daryl Joy MD, FACS

## 2018-07-05 LAB
BACTERIA SPEC CULT: NORMAL
BACTERIA SPEC CULT: NORMAL
SERVICE CMNT-IMP: NORMAL
SERVICE CMNT-IMP: NORMAL

## 2018-07-06 PROBLEM — C20 RECTAL ADENOCARCINOMA (HCC): Status: ACTIVE | Noted: 2018-07-06

## 2018-07-06 PROBLEM — R11.2 NAUSEA WITH VOMITING: Status: RESOLVED | Noted: 2018-06-26 | Resolved: 2018-07-06

## 2018-07-06 PROBLEM — I10 BENIGN ESSENTIAL HTN: Status: ACTIVE | Noted: 2018-05-31

## 2018-07-06 PROBLEM — R10.84 GENERALIZED ABDOMINAL PAIN: Status: RESOLVED | Noted: 2018-06-26 | Resolved: 2018-07-06

## 2018-07-17 PROBLEM — K62.4 RECTAL OBSTRUCTION: Status: ACTIVE | Noted: 2018-07-17

## 2018-07-17 PROBLEM — K62.4 RECTAL OBSTRUCTION: Status: ACTIVE | Noted: 2018-06-27

## 2018-07-17 PROBLEM — D72.829 LEUKOCYTOSIS: Status: RESOLVED | Noted: 2018-06-26 | Resolved: 2018-07-17

## 2018-07-17 PROBLEM — K56.609 BOWEL OBSTRUCTION (HCC): Status: RESOLVED | Noted: 2018-06-26 | Resolved: 2018-07-17

## 2018-07-17 PROBLEM — Z93.3 COLOSTOMY STATUS (HCC): Status: ACTIVE | Noted: 2018-07-06

## 2018-07-17 PROBLEM — K62.89 RECTAL MASS: Status: RESOLVED | Noted: 2018-06-26 | Resolved: 2018-07-17

## 2018-07-17 PROBLEM — Z93.3 COLOSTOMY STATUS (HCC): Status: ACTIVE | Noted: 2018-07-17

## 2018-07-17 PROBLEM — Z93.3 COLOSTOMY STATUS (HCC): Status: ACTIVE | Noted: 2018-06-29

## 2018-07-19 ENCOUNTER — HOSPITAL ENCOUNTER (OUTPATIENT)
Dept: PET IMAGING | Age: 53
Discharge: HOME | End: 2018-07-19
Payer: COMMERCIAL

## 2018-07-19 DIAGNOSIS — C20 RECTAL ADENOCARCINOMA (HCC): ICD-10-CM

## 2018-07-19 PROCEDURE — 74011636320 HC RX REV CODE- 636/320: Performed by: SURGERY

## 2018-07-19 PROCEDURE — A9552 F18 FDG: HCPCS

## 2018-07-19 RX ORDER — SODIUM CHLORIDE 0.9 % (FLUSH) 0.9 %
5-10 SYRINGE (ML) INJECTION
Status: COMPLETED | OUTPATIENT
Start: 2018-07-19 | End: 2018-07-19

## 2018-07-19 RX ADMIN — Medication 10 ML: at 13:15

## 2018-07-19 RX ADMIN — DIATRIZOATE MEGLUMINE AND DIATRIZOATE SODIUM 10 ML: 660; 100 LIQUID ORAL; RECTAL at 13:15

## 2018-07-23 ENCOUNTER — HOSPITAL ENCOUNTER (OUTPATIENT)
Dept: MRI IMAGING | Age: 53
Discharge: HOME | End: 2018-07-23
Attending: SURGERY
Payer: COMMERCIAL

## 2018-07-23 DIAGNOSIS — C20 RECTAL ADENOCARCINOMA (HCC): ICD-10-CM

## 2018-07-23 PROCEDURE — A9575 INJ GADOTERATE MEGLUMI 0.1ML: HCPCS | Performed by: SURGERY

## 2018-07-23 PROCEDURE — 72197 MRI PELVIS W/O & W/DYE: CPT

## 2018-07-23 PROCEDURE — 74011636320 HC RX REV CODE- 636/320: Performed by: SURGERY

## 2018-07-23 RX ORDER — SODIUM CHLORIDE 0.9 % (FLUSH) 0.9 %
10 SYRINGE (ML) INJECTION
Status: COMPLETED | OUTPATIENT
Start: 2018-07-23 | End: 2018-07-23

## 2018-07-23 RX ADMIN — GADOTERATE MEGLUMINE 13 ML: 376.9 INJECTION INTRAVENOUS at 09:39

## 2018-07-23 RX ADMIN — Medication 10 ML: at 09:39

## 2018-07-26 ENCOUNTER — HOSPITAL ENCOUNTER (OUTPATIENT)
Dept: LAB | Age: 53
Discharge: HOME | End: 2018-07-26
Payer: COMMERCIAL

## 2018-07-26 DIAGNOSIS — C20 RECTAL ADENOCARCINOMA (HCC): ICD-10-CM

## 2018-07-26 PROBLEM — D50.0 IRON DEFICIENCY ANEMIA DUE TO CHRONIC BLOOD LOSS: Status: ACTIVE | Noted: 2018-07-26

## 2018-07-26 LAB
ALBUMIN SERPL-MCNC: 3.2 G/DL (ref 3.5–5)
ALBUMIN/GLOB SERPL: 0.8 {RATIO} (ref 1.2–3.5)
ALP SERPL-CCNC: 100 U/L (ref 50–136)
ALT SERPL-CCNC: 52 U/L (ref 12–65)
ANION GAP SERPL CALC-SCNC: 8 MMOL/L (ref 7–16)
AST SERPL-CCNC: 27 U/L (ref 15–37)
BASOPHILS # BLD: 0.1 K/UL (ref 0–0.2)
BASOPHILS NFR BLD: 1 % (ref 0–2)
BILIRUB SERPL-MCNC: 0.7 MG/DL (ref 0.2–1.1)
BUN SERPL-MCNC: 6 MG/DL (ref 6–23)
CALCIUM SERPL-MCNC: 8.5 MG/DL (ref 8.3–10.4)
CEA SERPL-MCNC: 2.3 NG/ML (ref 0–3)
CHLORIDE SERPL-SCNC: 103 MMOL/L (ref 98–107)
CO2 SERPL-SCNC: 28 MMOL/L (ref 21–32)
CREAT SERPL-MCNC: 0.84 MG/DL (ref 0.8–1.5)
DIFFERENTIAL METHOD BLD: ABNORMAL
EOSINOPHIL # BLD: 0.3 K/UL (ref 0–0.8)
EOSINOPHIL NFR BLD: 2 % (ref 0.5–7.8)
ERYTHROCYTE [DISTWIDTH] IN BLOOD BY AUTOMATED COUNT: 13.3 % (ref 11.9–14.6)
FERRITIN SERPL-MCNC: 172 NG/ML (ref 8–388)
GLOBULIN SER CALC-MCNC: 3.9 G/DL (ref 2.3–3.5)
GLUCOSE SERPL-MCNC: 98 MG/DL (ref 65–100)
HCT VFR BLD AUTO: 42.8 % (ref 41.1–50.3)
HGB BLD-MCNC: 14 G/DL (ref 13.6–17.2)
HGB RETIC QN AUTO: 35 PG (ref 29–35)
IMM RETICS NFR: 19.8 % (ref 2.3–13.4)
IRON SATN MFR SERPL: 18 %
IRON SERPL-MCNC: 51 UG/DL (ref 35–150)
LYMPHOCYTES # BLD: 2.2 K/UL (ref 0.5–4.6)
LYMPHOCYTES NFR BLD: 18 % (ref 13–44)
MCH RBC QN AUTO: 29.3 PG (ref 26.1–32.9)
MCHC RBC AUTO-ENTMCNC: 32.7 G/DL (ref 31.4–35)
MCV RBC AUTO: 89.5 FL (ref 79.6–97.8)
MONOCYTES # BLD: 0.9 K/UL (ref 0.1–1.3)
MONOCYTES NFR BLD: 8 % (ref 4–12)
NEUTS SEG # BLD: 8.9 K/UL (ref 1.7–8.2)
NEUTS SEG NFR BLD: 72 % (ref 43–78)
NRBC # BLD: 0 K/UL (ref 0–0.2)
PLATELET # BLD AUTO: 305 K/UL (ref 150–450)
PMV BLD AUTO: 9.7 FL (ref 10.8–14.1)
POTASSIUM SERPL-SCNC: 3.7 MMOL/L (ref 3.5–5.1)
PROT SERPL-MCNC: 7.1 G/DL (ref 6.3–8.2)
RBC # BLD AUTO: 4.78 M/UL (ref 4.23–5.67)
RETICS # AUTO: 0.12 M/UL (ref 0.03–0.1)
RETICS/RBC NFR AUTO: 2.5 % (ref 0.3–2)
SODIUM SERPL-SCNC: 139 MMOL/L (ref 136–145)
TIBC SERPL-MCNC: 281 UG/DL (ref 250–450)
WBC # BLD AUTO: 12.4 K/UL (ref 4.3–11.1)

## 2018-07-26 PROCEDURE — 85025 COMPLETE CBC W/AUTO DIFF WBC: CPT | Performed by: INTERNAL MEDICINE

## 2018-07-26 PROCEDURE — 82378 CARCINOEMBRYONIC ANTIGEN: CPT | Performed by: INTERNAL MEDICINE

## 2018-07-26 PROCEDURE — 36415 COLL VENOUS BLD VENIPUNCTURE: CPT | Performed by: INTERNAL MEDICINE

## 2018-07-26 PROCEDURE — 83540 ASSAY OF IRON: CPT | Performed by: INTERNAL MEDICINE

## 2018-07-26 PROCEDURE — 80053 COMPREHEN METABOLIC PANEL: CPT | Performed by: INTERNAL MEDICINE

## 2018-07-26 PROCEDURE — 85046 RETICYTE/HGB CONCENTRATE: CPT | Performed by: INTERNAL MEDICINE

## 2018-07-26 PROCEDURE — 82728 ASSAY OF FERRITIN: CPT | Performed by: INTERNAL MEDICINE

## 2018-08-01 ENCOUNTER — PATIENT OUTREACH (OUTPATIENT)
Dept: CASE MANAGEMENT | Age: 53
End: 2018-08-01

## 2018-08-01 ENCOUNTER — DOCUMENTATION ONLY (OUTPATIENT)
Dept: HEMATOLOGY | Age: 53
End: 2018-08-01

## 2018-08-01 NOTE — PROGRESS NOTES
I spoke with Cal Valero regarding his [employer]. Patient has met his $ 750 Ded and has a  $5,000 OOP Max with $4,078 remaining. I explained  that since he is on oral chemo that his medicine will be billed through his pharmacy benefits and  handled by a specialty pharmacy. I also spoke to him about applying  for financial assistance to assist with his OOP since he had concerns about paying for his hospital bill. Next, I spoke with Mr. Santos Larsen regarding potential oral medication authorizations. I told him that if he ever had any problems getting his oral medications filled to give the               dedicated [address] [place] a call. Most of the time, it is simply an authorization that needs to be done with the insurance company. Next, I spoke with Mr. Santos Larsen regarding enrolling with [facility]. I went over some of the services that [facility] offers and the enrollment process. Next, I gave Mr. Santos Larsen a form with various resource organizations that could assist with specific needs (example:  transportation, lodging, preparing meals, home cleaning)    Lastly, I gave Mr. Santos Larsen flyers about the free Yoga classes for cancer survivors and the Oncology Massage program.  I let him know that he can get a free 30 minute massage. Faxed Patient Referral form to the [facility] at [phone]. Phone [phone]. Form scanned into chart. Faxed Physician's Statement to the [phone] [place] [address] at [phone]. Phone [phone]. Form scanned into chart.

## 2018-08-01 NOTE — PROGRESS NOTES
I met patient today in the lobby at [address] [place] [address] here for chemo ed. He has decided he will come for consult with radiation oncology on 8-3-18 instead of 8-14. Patient now has my contact info and I encouraged him to call with any questions or concerns.  Nurse navigation will be following

## 2018-08-03 ENCOUNTER — HOSPITAL ENCOUNTER (OUTPATIENT)
Dept: RADIATION ONCOLOGY | Age: 53
Discharge: HOME | End: 2018-08-03
Payer: COMMERCIAL

## 2018-08-03 VITALS
BODY MASS INDEX: 22.13 KG/M2 | HEART RATE: 74 BPM | RESPIRATION RATE: 16 BRPM | HEIGHT: 69 IN | TEMPERATURE: 98.2 F | DIASTOLIC BLOOD PRESSURE: 102 MMHG | WEIGHT: 149.4 LBS | SYSTOLIC BLOOD PRESSURE: 165 MMHG | OXYGEN SATURATION: 100 %

## 2018-08-03 PROCEDURE — 99211 OFF/OP EST MAY X REQ PHY/QHP: CPT

## 2018-08-03 PROCEDURE — 77470 SPECIAL RADIATION TREATMENT: CPT

## 2018-08-03 PROCEDURE — 77290 THER RAD SIMULAJ FIELD CPLX: CPT

## 2018-08-03 NOTE — CONSULTS
Patient: Vinod Kapadia MRN: [id number]  SSN: [id number]    YOB: 1965  Age: 46 y.o. Sex: male      Other Providers:    MD Liane Kirk MD    CHIEF COMPLAINT: Rectal cancer    DIAGNOSIS: Adenocarcinoma of the rectum, T3N2M0, Stage III      HISTORY OF PRESENT ILLNESS:  Vinod Kapadia is a 46 y.o. male who I am seeing at the request of Dr. Cindy Duvall regarding the role of radiation therapy in treatment of this locally advanced rectal adenocarcinoma. He has a medical history significant for GERD and prior bilateral inguinal hernia repair. He is a former smoker, recently quit. He drinks 1-2 beers daily. Over the few months prior to diagnosis he noted bright red blood per rectum and change in bowel habits with frequent, small caliber stools. He was seen by GI and was scheduled for colonoscopy. However, following his bowel prep he passed only a small amount of stool, but began vomiting. He was seen at the ER on 06/26/18 where a CT scan showed fluid-filled large and small bowel loops with increased soft tissue suggesting an obstructing high rectal cancer. Flexible sigmoidoscopy on 06/26/18 showed a completely obstructing circumferential mass at 5-6 cm from the anal verge which could not be traversed. Extensive biopsies were taken. However, final pathology showed only fragments of hyperplastic polyp with ulceration and inflammation. On 06/29/18 he underwent right transverse diverting colostomy under the direction of Dr. Jacqueline Lee. Additional biopsies were taken at that time. Pathology  Revealed poorly differentiated colorectal adenocarcinoma. Peritoneal fluid was also sent, which revealed only rare atypical cells. No obvious peritoneal disease was seen. Pet/CT scan on 07/19/18 showed hypermetabolic mass in the proximal rectum. In addition, multiple pelvic lymph nodes were suspicious for regional lymph node metastases.   Pelvic MRI on 07/23/18 showed the rectal tumor approximately 8 cm in length with extension through the muscularis wall into the perirectal fat on the right. Lymph nodes were seen both within and lateral to the mesorectal fascia. Clinical staging was T3N2M0. He was seen by Dr. Heaven Jasmine and was recommended to undergo pre-operative chemoradiotherapy. According to Dr. Kenna Rollins, surgery will likely consist of a low anterior resection with low rectal anastomosis. At this time, Mr. Yohana Iqbal is doing reasonably well. He has recovered well from transverse colostomy for decompression. Staples removed on 07/17/18. Colostomy functioning well. Patient denies systemic complaints. He does continue to pass some fecal material. He has nocturia x4 which is longstanding. He has slow urinary stream and significant urinary hesitancy. PAST MEDICAL HISTORY:    Past Medical History:   Diagnosis Date    Chest pain 8/18/2016    Colon cancer ([address]     GERD (gastroesophageal reflux disease)     Iron deficiency anemia due to chronic blood loss 7/26/2018    Tobacco abuse 8/18/2016       The patient denies history of collagen vascular diseases, pacemaker insertion, prior radiation or prior chemotherapy. PAST SURGICAL HISTORY:   Past Surgical History:   Procedure Laterality Date    FLEXIBLE SIGMOIDOSCOPY N/A 6/27/2018    SIGMOIDOSCOPY FLEXIBLE performed by Husam Charles MD at [facility] ENDOSCOPY    HX HERNIA REPAIR      HX ORTHOPAEDIC      arm       MEDICATIONS:     Current Outpatient Prescriptions:     capecitabine (XELODA) 500 mg tablet, Take 3 tabs twice a day (including weekends) starting on the first day of radiation and ending on the last day of radiation, Disp: 294 Tab, Rfl: 0    sildenafil, antihypertensive, (REVATIO) 20 mg tablet, Take 5 Tabs by mouth daily as needed for up to 30 days. , Disp: 50 Tab, Rfl: 11    carvedilol (COREG) 6.25 mg tablet, Take 1 Tab by mouth two (2) times daily (with meals). , Disp: 60 Tab, Rfl: 0    lisinopril (PRINIVIL, ZESTRIL) 10 mg tablet, Take 1 Tab by mouth daily. , Disp: 30 Tab, Rfl: 0    pantoprazole (PROTONIX) 40 mg tablet, Take 1 Tab by mouth Daily (before breakfast). , Disp: 30 Tab, Rfl: 0    ALLERGIES:   Allergies   Allergen Reactions    Zithromax [Azithromycin] Rash       SOCIAL HISTORY:   Social History     Social History    Marital status:      Spouse name: N/A    Number of children: N/A    Years of education: N/A     Occupational History    Not on file. Social History Main Topics    Smoking status: Former Smoker     Packs/day: 1.00     Years: 30.00     Types: Cigarettes     Start date: 5/31/1988     Quit date: 6/26/2018    Smokeless tobacco: Never Used    Alcohol use 8.4 oz/week     14 Cans of beer, 0 Standard drinks or equivalent per week    Drug use: No    Sexual activity: Not on file     Other Topics Concern    Not on file     Social History Narrative       FAMILY HISTORY:   Family History   Problem Relation Age of Onset    Hypertension Mother     Heart Disease Mother     No Known Problems Father        REVIEW OF SYSTEMS: Please see the completed review of systems sheet in the chart that I have reviewed today. PHYSICAL EXAMINATION:   ECOG Performance status 0  VITAL SIGNS:   Visit Vitals    BP (!) 165/102 (BP 1 Location: Left arm, BP Patient Position: Sitting)    Pulse 74    Temp 98.2 °F (36.8 °C)    Resp 16    Ht 5' 9\" (1.753 m)    Wt 67.8 kg (149 lb 6.4 oz)    SpO2 100%    BMI 22.06 kg/m2        GENERAL: The patient is well-developed, ambulatory, alert and in no acute distress. HEENT: Head is normocephalic, atraumatic. Pupils are equal, round and reactive to light and accommodation. Extraocular movement intact. NECK: Neck is supple with no masses. CARDIOVASCULAR: Heart has regular rate and rhythm. There are no murmurs, rubs or gallops. Radial pulses are 2+ RESPIRATORY: Lungs are clear to auscultation and percussion. There is normal respiratory effort.  GASTROINTESTINAL: The abdomen is soft, non-tender, nondistended with no hepatospelnomagaly. Colostomy functioning properly. No sign of infection. Incisions well healed. Digital rectal examination: deferred. LYMPHATIC: There is no cervical, supraclavicular or axillary lymphadenopathy bilaterally. MUSCULOSKELETAL: Extremities reveal no cyanosis, clubbing or edema.  is 5+/5. NEURO:  Cranial nerves II-XII grossly intact. Muscular strength and sensation are intact throughout all four extremities. PATHOLOGY:      06/29/18:    DIAGNOSIS   RECTAL MASS: POORLY DIFFERENTIATED ADENOCARCINOMA. SEE IMMUNOHISTOHEMISTRY REPORT AND COMMENT. Comment   Case discussed with Dr. Pinky Pool on 7/5/18.   tls/7/2/2018   Electronically signed out on 7/6/2018 12:45 by Aubrey Walton. Jg Salomon M.D., Ph.D.   Procedures/Addenda   STF-IMMUNOHISTOCHEMISTRY   Status: Signed Out Aubrey Walton. Jg Salomon M.D., Ph.D. on 7/6/2018   Interpretation   Immunohistochemical Stain Panel:   Interpretation: Immunohistochemical slides demonstrate a focus of poorly differentiated tumor showing a cytokeratin 7 negative, cytokeratin 20 positive immunoprofile. Nuclear CDX2 immunoreactivity is also identified. The immunohistochemical features are consistent with a poorly differentiated colorectal adenocarcinoma. Clinical and radiologic correlation is recommended. Findings discussed with Dr. Pinky Pool on 7/5/18. Antibody/Test Marker For Result   Cytokeratin 7 Lung, breast, upper GI, serous ovary Negative   Cytokeratin 20 Colon, mucinous ovary, urothelium Positive   Pancytokeratin (AE1/AE3) Broad spectrum epithelial marker Positive, nuclear pattern   S-100 Melanoma No significant staining in   area of question   Synaptophysin Neural and neuroendocrine tumors Negative   CDX2 Gastrointestinal marker Positive, nuclear pattern   Control sections stain appropriately. Consultant: Dr. Alicia Wiseman     06/29/18:    DIAGNOSIS   Peritoneal Fluid:   RARE ATYPICAL CELLS PRESENT. Cell block: Paucicellular, non-contributory. 06/27/18:    DIAGNOSIS   RECTAL MASS BIOPSIES: FRAGMENTS OF HYPERPLASTIC POLYP, FOCALLY ULCERATED AND INFLAMED. SEE COMMENT. Comment   Often hyperplastic large intestinal type mucosa is present at edge of neoplastic mass. If these biopsy fragments represent only a small portion of a much larger mass, unsampled mass could have areas of in situ or even infiltrating carcinoma. Case discussed with Dr. Rosalinda Wagner on 6/ 29/ 18 at approximately 0910 hours. LABORATORY:   Lab Results   Component Value Date/Time    Sodium 139 07/26/2018 03:36 PM    Potassium 3.7 07/26/2018 03:36 PM    Chloride 103 07/26/2018 03:36 PM    CO2 28 07/26/2018 03:36 PM    Anion gap 8 07/26/2018 03:36 PM    Glucose 98 07/26/2018 03:36 PM    BUN 6 07/26/2018 03:36 PM    Creatinine 0.84 07/26/2018 03:36 PM    GFR est AA >60 07/26/2018 03:36 PM    GFR est non-AA >60 07/26/2018 03:36 PM    Calcium 8.5 07/26/2018 03:36 PM    Albumin 3.2 (L) 07/26/2018 03:36 PM    Protein, total 7.1 07/26/2018 03:36 PM    Globulin 3.9 (H) 07/26/2018 03:36 PM    A-G Ratio 0.8 (L) 07/26/2018 03:36 PM    AST (SGOT) 27 07/26/2018 03:36 PM    ALT (SGPT) 52 07/26/2018 03:36 PM     Lab Results   Component Value Date/Time    WBC 12.4 (H) 07/26/2018 03:36 PM    HGB 14.0 07/26/2018 03:36 PM    HCT 42.8 07/26/2018 03:36 PM    PLATELET 741 25/95/5739 03:36 PM       RADIOLOGY:        7/23/2018: Edwina Pedraza Pelv W Wo Cont  MRI PELVIS WITH CONTRAST. HISTORY: Rectal cancer, staging exam. Recent PET CTs reviewed. TECHNIQUE: 1 mg glucagon was given intramuscularly prior to the procedure to reduce rectal peristalsis motion artifact. Axial, sagittal, coronal T2; axial T1; axial fat-saturated T1 images were followed by followed by 13 cc intravenous gadolinium, following which fat-saturated axial images through the rectum are repeated. FINDINGS:Rectal thickening consistent with primary tumor extends over about an 8 cm length.  I believe it extends to within 3 or 4 cm of the levator ani insertion. Margins are indistinct, especially along the right lateral margin. There are enhancing lymph nodes in the perirectal fascia which measure up to 18 mm. There are 11 mm enhancing lymph nodes just lateral to the mesorectal fascia on the right. Prostate and seminal vesicles grossly unremarkable. Urinary bladder is unremarkable. No gross bony lesions. No free fluid. IMPRESSION: Rectal tumor, approximately 8 cm in length with extension through the muscular wall into the perirectal fat on the right, T3. The tumor extends to within 3 or 4 cm of the levator ani insertion. Lymph nodes both within and lateral to the mesorectal fascia, probably N2.          7/19/2018: Pet/ct Tumor Image Skull Thigh W (ini)  Nuclear Medicine Whole Body CT / PET- FDG Study 7/19/2018 3:02 PM INDICATION: Staging for adenocarcinoma of the rectum COMPARISON: CT abdomen and pelvis 6/26/2018 TECHNIQUE:   Radiopharmaceutical: 15.88 mCi F18-FDG IV. This is a whole-body PET- FDG study performed on a dedicated full- ring scanner. Low dose, nondiagnostic CT axial source images are also acquired for PET attenuation correction and are utilized for PET-CT fusion with the emission images. The patient is not a diabetic and underwent adequate fasting of at least 4 hours. Blood glucose at the time of tracer administration is 82 mg/dl, which is adequate for imaging. Approximately 60 minutes after administration of the radiopharmaceutical imaging was initiated covering the skull to the thighs. SUV max. Calculated from patient body wt. Noncaloric MDGASTROVIEW dilute oral contrast is given. FINDINGS:  Examination of the 3D tomographic PET images demonstrates normal physiologic FDG tracer activity in the head and neck. In the chest there is normal physiologic FDG tracer activity and no suspicious hypermetabolic lymph node, or lung nodule. There are thyroid nodular changes.  Below the diaphragm within the abdomen and pelvis in the abdomen there is no abnormal hypermetabolic lymph node, or liver lesion. There are changes of recent surgery with left hemicolectomy and a colostomy site on the right. Expected nonfocal low intensity surgery FDG activity is seen with the surgery changes. There is incidental bilateral mild adrenal thickening, no abnormal FDG activity at these. In the pelvis at the proximal rectum there is a clearly hypermetabolic lesion. SUV Max for this is 7.3. This lesion measures 5.9 cm craniocaudal and has greatest transverse dimensions of 4.3 x 2.4 cm. There are multiple lymph nodes in the mesocolon fat and several of these also show increased FDG uptake-1 is well evident at the near the right pelvic sidewall. There is incidental low level uptake compatible with prior inguinal hernia repairs. No suspicious skeletal lesion. IMPRESSION: 1. Hypermetabolic mass well evident in the proximal rectum compatible with a rectal carcinoma. There are multiple pelvic lymph nodes, larger ones show associated FDG uptake as well-suspicious for regional lymph node metastases. No evidence of distant metastatic disease on this exam.       Result Date: 6/28/2018  PORTABLE ABDOMEN, June 28, 2018 at 1432 hours: CLINICAL HISTORY:  Nasogastric tube repositioning. COMPARISON:  KUB at 1137 hours today. FINDINGS:  AP supine image demonstrates a nasogastric tube with the proximal sidehole at just above the gastroesophageal junction. Multiple mildly dilated small bowel loops are evident in the still upper abdomen. IMPRESSION:  Nasogastric tube proximal sidehole has been advanced slightly below the gastroesophageal junction, but further advancement by approximately 5 cm is suggested. 6/28/2018: Xr Abd (kub)   KUB: CLINICAL HISTORY:  Periumbilical pain today with a history of rectal bleeding. COMPARISON:  June 27, 2018 at 1810 hours.  FINDINGS:  AP supine images demonstrates a moderate amount of stool scattered in the colon with multiple mildly dilated small bowel loops.  No abnormal soft tissue mass is noted. Nasogastric tube proximal sidehole is just above the gastroesophageal junction. IMPRESSION:  1. NO SIGNIFICANT INTERVAL CHANGE FROM JUNE 27. 2.  NASOGASTRIC TUBE PROXIMAL SIDEHOLE IS JUST ABOVE THE GASTROESOPHAGEAL JUNCTION. ADVANCEMENT BY APPROXIMATELY 10 CM IS SUGGESTED.        6/27/2018: Xr Abd (kub)  KUB HISTORY: NG tube placement. A single AP view of the abdomen was obtained in the supine position at 1815 hours. Comparison was made to the prior exam performed earlier on the same day. Findings: A nasogastric tube is unchanged at the tip overlying the left upper quadrant. There are dilated loops of large and small bowel, more pronounced than seen previously. Assessment for free intraperitoneal air suboptimal on this supine technique. IMPRESSION: Interval progression of dilated loops of large and small bowel and compared to the previous exam performed earlier on the same day. These findings could be secondary to a distal colonic obstruction given the abnormal CT findings. 6/27/2018: Xr Abd (kub)  KUB CLINICAL INDICATION:  Follow-up bowel obstruction, feeding tube placement, rectal mass COMPARISON: 6/26/2018 radiograph and CT TECHNIQUE: A single AP supine view of the abdomen 7:27 AM upright FINDINGS:  There is an enteric tube with tip projecting over left upper abdomen likely at the level of the gastric fundus, and side port near the distal esophagus. If positioning in the gastric body is desired this should be advanced at least 7 cm. The bowel gas pattern demonstrates a few nonspecific loops overall decreased in prominence since the prior CT. There are mild linear opacities in the left lung base, likely atelectasis or pneumonitis. No evidence of free air. IMPRESSION:  1. Enteric tube likely entering proximal stomach. Consider advancement. 2. Decreasing gaseous prominence of bowel loops since CT yesterday.            6/26/2018: Ct Abd Pelv W Cont  CT ABDOMEN AND PELVIS WITH CONTRAST. HISTORY: Abdominal pain and cramping. Blood in stool. COMPARISON: None TECHNIQUE: 5 mm axial scans from above the diaphragms to the pubic symphysis following oral and 100 cc intravenous contrast without acute complication. Intravenous contrast was given to increase the sensitivity to acute inflammation. Radiation dose reduction techniques were used for this study. Our CT scanners use one or more of the following: Automated exposure control, adjustment of the mA and or kV according to patient size, iterative reconstruction. FINDINGS: Abdomen: No free air. The lung bases are clear. There is fluid in the distal esophagus, probably reflux. Oral contrast has not left the stomach. Small bowel loops are fluid-filled and mildly dilated throughout. No transition zone appreciated. Large bowel loops are also fluid-filled. Small hypoattenuating foci in the liver, probably subcentimeter cysts. The spleen is small and homogeneous. . No calcified gallstones. The biliary tree is not dilated. The pancreas is unremarkable. No free fluid, acute inflammatory changes or adenopathy. The kidneys enhance uniformly. No radiopaque renal calculi. No hydronephrosis. The adrenal glands are normal size. Aorta is normal caliber. Pelvis: The colon is fluid-filled down to the level of the rectosigmoid where there is increased soft tissue suggesting an obstructing high rectal mass. The urinary bladder unremarkable. The prostate is mildly enlarged. Seminal vesicles symmetric. IMPRESSION:  Fluid-filled small and large bowel loops with increased soft tissue suggesting an obstructing high rectal cancer.              IMPRESSION:  Haresh Chowdhury is a 46 y.o. male with Adenocarcinoma of the rectum, T3N2M0, Stage III      COUNSELING AND COORDINATION OF CARE: I have had a 60 minute consultation with Mr. Margaret Stoll of which could have has been spent counseling him about management options for his locally advanced rectal cancer. We discussed the natural history of rectal cancer and the implications of various prognostic factors including T and N stage, LVSI, size, and distance from the dentate line which dictate therapy and prognosis. We discussed the role of preoperative therapy with chemotherapy and radiation and specifically the early trial data in support of chemtherapy and radiation in addition to total mesorectal excision to improve the locoregional control. We discussed the outcomes of the [facility] Rectal trial comparing pre vs post operative chemoradiation with improved local control, toxicity, and ability to preserve the sphincter with preoperative treatment such that this is now the standard of care for patients with T3 or any N1 tumor. We also discussed the treatment of T1 with negative features (LVSI, Grade 3, more than 3 mm depth of invasion, more than 3 cm tumor, and more than 1/3 rectal circumference) and T2 tumors where transanal excision can be performed but adjuvant chemoradiotherapy is needed, otherwise total mesorectal exicision via APR or LAR is required and curative without needing adjuvant radiotherapy. Considering the tumor invaded beyond the muscularis and involves the lymph nodes, I have recommended preoperative chemoradiation. He will be simulated prone in belly board with an anal marker to be placed. I will plan to deliver 45 Gy in 25 fractions to a 3-4 field pelvis and boost the primary tumor a further 5.4 Gy in 3 fractions. We reviewed the logistics of radiation treatment, benefits, and anticipated acute and late side effects and he has signed informed consent. PLAN:    1) Consented patient for treatment with external beam radiation after discussing risk, benefits, and side effects from treatment. 2) Reviewed available research treatment and cancer care protocols for which patient may be eligible. Unfortunately there are no matching clinical trials available at this time. 3) Coordinate care and start date with medical oncology. 4) CT Simulation planned shortly with radiotherapy to begin 7-10 days following.        Abhi Harris MD   August 3, 2018

## 2018-08-03 NOTE — PROGRESS NOTES
Pt here today for initial RT consult for rectal cancer with Dr. Jones Wolfe. The 7/23/18 MRI Pelvis indicated that the rectal mass extends through the muscle wall. The 7/19/19 PET indicated that multiple nodes were also involved, but no distant mets. was noted. Pt stated that Dr. Verna Looney told him the cancer is aggressive. An overview of RT was given. The plan is for pt to have RT/Xeloda. He has a pre-paid vacation planned for 8/11/18-8/19/18 and would really like to go before he starts treatment. RT consents were signed and pt completed the CT/Sim today.

## 2018-08-06 ENCOUNTER — PATIENT OUTREACH (OUTPATIENT)
Dept: CASE MANAGEMENT | Age: 53
End: 2018-08-06

## 2018-08-06 NOTE — PROGRESS NOTES
Spoke with patient and made it clear Dr Shara Mejia recommended chemo/rad start as soon as possible on 8-14-18. Pt still has vacation planned on 8-13----8-18-18 and would like to go on vacation. Pt understands the earlier date for start is preferred but will speak with wife as his vacation is important so he may start on 8-20-18. Pt to return call tomorrow w answer.

## 2018-08-07 ENCOUNTER — HOSPITAL ENCOUNTER (OUTPATIENT)
Dept: RADIATION ONCOLOGY | Age: 53
Discharge: HOME | End: 2018-08-07
Payer: COMMERCIAL

## 2018-08-07 PROCEDURE — 77334 RADIATION TREATMENT AID(S): CPT

## 2018-08-07 PROCEDURE — 77295 3-D RADIOTHERAPY PLAN: CPT

## 2018-08-07 PROCEDURE — 77300 RADIATION THERAPY DOSE PLAN: CPT

## 2018-08-07 PROCEDURE — 77399 UNLISTED PX MED RADJ PHYSICS: CPT

## 2018-08-21 NOTE — PROGRESS NOTES
Patient: Kristine Duvall MRN: [id number]  SSN: [id number]    YOB: 1965  Age: 46 y.o. Sex: male      DIAGNOSIS:  Adenocarcinoma of the rectum, T3N2M0, Stage III    SITE TREATED AND DOSE DELIVERED:  Pelvis has received 360 cGy of 4500 cGy in 2/25 fractions. Boost to follow. SUBJECTIVE:  Kristine Duvall is a 46 y.o. male being treated for Stage III rectal adenocarcinoma. He is doing reasonably well. He has the urge for bowel movements with discharge of blood, mucous, other fecal material despite colostomy. OBJECTIVE:  No findings    There were no vitals taken for this visit. Lab Results   Component Value Date/Time    Sodium 139 08/20/2018 07:58 AM    Potassium 3.8 08/20/2018 07:58 AM    Chloride 103 08/20/2018 07:58 AM    CO2 30 08/20/2018 07:58 AM    Anion gap 6 (L) 08/20/2018 07:58 AM    Glucose 133 (H) 08/20/2018 07:58 AM    BUN 7 08/20/2018 07:58 AM    Creatinine 0.94 08/20/2018 07:58 AM    GFR est AA >60 08/20/2018 07:58 AM    GFR est non-AA >60 08/20/2018 07:58 AM    Calcium 8.7 08/20/2018 07:58 AM    Magnesium 2.3 08/20/2018 07:58 AM    Albumin 3.5 08/20/2018 07:58 AM    Protein, total 7.5 08/20/2018 07:58 AM    Globulin 4.0 (H) 08/20/2018 07:58 AM    A-G Ratio 0.9 (L) 08/20/2018 07:58 AM    AST (SGOT) 19 08/20/2018 07:58 AM    ALT (SGPT) 29 08/20/2018 07:58 AM     Lab Results   Component Value Date/Time    WBC 7.9 08/20/2018 07:58 AM    HGB 15.7 08/20/2018 07:58 AM    HCT 48.5 08/20/2018 07:58 AM    PLATELET 147 22/40/3013 07:58 AM       ASSESSMENT and PLAN:  Kristine Duvall is tolerating radiation as anticipated for the current dose and fraction. We will continue on as planned with another treatment visit anticipated next week.         Lee Hannon MD   August 21, 2018

## 2018-08-27 NOTE — PROGRESS NOTES
Pt was here D6 Xeloda/radiation. He states he is having urgency and frequency with rectal bowel movements. Dr Lucy Barker  has told him this can be normal for a while after surgery. Pt advised to do warm epsom salt sitz baths and use Aqua phor cream to rectal area. Palliative care will also be referred to patient in one to two weeks for help with pain/discomfort. Pt encouraged to call with any questions or concerns. Ayaka Morgan

## 2018-08-28 NOTE — PROGRESS NOTES
Patient: Alanis House MRN: [id number]  SSN: [id number]    YOB: 1965  Age: 46 y.o. Sex: male      DIAGNOSIS:  Adenocarcinoma of the rectum, T3N2M0, Stage III    SITE TREATED AND DOSE DELIVERED:  Pelvis has received 1260 cGy of 4500 cGy in 7/25 fractions. Boost to follow. SUBJECTIVE:  Alanis House is a 46 y.o. male being treated for Stage III rectal adenocarcinoma. He is doing reasonably well. He has the frequent urge for bowel movements with discharge of blood, mucous, other fecal material despite colostomy. OBJECTIVE:  No findings    There were no vitals taken for this visit. Lab Results   Component Value Date/Time    Sodium 139 08/27/2018 07:32 AM    Potassium 3.7 08/27/2018 07:32 AM    Chloride 103 08/27/2018 07:32 AM    CO2 28 08/27/2018 07:32 AM    Anion gap 8 08/27/2018 07:32 AM    Glucose 109 (H) 08/27/2018 07:32 AM    BUN 6 08/27/2018 07:32 AM    Creatinine 0.87 08/27/2018 07:32 AM    GFR est AA >60 08/27/2018 07:32 AM    GFR est non-AA >60 08/27/2018 07:32 AM    Calcium 8.6 08/27/2018 07:32 AM    Magnesium 2.0 08/27/2018 07:32 AM    Albumin 3.4 (L) 08/27/2018 07:32 AM    Protein, total 7.7 08/27/2018 07:32 AM    Globulin 4.3 (H) 08/27/2018 07:32 AM    A-G Ratio 0.8 (L) 08/27/2018 07:32 AM    AST (SGOT) 15 08/27/2018 07:32 AM    ALT (SGPT) 24 08/27/2018 07:32 AM     Lab Results   Component Value Date/Time    WBC 7.1 08/27/2018 07:32 AM    HGB 15.2 08/27/2018 07:32 AM    HCT 45.6 08/27/2018 07:32 AM    PLATELET 240 06/10/4102 07:32 AM       ASSESSMENT and PLAN:  lAanis House is tolerating radiation as anticipated for the current dose and fraction. We will continue on as planned with another treatment visit anticipated next week.         Ash Segura MD   August 28, 2018

## 2018-09-04 NOTE — PROGRESS NOTES
Patient: Marcie Cardoza MRN: [id number]  SSN: [id number] YOB: 1965  Age: 46 y.o. Sex: male DIAGNOSIS:  Adenocarcinoma of the rectum, T3N2M0, Stage III 
 
SITE TREATED AND DOSE DELIVERED:  Pelvis has received 1980 cGy of 4500 cGy in 11/25 fractions. Boost to follow. SUBJECTIVE:  Marcie Cardoza is a 46 y.o. male being treated for Stage III rectal adenocarcinoma. He is doing reasonably well. He has the frequent urge for bowel movements with discharge of blood, mucous, other fecal material despite colostomy. Week 2 worsening fecal urgency. Less blood, but continued fecal material, small amounts. Also thinks that he has an aggravated hemorrhoid that is making urgency worse. OBJECTIVE:  No findings There were no vitals taken for this visit. Lab Results Component Value Date/Time Sodium 139 08/27/2018 07:32 AM  
 Potassium 3.7 08/27/2018 07:32 AM  
 Chloride 103 08/27/2018 07:32 AM  
 CO2 28 08/27/2018 07:32 AM  
 Anion gap 8 08/27/2018 07:32 AM  
 Glucose 109 (H) 08/27/2018 07:32 AM  
 BUN 6 08/27/2018 07:32 AM  
 Creatinine 0.87 08/27/2018 07:32 AM  
 GFR est AA >60 08/27/2018 07:32 AM  
 GFR est non-AA >60 08/27/2018 07:32 AM  
 Calcium 8.6 08/27/2018 07:32 AM  
 Magnesium 2.0 08/27/2018 07:32 AM  
 Albumin 3.4 (L) 08/27/2018 07:32 AM  
 Protein, total 7.7 08/27/2018 07:32 AM  
 Globulin 4.3 (H) 08/27/2018 07:32 AM  
 A-G Ratio 0.8 (L) 08/27/2018 07:32 AM  
 AST (SGOT) 15 08/27/2018 07:32 AM  
 ALT (SGPT) 24 08/27/2018 07:32 AM  
 
Lab Results Component Value Date/Time WBC 7.1 08/27/2018 07:32 AM  
 HGB 15.2 08/27/2018 07:32 AM  
 HCT 45.6 08/27/2018 07:32 AM  
 PLATELET 217 54/25/1199 07:32 AM  
 
 
ASSESSMENT and PLAN:  Marcie Cardoza is tolerating radiation as anticipated for the current dose and fraction. We will continue on as planned with another treatment visit anticipated next week. Start Preparation H for hemorrhoids.  
 
 
Sendy Hanson MD  
 September 4, 2018

## 2018-09-04 NOTE — PROGRESS NOTES
Saw patient today with Sue All and palliative care. Patient is Day 6 of 22. He remains on Xeloda and continues to work as a . He denies need for pain medication at this time. Sitz bath and salt water/baking soda mouth rinses encouraged. Pt also encouraged to use good hand lotions on hands and feet.  Nurse navigation is following

## 2018-09-11 NOTE — PROGRESS NOTES
Patient: Rose Rueda MRN: [id number]  SSN: [id number] YOB: 1965  Age: 46 y.o. Sex: male DIAGNOSIS:  Adenocarcinoma of the rectum, T3N2M0, Stage III 
 
SITE TREATED AND DOSE DELIVERED:  Pelvis has received 2880 cGy of 4500 cGy in 16/25 fractions. Boost to follow. SUBJECTIVE:  Rose Rueda is a 46 y.o. male being treated for Stage III rectal adenocarcinoma. He is doing reasonably well. He has the frequent urge for bowel movements with discharge of blood, mucous, other fecal material despite colostomy. Week 2 worsening fecal urgency. Less blood, but continued fecal material, small amounts. Also thinks that he has an aggravated hemorrhoid that is making urgency worse. Week 3 increased zeferino-anal pain and dysuria. Continued frequent urgent small bowel movements. OBJECTIVE:  No findings There were no vitals taken for this visit. Lab Results Component Value Date/Time Sodium 138 09/04/2018 08:07 AM  
 Potassium 3.7 09/04/2018 08:07 AM  
 Chloride 102 09/04/2018 08:07 AM  
 CO2 30 09/04/2018 08:07 AM  
 Anion gap 6 (L) 09/04/2018 08:07 AM  
 Glucose 108 (H) 09/04/2018 08:07 AM  
 BUN 5 (L) 09/04/2018 08:07 AM  
 Creatinine 0.86 09/04/2018 08:07 AM  
 GFR est AA >60 09/04/2018 08:07 AM  
 GFR est non-AA >60 09/04/2018 08:07 AM  
 Calcium 8.7 09/04/2018 08:07 AM  
 Magnesium 2.0 08/27/2018 07:32 AM  
 Albumin 3.3 (L) 09/04/2018 08:07 AM  
 Protein, total 7.4 09/04/2018 08:07 AM  
 Globulin 4.1 (H) 09/04/2018 08:07 AM  
 A-G Ratio 0.8 (L) 09/04/2018 08:07 AM  
 AST (SGOT) 19 09/04/2018 08:07 AM  
 ALT (SGPT) 22 09/04/2018 08:07 AM  
 
Lab Results Component Value Date/Time WBC 6.6 09/04/2018 08:07 AM  
 HGB 15.2 09/04/2018 08:07 AM  
 HCT 45.2 09/04/2018 08:07 AM  
 PLATELET 733 10/18/9419 08:07 AM  
 
 
ASSESSMENT and PLAN:  Rose Rueda is tolerating radiation as anticipated for the current dose and fraction.   We will continue on as planned with another treatment visit anticipated next week. Continue Preparation H for hemorrhoids. Start Dermaplast at anal verge. Decrease carbonated beverages, caffeine and acidic beverages to help dysuria. If that is not sufficient will start Pyridium. Shivani Gilliland MD  
September 11, 2018

## 2018-09-12 NOTE — PROGRESS NOTES
Saw patient today with Dr Prem Wiggins. He is D17 of 28 with radiation/chemo. He states he has some burning sensation but he is able to work and does not desire pain medications. Pt has been educated on the use of baby wipes, Solarcaine spray and frequent Sitz baths. We will cancel palliative  care visit for today with understanding pt call myself or the office with questions,concerns or needs.

## 2018-09-18 NOTE — PROGRESS NOTES
Patient: Madelaine Barajas MRN: [id number]  SSN: [id number] YOB: 1965  Age: 46 y.o. Sex: male DIAGNOSIS:  Adenocarcinoma of the rectum, T3N2M0, Stage III 
 
SITE TREATED AND DOSE DELIVERED:  Pelvis has received 3780 cGy of 4500 cGy in 21/25 fractions. Boost to follow. SUBJECTIVE:  Madelaine Barajas is a 46 y.o. male being treated for Stage III rectal adenocarcinoma. He is doing reasonably well. He has the frequent urge for bowel movements with discharge of blood, mucous, other fecal material despite colostomy. Week 2 worsening fecal urgency. Less blood, but continued fecal material, small amounts. Also thinks that he has an aggravated hemorrhoid that is making urgency worse. Week 3 increased zeferino-anal pain and dysuria. Continued frequent urgent small bowel movements. Week 4 decreased small urgent bowel movements, but increased nocturia and daytime urinary frequency. Continued mild to moderate perianal discomfort. OBJECTIVE:  No findings There were no vitals taken for this visit. Lab Results Component Value Date/Time Sodium 140 09/12/2018 07:42 AM  
 Potassium 3.9 09/12/2018 07:42 AM  
 Chloride 102 09/12/2018 07:42 AM  
 CO2 31 09/12/2018 07:42 AM  
 Anion gap 7 09/12/2018 07:42 AM  
 Glucose 91 09/12/2018 07:42 AM  
 BUN 6 09/12/2018 07:42 AM  
 Creatinine 0.89 09/12/2018 07:42 AM  
 GFR est AA >60 09/12/2018 07:42 AM  
 GFR est non-AA >60 09/12/2018 07:42 AM  
 Calcium 8.8 09/12/2018 07:42 AM  
 Magnesium 2.0 08/27/2018 07:32 AM  
 Albumin 3.3 (L) 09/12/2018 07:42 AM  
 Protein, total 7.6 09/12/2018 07:42 AM  
 Globulin 4.3 (H) 09/12/2018 07:42 AM  
 A-G Ratio 0.8 (L) 09/12/2018 07:42 AM  
 AST (SGOT) 17 09/12/2018 07:42 AM  
 ALT (SGPT) 21 09/12/2018 07:42 AM  
 
Lab Results Component Value Date/Time  WBC 5.8 09/12/2018 07:42 AM  
 HGB 15.4 09/12/2018 07:42 AM  
 HCT 45.8 09/12/2018 07:42 AM  
 PLATELET 296 16/47/5336 07:42 AM  
 
 
 ASSESSMENT and PLAN:  Lexy Israel is tolerating radiation as anticipated for the current dose and fraction. We will continue on as planned with another treatment visit anticipated next week. Continue Preparation H for hemorrhoids. Continue Dermaplast at anal verge. Decrease carbonated beverages, caffeine and acidic beverages to help dysuria. If that is not sufficient will start Pyridium. Rodo Ramires MD  
September 18, 2018

## 2018-09-25 NOTE — TELEPHONE ENCOUNTER
Called pt and advised of follow up appointment made today for him with Dr. Cy Mota for Monday 10/1/18 @ 9:45 a.m. Pt was happy with appt. Date/time.

## 2018-09-25 NOTE — PROGRESS NOTES
Saw patient today with Dr Shara Mejia. He finished D25 of 25 with 3 more boost. Patient will have scan in 4 weeks and return to Dr Shara Mejia. He will follow up with Dr Nayana Serrano on Oct 1, 2018. Pt denies uncontrolled symptoms.  Nurse navigation will continue to monitor

## 2018-09-25 NOTE — PROGRESS NOTES
Patient: Harjit Georges MRN: [id number]  SSN: [id number] YOB: 1965  Age: 46 y.o. Sex: male DIAGNOSIS:  Adenocarcinoma of the rectum, T3N2M0, Stage III 
 
SITE TREATED AND DOSE DELIVERED:  Pelvis has received 4500 cGy of 4500 cGy in 25/25 fractions. Boost to follow. SUBJECTIVE:  Harjit Georges is a 46 y.o. male being treated for Stage III rectal adenocarcinoma. He is doing reasonably well. He has the frequent urge for bowel movements with discharge of blood, mucous, other fecal material despite colostomy. Week 2 worsening fecal urgency. Less blood, but continued fecal material, small amounts. Also thinks that he has an aggravated hemorrhoid that is making urgency worse. Week 3 increased zeferino-anal pain and dysuria. Continued frequent urgent small bowel movements. Week 4 decreased small urgent bowel movements, but increased nocturia and daytime urinary frequency. Continued mild to moderate perianal discomfort. Week 5 no significant changes. OBJECTIVE:  No findings There were no vitals taken for this visit. Lab Results Component Value Date/Time Sodium 138 09/18/2018 07:42 AM  
 Potassium 3.5 09/18/2018 07:42 AM  
 Chloride 101 09/18/2018 07:42 AM  
 CO2 30 09/18/2018 07:42 AM  
 Anion gap 7 09/18/2018 07:42 AM  
 Glucose 102 (H) 09/18/2018 07:42 AM  
 BUN 6 09/18/2018 07:42 AM  
 Creatinine 0.88 09/18/2018 07:42 AM  
 GFR est AA >60 09/18/2018 07:42 AM  
 GFR est non-AA >60 09/18/2018 07:42 AM  
 Calcium 8.5 09/18/2018 07:42 AM  
 Magnesium 2.2 09/18/2018 07:42 AM  
 Albumin 3.2 (L) 09/18/2018 07:42 AM  
 Protein, total 7.3 09/18/2018 07:42 AM  
 Globulin 4.1 (H) 09/18/2018 07:42 AM  
 A-G Ratio 0.8 (L) 09/18/2018 07:42 AM  
 AST (SGOT) 15 09/18/2018 07:42 AM  
 ALT (SGPT) 20 09/18/2018 07:42 AM  
 
Lab Results Component Value Date/Time  WBC 7.2 09/18/2018 07:42 AM  
 HGB 14.9 09/18/2018 07:42 AM  
 HCT 44.0 09/18/2018 07:42 AM  
 PLATELET 453 77/92/1617 07:42 AM  
 
 ASSESSMENT and PLAN:  Cherie Beltran is tolerating radiation as anticipated for the current dose and fraction. He will complete treatment on Friday and then return for follow-up in one month. We will arrange for him to be seen by his surgeon shortly after completion of radiation therapy. Continue Preparation H for hemorrhoids. Continue Dermaplast at anal verge. Decrease carbonated beverages, caffeine and acidic beverages to help dysuria. If that is not sufficient will start Pyridium. Jt العراقي MD  
September 25, 2018

## 2018-09-26 PROBLEM — K62.89 RECTAL PAIN: Status: ACTIVE | Noted: 2018-01-01

## 2018-09-28 NOTE — PROGRESS NOTES
Patient: Kenzie Enamorado MRN: [id number]  SSN: [id number] YOB: 1965  Age: 46 y.o. Sex: male DIAGNOSIS:  Adenocarcinoma of the rectum, T3N2M0, Stage III 
 
SITE TREATED AND DOSE DELIVERED:  Pelvis has received 4500 cGy of 4500 cGy in 25/25 fractions. Boost has received 540 cGy in 3 fractions. SUBJECTIVE:  Kenzie Enamorado is a 46 y.o. male being treated for Stage III rectal adenocarcinoma. He is doing reasonably well. He has the frequent urge for bowel movements with discharge of blood, mucous, other fecal material despite colostomy. Week 2 worsening fecal urgency. Less blood, but continued fecal material, small amounts. Also thinks that he has an aggravated hemorrhoid that is making urgency worse. Week 3 increased zeferino-anal pain and dysuria. Continued frequent urgent small bowel movements. Week 4 decreased small urgent bowel movements, but increased nocturia and daytime urinary frequency. Continued mild to moderate perianal discomfort. Week 5-6 no significant changes. OBJECTIVE:  No findings There were no vitals taken for this visit. Lab Results Component Value Date/Time Sodium 139 09/25/2018 07:55 AM  
 Potassium 3.3 (L) 09/25/2018 07:55 AM  
 Chloride 102 09/25/2018 07:55 AM  
 CO2 31 09/25/2018 07:55 AM  
 Anion gap 6 (L) 09/25/2018 07:55 AM  
 Glucose 86 09/25/2018 07:55 AM  
 BUN 5 (L) 09/25/2018 07:55 AM  
 Creatinine 0.83 09/25/2018 07:55 AM  
 GFR est AA >60 09/25/2018 07:55 AM  
 GFR est non-AA >60 09/25/2018 07:55 AM  
 Calcium 8.6 09/25/2018 07:55 AM  
 Magnesium 2.3 09/25/2018 07:55 AM  
 Albumin 3.3 (L) 09/25/2018 07:55 AM  
 Protein, total 7.4 09/25/2018 07:55 AM  
 Globulin 4.1 (H) 09/25/2018 07:55 AM  
 A-G Ratio 0.8 (L) 09/25/2018 07:55 AM  
 AST (SGOT) 14 (L) 09/25/2018 07:55 AM  
 ALT (SGPT) 20 09/25/2018 07:55 AM  
 
Lab Results Component Value Date/Time  WBC 6.0 09/25/2018 07:55 AM  
 HGB 15.3 09/25/2018 07:55 AM  
 HCT 44.9 09/25/2018 07:55 AM  
 PLATELET 564 12/92/9972 07:55 AM  
 
 
ASSESSMENT and PLAN:  Sonam Owens is tolerating radiation as anticipated for the current dose and fraction. He completed treatment today and will return for follow-up in one month. He will see his surgeon on Monday. He is scheduled for repeat imaging on 10/17/18. Martha Acharya MD  
September 28, 2018

## 2018-10-01 PROBLEM — Z92.3 S/P RADIATION THERAPY < 4 WEEKS AGO: Status: ACTIVE | Noted: 2018-01-01

## 2018-10-12 PROBLEM — K62.89 RECTAL PAIN: Status: RESOLVED | Noted: 2018-01-01 | Resolved: 2018-01-01

## 2018-10-12 PROBLEM — D75.1 POLYCYTHEMIA SECONDARY TO SMOKING: Status: RESOLVED | Noted: 2018-05-30 | Resolved: 2018-01-01

## 2018-10-12 PROBLEM — D50.0 IRON DEFICIENCY ANEMIA DUE TO CHRONIC BLOOD LOSS: Status: RESOLVED | Noted: 2018-07-26 | Resolved: 2018-01-01

## 2018-10-24 NOTE — PROGRESS NOTES
Patient: Francisco Downey MRN: [id number]  SSN: [id number] YOB: 1965  Age: 46 y.o. Sex: male Other Providers:    MD Linda Schmitt MD 
 
CHIEF COMPLAINT: Rectal cancer DIAGNOSIS: Adenocarcinoma of the rectum, T3N2M0, Stage III 
 
PREVIOUS TREATMENT:   
1) Flexible sigmoidoscopy w/biopsy, 06/26/18 2) Right transverse diverting colostomy, 6/29/2018 3) Radiation therapy of the pelvis. DOSE: 4500 cGy in 25 fractions pelvis, 1000 cGy in 5 fractions pelvis boost. Total 5500 cGy. Treatment: 8/20/2018 - 9/28/2018. HISTORY OF PRESENT ILLNESS:  Francisco Downey is a 46 y.o. male who was initially seen by Dr. Luis Sanchez at the request of Dr. Hudson Vivar regarding the role of radiation therapy in treatment of this locally advanced rectal adenocarcinoma. He has a medical history significant for GERD and prior bilateral inguinal hernia repair. He is a former smoker, recently quit. He drinks 1-2 beers daily. Over the few months prior to diagnosis he noted bright red blood per rectum and change in bowel habits with frequent, small caliber stools. He was seen by GI and was scheduled for colonoscopy. However, following his bowel prep he passed only a small amount of stool, but began vomiting. He was seen at the ER on 06/26/18 where a CT scan showed fluid-filled large and small bowel loops with increased soft tissue suggesting an obstructing high rectal cancer. Flexible sigmoidoscopy on 06/26/18 showed a completely obstructing circumferential mass at 5-6 cm from the anal verge which could not be traversed. Extensive biopsies were taken. However, final pathology showed only fragments of hyperplastic polyp with ulceration and inflammation. On 06/29/18 he underwent right transverse diverting colostomy under the direction of Dr. Jessy Oglesby. Additional biopsies were taken at that time.   Pathology  Revealed poorly differentiated colorectal adenocarcinoma. Peritoneal fluid was also sent, which revealed only rare atypical cells. No obvious peritoneal disease was seen. Pet/CT scan on 07/19/18 showed hypermetabolic mass in the proximal rectum. In addition, multiple pelvic lymph nodes were suspicious for regional lymph node metastases. Pelvic MRI on 07/23/18 showed the rectal tumor approximately 8 cm in length with extension through the muscularis wall into the perirectal fat on the right. Lymph nodes were seen both within and lateral to the mesorectal fascia. Clinical staging was T3N2M0. He was seen by Dr. Bahman Nogueira and was recommended to undergo pre-operative chemoradiotherapy. According to Dr. Cheung , surgery will likely consist of a low anterior resection with low rectal anastomosis. He recovered well from transverse colostomy for decompression. Staples removed on 07/17/18. Colostomy functioning well. Patient denies systemic complaints. He continued to pass some fecal material. He had nocturia x4 which was longstanding. He had slow urinary stream and significant urinary hesitancy. INTERVAL HISTORY: Ms King Cramer returns today 1 month after completing radiation therapy of the pelvis for his rectal cancer. He tolerated chemoradiation fairly well. He reports having a weak urinary flow. He also reports that he is able to have an ejaculation but no semen. He states \"It feels like I have turnicat down there. \" His colostomy is functioning well. His energy continues to improve. He met with Dr. Елена Burns and is scheduled for his surgery on 11/15/18. PAST MEDICAL HISTORY:   
Past Medical History:  
Diagnosis Date  Chest pain 8/18/2016  Colon cancer ([address]  GERD (gastroesophageal reflux disease)  Iron deficiency anemia due to chronic blood loss 7/26/2018  Tobacco abuse 8/18/2016 The patient denies history of collagen vascular diseases, pacemaker insertion, prior radiation or prior chemotherapy.   
 
PAST SURGICAL HISTORY:  
 Past Surgical History:  
Procedure Laterality Date  HX HERNIA REPAIR    
 HX ORTHOPAEDIC    
 arm MEDICATIONS:  
 
Current Outpatient Medications:  
  carvedilol (COREG) 3.125 mg tablet, Take 1 Tab by mouth two (2) times daily (with meals). , Disp: 60 Tab, Rfl: 11 ALLERGIES:  
Allergies Allergen Reactions  Zithromax [Azithromycin] Rash SOCIAL HISTORY:  
Social History Socioeconomic History  Marital status:  Spouse name: Not on file  Number of children: Not on file  Years of education: Not on file  Highest education level: Not on file Social Needs  Financial resource strain: Not on file  Food insecurity - worry: Not on file  Food insecurity - inability: Not on file  Transportation needs - medical: Not on file  Transportation needs - non-medical: Not on file Occupational History  Not on file Tobacco Use  Smoking status: Former Smoker Packs/day: 1.00 Years: 30.00 Pack years: 30.00 Types: Cigarettes Start date: 1988 Last attempt to quit: 2018 Years since quittin.3  Smokeless tobacco: Never Used Substance and Sexual Activity  Alcohol use: Yes Alcohol/week: 8.4 oz Types: 14 Cans of beer per week  Drug use: No  
 Sexual activity: Not on file Other Topics Concern  Not on file Social History Narrative  Not on file FAMILY HISTORY:  
Family History Problem Relation Age of Onset  Hypertension Mother  Heart Disease Mother  No Known Problems Father REVIEW OF SYSTEMS: Please see the completed review of systems sheet in the chart that I have reviewed today. PHYSICAL EXAMINATION:  
ECOG Performance status 0 
VITAL SIGNS: blood pressure 160/83  Pulse 84  Temperature 98.8  Weight 155.8 GENERAL: The patient is well-developed, ambulatory, alert and in no acute distress.   
 
PATHOLOGY:   
 
18:  
 DIAGNOSIS  
 RECTAL MASS: POORLY DIFFERENTIATED ADENOCARCINOMA. SEE IMMUNOHISTOHEMISTRY REPORT AND COMMENT. Comment Case discussed with Dr. Moisés Potter on 7/5/18.  
tls/7/2/2018 Electronically signed out on 7/6/2018 12:45 by Lisa Dowell. Audrey Nath M.D., Ph.D.  
[facility]-IMMUNOHISTOCHEMISTRY Status: Signed Out Nikita Nath M.D., Ph.D. on 7/6/2018 Interpretation Immunohistochemical Stain Panel: Interpretation: Immunohistochemical slides demonstrate a focus of poorly differentiated tumor showing a cytokeratin 7 negative, cytokeratin 20 positive immunoprofile. Nuclear CDX2 immunoreactivity is also identified. The immunohistochemical features are consistent with a poorly differentiated colorectal adenocarcinoma. Clinical and radiologic correlation is recommended. Findings discussed with Dr. Moisés Potter on 7/5/18. Antibody/Test Marker For Result Cytokeratin 7 Lung, breast, upper GI, serous ovary Negative Cytokeratin 20 Colon, mucinous ovary, urothelium Positive Pancytokeratin (AE1/AE3) Broad spectrum epithelial marker Positive, nuclear pattern S-100 Melanoma No significant staining in  
area of question Synaptophysin Neural and neuroendocrine tumors Negative CDX2 Gastrointestinal marker Positive, nuclear pattern Control sections stain appropriately. Consultant: Dr. Cuca Lainez  
 
06/29/18:  
 DIAGNOSIS Peritoneal Fluid:  
RARE ATYPICAL CELLS PRESENT. Cell block: Paucicellular, non-contributory. 06/27/18:  
 DIAGNOSIS  
RECTAL MASS BIOPSIES: FRAGMENTS OF HYPERPLASTIC POLYP, FOCALLY ULCERATED AND INFLAMED. SEE COMMENT. Comment Often hyperplastic large intestinal type mucosa is present at edge of neoplastic mass. If these biopsy fragments represent only a small portion of a much larger mass, unsampled mass could have areas of in situ or even infiltrating carcinoma. Case discussed with Dr. Nata Guevara on 6/ 29/ 18 at approximately 0910 hours. LABORATORY:  
Lab Results Component Value Date/Time Sodium 139 09/25/2018 07:55 AM  
 Potassium 3.3 (L) 09/25/2018 07:55 AM  
 Chloride 102 09/25/2018 07:55 AM  
 CO2 31 09/25/2018 07:55 AM  
 Anion gap 6 (L) 09/25/2018 07:55 AM  
 Glucose 86 09/25/2018 07:55 AM  
 BUN 5 (L) 09/25/2018 07:55 AM  
 Creatinine 0.83 09/25/2018 07:55 AM  
 GFR est AA >60 09/25/2018 07:55 AM  
 GFR est non-AA >60 09/25/2018 07:55 AM  
 Calcium 8.6 09/25/2018 07:55 AM  
 Magnesium 2.3 09/25/2018 07:55 AM  
 Albumin 3.3 (L) 09/25/2018 07:55 AM  
 Protein, total 7.4 09/25/2018 07:55 AM  
 Globulin 4.1 (H) 09/25/2018 07:55 AM  
 A-G Ratio 0.8 (L) 09/25/2018 07:55 AM  
 AST (SGOT) 14 (L) 09/25/2018 07:55 AM  
 ALT (SGPT) 20 09/25/2018 07:55 AM  
 
Lab Results Component Value Date/Time WBC 6.0 09/25/2018 07:55 AM  
 HGB 15.3 09/25/2018 07:55 AM  
 HCT 44.9 09/25/2018 07:55 AM  
 PLATELET 306 10/38/4560 07:55 AM  
 
RADIOLOGY:   
 
7/23/2018: Alesha GEORGES [facility] MRI PELVIS WITH CONTRAST. HISTORY: Rectal cancer, staging exam. Recent PET CTs reviewed. TECHNIQUE: 1 mg glucagon was given intramuscularly prior to the procedure to reduce rectal peristalsis motion artifact. Axial, sagittal, coronal T2; axial T1; axial fat-saturated T1 images were followed by followed by 13 cc intravenous gadolinium, following which fat-saturated axial images through the rectum are repeated. FINDINGS:Rectal thickening consistent with primary tumor extends over about an 8 cm length. I believe it extends to within 3 or 4 cm of the levator ani insertion. Margins are indistinct, especially along the right lateral margin. There are enhancing lymph nodes in the perirectal fascia which measure up to 18 mm. There are 11 mm enhancing lymph nodes just lateral to the mesorectal fascia on the right. Prostate and seminal vesicles grossly unremarkable. Urinary bladder is unremarkable. No gross bony lesions. No free fluid.   
 
IMPRESSION: Rectal tumor, approximately 8 cm in length with extension through the muscular wall into the perirectal fat on the right, T3. The tumor extends to within 3 or 4 cm of the levator ani insertion. Lymph nodes both within and lateral to the mesorectal fascia, probably N2.   
 
7/19/2018: Pet/ct Tumor Image Skull Thigh W (ini) Nuclear Medicine Whole Body CT / PET- FDG Study 7/19/2018 3:02 PM INDICATION: Staging for adenocarcinoma of the rectum COMPARISON: CT abdomen and pelvis 6/26/2018 TECHNIQUE:   Radiopharmaceutical: 15.88 mCi F18-FDG IV. This is a whole-body PET- FDG study performed on a dedicated full- ring scanner. Low dose, nondiagnostic CT axial source images are also acquired for PET attenuation correction and are utilized for PET-CT fusion with the emission images. The patient is not a diabetic and underwent adequate fasting of at least 4 hours. Blood glucose at the time of tracer administration is 82 mg/dl, which is adequate for imaging. Approximately 60 minutes after administration of the radiopharmaceutical imaging was initiated covering the skull to the thighs. SUV max. Calculated from patient body wt. Noncaloric MDGASTROVIEW dilute oral contrast is given. FINDINGS:  Examination of the 3D tomographic PET images demonstrates normal physiologic FDG tracer activity in the head and neck. In the chest there is normal physiologic FDG tracer activity and no suspicious hypermetabolic lymph node, or lung nodule. There are thyroid nodular changes. Below the diaphragm within the abdomen and pelvis in the abdomen there is no abnormal hypermetabolic lymph node, or liver lesion. There are changes of recent surgery with left hemicolectomy and a colostomy site on the right. Expected nonfocal low intensity surgery FDG activity is seen with the surgery changes. There is incidental bilateral mild adrenal thickening, no abnormal FDG activity at these. In the pelvis at the proximal rectum there is a clearly hypermetabolic lesion.  SUV Max for this is 7.3. This lesion measures 5.9 cm craniocaudal and has greatest transverse dimensions of 4.3 x 2.4 cm. There are multiple lymph nodes in the mesocolon fat and several of these also show increased FDG uptake-1 is well evident at the near the right pelvic sidewall. There is incidental low level uptake compatible with prior inguinal hernia repairs. No suspicious skeletal lesion. IMPRESSION: 1. Hypermetabolic mass well evident in the proximal rectum compatible with a rectal carcinoma. There are multiple pelvic lymph nodes, larger ones show associated FDG uptake as well-suspicious for regional lymph node metastases. No evidence of distant metastatic disease on this exam.  
 
6/26/2018: Ct Abd Pelv W Cont CT ABDOMEN AND PELVIS WITH CONTRAST. HISTORY: Abdominal pain and cramping. Blood in stool. COMPARISON: None TECHNIQUE: 5 mm axial scans from above the diaphragms to the pubic symphysis following oral and 100 cc intravenous contrast without acute complication. Intravenous contrast was given to increase the sensitivity to acute inflammation. Radiation dose reduction techniques were used for this study. Our CT scanners use one or more of the following: Automated exposure control, adjustment of the mA and or kV according to patient size, iterative reconstruction. FINDINGS: Abdomen: No free air. The lung bases are clear. There is fluid in the distal esophagus, probably reflux. Oral contrast has not left the stomach. Small bowel loops are fluid-filled and mildly dilated throughout. No transition zone appreciated. Large bowel loops are also fluid-filled. Small hypoattenuating foci in the liver, probably subcentimeter cysts. The spleen is small and homogeneous. . No calcified gallstones. The biliary tree is not dilated. The pancreas is unremarkable. No free fluid, acute inflammatory changes or adenopathy. The kidneys enhance uniformly. No radiopaque renal calculi. No hydronephrosis.  The adrenal glands are normal size. Aorta is normal caliber. Pelvis: The colon is fluid-filled down to the level of the rectosigmoid where there is increased soft tissue suggesting an obstructing high rectal mass. The urinary bladder unremarkable. The prostate is mildly enlarged. Seminal vesicles symmetric. IMPRESSION:  Fluid-filled small and large bowel loops with increased soft tissue suggesting an obstructing high rectal cancer. CT of the Chest, Abdomen, and Pelvis 10/17/2018 INDICATION:  Rectal cancer Compared with 06/26/2018. FINDINGS:  
Chest CT: The lungs are clear.  No masses or infiltrates are seen. Madolyn Flake is no  
effusion. Madolyn Flake is no significant adenopathy.  There are no bony lesions. Abdomen CT: There are 3 stable low-attenuation lesions in the liver, probably  
benign cysts.  Largest is in the caudate lobe, 8 mm in diameter.  No new or  
enlarging liver lesions are seen.  The adrenal glands and pancreas appear  
normal.  There is normal enhancement of the kidneys. There is no hydronephrosis.  
Madolyn Flake is a single small 6 mm lymph node anterior to the distal IVC. Madolyn Flake is a  
right-sided ostomy. Pelvis CT: The rectal mass is smaller but still present.  Ill-defined soft  
tissue is still noted in the right perihilar rectal fat consistent with local  
tumor extension. Madolyn Flake is an 18 mm soft tissue mass along the right pelvic  
sidewall, likely tumor.  Patient has also developed bilateral common iliac  
adenopathy.  The right-sided node measures 2.6 x 1.5 cm.  There are no bony  
lesions. IMPRESSION:    
1.  Persistent but smaller rectal tumor. 2.  Increased right pelvic sidewall, common iliac, and retroperitoneal  
adenopathy. IMPRESSION:  Mor Pastrana is a 46 y.o. male with Adenocarcinoma of the rectum, T3N2M0, Stage III. He completed radiation, 9/28/2018.  Post radiation CT shows persistent but smaller rectal tumor with increased right pelvic sidewall, common iliac and retroperitoneal adenopathy. He is scheduled for a colonoscopy on November 1st and his surgery on November 15th with Dr. Billy Hernandez, colon and rectal surgeon. PLAN: 
1) Discussed potential side effects related to radiation of the pelvis such as anejaculation. 2) Scheduled for surgery 11/15. I will see him again in 3 months. 3) I spent 30 minutes in the care of Mr Clara Cowart today, over 75% of which was in direct counseling and ongoing management of his rectal cancer. All questions were answered to the best of my ability. Santana Avitia NP October 24, 2018 Portions of this note were copied from prior encounters and reviewed for accuracy, currency, and represent documentation and tasks completed during this encounter. I verify and attest these portions to be unchanged from prior visits.

## 2018-10-24 NOTE — NURSE NAVIGATOR
F/u Rectal cancer. Apt with  Dr. Nahid Bravo today. Colostomy 6-29-18. S/p Xeloda/RT. RT end 9-28-18. CT C/A/P 10-17-18. Pt scheduled for colonoscopy and surgery to follow.  
 
Michael Easton RN

## 2018-10-25 NOTE — PROGRESS NOTES
Spoke with Dr Kary Ross and then called Dr Halbert Canavan and colonoscopy and surgery will be cancelled.  Spoke with Karyle Lied

## 2018-10-25 NOTE — PROGRESS NOTES
Pre-assessment call completed. Notified of arrival time. Allergies and medical history verified. Informed patient to have nothing to eat or drink after midnight prior to procedure. Patient aware to have a .

## 2018-10-25 NOTE — PROGRESS NOTES
Saw patient on 10-24-18 with Dr Elvia Mcknight. Dr Elvia Mcknight told that he had talked with Dr Stefano Garrison and he will need to cancel surgery for now and start chemo: FOLFOZ/Avastin. Chemo/radiation has not helped as much as hoped and tumor is trying to even progress. Antinausea premeds were sent to pharm and chemo ed/FC arranged. Pt will have PET and port placed.     Called pt today and made sure he was aware of port time of arrival, PET time of arrival and chemo start on 11-18

## 2018-10-29 NOTE — ANESTHESIA PREPROCEDURE EVALUATION
Anesthetic History No history of anesthetic complications Review of Systems / Medical History Pertinent labs reviewed Pulmonary Smoker Neuro/Psych Within defined limits Cardiovascular Hypertension Exercise tolerance: >4 METS 
  
GI/Hepatic/Renal 
  
GERD: well controlled Endo/Other Cancer (colon/rectal) and anemia Other Findings Physical Exam 
 
Airway Mallampati: II 
TM Distance: 4 - 6 cm Neck ROM: normal range of motion Mouth opening: Normal 
 
 Cardiovascular Regular rate and rhythm,  S1 and S2 normal,  no murmur, click, rub, or gallop Dental 
No notable dental hx Pulmonary Breath sounds clear to auscultation Abdominal 
GI exam deferred Other Findings Anesthetic Plan ASA: 2 Anesthesia type: total IV anesthesia Induction: Intravenous Anesthetic plan and risks discussed with: Patient and Spouse

## 2018-10-29 NOTE — H&P
Department of Interventional Radiology  [phone]    History and Physical    Patient:  Stephanie Maria MRN:  [id number]  SSN:  [id number]    YOB: 1965  Age:  46 y.o. Sex:  male      Primary Care Provider:  Compa Perez DO  Referring Physician:  Adan Bass MD    Subjective:     Chief Complaint: port    History of the Present Illness: The patient is a 46 y.o. male who presents for venous chest port placement. Rectal cancer. C/o chronic dry scaly red rash right pretibial-self treating with OTC creams and ointments not helpful. NPO. Past Medical History:   Diagnosis Date    Chest pain 8/18/2016    Colon cancer ([address]     GERD (gastroesophageal reflux disease)     resolved per pt-- prn OTC meds    Hypertension     no current treatment, states Coreg dropped BP too much and is not taking    Iron deficiency anemia due to chronic blood loss 07/26/2018    denies hx of blood transfusion    PONV (postoperative nausea and vomiting)     Rectal cancer ([address]     Tobacco abuse 08/18/2016    Quit 6/28/18--- 1 ppd x 30 yrs     Past Surgical History:   Procedure Laterality Date    HX HERNIA REPAIR  2003    VASCULAR SURGERY PROCEDURE UNLIST Right     artery tied off after accident        Review of Systems:    Pertinent items are noted in the History of Present Illness. Current Facility-Administered Medications   Medication Dose Route Frequency    lidocaine (XYLOCAINE) 20 mg/mL (2 %) injection  mg   mg IntraDERMal ONCE    heparin (PF) 2 units/ml in NS infusion 2,000 Units  1,000 mL Irrigation Multiple    heparin (porcine) 100 unit/mL injection 500 Units  500 Units InterCATHeter ONCE    ceFAZolin (ANCEF) 2 g/20 mL in sterile water IV syringe  2 g IntraVENous ONCE     Current Outpatient Medications   Medication Sig    prochlorperazine (COMPAZINE) 10 mg tablet Take 1 Tab by mouth every six (6) hours as needed.     ondansetron hcl (ZOFRAN) 8 mg tablet Take 1 Tab by mouth every eight (8) hours as needed for Nausea.  lidocaine-prilocaine (EMLA) topical cream Apply  to affected area as needed for Pain. Apply to port 30-45 min prior to port needle stick        Allergies   Allergen Reactions    Zithromax [Azithromycin] Rash       Family History   Problem Relation Age of Onset    Hypertension Mother     Heart Disease Mother      Social History     Tobacco Use    Smoking status: Former Smoker     Packs/day: 1.00     Years: 30.00     Pack years: 30.00     Types: Cigarettes     Start date: 1988     Last attempt to quit: 2018     Years since quittin.3    Smokeless tobacco: Never Used   Substance Use Topics    Alcohol use: Yes     Alcohol/week: 8.4 oz     Types: 14 Cans of beer per week        Objective:       Physical Examination:    Vitals:    10/29/18 1144   BP: 161/79   Pulse: 77   Resp: 20   Temp: 97.6 °F (36.4 °C)   SpO2: 99%   Weight: 70.3 kg (155 lb)     Blood pressure 161/79, pulse 77, temperature 97.6 °F (36.4 °C), resp. rate 20, weight 70.3 kg (155 lb), SpO2 99 %. HEART: regular rate and rhythm  LUNG: clear to auscultation bilaterally  ABDOMEN: normal findings: soft, non-tender  EXTREMITIES: warm, no edema, red, scaly dry patch right pretibial skin.     Laboratory:     Lab Results   Component Value Date/Time    Sodium 138 10/24/2018 03:30 PM    Sodium 139 2018 07:55 AM    Potassium 3.6 10/24/2018 03:30 PM    Potassium 3.3 (L) 2018 07:55 AM    Chloride 101 10/24/2018 03:30 PM    Chloride 102 2018 07:55 AM    CO2 30 10/24/2018 03:30 PM    CO2 31 2018 07:55 AM    Anion gap 7 10/24/2018 03:30 PM    Anion gap 6 (L) 2018 07:55 AM    Glucose 104 (H) 10/24/2018 03:30 PM    Glucose 86 2018 07:55 AM    BUN 6 10/24/2018 03:30 PM    BUN 5 (L) 2018 07:55 AM    Creatinine 0.83 10/24/2018 03:30 PM    Creatinine 0.83 2018 07:55 AM    GFR est AA >60 10/24/2018 03:30 PM    GFR est AA >60 2018 07:55 AM    GFR est non-AA >60 10/24/2018 03:30 PM    GFR est non-AA >60 09/25/2018 07:55 AM    Calcium 8.8 10/24/2018 03:30 PM    Calcium 8.6 09/25/2018 07:55 AM    Magnesium 2.3 09/25/2018 07:55 AM    Magnesium 2.2 09/18/2018 07:42 AM    Albumin 3.1 (L) 10/24/2018 03:30 PM    Albumin 3.3 (L) 09/25/2018 07:55 AM    Protein, total 7.5 10/24/2018 03:30 PM    Protein, total 7.4 09/25/2018 07:55 AM    Globulin 4.4 (H) 10/24/2018 03:30 PM    Globulin 4.1 (H) 09/25/2018 07:55 AM    A-G Ratio 0.7 (L) 10/24/2018 03:30 PM    A-G Ratio 0.8 (L) 09/25/2018 07:55 AM    AST (SGOT) 17 10/24/2018 03:30 PM    AST (SGOT) 14 (L) 09/25/2018 07:55 AM    ALT (SGPT) 23 10/24/2018 03:30 PM    ALT (SGPT) 20 09/25/2018 07:55 AM     Lab Results   Component Value Date/Time    WBC 7.3 10/24/2018 03:30 PM    WBC 6.0 09/25/2018 07:55 AM    HGB 14.3 10/24/2018 03:30 PM    HGB 15.3 09/25/2018 07:55 AM    HCT 42.8 10/24/2018 03:30 PM    HCT 44.9 09/25/2018 07:55 AM    PLATELET 107 88/87/5562 03:30 PM    PLATELET 219 63/98/2317 07:55 AM     No results found for: APTT, PTP, INR    Assessment:     Rectal cancer    Plan:     Planned Procedure:  Port placement  Pt will call PCP soon about RLE rash    Risks, benefits, and alternatives reviewed with patient and he agrees to proceed with the procedure.       Signed By: Wallace Way PA-C     October 29, 2018

## 2018-10-29 NOTE — DISCHARGE INSTRUCTIONS
[address]  Department of Interventional Radiology  [facility]  [phone] Office  [phone] Fax  Implanted Port Discharge Instructions      General Instructions:   A port is like an implanted IV. They are usually ordered for patients who will be getting chemotherapy, but can also be used as an IV for long term antibiotics, large amounts of fluids, and/or blood products. Your blood can be drawn from your port for labs also. Those patients who do not have good veins find the ports convenient as they can get the IV they need with one stick. The port can be used long term, and the care is easy. The device is under the skin, and once the skin heals, care is minimal. All that is required is the nurse who accesses the port will need to flush it with heparinized saline after each use. Ports are usually placed in the chest wall, usually on the right side. But they can be place in the arms and in the abdomen. Home Care Instructions: If your port is in your arm, do not allow blood pressure or other IVs to be place in that arm. Do not allow bra straps or any clothing to rub the skin over the port. Do not bathe or swim until the skin has healed and if the port is accessed. Once it is healed, and when the port is not accessed, it is okay to bathe and swim. Restrict yourself to light activity for the first 5 days after getting the port put in, after that, resume normal activity slowly. You may resume your normal diet and medications. Follow-Up Instructions: Please see your oncologist, or whatever physician ordered the port as he/she has requested of you. Call If: You should call your Physician and/or the Radiology Nurse if you notice redness, pus, swelling, or pain from the area of your incision. Call if you should develop a fever. The nurses who access your port will know to call your doctor if the port does not seem to be working properly. You need to tell the nurses who use the port if you should have any pain or swelling at the site during an infusion. To Reach Us: If you have any questions about your procedure, please call the Interventional Radiology department at [phone]. After business hours (5pm) and weekends, call the answering service at [phone] and ask for the Radiologist on call to be paged. Si tiene Preguntas acerca del procedimiento, por favor llame al departamento de Radiología Intervencional al [phone]. Después de horas de oficina (5 pm) y los fines de [place], llamar al [place] de llamadas al [phone] y pregunte por el Radiologo de [facility].          Patient Signature:  Date: 10/29/2018  Discharging Nurse: Christen Patel RN

## 2018-10-29 NOTE — ANESTHESIA POSTPROCEDURE EVALUATION
Procedure(s): 
IR ANESTHESIA- PORT PLACEMENT/ TIRSO TO DO. Anesthesia Post Evaluation Patient location during evaluation: PACU Patient participation: complete - patient participated Level of consciousness: awake Pain management: satisfactory to patient Airway patency: patent Anesthetic complications: no 
Cardiovascular status: hemodynamically stable Respiratory status: spontaneous ventilation Hydration status: euvolemic Comments: Nausea adequately controlled. Visit Vitals /75 Pulse 82 Temp 36.6 °C (97.8 °F) Resp 20 Wt 70.3 kg (155 lb) SpO2 99% BMI 23.22 kg/m²

## 2018-10-29 NOTE — PROCEDURES
Department of Interventional Radiology  [phone]        Interventional Radiology Brief Procedure Note    Patient: Gonzales Olivarez MRN: [id number]  SSN: [id number]    YOB: 1965  Age: 46 y.o.   Sex: male      Date of Procedure: 10/29/2018    Pre-Procedure Diagnosis: rectal cancer    Post-Procedure Diagnosis: SAME    Procedure(s): Venous Chest Port Placement    Brief Description of Procedure: US, fluoro guided right IJ venous chest port placed    Performed By: Jose Herman PA-C     Assistants: None    Anesthesia:TIVS/MAC    Estimated Blood Loss: Less than 10ml    Specimens: None    Implants: Subcutaneous Port    Findings: catheter tip in right atrium     Complications: None    Recommendations: ok to use port     Follow Up: referring MD    Signed By: Jose Herman PA-C     October 29, 2018

## 2018-11-01 NOTE — PROGRESS NOTES
I spoke with Mr. Sherry Asher regarding his insurance, supportive organizations, and who to call if he has any problems with oral prescriptions. Mr. Sherry Asher has had a financial counseling session in the past few months, so I reviewed the information with him. Regarding his insurance, he has met his $750 deducible and $5,000 max OP for the year. He said that he spoke with one of the financial coordinators at [facility] about hospital financial assistance. He was told that he is over the income limit to qualify.

## 2018-11-07 NOTE — PROGRESS NOTES
Saw patient on 11-6-18 with his niece and wife present. He will start Avastin/5FU/Leucovorin and Oxaliplatin on 11-7-18. He will also have flu vaccine in infusion. We reviewed possible side effects and pt states he is ready for start. Dr Gee Singer mentioned getting a thyroid U/S but will hold off for now with U/S and ENT referral. Pt encouraged to call with any questions or concerns.

## 2018-11-07 NOTE — PROGRESS NOTES
Arrived to the [facility]. D1C1 Avastin and Folfiri completed.  Patient tolerated well   Any issues or concerns during appointment:Patient very anxious-emotional support provided  Patient aware of next infusion appointment on Friday,November 9th @ 1315  Discharged home ambulatory accompanied by wife

## 2018-11-09 NOTE — PROGRESS NOTES
Pt. Discharged ambulatory. Continuous chemotherapy pump removed per protocol. No complaints or distress noted. To call physician with any problems or concerns. Understanding voiced.

## 2018-11-21 NOTE — PROGRESS NOTES
Arrived to the [facility]. chemo completed. Patient tolerated well. Any issues or concerns during appointment: no.  Patient aware of next infusion appointment on 11/23 (date) at 3 (time). Discharged home.

## 2018-11-23 NOTE — PROGRESS NOTES
Arrived to the [facility]. 5 FU pump completed. Patient tolerated well Any issues or concerns during appointment: No 
Patient aware of next infusion appointment on [address] @ 1100 Discharged ambulatory with family

## 2018-12-05 NOTE — PROGRESS NOTES
[facility] Assessment complete, labs reviewed. Potassium, Avastin, Oxaliplatin, Leucovorin, and Adrucil push completed. Adrucil CADD cassette infusing prior to discharge. Patient tolerated without problems. Any issues or concerns during appointment: None. Patient aware of next infusion appointment on 12/7/2018 (date) at 1600 (time). Discharged ambulatory.

## 2018-12-07 NOTE — PROGRESS NOTES
Arrived to the [facility]. Continuous chemo pump discontinued. Port flushed and de-accessed per protocol. Patient tolerated well. Any issues or concerns during appointment: none. Patient aware of next infusion appointment on 12/19/18 at 0900. Discharged ambulatory.       Lilliam Lacy RN

## 2018-12-19 NOTE — PROGRESS NOTES
Per Dr. Simeon Martin administer metoprolol 50mg po at this time. May begin folfiri at this time and administer avastin when BP decreases.

## 2018-12-19 NOTE — PROGRESS NOTES
Problem: Chemotherapy Treatment  Goal: *Chemotherapy regimen followed  Outcome: Progressing Towards Goal  Verbalizes/demonstrates understanding of purpose/procedure/potential side effects of avastin/oxaliplatin/leucovorin/fluorouracil.

## 2018-12-20 NOTE — PROGRESS NOTES
Pt arrived ambulatory today at 0900, to receive IV chemotherapy. Blood pressure outside of parameters, called to Dr. Simeon Martin, orders for metoprolol received and given with OK to proceed with chemotherapy, start with FOLFOX while waiting for b/p to decrease. B/P didn't respond to meds and orders to hold/stop today's dose of avastin. (Pt to follow up with primary MD for b/p care)  Pt monitored for one hour post chemotherapy per Dr. Simeon Martin, as pt felt dizzy and faint after last chemotherapy, went to car and stayed around til feeling safe to drive home-pt reported to MD.  Pt tolerated without difficulty, feeling mildly lightheaded, but vitals show no change and pt able to walk halls without problems. Discharged at this time, per choice, feeling \"fine. \"  Patient discharged via ambulatory accompanied by self. Instructed to notify physician of any problems, questions or concerns. Allowed opportunity for patient/family to ask questions. Verbalized understanding. Next appointment is Dec 21 at 1330 with Jan Presley.

## 2018-12-21 NOTE — PROGRESS NOTES
Arrived to the [facility]. Assessment completed. Patient's chemotherapy was disconnected from his port today, before discharge, and the port was flushed per protocol. Any issues or concerns during appointment: complains of \"jaw discomfort\" upon chewing food. He stated that this started yesterday. Call placed to Eleanor Lama NP. Informed her of this. She states that patient needs to call his family doctor or dentist if this gets worse. Informed patient of this and he verbalizes understanding. Patient aware of next infusion appointment on 1/3/19 (date) at 36 (time). Discharged ambulatory, per self  Patient instructed to call Dr. Majo Wahl' office immediately for any problems or concerns. He verbalizes understanding.

## 2019-01-01 ENCOUNTER — APPOINTMENT (OUTPATIENT)
Dept: RADIATION ONCOLOGY | Age: 54
End: 2019-01-01
Payer: COMMERCIAL

## 2019-01-01 ENCOUNTER — HOSPITAL ENCOUNTER (OUTPATIENT)
Dept: GENERAL RADIOLOGY | Age: 54
Discharge: HOME | End: 2019-07-11

## 2019-01-01 ENCOUNTER — HOSPITAL ENCOUNTER (OUTPATIENT)
Dept: LAB | Age: 54
Discharge: HOME | End: 2019-03-27
Payer: COMMERCIAL

## 2019-01-01 ENCOUNTER — HOSPITAL ENCOUNTER (OUTPATIENT)
Age: 54
Setting detail: OUTPATIENT SURGERY
End: 2019-01-01
Attending: UROLOGY | Admitting: UROLOGY
Payer: COMMERCIAL

## 2019-01-01 ENCOUNTER — HOSPITAL ENCOUNTER (OUTPATIENT)
Dept: INFUSION THERAPY | Age: 54
Discharge: HOME | End: 2019-05-10
Payer: COMMERCIAL

## 2019-01-01 ENCOUNTER — PATIENT OUTREACH (OUTPATIENT)
Dept: CASE MANAGEMENT | Age: 54
End: 2019-01-01

## 2019-01-01 ENCOUNTER — HOSPITAL ENCOUNTER (OUTPATIENT)
Dept: INFUSION THERAPY | Age: 54
Discharge: HOME | End: 2019-07-09
Payer: COMMERCIAL

## 2019-01-01 ENCOUNTER — HOSPITAL ENCOUNTER (OUTPATIENT)
Dept: INFUSION THERAPY | Age: 54
Discharge: HOME | End: 2019-05-22
Payer: COMMERCIAL

## 2019-01-01 ENCOUNTER — APPOINTMENT (OUTPATIENT)
Dept: GENERAL RADIOLOGY | Age: 54
DRG: 987 | End: 2019-01-01
Attending: INTERNAL MEDICINE
Payer: COMMERCIAL

## 2019-01-01 ENCOUNTER — HOSPITAL ENCOUNTER (OUTPATIENT)
Dept: INFUSION THERAPY | Age: 54
Discharge: HOME | End: 2019-03-29
Payer: COMMERCIAL

## 2019-01-01 ENCOUNTER — HOSPITAL ENCOUNTER (OUTPATIENT)
Dept: LAB | Age: 54
Discharge: HOME | End: 2019-06-19
Payer: COMMERCIAL

## 2019-01-01 ENCOUNTER — HOSPITAL ENCOUNTER (OUTPATIENT)
Dept: LAB | Age: 54
Discharge: HOME | End: 2019-05-08
Payer: COMMERCIAL

## 2019-01-01 ENCOUNTER — APPOINTMENT (OUTPATIENT)
Dept: RADIATION ONCOLOGY | Age: 54
End: 2019-01-01

## 2019-01-01 ENCOUNTER — HOSPITAL ENCOUNTER (OUTPATIENT)
Dept: LAB | Age: 54
Discharge: HOME | End: 2019-03-13
Payer: COMMERCIAL

## 2019-01-01 ENCOUNTER — HOSPITAL ENCOUNTER (OUTPATIENT)
Dept: INFUSION THERAPY | Age: 54
Discharge: HOME | End: 2019-07-15
Payer: COMMERCIAL

## 2019-01-01 ENCOUNTER — HOSPITAL ENCOUNTER (OUTPATIENT)
Dept: RADIATION ONCOLOGY | Age: 54
Discharge: HOME | End: 2019-07-29
Payer: COMMERCIAL

## 2019-01-01 ENCOUNTER — HOSPITAL ENCOUNTER (OUTPATIENT)
Dept: LAB | Age: 54
Discharge: HOME | End: 2019-01-30
Payer: COMMERCIAL

## 2019-01-01 ENCOUNTER — HOSPITAL ENCOUNTER (OUTPATIENT)
Dept: INFUSION THERAPY | Age: 54
Discharge: HOME | End: 2019-06-26
Payer: COMMERCIAL

## 2019-01-01 ENCOUNTER — HOSPITAL ENCOUNTER (OUTPATIENT)
Dept: INFUSION THERAPY | Age: 54
Discharge: HOME | End: 2019-03-13
Payer: COMMERCIAL

## 2019-01-01 ENCOUNTER — HOSPITAL ENCOUNTER (OUTPATIENT)
Dept: LAB | Age: 54
Discharge: HOME | End: 2019-01-16
Payer: COMMERCIAL

## 2019-01-01 ENCOUNTER — APPOINTMENT (OUTPATIENT)
Dept: GENERAL RADIOLOGY | Age: 54
DRG: 987 | End: 2019-01-01
Attending: NURSE PRACTITIONER
Payer: COMMERCIAL

## 2019-01-01 ENCOUNTER — HOSPITAL ENCOUNTER (OUTPATIENT)
Dept: INFUSION THERAPY | Age: 54
Discharge: HOME | End: 2019-07-17
Payer: COMMERCIAL

## 2019-01-01 ENCOUNTER — HOSPITAL ENCOUNTER (OUTPATIENT)
Dept: INFUSION THERAPY | Age: 54
Discharge: HOME | End: 2019-02-27
Payer: COMMERCIAL

## 2019-01-01 ENCOUNTER — HOSPITAL ENCOUNTER (OUTPATIENT)
Dept: INFUSION THERAPY | Age: 54
Discharge: HOME | End: 2019-01-18
Payer: COMMERCIAL

## 2019-01-01 ENCOUNTER — HOSPITAL ENCOUNTER (OUTPATIENT)
Dept: INFUSION THERAPY | Age: 54
Discharge: HOME | End: 2019-06-21
Payer: COMMERCIAL

## 2019-01-01 ENCOUNTER — HOSPITAL ENCOUNTER (OUTPATIENT)
Dept: INFUSION THERAPY | Age: 54
Discharge: HOME | End: 2019-01-05
Payer: COMMERCIAL

## 2019-01-01 ENCOUNTER — APPOINTMENT (OUTPATIENT)
Dept: GENERAL RADIOLOGY | Age: 54
DRG: 987 | End: 2019-01-01
Attending: UROLOGY
Payer: COMMERCIAL

## 2019-01-01 ENCOUNTER — HOSPITAL ENCOUNTER (OUTPATIENT)
Dept: LAB | Age: 54
Discharge: HOME | End: 2019-02-13
Payer: COMMERCIAL

## 2019-01-01 ENCOUNTER — HOSPITAL ENCOUNTER (OUTPATIENT)
Dept: RADIATION ONCOLOGY | Age: 54
End: 2019-01-01

## 2019-01-01 ENCOUNTER — APPOINTMENT (OUTPATIENT)
Dept: ULTRASOUND IMAGING | Age: 54
DRG: 987 | End: 2019-01-01
Attending: INTERNAL MEDICINE
Payer: COMMERCIAL

## 2019-01-01 ENCOUNTER — HOSPITAL ENCOUNTER (OUTPATIENT)
Dept: LAB | Age: 54
Discharge: HOME | End: 2019-02-21
Payer: COMMERCIAL

## 2019-01-01 ENCOUNTER — HOSPITAL ENCOUNTER (OUTPATIENT)
Age: 54
Setting detail: OUTPATIENT SURGERY
Discharge: HOME | End: 2019-06-11
Attending: INTERNAL MEDICINE | Admitting: INTERNAL MEDICINE
Payer: COMMERCIAL

## 2019-01-01 ENCOUNTER — ANESTHESIA (OUTPATIENT)
Dept: SURGERY | Age: 54
DRG: 987 | End: 2019-01-01
Payer: COMMERCIAL

## 2019-01-01 ENCOUNTER — APPOINTMENT (OUTPATIENT)
Dept: GENERAL RADIOLOGY | Age: 54
End: 2019-01-01
Attending: EMERGENCY MEDICINE
Payer: COMMERCIAL

## 2019-01-01 ENCOUNTER — APPOINTMENT (OUTPATIENT)
Dept: INFUSION THERAPY | Age: 54
End: 2019-01-01
Payer: COMMERCIAL

## 2019-01-01 ENCOUNTER — HOSPITAL ENCOUNTER (OUTPATIENT)
Dept: INFUSION THERAPY | Age: 54
Discharge: HOME | End: 2019-01-03
Payer: COMMERCIAL

## 2019-01-01 ENCOUNTER — HOSPITAL ENCOUNTER (OUTPATIENT)
Dept: INFUSION THERAPY | Age: 54
Discharge: HOME | End: 2019-06-07
Payer: COMMERCIAL

## 2019-01-01 ENCOUNTER — DOCUMENTATION ONLY (OUTPATIENT)
Dept: HEMATOLOGY | Age: 54
End: 2019-01-01

## 2019-01-01 ENCOUNTER — HOSPITAL ENCOUNTER (OUTPATIENT)
Dept: RADIATION ONCOLOGY | Age: 54
End: 2019-01-01
Payer: COMMERCIAL

## 2019-01-01 ENCOUNTER — HOSPITAL ENCOUNTER (OUTPATIENT)
Dept: INFUSION THERAPY | Age: 54
Discharge: HOME | End: 2019-04-10
Payer: COMMERCIAL

## 2019-01-01 ENCOUNTER — HOSPITAL ENCOUNTER (OUTPATIENT)
Dept: RADIATION ONCOLOGY | Age: 54
Discharge: HOME | End: 2019-07-29

## 2019-01-01 ENCOUNTER — ANESTHESIA (OUTPATIENT)
Dept: ENDOSCOPY | Age: 54
DRG: 987 | End: 2019-01-01
Payer: COMMERCIAL

## 2019-01-01 ENCOUNTER — HOSPITAL ENCOUNTER (OUTPATIENT)
Dept: INFUSION THERAPY | Age: 54
Discharge: HOME | End: 2019-04-24
Payer: COMMERCIAL

## 2019-01-01 ENCOUNTER — HOSPITAL ENCOUNTER (OUTPATIENT)
Dept: RADIATION ONCOLOGY | Age: 54
Discharge: HOME | End: 2019-07-30
Payer: COMMERCIAL

## 2019-01-01 ENCOUNTER — HOSPITAL ENCOUNTER (OUTPATIENT)
Dept: INFUSION THERAPY | Age: 54
Discharge: HOME | End: 2019-03-27
Payer: COMMERCIAL

## 2019-01-01 ENCOUNTER — HOSPITAL ENCOUNTER (OUTPATIENT)
Dept: INFUSION THERAPY | Age: 54
Discharge: HOME | End: 2019-03-15
Payer: COMMERCIAL

## 2019-01-01 ENCOUNTER — HOSPITAL ENCOUNTER (OUTPATIENT)
Dept: INFUSION THERAPY | Age: 54
Discharge: HOME | End: 2019-02-13
Payer: COMMERCIAL

## 2019-01-01 ENCOUNTER — ANESTHESIA EVENT (OUTPATIENT)
Dept: SURGERY | Age: 54
DRG: 987 | End: 2019-01-01
Payer: COMMERCIAL

## 2019-01-01 ENCOUNTER — HOSPITAL ENCOUNTER (INPATIENT)
Age: 54
LOS: 15 days | DRG: 987 | End: 2019-08-08
Attending: INTERNAL MEDICINE | Admitting: INTERNAL MEDICINE
Payer: COMMERCIAL

## 2019-01-01 ENCOUNTER — HOSPITAL ENCOUNTER (OUTPATIENT)
Dept: INFUSION THERAPY | Age: 54
Discharge: HOME | End: 2019-05-24
Payer: COMMERCIAL

## 2019-01-01 ENCOUNTER — HOSPITAL ENCOUNTER (OUTPATIENT)
Dept: INFUSION THERAPY | Age: 54
End: 2019-01-01

## 2019-01-01 ENCOUNTER — HOSPITAL ENCOUNTER (OUTPATIENT)
Dept: CT IMAGING | Age: 54
Discharge: HOME | End: 2019-05-14
Attending: UROLOGY
Payer: COMMERCIAL

## 2019-01-01 ENCOUNTER — HOSPITAL ENCOUNTER (OUTPATIENT)
Dept: RADIATION ONCOLOGY | Age: 54
Discharge: HOME | End: 2019-02-06
Payer: COMMERCIAL

## 2019-01-01 ENCOUNTER — HOSPITAL ENCOUNTER (OUTPATIENT)
Dept: INFUSION THERAPY | Age: 54
Discharge: HOME | End: 2019-01-30
Payer: COMMERCIAL

## 2019-01-01 ENCOUNTER — HOSPITAL ENCOUNTER (OUTPATIENT)
Dept: CT IMAGING | Age: 54
Discharge: HOME | End: 2019-05-08
Attending: INTERNAL MEDICINE

## 2019-01-01 ENCOUNTER — APPOINTMENT (OUTPATIENT)
Dept: INFUSION THERAPY | Age: 54
End: 2019-01-01

## 2019-01-01 ENCOUNTER — HOSPITAL ENCOUNTER (OUTPATIENT)
Dept: INFUSION THERAPY | Age: 54
Discharge: HOME | End: 2019-06-19
Payer: COMMERCIAL

## 2019-01-01 ENCOUNTER — HOSPITAL ENCOUNTER (OUTPATIENT)
Dept: INFUSION THERAPY | Age: 54
Discharge: HOME | End: 2019-03-01
Payer: COMMERCIAL

## 2019-01-01 ENCOUNTER — HOSPITAL ENCOUNTER (OUTPATIENT)
Dept: ULTRASOUND IMAGING | Age: 54
Discharge: HOME | End: 2019-06-20
Attending: INTERNAL MEDICINE
Payer: COMMERCIAL

## 2019-01-01 ENCOUNTER — HOSPITAL ENCOUNTER (OUTPATIENT)
Dept: INFUSION THERAPY | Age: 54
Discharge: HOME | End: 2019-07-11
Payer: COMMERCIAL

## 2019-01-01 ENCOUNTER — HOSPITAL ENCOUNTER (OUTPATIENT)
Dept: INFUSION THERAPY | Age: 54
Discharge: HOME | End: 2019-01-16
Payer: COMMERCIAL

## 2019-01-01 ENCOUNTER — HOSPITAL ENCOUNTER (OUTPATIENT)
Dept: LAB | Age: 54
Discharge: HOME | End: 2019-04-24
Payer: COMMERCIAL

## 2019-01-01 ENCOUNTER — HOSPITAL ENCOUNTER (OUTPATIENT)
Dept: LAB | Age: 54
Discharge: HOME | End: 2019-06-05
Payer: COMMERCIAL

## 2019-01-01 ENCOUNTER — HOSPITAL ENCOUNTER (OUTPATIENT)
Dept: LAB | Age: 54
Discharge: HOME | End: 2019-07-08
Payer: COMMERCIAL

## 2019-01-01 ENCOUNTER — HOSPITAL ENCOUNTER (OUTPATIENT)
Dept: LAB | Age: 54
Discharge: HOME | End: 2019-01-03
Payer: COMMERCIAL

## 2019-01-01 ENCOUNTER — APPOINTMENT (OUTPATIENT)
Dept: GENERAL RADIOLOGY | Age: 54
End: 2019-01-01
Attending: INTERNAL MEDICINE
Payer: COMMERCIAL

## 2019-01-01 ENCOUNTER — HOSPITAL ENCOUNTER (OUTPATIENT)
Dept: INFUSION THERAPY | Age: 54
Discharge: HOME | End: 2019-04-02
Payer: COMMERCIAL

## 2019-01-01 ENCOUNTER — APPOINTMENT (OUTPATIENT)
Dept: GENERAL RADIOLOGY | Age: 54
End: 2019-01-01
Attending: ANESTHESIOLOGY
Payer: COMMERCIAL

## 2019-01-01 ENCOUNTER — ANESTHESIA EVENT (OUTPATIENT)
Dept: ENDOSCOPY | Age: 54
DRG: 987 | End: 2019-01-01
Payer: COMMERCIAL

## 2019-01-01 ENCOUNTER — HOSPITAL ENCOUNTER (OUTPATIENT)
Dept: LAB | Age: 54
Discharge: HOME | End: 2019-04-10
Payer: COMMERCIAL

## 2019-01-01 ENCOUNTER — HOSPITAL ENCOUNTER (OUTPATIENT)
Dept: INFUSION THERAPY | Age: 54
Discharge: HOME | End: 2019-02-01
Payer: COMMERCIAL

## 2019-01-01 ENCOUNTER — HOSPITAL ENCOUNTER (OUTPATIENT)
Dept: LAB | Age: 54
Discharge: HOME | End: 2019-02-27
Payer: COMMERCIAL

## 2019-01-01 ENCOUNTER — HOSPITAL ENCOUNTER (OUTPATIENT)
Dept: INFUSION THERAPY | Age: 54
Discharge: HOME | End: 2019-06-05
Payer: COMMERCIAL

## 2019-01-01 ENCOUNTER — HOSPITAL ENCOUNTER (OUTPATIENT)
Dept: INFUSION THERAPY | Age: 54
Discharge: HOME | End: 2019-07-24
Payer: COMMERCIAL

## 2019-01-01 ENCOUNTER — HOSPITAL ENCOUNTER (OUTPATIENT)
Dept: LAB | Age: 54
Discharge: HOME | End: 2019-05-22
Payer: COMMERCIAL

## 2019-01-01 ENCOUNTER — HOSPITAL ENCOUNTER (OUTPATIENT)
Dept: INFUSION THERAPY | Age: 54
Discharge: HOME | End: 2019-04-26
Payer: COMMERCIAL

## 2019-01-01 ENCOUNTER — HOSPITAL ENCOUNTER (OUTPATIENT)
Dept: INFUSION THERAPY | Age: 54
Discharge: HOME | End: 2019-02-15
Payer: COMMERCIAL

## 2019-01-01 ENCOUNTER — HOSPITAL ENCOUNTER (OUTPATIENT)
Dept: LAB | Age: 54
Discharge: HOME | End: 2019-07-24
Payer: COMMERCIAL

## 2019-01-01 ENCOUNTER — HOSPITAL ENCOUNTER (OUTPATIENT)
Dept: ULTRASOUND IMAGING | Age: 54
Discharge: HOME | End: 2019-05-29
Attending: UROLOGY
Payer: COMMERCIAL

## 2019-01-01 ENCOUNTER — APPOINTMENT (OUTPATIENT)
Dept: CT IMAGING | Age: 54
DRG: 987 | End: 2019-01-01
Attending: NURSE PRACTITIONER
Payer: COMMERCIAL

## 2019-01-01 ENCOUNTER — HOSPITAL ENCOUNTER (EMERGENCY)
Age: 54
Discharge: HOME | End: 2019-03-23
Attending: EMERGENCY MEDICINE
Payer: COMMERCIAL

## 2019-01-01 ENCOUNTER — HOSPITAL ENCOUNTER (OUTPATIENT)
Dept: INFUSION THERAPY | Age: 54
Discharge: HOME | End: 2019-05-08
Payer: COMMERCIAL

## 2019-01-01 VITALS
RESPIRATION RATE: 16 BRPM | SYSTOLIC BLOOD PRESSURE: 184 MMHG | WEIGHT: 151 LBS | DIASTOLIC BLOOD PRESSURE: 84 MMHG | BODY MASS INDEX: 21.67 KG/M2 | HEART RATE: 66 BPM

## 2019-01-01 VITALS
OXYGEN SATURATION: 98 % | HEART RATE: 80 BPM | WEIGHT: 149.4 LBS | BODY MASS INDEX: 21.75 KG/M2 | SYSTOLIC BLOOD PRESSURE: 125 MMHG | RESPIRATION RATE: 18 BRPM | TEMPERATURE: 98 F | DIASTOLIC BLOOD PRESSURE: 72 MMHG

## 2019-01-01 VITALS
OXYGEN SATURATION: 98 % | RESPIRATION RATE: 18 BRPM | HEART RATE: 75 BPM | TEMPERATURE: 97.8 F | SYSTOLIC BLOOD PRESSURE: 159 MMHG | DIASTOLIC BLOOD PRESSURE: 76 MMHG

## 2019-01-01 VITALS
WEIGHT: 146.4 LBS | HEART RATE: 92 BPM | OXYGEN SATURATION: 100 % | RESPIRATION RATE: 18 BRPM | DIASTOLIC BLOOD PRESSURE: 70 MMHG | TEMPERATURE: 99.2 F | SYSTOLIC BLOOD PRESSURE: 138 MMHG | BODY MASS INDEX: 21.31 KG/M2

## 2019-01-01 VITALS
TEMPERATURE: 98.8 F | DIASTOLIC BLOOD PRESSURE: 93 MMHG | HEART RATE: 98 BPM | RESPIRATION RATE: 30 BRPM | SYSTOLIC BLOOD PRESSURE: 147 MMHG | OXYGEN SATURATION: 94 %

## 2019-01-01 VITALS
SYSTOLIC BLOOD PRESSURE: 138 MMHG | HEART RATE: 60 BPM | OXYGEN SATURATION: 100 % | RESPIRATION RATE: 18 BRPM | DIASTOLIC BLOOD PRESSURE: 84 MMHG | TEMPERATURE: 97.8 F | BODY MASS INDEX: 22.44 KG/M2 | WEIGHT: 156.4 LBS

## 2019-01-01 VITALS
TEMPERATURE: 98.4 F | OXYGEN SATURATION: 98 % | HEART RATE: 92 BPM | RESPIRATION RATE: 18 BRPM | BODY MASS INDEX: 23.45 KG/M2 | DIASTOLIC BLOOD PRESSURE: 91 MMHG | WEIGHT: 154.2 LBS | SYSTOLIC BLOOD PRESSURE: 140 MMHG

## 2019-01-01 VITALS
WEIGHT: 150 LBS | OXYGEN SATURATION: 97 % | DIASTOLIC BLOOD PRESSURE: 70 MMHG | RESPIRATION RATE: 18 BRPM | HEIGHT: 69 IN | BODY MASS INDEX: 22.22 KG/M2 | TEMPERATURE: 100.1 F | HEART RATE: 113 BPM | SYSTOLIC BLOOD PRESSURE: 114 MMHG

## 2019-01-01 VITALS
BODY MASS INDEX: 21.54 KG/M2 | HEIGHT: 70 IN | DIASTOLIC BLOOD PRESSURE: 78 MMHG | RESPIRATION RATE: 18 BRPM | HEART RATE: 88 BPM | SYSTOLIC BLOOD PRESSURE: 132 MMHG

## 2019-01-01 VITALS
BODY MASS INDEX: 21.81 KG/M2 | TEMPERATURE: 98.2 F | WEIGHT: 149.8 LBS | RESPIRATION RATE: 18 BRPM | DIASTOLIC BLOOD PRESSURE: 82 MMHG | HEART RATE: 90 BPM | OXYGEN SATURATION: 99 % | SYSTOLIC BLOOD PRESSURE: 133 MMHG

## 2019-01-01 VITALS
TEMPERATURE: 98 F | RESPIRATION RATE: 18 BRPM | SYSTOLIC BLOOD PRESSURE: 163 MMHG | OXYGEN SATURATION: 100 % | HEART RATE: 71 BPM | DIASTOLIC BLOOD PRESSURE: 83 MMHG

## 2019-01-01 VITALS
RESPIRATION RATE: 18 BRPM | SYSTOLIC BLOOD PRESSURE: 134 MMHG | HEART RATE: 86 BPM | OXYGEN SATURATION: 97 % | TEMPERATURE: 98.2 F | DIASTOLIC BLOOD PRESSURE: 88 MMHG | BODY MASS INDEX: 20.33 KG/M2 | WEIGHT: 139.6 LBS

## 2019-01-01 VITALS
DIASTOLIC BLOOD PRESSURE: 73 MMHG | HEART RATE: 75 BPM | TEMPERATURE: 97.4 F | RESPIRATION RATE: 18 BRPM | SYSTOLIC BLOOD PRESSURE: 119 MMHG | HEIGHT: 70 IN | BODY MASS INDEX: 21.48 KG/M2

## 2019-01-01 VITALS
TEMPERATURE: 98.2 F | WEIGHT: 148.6 LBS | DIASTOLIC BLOOD PRESSURE: 88 MMHG | SYSTOLIC BLOOD PRESSURE: 149 MMHG | OXYGEN SATURATION: 100 % | RESPIRATION RATE: 18 BRPM | BODY MASS INDEX: 21.63 KG/M2 | HEART RATE: 82 BPM

## 2019-01-01 VITALS
BODY MASS INDEX: 21.92 KG/M2 | TEMPERATURE: 98 F | RESPIRATION RATE: 16 BRPM | HEART RATE: 74 BPM | WEIGHT: 148 LBS | SYSTOLIC BLOOD PRESSURE: 111 MMHG | OXYGEN SATURATION: 99 % | HEIGHT: 69 IN | DIASTOLIC BLOOD PRESSURE: 71 MMHG

## 2019-01-01 VITALS
OXYGEN SATURATION: 100 % | SYSTOLIC BLOOD PRESSURE: 130 MMHG | HEART RATE: 93 BPM | TEMPERATURE: 97.7 F | DIASTOLIC BLOOD PRESSURE: 78 MMHG | RESPIRATION RATE: 18 BRPM

## 2019-01-01 VITALS
RESPIRATION RATE: 16 BRPM | OXYGEN SATURATION: 95 % | HEART RATE: 87 BPM | HEIGHT: 69 IN | SYSTOLIC BLOOD PRESSURE: 111 MMHG | BODY MASS INDEX: 20.5 KG/M2 | TEMPERATURE: 98 F | WEIGHT: 138.4 LBS | DIASTOLIC BLOOD PRESSURE: 73 MMHG

## 2019-01-01 VITALS
RESPIRATION RATE: 16 BRPM | HEART RATE: 67 BPM | OXYGEN SATURATION: 98 % | DIASTOLIC BLOOD PRESSURE: 81 MMHG | SYSTOLIC BLOOD PRESSURE: 157 MMHG

## 2019-01-01 VITALS
RESPIRATION RATE: 18 BRPM | OXYGEN SATURATION: 98 % | SYSTOLIC BLOOD PRESSURE: 122 MMHG | TEMPERATURE: 98.1 F | DIASTOLIC BLOOD PRESSURE: 68 MMHG | HEART RATE: 78 BPM

## 2019-01-01 VITALS
BODY MASS INDEX: 23.6 KG/M2 | SYSTOLIC BLOOD PRESSURE: 165 MMHG | HEART RATE: 87 BPM | WEIGHT: 155.2 LBS | OXYGEN SATURATION: 99 % | TEMPERATURE: 98.1 F | RESPIRATION RATE: 16 BRPM | DIASTOLIC BLOOD PRESSURE: 89 MMHG

## 2019-01-01 VITALS
WEIGHT: 148 LBS | HEART RATE: 69 BPM | DIASTOLIC BLOOD PRESSURE: 64 MMHG | OXYGEN SATURATION: 98 % | RESPIRATION RATE: 14 BRPM | TEMPERATURE: 98.4 F | HEIGHT: 70 IN | BODY MASS INDEX: 21.19 KG/M2 | SYSTOLIC BLOOD PRESSURE: 102 MMHG

## 2019-01-01 VITALS
DIASTOLIC BLOOD PRESSURE: 69 MMHG | TEMPERATURE: 97.3 F | RESPIRATION RATE: 16 BRPM | HEART RATE: 95 BPM | OXYGEN SATURATION: 98 % | SYSTOLIC BLOOD PRESSURE: 95 MMHG

## 2019-01-01 VITALS
WEIGHT: 150.4 LBS | DIASTOLIC BLOOD PRESSURE: 80 MMHG | OXYGEN SATURATION: 100 % | SYSTOLIC BLOOD PRESSURE: 138 MMHG | RESPIRATION RATE: 18 BRPM | HEART RATE: 77 BPM | BODY MASS INDEX: 21.9 KG/M2 | TEMPERATURE: 98.2 F

## 2019-01-01 VITALS
HEART RATE: 90 BPM | TEMPERATURE: 98 F | RESPIRATION RATE: 18 BRPM | WEIGHT: 152 LBS | SYSTOLIC BLOOD PRESSURE: 128 MMHG | BODY MASS INDEX: 23.11 KG/M2 | DIASTOLIC BLOOD PRESSURE: 74 MMHG | OXYGEN SATURATION: 98 %

## 2019-01-01 VITALS
RESPIRATION RATE: 16 BRPM | DIASTOLIC BLOOD PRESSURE: 85 MMHG | HEART RATE: 64 BPM | OXYGEN SATURATION: 100 % | WEIGHT: 157.8 LBS | TEMPERATURE: 98.2 F | BODY MASS INDEX: 22.64 KG/M2 | SYSTOLIC BLOOD PRESSURE: 135 MMHG

## 2019-01-01 VITALS
WEIGHT: 136.2 LBS | SYSTOLIC BLOOD PRESSURE: 113 MMHG | RESPIRATION RATE: 18 BRPM | TEMPERATURE: 98.2 F | OXYGEN SATURATION: 98 % | DIASTOLIC BLOOD PRESSURE: 73 MMHG | HEART RATE: 82 BPM | BODY MASS INDEX: 19.83 KG/M2

## 2019-01-01 VITALS — HEART RATE: 93 BPM | SYSTOLIC BLOOD PRESSURE: 163 MMHG | DIASTOLIC BLOOD PRESSURE: 105 MMHG

## 2019-01-01 VITALS
OXYGEN SATURATION: 96 % | BODY MASS INDEX: 22.61 KG/M2 | DIASTOLIC BLOOD PRESSURE: 62 MMHG | SYSTOLIC BLOOD PRESSURE: 89 MMHG | RESPIRATION RATE: 37 BRPM | HEART RATE: 94 BPM | WEIGHT: 155.3 LBS | TEMPERATURE: 98.4 F

## 2019-01-01 VITALS — SYSTOLIC BLOOD PRESSURE: 140 MMHG | DIASTOLIC BLOOD PRESSURE: 85 MMHG

## 2019-01-01 DIAGNOSIS — C20 RECTAL ADENOCARCINOMA (HCC): ICD-10-CM

## 2019-01-01 DIAGNOSIS — R06.02 SHORTNESS OF BREATH: ICD-10-CM

## 2019-01-01 DIAGNOSIS — C20 RECTAL ADENOCARCINOMA (HCC): Primary | ICD-10-CM

## 2019-01-01 DIAGNOSIS — R62.51 FAILURE TO THRIVE (0-17): ICD-10-CM

## 2019-01-01 DIAGNOSIS — R60.0 LEG EDEMA, RIGHT: ICD-10-CM

## 2019-01-01 DIAGNOSIS — C78.00 MALIGNANT NEOPLASM METASTATIC TO LUNG, UNSPECIFIED LATERALITY (HCC): Chronic | ICD-10-CM

## 2019-01-01 DIAGNOSIS — J01.90 ACUTE SINUSITIS, RECURRENCE NOT SPECIFIED, UNSPECIFIED LOCATION: ICD-10-CM

## 2019-01-01 DIAGNOSIS — Z51.5 ENCOUNTER FOR PALLIATIVE CARE: ICD-10-CM

## 2019-01-01 DIAGNOSIS — Z86.711 HX PULMONARY EMBOLISM: Chronic | ICD-10-CM

## 2019-01-01 DIAGNOSIS — R91.8 PULMONARY NODULES: ICD-10-CM

## 2019-01-01 DIAGNOSIS — R11.11 INTRACTABLE VOMITING WITHOUT NAUSEA, UNSPECIFIED VOMITING TYPE: ICD-10-CM

## 2019-01-01 DIAGNOSIS — J96.01 ACUTE RESPIRATORY FAILURE WITH HYPOXIA AND HYPERCAPNIA (HCC): ICD-10-CM

## 2019-01-01 DIAGNOSIS — E86.0 DEHYDRATION: Primary | ICD-10-CM

## 2019-01-01 DIAGNOSIS — Z93.3 COLOSTOMY STATUS (HCC): ICD-10-CM

## 2019-01-01 DIAGNOSIS — F41.9 ANXIETY: ICD-10-CM

## 2019-01-01 DIAGNOSIS — J96.02 ACUTE RESPIRATORY FAILURE WITH HYPOXIA AND HYPERCAPNIA (HCC): ICD-10-CM

## 2019-01-01 DIAGNOSIS — N17.9 ACUTE RENAL FAILURE, UNSPECIFIED ACUTE RENAL FAILURE TYPE (HCC): ICD-10-CM

## 2019-01-01 DIAGNOSIS — E86.0 DEHYDRATION: ICD-10-CM

## 2019-01-01 DIAGNOSIS — R34 OLIGURIA: ICD-10-CM

## 2019-01-01 DIAGNOSIS — C20 RECTAL CANCER (HCC): ICD-10-CM

## 2019-01-01 DIAGNOSIS — N13.30 HYDROURETERONEPHROSIS: ICD-10-CM

## 2019-01-01 DIAGNOSIS — R53.81 DEBILITY: ICD-10-CM

## 2019-01-01 DIAGNOSIS — J10.1 INFLUENZA A: Primary | ICD-10-CM

## 2019-01-01 DIAGNOSIS — K62.4 RECTAL OBSTRUCTION: ICD-10-CM

## 2019-01-01 DIAGNOSIS — J18.9 HAP (HOSPITAL-ACQUIRED PNEUMONIA): ICD-10-CM
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
## 2019-01-01 DIAGNOSIS — T17.908D ASPIRATION INTO AIRWAY, SUBSEQUENT ENCOUNTER: ICD-10-CM

## 2019-01-01 DIAGNOSIS — N28.89 KIDNEY MASS: ICD-10-CM

## 2019-01-01 DIAGNOSIS — R13.10 DYSPHAGIA, UNSPECIFIED TYPE: ICD-10-CM

## 2019-01-01 DIAGNOSIS — Z71.89 ACP (ADVANCE CARE PLANNING): ICD-10-CM

## 2019-01-01 DIAGNOSIS — R59.0 MEDIASTINAL LYMPHADENOPATHY: ICD-10-CM

## 2019-01-01 DIAGNOSIS — R06.00 DYSPNEA, UNSPECIFIED TYPE: ICD-10-CM

## 2019-01-01 DIAGNOSIS — R09.02 HYPOXIA: ICD-10-CM

## 2019-01-01 DIAGNOSIS — R50.9 FEVER CHILLS: ICD-10-CM

## 2019-01-01 DIAGNOSIS — Z86.718 HISTORY OF DVT OF LOWER EXTREMITY: Chronic | ICD-10-CM

## 2019-01-01 DIAGNOSIS — T17.908A ASPIRATION INTO AIRWAY, INITIAL ENCOUNTER: ICD-10-CM

## 2019-01-01 DIAGNOSIS — J96.01 ACUTE RESPIRATORY FAILURE WITH HYPOXIA (HCC): ICD-10-CM

## 2019-01-01 DIAGNOSIS — Z87.891 HISTORY OF TOBACCO ABUSE: ICD-10-CM

## 2019-01-01 LAB
ABO + RH BLD: NORMAL
ALBUMIN SERPL-MCNC: 1.8 G/DL (ref 3.5–5)
ALBUMIN SERPL-MCNC: 2 G/DL (ref 3.5–5)
ALBUMIN SERPL-MCNC: 2.1 G/DL (ref 3.5–5)
ALBUMIN SERPL-MCNC: 2.2 G/DL (ref 3.5–5)
ALBUMIN SERPL-MCNC: 2.2 G/DL (ref 3.5–5)
ALBUMIN SERPL-MCNC: 2.3 G/DL (ref 3.5–5)
ALBUMIN SERPL-MCNC: 2.4 G/DL (ref 3.5–5)
ALBUMIN SERPL-MCNC: 2.5 G/DL (ref 3.5–5)
ALBUMIN SERPL-MCNC: 2.6 G/DL (ref 3.5–5)
ALBUMIN SERPL-MCNC: 2.6 G/DL (ref 3.5–5)
ALBUMIN SERPL-MCNC: 2.7 G/DL (ref 3.5–5)
ALBUMIN SERPL-MCNC: 2.8 G/DL (ref 3.5–5)
ALBUMIN SERPL-MCNC: 2.9 G/DL (ref 3.5–5)
ALBUMIN SERPL-MCNC: 3 G/DL (ref 3.5–5)
ALBUMIN SERPL-MCNC: 3.1 G/DL (ref 3.5–5)
ALBUMIN/GLOB SERPL: 0.4 {RATIO} (ref 1.2–3.5)
ALBUMIN/GLOB SERPL: 0.5 {RATIO} (ref 1.2–3.5)
ALBUMIN/GLOB SERPL: 0.6 {RATIO}
ALBUMIN/GLOB SERPL: 0.6 {RATIO} (ref 1.2–3.5)
ALBUMIN/GLOB SERPL: 0.7 {RATIO} (ref 1.2–3.5)
ALBUMIN/GLOB SERPL: 0.8 {RATIO} (ref 1.2–3.5)
ALP SERPL-CCNC: 100 U/L (ref 50–136)
ALP SERPL-CCNC: 101 U/L (ref 50–136)
ALP SERPL-CCNC: 103 U/L (ref 50–136)
ALP SERPL-CCNC: 58 U/L (ref 50–136)
ALP SERPL-CCNC: 68 U/L (ref 50–136)
ALP SERPL-CCNC: 69 U/L (ref 50–136)
ALP SERPL-CCNC: 69 U/L (ref 50–136)
ALP SERPL-CCNC: 70 U/L (ref 50–136)
ALP SERPL-CCNC: 71 U/L (ref 50–136)
ALP SERPL-CCNC: 72 U/L (ref 50–136)
ALP SERPL-CCNC: 74 U/L (ref 50–136)
ALP SERPL-CCNC: 75 U/L (ref 50–136)
ALP SERPL-CCNC: 76 U/L (ref 50–136)
ALP SERPL-CCNC: 78 U/L (ref 50–136)
ALP SERPL-CCNC: 78 U/L (ref 50–136)
ALP SERPL-CCNC: 80 U/L (ref 50–136)
ALP SERPL-CCNC: 83 U/L (ref 50–136)
ALP SERPL-CCNC: 84 U/L (ref 50–136)
ALP SERPL-CCNC: 86 U/L (ref 50–136)
ALP SERPL-CCNC: 88 U/L (ref 50–136)
ALP SERPL-CCNC: 88 U/L (ref 50–136)
ALP SERPL-CCNC: 90 U/L (ref 50–136)
ALP SERPL-CCNC: 90 U/L (ref 50–136)
ALP SERPL-CCNC: 91 U/L (ref 50–136)
ALP SERPL-CCNC: 94 U/L (ref 50–136)
ALP SERPL-CCNC: 95 U/L (ref 50–136)
ALP SERPL-CCNC: 96 U/L (ref 50–136)
ALT SERPL-CCNC: 11 U/L (ref 12–65)
ALT SERPL-CCNC: 12 U/L (ref 12–65)
ALT SERPL-CCNC: 13 U/L (ref 12–65)
ALT SERPL-CCNC: 14 U/L (ref 12–65)
ALT SERPL-CCNC: 15 U/L (ref 12–65)
ALT SERPL-CCNC: 16 U/L (ref 12–65)
ALT SERPL-CCNC: 17 U/L (ref 12–65)
ALT SERPL-CCNC: 18 U/L (ref 12–65)
ALT SERPL-CCNC: 19 U/L (ref 12–65)
ALT SERPL-CCNC: 38 U/L (ref 12–65)
ALT SERPL-CCNC: 47 U/L (ref 12–65)
ALT SERPL-CCNC: 8 U/L (ref 12–65)
ALT SERPL-CCNC: 9 U/L (ref 12–65)
ANION GAP SERPL CALC-SCNC: 10 MMOL/L (ref 7–16)
ANION GAP SERPL CALC-SCNC: 11 MMOL/L (ref 7–16)
ANION GAP SERPL CALC-SCNC: 11 MMOL/L (ref 7–16)
ANION GAP SERPL CALC-SCNC: 13 MMOL/L (ref 7–16)
ANION GAP SERPL CALC-SCNC: 13 MMOL/L (ref 7–16)
ANION GAP SERPL CALC-SCNC: 14 MMOL/L (ref 7–16)
ANION GAP SERPL CALC-SCNC: 5 MMOL/L (ref 7–16)
ANION GAP SERPL CALC-SCNC: 5 MMOL/L (ref 7–16)
ANION GAP SERPL CALC-SCNC: 6 MMOL/L
ANION GAP SERPL CALC-SCNC: 6 MMOL/L (ref 7–16)
ANION GAP SERPL CALC-SCNC: 7 MMOL/L (ref 7–16)
ANION GAP SERPL CALC-SCNC: 8 MMOL/L (ref 7–16)
ANION GAP SERPL CALC-SCNC: 9 MMOL/L (ref 7–16)
APPEARANCE UR: CLEAR
APPEARANCE UR: CLEAR
APTT PPP: 47.6 SEC (ref 24.7–39.8)
ARTERIAL PATENCY WRIST A: YES
AST SERPL-CCNC: 10 U/L (ref 15–37)
AST SERPL-CCNC: 11 U/L (ref 15–37)
AST SERPL-CCNC: 11 U/L (ref 15–37)
AST SERPL-CCNC: 12 U/L (ref 15–37)
AST SERPL-CCNC: 12 U/L (ref 15–37)
AST SERPL-CCNC: 13 U/L (ref 15–37)
AST SERPL-CCNC: 14 U/L (ref 15–37)
AST SERPL-CCNC: 14 U/L (ref 15–37)
AST SERPL-CCNC: 15 U/L (ref 15–37)
AST SERPL-CCNC: 16 U/L (ref 15–37)
AST SERPL-CCNC: 16 U/L (ref 15–37)
AST SERPL-CCNC: 17 U/L (ref 15–37)
AST SERPL-CCNC: 19 U/L (ref 15–37)
AST SERPL-CCNC: 20 U/L (ref 15–37)
AST SERPL-CCNC: 21 U/L (ref 15–37)
AST SERPL-CCNC: 22 U/L (ref 15–37)
AST SERPL-CCNC: 22 U/L (ref 15–37)
AST SERPL-CCNC: 23 U/L (ref 15–37)
AST SERPL-CCNC: 25 U/L (ref 15–37)
AST SERPL-CCNC: 25 U/L (ref 15–37)
AST SERPL-CCNC: 29 U/L (ref 15–37)
AST SERPL-CCNC: 41 U/L (ref 15–37)
AST SERPL-CCNC: 41 U/L (ref 15–37)
AST SERPL-CCNC: 9 U/L (ref 15–37)
ATRIAL RATE: 97 BPM
BACTERIA SPEC CULT: NORMAL
BACTERIA URNS QL MICRO: 0 /HPF
BACTERIA URNS QL MICRO: ABNORMAL /HPF
BASE DEFICIT BLD-SCNC: 1 MMOL/L
BASE DEFICIT BLD-SCNC: 1 MMOL/L
BASE DEFICIT BLD-SCNC: 11 MMOL/L
BASE DEFICIT BLD-SCNC: 12 MMOL/L
BASE DEFICIT BLD-SCNC: 2 MMOL/L
BASE DEFICIT BLD-SCNC: 3 MMOL/L
BASE DEFICIT BLD-SCNC: 7 MMOL/L
BASE DEFICIT BLD-SCNC: 9 MMOL/L
BASE EXCESS BLD CALC-SCNC: 1 MMOL/L
BASE EXCESS BLD CALC-SCNC: 2 MMOL/L
BASE EXCESS BLD CALC-SCNC: 3 MMOL/L
BASE EXCESS BLD CALC-SCNC: 3 MMOL/L
BASE EXCESS BLD CALC-SCNC: 4 MMOL/L
BASE EXCESS BLD CALC-SCNC: 4 MMOL/L
BASE EXCESS BLD CALC-SCNC: 6 MMOL/L
BASOPHILS # BLD: 0 K/UL (ref 0–0.2)
BASOPHILS # BLD: 0.1 K/UL (ref 0–0.2)
BASOPHILS # BLD: 0.3 K/UL (ref 0–0.2)
BASOPHILS # BLD: 0.4 K/UL (ref 0–0.2)
BASOPHILS NFR BLD MANUAL: 4 % (ref 0–2)
BASOPHILS NFR BLD: 0 % (ref 0–2)
BASOPHILS NFR BLD: 1 % (ref 0–2)
BDY SITE: ABNORMAL
BILIRUB SERPL-MCNC: 0.2 MG/DL (ref 0.2–1.1)
BILIRUB SERPL-MCNC: 0.3 MG/DL (ref 0.2–1.1)
BILIRUB SERPL-MCNC: 0.4 MG/DL (ref 0.2–1.1)
BILIRUB SERPL-MCNC: 0.5 MG/DL (ref 0.2–1.1)
BILIRUB SERPL-MCNC: 0.6 MG/DL (ref 0.2–1.1)
BILIRUB SERPL-MCNC: 0.6 MG/DL (ref 0.2–1.1)
BILIRUB SERPL-MCNC: 0.7 MG/DL (ref 0.2–1.1)
BILIRUB SERPL-MCNC: 0.7 MG/DL (ref 0.2–1.1)
BILIRUB UR QL: NEGATIVE
BILIRUB UR QL: NEGATIVE
BLOOD GROUP ANTIBODIES SERPL: NORMAL
BNP SERPL-MCNC: 96 PG/ML
BODY TEMPERATURE: 98.6
BUN SERPL-MCNC: 10 MG/DL (ref 6–23)
BUN SERPL-MCNC: 107 MG/DL (ref 6–23)
BUN SERPL-MCNC: 145 MG/DL (ref 6–23)
BUN SERPL-MCNC: 19 MG/DL (ref 6–23)
BUN SERPL-MCNC: 23 MG/DL (ref 6–23)
BUN SERPL-MCNC: 24 MG/DL (ref 6–23)
BUN SERPL-MCNC: 25 MG/DL (ref 6–23)
BUN SERPL-MCNC: 26 MG/DL (ref 6–23)
BUN SERPL-MCNC: 28 MG/DL (ref 6–23)
BUN SERPL-MCNC: 3 MG/DL (ref 6–23)
BUN SERPL-MCNC: 37 MG/DL (ref 6–23)
BUN SERPL-MCNC: 4 MG/DL (ref 6–23)
BUN SERPL-MCNC: 47 MG/DL (ref 6–23)
BUN SERPL-MCNC: 5 MG/DL (ref 6–23)
BUN SERPL-MCNC: 6 MG/DL (ref 6–23)
BUN SERPL-MCNC: 6 MG/DL (ref 6–23)
BUN SERPL-MCNC: 7 MG/DL (ref 6–23)
BUN SERPL-MCNC: 71 MG/DL (ref 6–23)
BUN SERPL-MCNC: 8 MG/DL (ref 6–23)
BUN SERPL-MCNC: 9 MG/DL (ref 6–23)
C DIFF GDH STL QL: NORMAL
C DIFF TOX A+B STL QL IA: NORMAL
CALCIUM SERPL-MCNC: 7.9 MG/DL (ref 8.3–10.4)
CALCIUM SERPL-MCNC: 8 MG/DL (ref 8.3–10.4)
CALCIUM SERPL-MCNC: 8 MG/DL (ref 8.3–10.4)
CALCIUM SERPL-MCNC: 8.1 MG/DL (ref 8.3–10.4)
CALCIUM SERPL-MCNC: 8.1 MG/DL (ref 8.3–10.4)
CALCIUM SERPL-MCNC: 8.2 MG/DL (ref 8.3–10.4)
CALCIUM SERPL-MCNC: 8.3 MG/DL (ref 8.3–10.4)
CALCIUM SERPL-MCNC: 8.4 MG/DL (ref 8.3–10.4)
CALCIUM SERPL-MCNC: 8.5 MG/DL (ref 8.3–10.4)
CALCIUM SERPL-MCNC: 8.6 MG/DL (ref 8.3–10.4)
CALCIUM SERPL-MCNC: 8.7 MG/DL (ref 8.3–10.4)
CALCIUM SERPL-MCNC: 8.7 MG/DL (ref 8.3–10.4)
CALCIUM SERPL-MCNC: 8.8 MG/DL (ref 8.3–10.4)
CALCIUM SERPL-MCNC: 8.9 MG/DL (ref 8.3–10.4)
CALCIUM SERPL-MCNC: 8.9 MG/DL (ref 8.3–10.4)
CALCIUM SERPL-MCNC: 9.5 MG/DL (ref 8.3–10.4)
CALCULATED P AXIS, ECG09: 69 DEGREES
CALCULATED R AXIS, ECG10: 60 DEGREES
CALCULATED T AXIS, ECG11: 62 DEGREES
CANCER AG19-9 SERPL-ACNC: 14.5 U/ML (ref 2–37)
CASTS URNS QL MICRO: 0 /LPF
CASTS URNS QL MICRO: ABNORMAL /LPF
CEA SERPL-MCNC: 15.4 NG/ML (ref 0–3)
CEA SERPL-MCNC: 17.4 NG/ML (ref 0–3)
CEA SERPL-MCNC: 2.3 NG/ML (ref 0–3)
CEA SERPL-MCNC: 24.1 NG/ML (ref 0–3)
CEA SERPL-MCNC: 5.4 NG/ML (ref 0–3)
CEA SERPL-MCNC: 6.2 NG/ML (ref 0–3)
CHLORIDE SERPL-SCNC: 100 MMOL/L (ref 98–107)
CHLORIDE SERPL-SCNC: 101 MMOL/L (ref 98–107)
CHLORIDE SERPL-SCNC: 103 MMOL/L (ref 98–107)
CHLORIDE SERPL-SCNC: 104 MMOL/L (ref 98–107)
CHLORIDE SERPL-SCNC: 105 MMOL/L (ref 98–107)
CHLORIDE SERPL-SCNC: 106 MMOL/L (ref 98–107)
CHLORIDE SERPL-SCNC: 107 MMOL/L (ref 98–107)
CHLORIDE SERPL-SCNC: 108 MMOL/L (ref 98–107)
CHLORIDE SERPL-SCNC: 110 MMOL/L (ref 98–107)
CHLORIDE SERPL-SCNC: 112 MMOL/L (ref 98–107)
CHLORIDE SERPL-SCNC: 114 MMOL/L (ref 98–107)
CHLORIDE SERPL-SCNC: 99 MMOL/L (ref 98–107)
CLINICAL CONSIDERATION: NORMAL
CO2 BLD-SCNC: 16 MMOL/L
CO2 BLD-SCNC: 17 MMOL/L
CO2 BLD-SCNC: 18 MMOL/L
CO2 BLD-SCNC: 22 MMOL/L
CO2 BLD-SCNC: 24 MMOL/L
CO2 BLD-SCNC: 26 MMOL/L
CO2 BLD-SCNC: 28 MMOL/L
CO2 BLD-SCNC: 28 MMOL/L
CO2 BLD-SCNC: 29 MMOL/L
CO2 BLD-SCNC: 30 MMOL/L
CO2 BLD-SCNC: 31 MMOL/L
CO2 BLD-SCNC: 32 MMOL/L
CO2 BLD-SCNC: 32 MMOL/L
CO2 SERPL-SCNC: 17 MMOL/L (ref 21–32)
CO2 SERPL-SCNC: 18 MMOL/L (ref 21–32)
CO2 SERPL-SCNC: 20 MMOL/L (ref 21–32)
CO2 SERPL-SCNC: 22 MMOL/L (ref 21–32)
CO2 SERPL-SCNC: 23 MMOL/L (ref 21–32)
CO2 SERPL-SCNC: 24 MMOL/L (ref 21–32)
CO2 SERPL-SCNC: 25 MMOL/L (ref 21–32)
CO2 SERPL-SCNC: 26 MMOL/L (ref 21–32)
CO2 SERPL-SCNC: 27 MMOL/L (ref 21–32)
CO2 SERPL-SCNC: 28 MMOL/L (ref 21–32)
CO2 SERPL-SCNC: 29 MMOL/L (ref 21–32)
CO2 SERPL-SCNC: 30 MMOL/L (ref 21–32)
CO2 SERPL-SCNC: 31 MMOL/L (ref 21–32)
CO2 SERPL-SCNC: 33 MMOL/L (ref 21–32)
COLLECT TIME,HTIME: 1110
COLLECT TIME,HTIME: 1223
COLLECT TIME,HTIME: 1342
COLLECT TIME,HTIME: 1734
COLLECT TIME,HTIME: 2030
COLLECT TIME,HTIME: 300
COLLECT TIME,HTIME: 303
COLLECT TIME,HTIME: 309
COLLECT TIME,HTIME: 315
COLLECT TIME,HTIME: 315
COLLECT TIME,HTIME: 338
COLLECT TIME,HTIME: 433
COLLECT TIME,HTIME: 514
COLLECT TIME,HTIME: 635
COLLECT TIME,HTIME: 909
COLOR UR: YELLOW
COLOR UR: YELLOW
CREAT SERPL-MCNC: 0.6 MG/DL (ref 0.8–1.5)
CREAT SERPL-MCNC: 0.77 MG/DL (ref 0.8–1.5)
CREAT SERPL-MCNC: 0.79 MG/DL (ref 0.8–1.5)
CREAT SERPL-MCNC: 0.84 MG/DL (ref 0.8–1.5)
CREAT SERPL-MCNC: 0.86 MG/DL (ref 0.8–1.5)
CREAT SERPL-MCNC: 0.89 MG/DL (ref 0.8–1.5)
CREAT SERPL-MCNC: 0.9 MG/DL (ref 0.8–1.5)
CREAT SERPL-MCNC: 0.92 MG/DL (ref 0.8–1.5)
CREAT SERPL-MCNC: 0.93 MG/DL (ref 0.8–1.5)
CREAT SERPL-MCNC: 0.94 MG/DL (ref 0.8–1.5)
CREAT SERPL-MCNC: 0.94 MG/DL (ref 0.8–1.5)
CREAT SERPL-MCNC: 0.95 MG/DL (ref 0.8–1.5)
CREAT SERPL-MCNC: 0.95 MG/DL (ref 0.8–1.5)
CREAT SERPL-MCNC: 0.98 MG/DL (ref 0.8–1.5)
CREAT SERPL-MCNC: 1 MG/DL (ref 0.8–1.5)
CREAT SERPL-MCNC: 1.01 MG/DL (ref 0.8–1.5)
CREAT SERPL-MCNC: 1.02 MG/DL (ref 0.8–1.5)
CREAT SERPL-MCNC: 1.06 MG/DL (ref 0.8–1.5)
CREAT SERPL-MCNC: 1.08 MG/DL (ref 0.8–1.5)
CREAT SERPL-MCNC: 1.13 MG/DL (ref 0.8–1.5)
CREAT SERPL-MCNC: 1.16 MG/DL (ref 0.8–1.5)
CREAT SERPL-MCNC: 1.21 MG/DL (ref 0.8–1.5)
CREAT SERPL-MCNC: 1.27 MG/DL (ref 0.8–1.5)
CREAT SERPL-MCNC: 1.29 MG/DL (ref 0.8–1.5)
CREAT SERPL-MCNC: 1.5 MG/DL (ref 0.8–1.5)
CREAT SERPL-MCNC: 1.5 MG/DL (ref 0.8–1.5)
CREAT SERPL-MCNC: 2.02 MG/DL (ref 0.8–1.5)
CREAT SERPL-MCNC: 3.67 MG/DL (ref 0.8–1.5)
CREAT SERPL-MCNC: 5.01 MG/DL (ref 0.8–1.5)
CREAT UR-MCNC: 278 MG/DL
CREAT UR-MCNC: 497 MG/DL
CRYSTALS URNS QL MICRO: 0 /LPF
DIAGNOSIS, 93000: NORMAL
DIFFERENTIAL METHOD BLD: ABNORMAL
EOSINOPHIL # BLD: 0 K/UL (ref 0–0.8)
EOSINOPHIL # BLD: 0.1 K/UL (ref 0–0.8)
EOSINOPHIL # BLD: 0.2 K/UL (ref 0–0.8)
EOSINOPHIL # BLD: 0.3 K/UL (ref 0–0.8)
EOSINOPHIL NFR BLD MANUAL: 8 % (ref 1–8)
EOSINOPHIL NFR BLD: 0 % (ref 0.5–7.8)
EOSINOPHIL NFR BLD: 1 % (ref 0.5–7.8)
EOSINOPHIL NFR BLD: 2 % (ref 0.5–7.8)
EOSINOPHIL NFR BLD: 3 % (ref 0.5–7.8)
EOSINOPHIL NFR BLD: 5 % (ref 0.5–7.8)
EOSINOPHIL NFR BLD: 7 % (ref 0.5–7.8)
EPI CELLS #/AREA URNS HPF: ABNORMAL /HPF
EPI CELLS #/AREA URNS HPF: ABNORMAL /HPF
ERYTHROCYTE [DISTWIDTH] IN BLOOD BY AUTOMATED COUNT: 15.2 % (ref 11.9–14.6)
ERYTHROCYTE [DISTWIDTH] IN BLOOD BY AUTOMATED COUNT: 15.3 % (ref 11.9–14.6)
ERYTHROCYTE [DISTWIDTH] IN BLOOD BY AUTOMATED COUNT: 15.4 % (ref 11.9–14.6)
ERYTHROCYTE [DISTWIDTH] IN BLOOD BY AUTOMATED COUNT: 15.5 % (ref 11.9–14.6)
ERYTHROCYTE [DISTWIDTH] IN BLOOD BY AUTOMATED COUNT: 15.5 % (ref 11.9–14.6)
ERYTHROCYTE [DISTWIDTH] IN BLOOD BY AUTOMATED COUNT: 15.6 % (ref 11.9–14.6)
ERYTHROCYTE [DISTWIDTH] IN BLOOD BY AUTOMATED COUNT: 15.6 % (ref 11.9–14.6)
ERYTHROCYTE [DISTWIDTH] IN BLOOD BY AUTOMATED COUNT: 15.7 % (ref 11.9–14.6)
ERYTHROCYTE [DISTWIDTH] IN BLOOD BY AUTOMATED COUNT: 15.7 % (ref 11.9–14.6)
ERYTHROCYTE [DISTWIDTH] IN BLOOD BY AUTOMATED COUNT: 15.8 % (ref 11.9–14.6)
ERYTHROCYTE [DISTWIDTH] IN BLOOD BY AUTOMATED COUNT: 15.9 % (ref 11.9–14.6)
ERYTHROCYTE [DISTWIDTH] IN BLOOD BY AUTOMATED COUNT: 16 % (ref 11.9–14.6)
ERYTHROCYTE [DISTWIDTH] IN BLOOD BY AUTOMATED COUNT: 16.2 % (ref 11.9–14.6)
ERYTHROCYTE [DISTWIDTH] IN BLOOD BY AUTOMATED COUNT: 16.2 % (ref 11.9–14.6)
ERYTHROCYTE [DISTWIDTH] IN BLOOD BY AUTOMATED COUNT: 16.5 % (ref 11.9–14.6)
ERYTHROCYTE [DISTWIDTH] IN BLOOD BY AUTOMATED COUNT: 16.6 % (ref 11.9–14.6)
ERYTHROCYTE [DISTWIDTH] IN BLOOD BY AUTOMATED COUNT: 17.1 % (ref 11.9–14.6)
ERYTHROCYTE [DISTWIDTH] IN BLOOD BY AUTOMATED COUNT: 17.2 % (ref 11.9–14.6)
ERYTHROCYTE [DISTWIDTH] IN BLOOD BY AUTOMATED COUNT: 17.6 % (ref 11.9–14.6)
ERYTHROCYTE [DISTWIDTH] IN BLOOD BY AUTOMATED COUNT: 17.9 % (ref 11.9–14.6)
ERYTHROCYTE [DISTWIDTH] IN BLOOD BY AUTOMATED COUNT: 18.1 % (ref 11.9–14.6)
ERYTHROCYTE [DISTWIDTH] IN BLOOD BY AUTOMATED COUNT: 18.4 % (ref 11.9–14.6)
EXHALED MINUTE VOLUME, VE: 10.4 L/MIN
EXHALED MINUTE VOLUME, VE: 11.5 L/MIN
EXHALED MINUTE VOLUME, VE: 12.7 L/MIN
EXHALED MINUTE VOLUME, VE: 22.1 L/MIN
EXHALED MINUTE VOLUME, VE: 8.9 L/MIN
EXHALED MINUTE VOLUME, VE: 9.1 L/MIN
EXHALED MINUTE VOLUME, VE: 9.4 L/MIN
FERRITIN SERPL-MCNC: 960 NG/ML (ref 8–388)
FIBRINOGEN PPP-MCNC: 1117 MG/DL (ref 190–501)
FLOW RATE ISTAT,IFRATE: 15 L/MIN
FLOW RATE ISTAT,IFRATE: 6 L/MIN
FLOW RATE ISTAT,IFRATE: 7 L/MIN
FLUAV AG NPH QL IA: NEGATIVE
FLUAV AG NPH QL IA: POSITIVE
FLUBV AG NPH QL IA: NEGATIVE
FLUBV AG NPH QL IA: NEGATIVE
FOLATE SERPL-MCNC: 39.6 NG/ML (ref 3.1–17.5)
GAS FLOW.O2 O2 DELIVERY SYS: ABNORMAL L/MIN
GAS FLOW.O2 SETTING OXYMISER: 20 BPM
GAS FLOW.O2 SETTING OXYMISER: 20 BPM
GAS FLOW.O2 SETTING OXYMISER: 22 BPM
GAS FLOW.O2 SETTING OXYMISER: 24 BPM
GAS FLOW.O2 SETTING OXYMISER: 26 BPM
GAS FLOW.O2 SETTING OXYMISER: 30 BPM
GAS FLOW.O2 SETTING OXYMISER: 8 BPM
GLOBULIN SER CALC-MCNC: 3.4 G/DL (ref 2.3–3.5)
GLOBULIN SER CALC-MCNC: 3.6 G/DL (ref 2.3–3.5)
GLOBULIN SER CALC-MCNC: 3.6 G/DL (ref 2.3–3.5)
GLOBULIN SER CALC-MCNC: 3.7 G/DL (ref 2.3–3.5)
GLOBULIN SER CALC-MCNC: 3.8 G/DL (ref 2.3–3.5)
GLOBULIN SER CALC-MCNC: 3.9 G/DL (ref 2.3–3.5)
GLOBULIN SER CALC-MCNC: 4.1 G/DL (ref 2.3–3.5)
GLOBULIN SER CALC-MCNC: 4.2 G/DL (ref 2.3–3.5)
GLOBULIN SER CALC-MCNC: 4.3 G/DL
GLOBULIN SER CALC-MCNC: 4.3 G/DL (ref 2.3–3.5)
GLOBULIN SER CALC-MCNC: 4.3 G/DL (ref 2.3–3.5)
GLOBULIN SER CALC-MCNC: 4.4 G/DL (ref 2.3–3.5)
GLOBULIN SER CALC-MCNC: 4.5 G/DL (ref 2.3–3.5)
GLOBULIN SER CALC-MCNC: 4.6 G/DL (ref 2.3–3.5)
GLOBULIN SER CALC-MCNC: 4.8 G/DL (ref 2.3–3.5)
GLUCOSE BLD STRIP.AUTO-MCNC: 129 MG/DL (ref 65–100)
GLUCOSE BLD STRIP.AUTO-MCNC: 141 MG/DL (ref 65–100)
GLUCOSE BLD STRIP.AUTO-MCNC: 146 MG/DL (ref 65–100)
GLUCOSE BLD STRIP.AUTO-MCNC: 150 MG/DL (ref 65–100)
GLUCOSE BLD STRIP.AUTO-MCNC: 152 MG/DL (ref 65–100)
GLUCOSE BLD STRIP.AUTO-MCNC: 158 MG/DL (ref 65–100)
GLUCOSE BLD STRIP.AUTO-MCNC: 160 MG/DL (ref 65–100)
GLUCOSE BLD STRIP.AUTO-MCNC: 163 MG/DL (ref 65–100)
GLUCOSE BLD STRIP.AUTO-MCNC: 166 MG/DL (ref 65–100)
GLUCOSE BLD STRIP.AUTO-MCNC: 171 MG/DL (ref 65–100)
GLUCOSE BLD STRIP.AUTO-MCNC: 172 MG/DL (ref 65–100)
GLUCOSE BLD STRIP.AUTO-MCNC: 176 MG/DL (ref 65–100)
GLUCOSE BLD STRIP.AUTO-MCNC: 180 MG/DL (ref 65–100)
GLUCOSE BLD STRIP.AUTO-MCNC: 190 MG/DL (ref 65–100)
GLUCOSE BLD STRIP.AUTO-MCNC: 200 MG/DL (ref 65–100)
GLUCOSE BLD STRIP.AUTO-MCNC: 211 MG/DL (ref 65–100)
GLUCOSE BLD STRIP.AUTO-MCNC: 215 MG/DL (ref 65–100)
GLUCOSE BLD STRIP.AUTO-MCNC: 218 MG/DL (ref 65–100)
GLUCOSE BLD STRIP.AUTO-MCNC: 223 MG/DL (ref 65–100)
GLUCOSE BLD STRIP.AUTO-MCNC: 224 MG/DL (ref 65–100)
GLUCOSE BLD STRIP.AUTO-MCNC: 239 MG/DL (ref 65–100)
GLUCOSE BLD STRIP.AUTO-MCNC: 250 MG/DL (ref 65–100)
GLUCOSE SERPL-MCNC: 103 MG/DL (ref 65–100)
GLUCOSE SERPL-MCNC: 106 MG/DL (ref 65–100)
GLUCOSE SERPL-MCNC: 112 MG/DL (ref 65–100)
GLUCOSE SERPL-MCNC: 112 MG/DL (ref 65–100)
GLUCOSE SERPL-MCNC: 114 MG/DL (ref 65–100)
GLUCOSE SERPL-MCNC: 122 MG/DL (ref 65–100)
GLUCOSE SERPL-MCNC: 124 MG/DL (ref 65–100)
GLUCOSE SERPL-MCNC: 128 MG/DL (ref 65–100)
GLUCOSE SERPL-MCNC: 130 MG/DL (ref 65–100)
GLUCOSE SERPL-MCNC: 133 MG/DL (ref 65–100)
GLUCOSE SERPL-MCNC: 135 MG/DL (ref 65–100)
GLUCOSE SERPL-MCNC: 137 MG/DL (ref 65–100)
GLUCOSE SERPL-MCNC: 148 MG/DL (ref 65–100)
GLUCOSE SERPL-MCNC: 159 MG/DL (ref 65–100)
GLUCOSE SERPL-MCNC: 160 MG/DL (ref 65–100)
GLUCOSE SERPL-MCNC: 162 MG/DL (ref 65–100)
GLUCOSE SERPL-MCNC: 164 MG/DL (ref 65–100)
GLUCOSE SERPL-MCNC: 166 MG/DL (ref 65–100)
GLUCOSE SERPL-MCNC: 167 MG/DL (ref 65–100)
GLUCOSE SERPL-MCNC: 169 MG/DL (ref 65–100)
GLUCOSE SERPL-MCNC: 169 MG/DL (ref 65–100)
GLUCOSE SERPL-MCNC: 170 MG/DL (ref 65–100)
GLUCOSE SERPL-MCNC: 170 MG/DL (ref 65–100)
GLUCOSE SERPL-MCNC: 180 MG/DL (ref 65–100)
GLUCOSE SERPL-MCNC: 192 MG/DL (ref 65–100)
GLUCOSE SERPL-MCNC: 198 MG/DL (ref 65–100)
GLUCOSE SERPL-MCNC: 226 MG/DL (ref 65–100)
GLUCOSE SERPL-MCNC: 226 MG/DL (ref 65–100)
GLUCOSE SERPL-MCNC: 90 MG/DL (ref 65–100)
GLUCOSE UR STRIP.AUTO-MCNC: 500 MG/DL
GLUCOSE UR STRIP.AUTO-MCNC: NEGATIVE MG/DL
HCO3 BLD-SCNC: 15.2 MMOL/L (ref 22–26)
HCO3 BLD-SCNC: 16.1 MMOL/L (ref 22–26)
HCO3 BLD-SCNC: 17.1 MMOL/L (ref 22–26)
HCO3 BLD-SCNC: 20.8 MMOL/L (ref 22–26)
HCO3 BLD-SCNC: 22.7 MMOL/L (ref 22–26)
HCO3 BLD-SCNC: 24.3 MMOL/L (ref 22–26)
HCO3 BLD-SCNC: 26.4 MMOL/L (ref 22–26)
HCO3 BLD-SCNC: 27.1 MMOL/L (ref 22–26)
HCO3 BLD-SCNC: 27.3 MMOL/L (ref 22–26)
HCO3 BLD-SCNC: 28.4 MMOL/L (ref 22–26)
HCO3 BLD-SCNC: 29.1 MMOL/L (ref 22–26)
HCO3 BLD-SCNC: 29.3 MMOL/L (ref 22–26)
HCO3 BLD-SCNC: 29.4 MMOL/L (ref 22–26)
HCO3 BLD-SCNC: 30.4 MMOL/L (ref 22–26)
HCO3 BLD-SCNC: 31 MMOL/L (ref 22–26)
HCT VFR BLD AUTO: 22.4 % (ref 41.1–50.3)
HCT VFR BLD AUTO: 24.2 % (ref 41.1–50.3)
HCT VFR BLD AUTO: 24.3 % (ref 41.1–50.3)
HCT VFR BLD AUTO: 24.7 % (ref 41.1–50.3)
HCT VFR BLD AUTO: 25.7 % (ref 41.1–50.3)
HCT VFR BLD AUTO: 29.1 % (ref 41.1–50.3)
HCT VFR BLD AUTO: 29.7 % (ref 41.1–50.3)
HCT VFR BLD AUTO: 29.8 % (ref 41.1–50.3)
HCT VFR BLD AUTO: 30.5 % (ref 41.1–50.3)
HCT VFR BLD AUTO: 30.9 % (ref 41.1–50.3)
HCT VFR BLD AUTO: 31.7 % (ref 41.1–50.3)
HCT VFR BLD AUTO: 31.7 % (ref 41.1–50.3)
HCT VFR BLD AUTO: 32 % (ref 41.1–50.3)
HCT VFR BLD AUTO: 32.2 % (ref 41.1–50.3)
HCT VFR BLD AUTO: 32.3 % (ref 41.1–50.3)
HCT VFR BLD AUTO: 32.3 % (ref 41.1–50.3)
HCT VFR BLD AUTO: 32.5 % (ref 41.1–50.3)
HCT VFR BLD AUTO: 32.5 % (ref 41.1–50.3)
HCT VFR BLD AUTO: 32.7 % (ref 41.1–50.3)
HCT VFR BLD AUTO: 33 % (ref 41.1–50.3)
HCT VFR BLD AUTO: 33.2 % (ref 41.1–50.3)
HCT VFR BLD AUTO: 33.7 % (ref 41.1–50.3)
HCT VFR BLD AUTO: 33.8 % (ref 41.1–50.3)
HCT VFR BLD AUTO: 34 % (ref 41.1–50.3)
HCT VFR BLD AUTO: 34.1 % (ref 41.1–50.3)
HCT VFR BLD AUTO: 34.3 % (ref 41.1–50.3)
HCT VFR BLD AUTO: 34.4 % (ref 41.1–50.3)
HCT VFR BLD AUTO: 34.7 % (ref 41.1–50.3)
HCT VFR BLD AUTO: 35.2 % (ref 41.1–50.3)
HCT VFR BLD AUTO: 37 % (ref 41.1–50.3)
HCT VFR BLD AUTO: 37.3 % (ref 41.1–50.3)
HCT VFR BLD AUTO: 38.1 % (ref 41.1–50.3)
HCT VFR BLD AUTO: 38.4 % (ref 41.1–50.3)
HCT VFR BLD AUTO: 38.6 % (ref 41.1–50.3)
HCYS SERPL-SCNC: 9.7 UMOL/L (ref 0–15)
HGB BLD-MCNC: 10 G/DL (ref 13.6–17.2)
HGB BLD-MCNC: 10.1 G/DL (ref 13.6–17.2)
HGB BLD-MCNC: 10.1 G/DL (ref 13.6–17.2)
HGB BLD-MCNC: 10.2 G/DL (ref 13.6–17.2)
HGB BLD-MCNC: 10.2 G/DL (ref 13.6–17.2)
HGB BLD-MCNC: 10.3 G/DL (ref 13.6–17.2)
HGB BLD-MCNC: 10.4 G/DL (ref 13.6–17.2)
HGB BLD-MCNC: 10.5 G/DL (ref 13.6–17.2)
HGB BLD-MCNC: 10.6 G/DL (ref 13.6–17.2)
HGB BLD-MCNC: 10.6 G/DL (ref 13.6–17.2)
HGB BLD-MCNC: 10.7 G/DL (ref 13.6–17.2)
HGB BLD-MCNC: 10.8 G/DL (ref 13.6–17.2)
HGB BLD-MCNC: 11 G/DL (ref 13.6–17.2)
HGB BLD-MCNC: 11.2 G/DL (ref 13.6–17.2)
HGB BLD-MCNC: 11.2 G/DL (ref 13.6–17.2)
HGB BLD-MCNC: 11.3 G/DL (ref 13.6–17.2)
HGB BLD-MCNC: 12 G/DL (ref 13.6–17.2)
HGB BLD-MCNC: 12.6 G/DL (ref 13.6–17.2)
HGB BLD-MCNC: 12.8 G/DL (ref 13.6–17.2)
HGB BLD-MCNC: 13 G/DL (ref 13.6–17.2)
HGB BLD-MCNC: 13.1 G/DL (ref 13.6–17.2)
HGB BLD-MCNC: 13.2 G/DL (ref 13.6–17.2)
HGB BLD-MCNC: 6.8 G/DL (ref 13.6–17.2)
HGB BLD-MCNC: 7.2 G/DL (ref 13.6–17.2)
HGB BLD-MCNC: 7.3 G/DL (ref 13.6–17.2)
HGB BLD-MCNC: 7.5 G/DL (ref 13.6–17.2)
HGB BLD-MCNC: 7.9 G/DL (ref 13.6–17.2)
HGB BLD-MCNC: 8.7 G/DL (ref 13.6–17.2)
HGB BLD-MCNC: 9.3 G/DL (ref 13.6–17.2)
HGB BLD-MCNC: 9.6 G/DL (ref 13.6–17.2)
HGB BLD-MCNC: 9.8 G/DL (ref 13.6–17.2)
HGB BLD-MCNC: 9.8 G/DL (ref 13.6–17.2)
HGB BLD-MCNC: 9.9 G/DL (ref 13.6–17.2)
HGB BLD-MCNC: 9.9 G/DL (ref 13.6–17.2)
HGB UR QL STRIP: NEGATIVE
HGB UR QL STRIP: NEGATIVE
IMM GRANULOCYTES # BLD AUTO: 0 K/UL (ref 0–0.5)
IMM GRANULOCYTES # BLD AUTO: 0.1 K/UL (ref 0–0.5)
IMM GRANULOCYTES # BLD AUTO: 0.2 K/UL (ref 0–0.5)
IMM GRANULOCYTES # BLD AUTO: 0.3 K/UL (ref 0–0.5)
IMM GRANULOCYTES # BLD AUTO: 0.5 K/UL (ref 0–0.5)
IMM GRANULOCYTES # BLD AUTO: 0.6 K/UL (ref 0–0.5)
IMM GRANULOCYTES # BLD AUTO: 0.6 K/UL (ref 0–0.5)
IMM GRANULOCYTES # BLD AUTO: 0.9 K/UL (ref 0–0.5)
IMM GRANULOCYTES # BLD AUTO: 10.2 K/UL (ref 0–0.5)
IMM GRANULOCYTES # BLD AUTO: 10.8 K/UL (ref 0–0.5)
IMM GRANULOCYTES # BLD AUTO: 10.9 K/UL (ref 0–0.5)
IMM GRANULOCYTES # BLD AUTO: 15.4 K/UL (ref 0–0.5)
IMM GRANULOCYTES # BLD AUTO: 16 K/UL (ref 0–0.5)
IMM GRANULOCYTES # BLD AUTO: 16.3 K/UL (ref 0–0.5)
IMM GRANULOCYTES # BLD AUTO: 6.1 K/UL (ref 0–0.5)
IMM GRANULOCYTES # BLD AUTO: 6.5 K/UL (ref 0–0.5)
IMM GRANULOCYTES # BLD AUTO: 7.7 K/UL (ref 0–0.5)
IMM GRANULOCYTES # BLD AUTO: 9 K/UL (ref 0–0.5)
IMM GRANULOCYTES # BLD AUTO: 9.6 K/UL (ref 0–0.5)
IMM GRANULOCYTES # BLD AUTO: 9.6 K/UL (ref 0–0.5)
IMM GRANULOCYTES # BLD: 0 K/UL (ref 0–0.5)
IMM GRANULOCYTES NFR BLD AUTO: 0 % (ref 0–5)
IMM GRANULOCYTES NFR BLD AUTO: 1 % (ref 0–5)
IMM GRANULOCYTES NFR BLD AUTO: 10 % (ref 0–5)
IMM GRANULOCYTES NFR BLD AUTO: 13 % (ref 0–5)
IMM GRANULOCYTES NFR BLD AUTO: 14 % (ref 0–5)
IMM GRANULOCYTES NFR BLD AUTO: 17 % (ref 0–5)
IMM GRANULOCYTES NFR BLD AUTO: 18 % (ref 0–5)
IMM GRANULOCYTES NFR BLD AUTO: 18 % (ref 0–5)
IMM GRANULOCYTES NFR BLD AUTO: 19 % (ref 0–5)
IMM GRANULOCYTES NFR BLD AUTO: 2 % (ref 0–5)
IMM GRANULOCYTES NFR BLD AUTO: 21 % (ref 0–5)
IMM GRANULOCYTES NFR BLD AUTO: 22 % (ref 0–5)
IMM GRANULOCYTES NFR BLD AUTO: 28 % (ref 0–5)
IMM GRANULOCYTES NFR BLD AUTO: 29 % (ref 0–5)
IMM GRANULOCYTES NFR BLD AUTO: 34 % (ref 0–5)
IMM GRANULOCYTES NFR BLD AUTO: 37 % (ref 0–5)
IMM GRANULOCYTES NFR BLD AUTO: 40 % (ref 0–5)
IMM GRANULOCYTES NFR BLD AUTO: 42 % (ref 0–5)
IMM GRANULOCYTES NFR BLD AUTO: 5 % (ref 0–5)
IMM GRANULOCYTES NFR BLD AUTO: 9 % (ref 0–5)
INR PPP: 1.2
INSPIRATION.DURATION SETTING TIME VENT: 0.6 SEC
INSPIRATION.DURATION SETTING TIME VENT: 0.7 SEC
INSPIRATION.DURATION SETTING TIME VENT: 0.8 SEC
INSPIRATION.DURATION SETTING TIME VENT: 0.8 SEC
INSPIRATION.DURATION SETTING TIME VENT: 0.9 SEC
INSPIRATION.DURATION SETTING TIME VENT: 1 SEC
INTERPRETATION: NORMAL
IRON SATN MFR SERPL: 11 %
IRON SERPL-MCNC: 24 UG/DL (ref 35–150)
KETONES UR QL STRIP.AUTO: NEGATIVE MG/DL
KETONES UR QL STRIP.AUTO: NEGATIVE MG/DL
LACTATE BLD-SCNC: 1.4 MMOL/L (ref 0.5–1.9)
LACTATE BLD-SCNC: 2.72 MMOL/L (ref 0.5–1.9)
LEUKOCYTE ESTERASE UR QL STRIP.AUTO: NEGATIVE
LEUKOCYTE ESTERASE UR QL STRIP.AUTO: NEGATIVE
LYMPHOCYTES # BLD: 0 K/UL (ref 0.5–4.6)
LYMPHOCYTES # BLD: 0.3 K/UL (ref 0.5–4.6)
LYMPHOCYTES # BLD: 0.3 K/UL (ref 0.5–4.6)
LYMPHOCYTES # BLD: 0.4 K/UL (ref 0.5–4.6)
LYMPHOCYTES # BLD: 0.5 K/UL (ref 0.5–4.6)
LYMPHOCYTES # BLD: 0.6 K/UL (ref 0.5–4.6)
LYMPHOCYTES # BLD: 0.7 K/UL (ref 0.5–4.6)
LYMPHOCYTES # BLD: 0.7 K/UL (ref 0.5–4.6)
LYMPHOCYTES # BLD: 0.8 K/UL (ref 0.5–4.6)
LYMPHOCYTES NFR BLD MANUAL: 19 % (ref 16–44)
LYMPHOCYTES NFR BLD: 0 % (ref 13–44)
LYMPHOCYTES NFR BLD: 1 % (ref 13–44)
LYMPHOCYTES NFR BLD: 10 % (ref 13–44)
LYMPHOCYTES NFR BLD: 10 % (ref 13–44)
LYMPHOCYTES NFR BLD: 11 % (ref 13–44)
LYMPHOCYTES NFR BLD: 13 % (ref 13–44)
LYMPHOCYTES NFR BLD: 13 % (ref 13–44)
LYMPHOCYTES NFR BLD: 14 % (ref 13–44)
LYMPHOCYTES NFR BLD: 15 % (ref 13–44)
LYMPHOCYTES NFR BLD: 17 % (ref 13–44)
LYMPHOCYTES NFR BLD: 18 % (ref 13–44)
LYMPHOCYTES NFR BLD: 2 % (ref 13–44)
LYMPHOCYTES NFR BLD: 4 % (ref 13–44)
LYMPHOCYTES NFR BLD: 5 % (ref 13–44)
LYMPHOCYTES NFR BLD: 6 % (ref 13–44)
LYMPHOCYTES NFR BLD: 6 % (ref 13–44)
LYMPHOCYTES NFR BLD: 7 % (ref 13–44)
LYMPHOCYTES NFR BLD: 7 % (ref 13–44)
LYMPHOCYTES NFR BLD: 8 % (ref 13–44)
LYMPHOCYTES NFR BLD: 8 % (ref 13–44)
Lab: NORMAL
MAGNESIUM SERPL-MCNC: 1.7 MG/DL (ref 1.8–2.4)
MAGNESIUM SERPL-MCNC: 1.8 MG/DL (ref 1.8–2.4)
MAGNESIUM SERPL-MCNC: 1.9 MG/DL (ref 1.8–2.4)
MAGNESIUM SERPL-MCNC: 2 MG/DL (ref 1.8–2.4)
MAGNESIUM SERPL-MCNC: 2.1 MG/DL (ref 1.8–2.4)
MAGNESIUM SERPL-MCNC: 2.2 MG/DL (ref 1.8–2.4)
MAGNESIUM SERPL-MCNC: 2.3 MG/DL (ref 1.8–2.4)
MAGNESIUM SERPL-MCNC: 2.4 MG/DL (ref 1.8–2.4)
MAGNESIUM SERPL-MCNC: 2.7 MG/DL (ref 1.8–2.4)
MAGNESIUM SERPL-MCNC: 2.9 MG/DL (ref 1.8–2.4)
MCH RBC QN AUTO: 28.7 PG (ref 26.1–32.9)
MCH RBC QN AUTO: 28.9 PG (ref 26.1–32.9)
MCH RBC QN AUTO: 29 PG (ref 26.1–32.9)
MCH RBC QN AUTO: 29 PG (ref 26.1–32.9)
MCH RBC QN AUTO: 29.2 PG (ref 26.1–32.9)
MCH RBC QN AUTO: 29.2 PG (ref 26.1–32.9)
MCH RBC QN AUTO: 29.3 PG (ref 26.1–32.9)
MCH RBC QN AUTO: 29.6 PG (ref 26.1–32.9)
MCH RBC QN AUTO: 29.6 PG (ref 26.1–32.9)
MCH RBC QN AUTO: 29.7 PG (ref 26.1–32.9)
MCH RBC QN AUTO: 29.7 PG (ref 26.1–32.9)
MCH RBC QN AUTO: 30.5 PG (ref 26.1–32.9)
MCH RBC QN AUTO: 30.6 PG (ref 26.1–32.9)
MCH RBC QN AUTO: 31 PG (ref 26.1–32.9)
MCH RBC QN AUTO: 31.3 PG (ref 26.1–32.9)
MCH RBC QN AUTO: 31.7 PG (ref 26.1–32.9)
MCH RBC QN AUTO: 31.7 PG (ref 26.1–32.9)
MCH RBC QN AUTO: 31.9 PG (ref 26.1–32.9)
MCH RBC QN AUTO: 31.9 PG (ref 26.1–32.9)
MCH RBC QN AUTO: 32.1 PG (ref 26.1–32.9)
MCH RBC QN AUTO: 32.2 PG (ref 26.1–32.9)
MCH RBC QN AUTO: 32.6 PG (ref 26.1–32.9)
MCH RBC QN AUTO: 32.6 PG (ref 26.1–32.9)
MCH RBC QN AUTO: 33.3 PG (ref 26.1–32.9)
MCH RBC QN AUTO: 33.4 PG (ref 26.1–32.9)
MCH RBC QN AUTO: 33.6 PG (ref 26.1–32.9)
MCHC RBC AUTO-ENTMCNC: 29.4 G/DL (ref 31.4–35)
MCHC RBC AUTO-ENTMCNC: 29.4 G/DL (ref 31.4–35)
MCHC RBC AUTO-ENTMCNC: 29.8 G/DL (ref 31.4–35)
MCHC RBC AUTO-ENTMCNC: 29.9 G/DL (ref 31.4–35)
MCHC RBC AUTO-ENTMCNC: 30 G/DL (ref 31.4–35)
MCHC RBC AUTO-ENTMCNC: 30.4 G/DL (ref 31.4–35)
MCHC RBC AUTO-ENTMCNC: 30.4 G/DL (ref 31.4–35)
MCHC RBC AUTO-ENTMCNC: 30.5 G/DL (ref 31.4–35)
MCHC RBC AUTO-ENTMCNC: 31.1 G/DL (ref 31.4–35)
MCHC RBC AUTO-ENTMCNC: 31.2 G/DL (ref 31.4–35)
MCHC RBC AUTO-ENTMCNC: 31.2 G/DL (ref 31.4–35)
MCHC RBC AUTO-ENTMCNC: 31.3 G/DL (ref 31.4–35)
MCHC RBC AUTO-ENTMCNC: 31.3 G/DL (ref 31.4–35)
MCHC RBC AUTO-ENTMCNC: 31.8 G/DL (ref 31.4–35)
MCHC RBC AUTO-ENTMCNC: 32 G/DL (ref 31.4–35)
MCHC RBC AUTO-ENTMCNC: 32 G/DL (ref 31.4–35)
MCHC RBC AUTO-ENTMCNC: 32.1 G/DL (ref 31.4–35)
MCHC RBC AUTO-ENTMCNC: 32.1 G/DL (ref 31.4–35)
MCHC RBC AUTO-ENTMCNC: 32.2 G/DL (ref 31.4–35)
MCHC RBC AUTO-ENTMCNC: 32.3 G/DL (ref 31.4–35)
MCHC RBC AUTO-ENTMCNC: 32.5 G/DL (ref 31.4–35)
MCHC RBC AUTO-ENTMCNC: 32.8 G/DL (ref 31.4–35)
MCHC RBC AUTO-ENTMCNC: 33.2 G/DL (ref 31.4–35)
MCHC RBC AUTO-ENTMCNC: 34 G/DL (ref 31.4–35)
MCHC RBC AUTO-ENTMCNC: 34.1 G/DL (ref 31.4–35)
MCHC RBC AUTO-ENTMCNC: 34.2 G/DL (ref 31.4–35)
MCHC RBC AUTO-ENTMCNC: 34.2 G/DL (ref 31.4–35)
MCHC RBC AUTO-ENTMCNC: 34.3 G/DL (ref 31.4–35)
MCHC RBC AUTO-ENTMCNC: 34.5 G/DL (ref 31.4–35)
MCV RBC AUTO: 100.4 FL (ref 79.6–97.8)
MCV RBC AUTO: 101.2 FL (ref 79.6–97.8)
MCV RBC AUTO: 101.5 FL (ref 79.6–97.8)
MCV RBC AUTO: 102.1 FL (ref 79.6–97.8)
MCV RBC AUTO: 90.5 FL (ref 79.6–97.8)
MCV RBC AUTO: 90.8 FL (ref 79.6–97.8)
MCV RBC AUTO: 91.8 FL (ref 79.6–97.8)
MCV RBC AUTO: 92.2 FL (ref 79.6–97.8)
MCV RBC AUTO: 93 FL (ref 79.6–97.8)
MCV RBC AUTO: 93.1 FL (ref 79.6–97.8)
MCV RBC AUTO: 93.4 FL (ref 79.6–97.8)
MCV RBC AUTO: 93.8 FL (ref 79.6–97.8)
MCV RBC AUTO: 93.9 FL (ref 79.6–97.8)
MCV RBC AUTO: 94 FL (ref 79.6–97.8)
MCV RBC AUTO: 94.2 FL (ref 79.6–97.8)
MCV RBC AUTO: 94.4 FL (ref 79.6–97.8)
MCV RBC AUTO: 94.6 FL (ref 79.6–97.8)
MCV RBC AUTO: 95 FL (ref 79.6–97.8)
MCV RBC AUTO: 95.2 FL (ref 79.6–97.8)
MCV RBC AUTO: 95.9 FL (ref 79.6–97.8)
MCV RBC AUTO: 96.9 FL (ref 79.6–97.8)
MCV RBC AUTO: 97.4 FL (ref 79.6–97.8)
MCV RBC AUTO: 97.6 FL (ref 79.6–97.8)
MCV RBC AUTO: 97.7 FL (ref 79.6–97.8)
MCV RBC AUTO: 97.8 FL (ref 79.6–97.8)
MCV RBC AUTO: 98 FL (ref 79.6–97.8)
MCV RBC AUTO: 98.2 FL (ref 79.6–97.8)
MCV RBC AUTO: 98.2 FL (ref 79.6–97.8)
MCV RBC AUTO: 98.4 FL (ref 79.6–97.8)
MCV RBC AUTO: 99.4 FL (ref 79.6–97.8)
METHYLMALONATE SERPL-SCNC: 171 NMOL/L (ref 0–378)
MONOCYTES # BLD: 0.5 K/UL (ref 0.1–1.3)
MONOCYTES # BLD: 0.5 K/UL (ref 0.1–1.3)
MONOCYTES # BLD: 0.6 K/UL (ref 0.1–1.3)
MONOCYTES # BLD: 0.6 K/UL (ref 0.1–1.3)
MONOCYTES # BLD: 0.7 K/UL (ref 0.1–1.3)
MONOCYTES # BLD: 0.8 K/UL (ref 0.1–1.3)
MONOCYTES # BLD: 0.8 K/UL (ref 0.1–1.3)
MONOCYTES # BLD: 0.9 K/UL (ref 0.1–1.3)
MONOCYTES # BLD: 1 K/UL (ref 0.1–1.3)
MONOCYTES # BLD: 1.1 K/UL (ref 0.1–1.3)
MONOCYTES # BLD: 1.2 K/UL (ref 0.1–1.3)
MONOCYTES # BLD: 1.3 K/UL (ref 0.1–1.3)
MONOCYTES # BLD: 1.3 K/UL (ref 0.1–1.3)
MONOCYTES # BLD: 1.7 K/UL (ref 0.1–1.3)
MONOCYTES # BLD: 1.9 K/UL (ref 0.1–1.3)
MONOCYTES # BLD: 2.1 K/UL (ref 0.1–1.3)
MONOCYTES # BLD: 2.3 K/UL (ref 0.1–1.3)
MONOCYTES # BLD: 2.8 K/UL (ref 0.1–1.3)
MONOCYTES # BLD: 3 K/UL (ref 0.1–1.3)
MONOCYTES # BLD: 3.3 K/UL (ref 0.1–1.3)
MONOCYTES # BLD: 3.5 K/UL (ref 0.1–1.3)
MONOCYTES # BLD: 3.7 K/UL (ref 0.1–1.3)
MONOCYTES # BLD: 3.9 K/UL (ref 0.1–1.3)
MONOCYTES NFR BLD MANUAL: 26 % (ref 3–9)
MONOCYTES NFR BLD: 10 % (ref 4–12)
MONOCYTES NFR BLD: 10 % (ref 4–12)
MONOCYTES NFR BLD: 11 % (ref 4–12)
MONOCYTES NFR BLD: 13 % (ref 4–12)
MONOCYTES NFR BLD: 14 % (ref 4–12)
MONOCYTES NFR BLD: 16 % (ref 4–12)
MONOCYTES NFR BLD: 16 % (ref 4–12)
MONOCYTES NFR BLD: 17 % (ref 4–12)
MONOCYTES NFR BLD: 18 % (ref 4–12)
MONOCYTES NFR BLD: 20 % (ref 4–12)
MONOCYTES NFR BLD: 20 % (ref 4–12)
MONOCYTES NFR BLD: 23 % (ref 4–12)
MONOCYTES NFR BLD: 29 % (ref 4–12)
MONOCYTES NFR BLD: 34 % (ref 4–12)
MONOCYTES NFR BLD: 5 % (ref 4–12)
MONOCYTES NFR BLD: 6 % (ref 4–12)
MONOCYTES NFR BLD: 7 % (ref 4–12)
MONOCYTES NFR BLD: 8 % (ref 4–12)
MONOCYTES NFR BLD: 9 % (ref 4–12)
MUCOUS THREADS URNS QL MICRO: ABNORMAL /LPF
MYELOCYTES NFR BLD MANUAL: 4 %
NEUTS BAND NFR BLD MANUAL: 4 % (ref 0–6)
NEUTS SEG # BLD: 0.6 K/UL (ref 1.7–8.2)
NEUTS SEG # BLD: 1 K/UL (ref 1.7–8.2)
NEUTS SEG # BLD: 1.7 K/UL (ref 1.7–8.2)
NEUTS SEG # BLD: 16.4 K/UL (ref 1.7–8.2)
NEUTS SEG # BLD: 19 K/UL (ref 1.7–8.2)
NEUTS SEG # BLD: 19.5 K/UL (ref 1.7–8.2)
NEUTS SEG # BLD: 2.3 K/UL (ref 1.7–8.2)
NEUTS SEG # BLD: 2.4 K/UL (ref 1.7–8.2)
NEUTS SEG # BLD: 2.5 K/UL (ref 1.7–8.2)
NEUTS SEG # BLD: 2.8 K/UL (ref 1.7–8.2)
NEUTS SEG # BLD: 2.9 K/UL (ref 1.7–8.2)
NEUTS SEG # BLD: 20.8 K/UL (ref 1.7–8.2)
NEUTS SEG # BLD: 23.2 K/UL (ref 1.7–8.2)
NEUTS SEG # BLD: 23.7 K/UL (ref 1.7–8.2)
NEUTS SEG # BLD: 24 K/UL (ref 1.7–8.2)
NEUTS SEG # BLD: 28.8 K/UL (ref 1.7–8.2)
NEUTS SEG # BLD: 3.5 K/UL (ref 1.7–8.2)
NEUTS SEG # BLD: 3.8 K/UL (ref 1.7–8.2)
NEUTS SEG # BLD: 3.9 K/UL (ref 1.7–8.2)
NEUTS SEG # BLD: 32.2 K/UL (ref 1.7–8.2)
NEUTS SEG # BLD: 34 K/UL (ref 1.7–8.2)
NEUTS SEG # BLD: 34.6 K/UL (ref 1.7–8.2)
NEUTS SEG # BLD: 39.6 K/UL (ref 1.7–8.2)
NEUTS SEG # BLD: 4.2 K/UL (ref 1.7–8.2)
NEUTS SEG # BLD: 5 K/UL (ref 1.7–8.2)
NEUTS SEG # BLD: 7.1 K/UL (ref 1.7–8.2)
NEUTS SEG # BLD: 8.4 K/UL (ref 1.7–8.2)
NEUTS SEG # BLD: 8.5 K/UL (ref 1.7–8.2)
NEUTS SEG # BLD: 8.6 K/UL (ref 1.7–8.2)
NEUTS SEG NFR BLD MANUAL: 35 % (ref 47–75)
NEUTS SEG NFR BLD: 29 % (ref 43–78)
NEUTS SEG NFR BLD: 36 % (ref 43–78)
NEUTS SEG NFR BLD: 50 % (ref 43–78)
NEUTS SEG NFR BLD: 52 % (ref 43–78)
NEUTS SEG NFR BLD: 56 % (ref 43–78)
NEUTS SEG NFR BLD: 58 % (ref 43–78)
NEUTS SEG NFR BLD: 58 % (ref 43–78)
NEUTS SEG NFR BLD: 61 % (ref 43–78)
NEUTS SEG NFR BLD: 62 % (ref 43–78)
NEUTS SEG NFR BLD: 63 % (ref 43–78)
NEUTS SEG NFR BLD: 63 % (ref 43–78)
NEUTS SEG NFR BLD: 65 % (ref 43–78)
NEUTS SEG NFR BLD: 68 % (ref 43–78)
NEUTS SEG NFR BLD: 69 % (ref 43–78)
NEUTS SEG NFR BLD: 70 % (ref 43–78)
NEUTS SEG NFR BLD: 72 % (ref 43–78)
NEUTS SEG NFR BLD: 73 % (ref 43–78)
NEUTS SEG NFR BLD: 74 % (ref 43–78)
NEUTS SEG NFR BLD: 75 % (ref 43–78)
NEUTS SEG NFR BLD: 75 % (ref 43–78)
NEUTS SEG NFR BLD: 76 % (ref 43–78)
NEUTS SEG NFR BLD: 76 % (ref 43–78)
NEUTS SEG NFR BLD: 78 % (ref 43–78)
NEUTS SEG NFR BLD: 78 % (ref 43–78)
NEUTS SEG NFR BLD: 80 % (ref 43–78)
NEUTS SEG NFR BLD: 81 % (ref 43–78)
NEUTS SEG NFR BLD: 82 % (ref 43–78)
NITRITE UR QL STRIP.AUTO: NEGATIVE
NITRITE UR QL STRIP.AUTO: NEGATIVE
NRBC # BLD: 0 K/UL (ref 0–0.2)
NRBC # BLD: 0.01 K/UL (ref 0–0.2)
NRBC # BLD: 0.02 K/UL (ref 0–0.2)
NRBC # BLD: 0.03 K/UL (ref 0–0.2)
NRBC # BLD: 0.03 K/UL (ref 0–0.2)
NRBC # BLD: 0.05 K/UL (ref 0–0.2)
NRBC # BLD: 0.07 K/UL (ref 0–0.2)
NRBC # BLD: 0.07 K/UL (ref 0–0.2)
NRBC # BLD: 0.14 K/UL (ref 0–0.2)
NRBC # BLD: 0.14 K/UL (ref 0–0.2)
NRBC # BLD: 0.15 K/UL (ref 0–0.2)
NRBC # BLD: 0.16 K/UL (ref 0–0.2)
NRBC # BLD: 0.19 K/UL (ref 0–0.2)
NRBC # BLD: 0.27 K/UL (ref 0–0.2)
NRBC # BLD: 0.29 K/UL (ref 0–0.2)
NRBC # BLD: 0.32 K/UL (ref 0–0.2)
NRBC # BLD: 0.32 K/UL (ref 0–0.2)
O2/TOTAL GAS SETTING VFR VENT: 28 %
O2/TOTAL GAS SETTING VFR VENT: 30 %
O2/TOTAL GAS SETTING VFR VENT: 35 %
O2/TOTAL GAS SETTING VFR VENT: 40 %
P-R INTERVAL, ECG05: 142 MS
PATH REV BLD -IMP: NORMAL
PCO2 BLD: 37.5 MMHG (ref 35–45)
PCO2 BLD: 37.6 MMHG (ref 35–45)
PCO2 BLD: 39.4 MMHG (ref 35–45)
PCO2 BLD: 41 MMHG (ref 35–45)
PCO2 BLD: 42.9 MMHG (ref 35–45)
PCO2 BLD: 47.2 MMHG (ref 35–45)
PCO2 BLD: 47.6 MMHG (ref 35–45)
PCO2 BLD: 47.9 MMHG (ref 35–45)
PCO2 BLD: 50.7 MMHG (ref 35–45)
PCO2 BLD: 52.1 MMHG (ref 35–45)
PCO2 BLD: 56.2 MMHG (ref 35–45)
PCO2 BLD: 57.8 MMHG (ref 35–45)
PCO2 BLD: 58.8 MMHG (ref 35–45)
PCO2 BLD: 60.1 MMHG (ref 35–45)
PCO2 BLD: 61 MMHG (ref 35–45)
PCR REFLEX: NORMAL
PEEP RESPIRATORY: 10 CMH2O
PEEP RESPIRATORY: 8 CMH2O
PH BLD: 7.2 [PH] (ref 7.35–7.45)
PH BLD: 7.21 [PH] (ref 7.35–7.45)
PH BLD: 7.22 [PH] (ref 7.35–7.45)
PH BLD: 7.24 [PH] (ref 7.35–7.45)
PH BLD: 7.27 [PH] (ref 7.35–7.45)
PH BLD: 7.27 [PH] (ref 7.35–7.45)
PH BLD: 7.28 [PH] (ref 7.35–7.45)
PH BLD: 7.29 [PH] (ref 7.35–7.45)
PH BLD: 7.32 [PH] (ref 7.35–7.45)
PH BLD: 7.33 [PH] (ref 7.35–7.45)
PH BLD: 7.34 [PH] (ref 7.35–7.45)
PH BLD: 7.36 [PH] (ref 7.35–7.45)
PH BLD: 7.39 [PH] (ref 7.35–7.45)
PH BLD: 7.42 [PH] (ref 7.35–7.45)
PH BLD: 7.47 [PH] (ref 7.35–7.45)
PH UR STRIP: 5.5 [PH] (ref 5–9)
PH UR STRIP: 6 [PH] (ref 5–9)
PHOSPHATE SERPL-MCNC: 3.3 MG/DL (ref 2.5–4.5)
PHOSPHATE SERPL-MCNC: 3.3 MG/DL (ref 2.5–4.5)
PHOSPHATE SERPL-MCNC: 6.7 MG/DL (ref 2.5–4.5)
PIP ISTAT,IPIP: 16
PLATELET # BLD AUTO: 114 K/UL (ref 150–450)
PLATELET # BLD AUTO: 120 K/UL (ref 150–450)
PLATELET # BLD AUTO: 128 K/UL (ref 150–450)
PLATELET # BLD AUTO: 128 K/UL (ref 150–450)
PLATELET # BLD AUTO: 132 K/UL (ref 150–450)
PLATELET # BLD AUTO: 135 K/UL (ref 150–450)
PLATELET # BLD AUTO: 142 K/UL (ref 150–450)
PLATELET # BLD AUTO: 145 K/UL (ref 150–450)
PLATELET # BLD AUTO: 159 K/UL (ref 150–450)
PLATELET # BLD AUTO: 167 K/UL (ref 150–450)
PLATELET # BLD AUTO: 173 K/UL (ref 150–450)
PLATELET # BLD AUTO: 179 K/UL (ref 150–450)
PLATELET # BLD AUTO: 183 K/UL (ref 150–450)
PLATELET # BLD AUTO: 188 K/UL (ref 150–450)
PLATELET # BLD AUTO: 199 K/UL (ref 150–450)
PLATELET # BLD AUTO: 200 K/UL (ref 150–450)
PLATELET # BLD AUTO: 206 K/UL (ref 150–450)
PLATELET # BLD AUTO: 220 K/UL (ref 150–450)
PLATELET # BLD AUTO: 221 K/UL (ref 150–450)
PLATELET # BLD AUTO: 225 K/UL (ref 150–450)
PLATELET # BLD AUTO: 232 K/UL (ref 150–450)
PLATELET # BLD AUTO: 273 K/UL (ref 150–450)
PLATELET # BLD AUTO: 286 K/UL (ref 150–450)
PLATELET # BLD AUTO: 341 K/UL (ref 150–450)
PLATELET # BLD AUTO: 352 K/UL (ref 150–450)
PLATELET # BLD AUTO: 382 K/UL (ref 150–450)
PLATELET # BLD AUTO: 398 K/UL (ref 150–450)
PLATELET # BLD AUTO: 401 K/UL (ref 150–450)
PLATELET # BLD AUTO: 416 K/UL (ref 150–450)
PLATELET # BLD AUTO: 427 K/UL (ref 150–450)
PLATELET # BLD AUTO: 462 K/UL (ref 150–450)
PLATELET # BLD AUTO: 468 K/UL (ref 150–450)
PLATELET COMMENTS,PCOM: ADEQUATE
PLATELET COMMENTS,PCOM: SLIGHT
PLATELET COMMENTS,PCOM: SLIGHT
PMV BLD AUTO: 10.1 FL (ref 9.4–12.3)
PMV BLD AUTO: 10.6 FL (ref 9.4–12.3)
PMV BLD AUTO: 10.9 FL (ref 9.4–12.3)
PMV BLD AUTO: 11.3 FL (ref 9.4–12.3)
PMV BLD AUTO: 11.8 FL (ref 9.4–12.3)
PMV BLD AUTO: 11.9 FL (ref 9.4–12.3)
PMV BLD AUTO: 12 FL (ref 9.4–12.3)
PMV BLD AUTO: 8.4 FL (ref 9.4–12.3)
PMV BLD AUTO: 8.6 FL (ref 9.4–12.3)
PMV BLD AUTO: 8.6 FL (ref 9.4–12.3)
PMV BLD AUTO: 8.8 FL (ref 9.4–12.3)
PMV BLD AUTO: 8.9 FL (ref 9.4–12.3)
PMV BLD AUTO: 9 FL (ref 9.4–12.3)
PMV BLD AUTO: 9.2 FL (ref 9.4–12.3)
PMV BLD AUTO: 9.3 FL (ref 9.4–12.3)
PMV BLD AUTO: 9.3 FL (ref 9.4–12.3)
PMV BLD AUTO: 9.4 FL (ref 9.4–12.3)
PMV BLD AUTO: 9.5 FL (ref 9.4–12.3)
PMV BLD AUTO: 9.5 FL (ref 9.4–12.3)
PMV BLD AUTO: 9.6 FL (ref 9.4–12.3)
PMV BLD AUTO: 9.6 FL (ref 9.4–12.3)
PMV BLD AUTO: 9.7 FL (ref 9.4–12.3)
PMV BLD AUTO: 9.9 FL (ref 9.4–12.3)
PMV BLD AUTO: 9.9 FL (ref 9.4–12.3)
PO2 BLD: 115 MMHG (ref 75–100)
PO2 BLD: 118 MMHG (ref 75–100)
PO2 BLD: 165 MMHG (ref 75–100)
PO2 BLD: 72 MMHG (ref 75–100)
PO2 BLD: 77 MMHG (ref 75–100)
PO2 BLD: 77 MMHG (ref 75–100)
PO2 BLD: 79 MMHG (ref 75–100)
PO2 BLD: 81 MMHG (ref 75–100)
PO2 BLD: 82 MMHG (ref 75–100)
PO2 BLD: 83 MMHG (ref 75–100)
PO2 BLD: 83 MMHG (ref 75–100)
PO2 BLD: 86 MMHG (ref 75–100)
PO2 BLD: 88 MMHG (ref 75–100)
PO2 BLD: 90 MMHG (ref 75–100)
PO2 BLD: 91 MMHG (ref 75–100)
POTASSIUM SERPL-SCNC: 3 MMOL/L (ref 3.5–5.1)
POTASSIUM SERPL-SCNC: 3.2 MMOL/L (ref 3.5–5.1)
POTASSIUM SERPL-SCNC: 3.3 MMOL/L (ref 3.5–5.1)
POTASSIUM SERPL-SCNC: 3.3 MMOL/L (ref 3.5–5.1)
POTASSIUM SERPL-SCNC: 3.4 MMOL/L (ref 3.5–5.1)
POTASSIUM SERPL-SCNC: 3.4 MMOL/L (ref 3.5–5.1)
POTASSIUM SERPL-SCNC: 3.5 MMOL/L (ref 3.5–5.1)
POTASSIUM SERPL-SCNC: 3.6 MMOL/L (ref 3.5–5.1)
POTASSIUM SERPL-SCNC: 3.6 MMOL/L (ref 3.5–5.1)
POTASSIUM SERPL-SCNC: 3.7 MMOL/L (ref 3.5–5.1)
POTASSIUM SERPL-SCNC: 3.8 MMOL/L (ref 3.5–5.1)
POTASSIUM SERPL-SCNC: 3.9 MMOL/L (ref 3.5–5.1)
POTASSIUM SERPL-SCNC: 3.9 MMOL/L (ref 3.5–5.1)
POTASSIUM SERPL-SCNC: 4 MMOL/L (ref 3.5–5.1)
POTASSIUM SERPL-SCNC: 4.1 MMOL/L (ref 3.5–5.1)
POTASSIUM SERPL-SCNC: 4.2 MMOL/L (ref 3.5–5.1)
POTASSIUM SERPL-SCNC: 4.2 MMOL/L (ref 3.5–5.1)
POTASSIUM SERPL-SCNC: 4.3 MMOL/L (ref 3.5–5.1)
POTASSIUM SERPL-SCNC: 4.3 MMOL/L (ref 3.5–5.1)
POTASSIUM SERPL-SCNC: 4.4 MMOL/L (ref 3.5–5.1)
POTASSIUM SERPL-SCNC: 4.5 MMOL/L (ref 3.5–5.1)
POTASSIUM SERPL-SCNC: 4.6 MMOL/L (ref 3.5–5.1)
POTASSIUM SERPL-SCNC: 4.7 MMOL/L (ref 3.5–5.1)
POTASSIUM SERPL-SCNC: 5.6 MMOL/L (ref 3.5–5.1)
PRESSURE CONTROL, IPC: 18
PRESSURE CONTROL, IPC: 22
PRESSURE CONTROL, IPC: 25
PRESSURE CONTROL, IPC: 28
PRESSURE CONTROL, IPC: 30
PRESSURE CONTROL, IPC: 34
PRESSURE CONTROL, IPC: 40
PRESSURE SUPPORT SETTING VENT: 15 CMH2O
PROT SERPL-MCNC: 5.7 G/DL (ref 6.3–8.2)
PROT SERPL-MCNC: 6 G/DL (ref 6.3–8.2)
PROT SERPL-MCNC: 6.1 G/DL (ref 6.3–8.2)
PROT SERPL-MCNC: 6.2 G/DL (ref 6.3–8.2)
PROT SERPL-MCNC: 6.3 G/DL (ref 6.3–8.2)
PROT SERPL-MCNC: 6.3 G/DL (ref 6.3–8.2)
PROT SERPL-MCNC: 6.4 G/DL (ref 6.3–8.2)
PROT SERPL-MCNC: 6.5 G/DL (ref 6.3–8.2)
PROT SERPL-MCNC: 6.6 G/DL (ref 6.3–8.2)
PROT SERPL-MCNC: 6.7 G/DL (ref 6.3–8.2)
PROT SERPL-MCNC: 6.9 G/DL (ref 6.3–8.2)
PROT SERPL-MCNC: 7 G/DL (ref 6.3–8.2)
PROT SERPL-MCNC: 7 G/DL (ref 6.3–8.2)
PROT SERPL-MCNC: 7.1 G/DL (ref 6.3–8.2)
PROT SERPL-MCNC: 7.2 G/DL (ref 6.3–8.2)
PROT SERPL-MCNC: 7.2 G/DL (ref 6.3–8.2)
PROT SERPL-MCNC: 7.3 G/DL (ref 6.3–8.2)
PROT SERPL-MCNC: 7.4 G/DL (ref 6.3–8.2)
PROT UR STRIP-MCNC: 100 MG/DL
PROT UR STRIP-MCNC: 30 MG/DL
PROT UR-MCNC: 114 MG/DL
PROT UR-MCNC: 114 MG/DL
PROT UR-MCNC: 14 MG/DL
PROT UR-MCNC: 19 MG/DL
PROT UR-MCNC: 21 MG/DL
PROT UR-MCNC: 220 MG/DL
PROT UR-MCNC: 224 MG/DL
PROT UR-MCNC: 35 MG/DL
PROT UR-MCNC: 35 MG/DL
PROT UR-MCNC: 41 MG/DL
PROT UR-MCNC: 65 MG/DL
PROT UR-MCNC: 69 MG/DL
PROT UR-MCNC: 7 MG/DL
PROT UR-MCNC: 73 MG/DL
PROT UR-MCNC: 82 MG/DL
PROT UR-MCNC: 92 MG/DL
PROT/CREAT UR-RTO: 0.2
PROT/CREAT UR-RTO: 0.8
PROTHROMBIN TIME: 15.3 SEC (ref 11.7–14.5)
Q-T INTERVAL, ECG07: 342 MS
QRS DURATION, ECG06: 72 MS
QTC CALCULATION (BEZET), ECG08: 434 MS
RBC # BLD AUTO: 2.29 M/UL (ref 4.23–5.6)
RBC # BLD AUTO: 2.46 M/UL (ref 4.23–5.6)
RBC # BLD AUTO: 2.46 M/UL (ref 4.23–5.6)
RBC # BLD AUTO: 2.85 M/UL (ref 4.23–5.6)
RBC # BLD AUTO: 3.06 M/UL (ref 4.23–5.67)
RBC # BLD AUTO: 3.14 M/UL (ref 4.23–5.6)
RBC # BLD AUTO: 3.19 M/UL (ref 4.23–5.67)
RBC # BLD AUTO: 3.23 M/UL (ref 4.23–5.6)
RBC # BLD AUTO: 3.25 M/UL (ref 4.23–5.67)
RBC # BLD AUTO: 3.29 M/UL (ref 4.23–5.67)
RBC # BLD AUTO: 3.33 M/UL (ref 4.23–5.6)
RBC # BLD AUTO: 3.33 M/UL (ref 4.23–5.67)
RBC # BLD AUTO: 3.35 M/UL (ref 4.23–5.67)
RBC # BLD AUTO: 3.36 M/UL (ref 4.23–5.6)
RBC # BLD AUTO: 3.38 M/UL (ref 4.23–5.67)
RBC # BLD AUTO: 3.39 M/UL (ref 4.23–5.6)
RBC # BLD AUTO: 3.41 M/UL (ref 4.23–5.6)
RBC # BLD AUTO: 3.41 M/UL (ref 4.23–5.6)
RBC # BLD AUTO: 3.44 M/UL (ref 4.23–5.67)
RBC # BLD AUTO: 3.48 M/UL (ref 4.23–5.6)
RBC # BLD AUTO: 3.52 M/UL (ref 4.23–5.6)
RBC # BLD AUTO: 3.58 M/UL (ref 4.23–5.67)
RBC # BLD AUTO: 3.59 M/UL (ref 4.23–5.6)
RBC # BLD AUTO: 3.62 M/UL (ref 4.23–5.6)
RBC # BLD AUTO: 3.65 M/UL (ref 4.23–5.6)
RBC # BLD AUTO: 3.77 M/UL (ref 4.23–5.67)
RBC # BLD AUTO: 3.78 M/UL (ref 4.23–5.67)
RBC # BLD AUTO: 3.92 M/UL (ref 4.23–5.67)
RBC # BLD AUTO: 4.01 M/UL (ref 4.23–5.67)
RBC # BLD AUTO: 4.11 M/UL (ref 4.23–5.67)
RBC # BLD AUTO: 4.13 M/UL (ref 4.23–5.67)
RBC # BLD AUTO: 4.15 M/UL (ref 4.23–5.67)
RBC #/AREA URNS HPF: 0 /HPF
RBC #/AREA URNS HPF: ABNORMAL /HPF
RBC MORPH BLD: ABNORMAL
SAO2 % BLD: 91 % (ref 95–98)
SAO2 % BLD: 92 % (ref 95–98)
SAO2 % BLD: 94 % (ref 95–98)
SAO2 % BLD: 95 % (ref 95–98)
SAO2 % BLD: 95 % (ref 95–98)
SAO2 % BLD: 96 % (ref 95–98)
SAO2 % BLD: 99 % (ref 95–98)
SERVICE CMNT-IMP: ABNORMAL
SERVICE CMNT-IMP: NORMAL
SODIUM SERPL-SCNC: 132 MMOL/L (ref 136–145)
SODIUM SERPL-SCNC: 133 MMOL/L (ref 136–145)
SODIUM SERPL-SCNC: 133 MMOL/L (ref 136–145)
SODIUM SERPL-SCNC: 134 MMOL/L (ref 136–145)
SODIUM SERPL-SCNC: 134 MMOL/L (ref 136–145)
SODIUM SERPL-SCNC: 135 MMOL/L (ref 136–145)
SODIUM SERPL-SCNC: 135 MMOL/L (ref 136–145)
SODIUM SERPL-SCNC: 137 MMOL/L (ref 136–145)
SODIUM SERPL-SCNC: 138 MMOL/L (ref 136–145)
SODIUM SERPL-SCNC: 138 MMOL/L (ref 136–145)
SODIUM SERPL-SCNC: 139 MMOL/L (ref 136–145)
SODIUM SERPL-SCNC: 140 MMOL/L (ref 136–145)
SODIUM SERPL-SCNC: 141 MMOL/L (ref 136–145)
SODIUM SERPL-SCNC: 142 MMOL/L (ref 136–145)
SODIUM SERPL-SCNC: 143 MMOL/L (ref 136–145)
SODIUM SERPL-SCNC: 144 MMOL/L (ref 136–145)
SODIUM SERPL-SCNC: 147 MMOL/L (ref 136–145)
SP GR UR REFRACTOMETRY: 1.02 (ref 1–1.02)
SP GR UR REFRACTOMETRY: 1.02 (ref 1–1.02)
SPECIMEN EXP DATE BLD: NORMAL
SPECIMEN SOURCE: ABNORMAL
SPECIMEN SOURCE: NORMAL
SPECIMEN TYPE: ABNORMAL
SPONTANEOUS TIMED, IST: 14
TIBC SERPL-MCNC: 216 UG/DL (ref 250–450)
TRIGL SERPL-MCNC: 117 MG/DL (ref 35–150)
TRIGL SERPL-MCNC: 272 MG/DL (ref 35–150)
TROPONIN I SERPL-MCNC: <0.02 NG/ML (ref 0.02–0.05)
UROBILINOGEN UR QL STRIP.AUTO: 0.2 EU/DL (ref 0.2–1)
UROBILINOGEN UR QL STRIP.AUTO: 1 EU/DL (ref 0.2–1)
VANCOMYCIN SERPL-MCNC: 32.5 UG/ML
VENTILATION MODE VENT: ABNORMAL
VENTRICULAR RATE, ECG03: 97 BPM
VIT B12 SERPL-MCNC: 289 PG/ML (ref 193–986)
VIT B12 SERPL-MCNC: 350 PG/ML
VT SETTING VENT: 462 ML
VT SETTING VENT: 500 ML
VT SETTING VENT: 550 ML
WBC # BLD AUTO: 10.4 K/UL (ref 4.3–11.1)
WBC # BLD AUTO: 10.5 K/UL (ref 4.3–11.1)
WBC # BLD AUTO: 10.9 K/UL (ref 4.3–11.1)
WBC # BLD AUTO: 2.2 K/UL (ref 4.3–11.1)
WBC # BLD AUTO: 2.3 K/UL (ref 4.3–11.1)
WBC # BLD AUTO: 27.6 K/UL (ref 4.3–11.1)
WBC # BLD AUTO: 28.3 K/UL (ref 4.3–11.1)
WBC # BLD AUTO: 3.5 K/UL (ref 4.3–11.1)
WBC # BLD AUTO: 3.8 K/UL (ref 4.3–11.1)
WBC # BLD AUTO: 3.9 K/UL (ref 4.3–11.1)
WBC # BLD AUTO: 37.7 K/UL (ref 4.3–11.1)
WBC # BLD AUTO: 38.4 K/UL (ref 4.3–11.1)
WBC # BLD AUTO: 38.7 K/UL (ref 4.3–11.1)
WBC # BLD AUTO: 38.9 K/UL (ref 4.3–11.1)
WBC # BLD AUTO: 4 K/UL (ref 4.3–11.1)
WBC # BLD AUTO: 4.2 K/UL (ref 4.3–11.1)
WBC # BLD AUTO: 4.5 K/UL (ref 4.3–11.1)
WBC # BLD AUTO: 4.7 K/UL (ref 4.3–11.1)
WBC # BLD AUTO: 40 K/UL (ref 4.3–11.1)
WBC # BLD AUTO: 41.5 K/UL (ref 4.3–11.1)
WBC # BLD AUTO: 42.9 K/UL (ref 4.3–11.1)
WBC # BLD AUTO: 47.4 K/UL (ref 4.3–11.1)
WBC # BLD AUTO: 48.6 K/UL (ref 4.3–11.1)
WBC # BLD AUTO: 5 K/UL (ref 4.3–11.1)
WBC # BLD AUTO: 5.1 K/UL (ref 4.3–11.1)
WBC # BLD AUTO: 5.4 K/UL (ref 4.3–11.1)
WBC # BLD AUTO: 5.5 K/UL (ref 4.3–11.1)
WBC # BLD AUTO: 5.7 K/UL (ref 4.3–11.1)
WBC # BLD AUTO: 53.4 K/UL (ref 4.3–11.1)
WBC # BLD AUTO: 6.3 K/UL (ref 4.3–11.1)
WBC # BLD AUTO: 6.4 K/UL (ref 4.3–11.1)
WBC # BLD AUTO: 8.8 K/UL (ref 4.3–11.1)
WBC MORPH BLD: ABNORMAL
WBC URNS QL MICRO: ABNORMAL /HPF
WBC URNS QL MICRO: ABNORMAL /HPF

## 2019-01-01 PROCEDURE — C1769 GUIDE WIRE: HCPCS | Performed by: UROLOGY

## 2019-01-01 PROCEDURE — 74011000258 HC RX REV CODE- 258: Performed by: INTERNAL MEDICINE

## 2019-01-01 PROCEDURE — 85025 COMPLETE CBC W/AUTO DIFF WBC: CPT

## 2019-01-01 PROCEDURE — 96367 TX/PROPH/DG ADDL SEQ IV INF: CPT

## 2019-01-01 PROCEDURE — 99254 IP/OBS CNSLTJ NEW/EST MOD 60: CPT | Performed by: INTERNAL MEDICINE

## 2019-01-01 PROCEDURE — 80053 COMPREHEN METABOLIC PANEL: CPT

## 2019-01-01 PROCEDURE — 96411 CHEMO IV PUSH ADDL DRUG: CPT

## 2019-01-01 PROCEDURE — 96368 THER/DIAG CONCURRENT INF: CPT

## 2019-01-01 PROCEDURE — 87040 BLOOD CULTURE FOR BACTERIA: CPT

## 2019-01-01 PROCEDURE — 74011250637 HC RX REV CODE- 250/637: Performed by: NURSE PRACTITIONER

## 2019-01-01 PROCEDURE — 74011000250 HC RX REV CODE- 250: Performed by: INTERNAL MEDICINE

## 2019-01-01 PROCEDURE — 74011000250 HC RX REV CODE- 250: Performed by: NURSE PRACTITIONER

## 2019-01-01 PROCEDURE — 96375 TX/PRO/DX INJ NEW DRUG ADDON: CPT

## 2019-01-01 PROCEDURE — 82962 GLUCOSE BLOOD TEST: CPT

## 2019-01-01 PROCEDURE — 87086 URINE CULTURE/COLONY COUNT: CPT

## 2019-01-01 PROCEDURE — 77030020263 HC SOL INJ SOD CL0.9% LFCR 1000ML

## 2019-01-01 PROCEDURE — 74011250636 HC RX REV CODE- 250/636: Performed by: INTERNAL MEDICINE

## 2019-01-01 PROCEDURE — 74011250636 HC RX REV CODE- 250/636: Performed by: NURSE PRACTITIONER

## 2019-01-01 PROCEDURE — 96415 CHEMO IV INFUSION ADDL HR: CPT

## 2019-01-01 PROCEDURE — 87804 INFLUENZA ASSAY W/OPTIC: CPT

## 2019-01-01 PROCEDURE — 99223 1ST HOSP IP/OBS HIGH 75: CPT | Performed by: INTERNAL MEDICINE

## 2019-01-01 PROCEDURE — 74011000258 HC RX REV CODE- 258: Performed by: NURSE PRACTITIONER

## 2019-01-01 PROCEDURE — 5A1955Z RESPIRATORY VENTILATION, GREATER THAN 96 CONSECUTIVE HOURS: ICD-10-PCS | Performed by: INTERNAL MEDICINE

## 2019-01-01 PROCEDURE — 36591 DRAW BLOOD OFF VENOUS DEVICE: CPT

## 2019-01-01 PROCEDURE — 82607 VITAMIN B-12: CPT

## 2019-01-01 PROCEDURE — 99231 SBSQ HOSP IP/OBS SF/LOW 25: CPT | Performed by: INTERNAL MEDICINE

## 2019-01-01 PROCEDURE — 94640 AIRWAY INHALATION TREATMENT: CPT

## 2019-01-01 PROCEDURE — C1758 CATHETER, URETERAL: HCPCS | Performed by: UROLOGY

## 2019-01-01 PROCEDURE — 71045 X-RAY EXAM CHEST 1 VIEW: CPT

## 2019-01-01 PROCEDURE — 83921 ORGANIC ACID SINGLE QUANT: CPT

## 2019-01-01 PROCEDURE — 77010033678 HC OXYGEN DAILY

## 2019-01-01 PROCEDURE — 99232 SBSQ HOSP IP/OBS MODERATE 35: CPT | Performed by: INTERNAL MEDICINE

## 2019-01-01 PROCEDURE — G0498 CHEMO EXTEND IV INFUS W/PUMP: HCPCS

## 2019-01-01 PROCEDURE — 81003 URINALYSIS AUTO W/O SCOPE: CPT | Performed by: EMERGENCY MEDICINE

## 2019-01-01 PROCEDURE — 74011000255 HC RX REV CODE- 255: Performed by: INTERNAL MEDICINE

## 2019-01-01 PROCEDURE — 96360 HYDRATION IV INFUSION INIT: CPT | Performed by: EMERGENCY MEDICINE

## 2019-01-01 PROCEDURE — 83735 ASSAY OF MAGNESIUM: CPT

## 2019-01-01 PROCEDURE — 74011250636 HC RX REV CODE- 250/636: Performed by: EMERGENCY MEDICINE

## 2019-01-01 PROCEDURE — 77030013140 HC MSK NEB VYRM -A

## 2019-01-01 PROCEDURE — 83605 ASSAY OF LACTIC ACID: CPT

## 2019-01-01 PROCEDURE — 65270000029 HC RM PRIVATE

## 2019-01-01 PROCEDURE — 74011250637 HC RX REV CODE- 250/637: Performed by: INTERNAL MEDICINE

## 2019-01-01 PROCEDURE — 36600 WITHDRAWAL OF ARTERIAL BLOOD: CPT

## 2019-01-01 PROCEDURE — 76060000034 HC ANESTHESIA 1.5 TO 2 HR: Performed by: UROLOGY

## 2019-01-01 PROCEDURE — 84478 ASSAY OF TRIGLYCERIDES: CPT

## 2019-01-01 PROCEDURE — 74011250636 HC RX REV CODE- 250/636

## 2019-01-01 PROCEDURE — 82378 CARCINOEMBRYONIC ANTIGEN: CPT

## 2019-01-01 PROCEDURE — 76770 US EXAM ABDO BACK WALL COMP: CPT

## 2019-01-01 PROCEDURE — 96523 IRRIG DRUG DELIVERY DEVICE: CPT

## 2019-01-01 PROCEDURE — 74018 RADEX ABDOMEN 1 VIEW: CPT

## 2019-01-01 PROCEDURE — 74011636320 HC RX REV CODE- 636/320: Performed by: INTERNAL MEDICINE

## 2019-01-01 PROCEDURE — 74011250636 HC RX REV CODE- 250/636: Performed by: RADIOLOGY

## 2019-01-01 PROCEDURE — 77030003406 HC NDL ASPIR BIOP OCOA -C: Performed by: INTERNAL MEDICINE

## 2019-01-01 PROCEDURE — 99233 SBSQ HOSP IP/OBS HIGH 50: CPT | Performed by: INTERNAL MEDICINE

## 2019-01-01 PROCEDURE — 96417 CHEMO IV INFUS EACH ADDL SEQ: CPT

## 2019-01-01 PROCEDURE — 65610000001 HC ROOM ICU GENERAL

## 2019-01-01 PROCEDURE — 50200 RENAL BIOPSY PERQ: CPT

## 2019-01-01 PROCEDURE — 77030014008 HC SPNG HEMSTAT J&J -C

## 2019-01-01 PROCEDURE — 82803 BLOOD GASES ANY COMBINATION: CPT

## 2019-01-01 PROCEDURE — 94660 CPAP INITIATION&MGMT: CPT

## 2019-01-01 PROCEDURE — 77030011256 HC DRSG MEPILEX <16IN NO BORD MOLN -A

## 2019-01-01 PROCEDURE — 77030019605

## 2019-01-01 PROCEDURE — 96360 HYDRATION IV INFUSION INIT: CPT

## 2019-01-01 PROCEDURE — 84156 ASSAY OF PROTEIN URINE: CPT

## 2019-01-01 PROCEDURE — 74011636637 HC RX REV CODE- 636/637: Performed by: INTERNAL MEDICINE

## 2019-01-01 PROCEDURE — 94003 VENT MGMT INPAT SUBQ DAY: CPT

## 2019-01-01 PROCEDURE — 96413 CHEMO IV INFUSION 1 HR: CPT

## 2019-01-01 PROCEDURE — 94760 N-INVAS EAR/PLS OXIMETRY 1: CPT

## 2019-01-01 PROCEDURE — 81003 URINALYSIS AUTO W/O SCOPE: CPT

## 2019-01-01 PROCEDURE — 85730 THROMBOPLASTIN TIME PARTIAL: CPT

## 2019-01-01 PROCEDURE — 99239 HOSP IP/OBS DSCHRG MGMT >30: CPT | Performed by: INTERNAL MEDICINE

## 2019-01-01 PROCEDURE — 36556 INSERT NON-TUNNEL CV CATH: CPT | Performed by: INTERNAL MEDICINE

## 2019-01-01 PROCEDURE — P9047 ALBUMIN (HUMAN), 25%, 50ML: HCPCS | Performed by: INTERNAL MEDICINE

## 2019-01-01 PROCEDURE — 36556 INSERT NON-TUNNEL CV CATH: CPT

## 2019-01-01 PROCEDURE — 74220 X-RAY XM ESOPHAGUS 1CNTRST: CPT

## 2019-01-01 PROCEDURE — 99231 SBSQ HOSP IP/OBS SF/LOW 25: CPT | Performed by: NURSE PRACTITIONER

## 2019-01-01 PROCEDURE — 84100 ASSAY OF PHOSPHORUS: CPT

## 2019-01-01 PROCEDURE — 88173 CYTOPATH EVAL FNA REPORT: CPT

## 2019-01-01 PROCEDURE — 77030018846 HC SOL IRR STRL H20 ICUM -A: Performed by: UROLOGY

## 2019-01-01 PROCEDURE — 96372 THER/PROPH/DIAG INJ SC/IM: CPT

## 2019-01-01 PROCEDURE — 80202 ASSAY OF VANCOMYCIN: CPT

## 2019-01-01 PROCEDURE — 31720 CLEARANCE OF AIRWAYS: CPT

## 2019-01-01 PROCEDURE — 83880 ASSAY OF NATRIURETIC PEPTIDE: CPT

## 2019-01-01 PROCEDURE — 74420 UROGRAPHY RTRGR +-KUB: CPT

## 2019-01-01 PROCEDURE — 99203 OFFICE O/P NEW LOW 30 MIN: CPT | Performed by: INTERNAL MEDICINE

## 2019-01-01 PROCEDURE — 83540 ASSAY OF IRON: CPT

## 2019-01-01 PROCEDURE — 77030018798 HC PMP KT ENTRL FED COVD -A

## 2019-01-01 PROCEDURE — 88305 TISSUE EXAM BY PATHOLOGIST: CPT

## 2019-01-01 PROCEDURE — 77030018832 HC SOL IRR H20 ICUM -A: Performed by: UROLOGY

## 2019-01-01 PROCEDURE — 99152 MOD SED SAME PHYS/QHP 5/>YRS: CPT

## 2019-01-01 PROCEDURE — 77030004950 HC CATH ENTRL NG COVD -A

## 2019-01-01 PROCEDURE — 77030012699 HC VLV SUC CNTRL OCOA -A: Performed by: INTERNAL MEDICINE

## 2019-01-01 PROCEDURE — 92610 EVALUATE SWALLOWING FUNCTION: CPT

## 2019-01-01 PROCEDURE — 77030034850

## 2019-01-01 PROCEDURE — B544ZZA ULTRASONOGRAPHY OF LEFT JUGULAR VEINS, GUIDANCE: ICD-10-PCS | Performed by: INTERNAL MEDICINE

## 2019-01-01 PROCEDURE — 99291 CRITICAL CARE FIRST HOUR: CPT | Performed by: INTERNAL MEDICINE

## 2019-01-01 PROCEDURE — 77030037088 HC TUBE ENDOTRACH ORAL NSL COVD-A: Performed by: ANESTHESIOLOGY

## 2019-01-01 PROCEDURE — 76040000026: Performed by: INTERNAL MEDICINE

## 2019-01-01 PROCEDURE — 96366 THER/PROPH/DIAG IV INF ADDON: CPT

## 2019-01-01 PROCEDURE — 82746 ASSAY OF FOLIC ACID SERUM: CPT

## 2019-01-01 PROCEDURE — 77030020255 HC SOL INJ LR 1000ML BG

## 2019-01-01 PROCEDURE — 77030021907 HC KT URIN FOL O&M -A

## 2019-01-01 PROCEDURE — 77300 RADIATION THERAPY DOSE PLAN: CPT

## 2019-01-01 PROCEDURE — 96361 HYDRATE IV INFUSION ADD-ON: CPT

## 2019-01-01 PROCEDURE — 84484 ASSAY OF TROPONIN QUANT: CPT

## 2019-01-01 PROCEDURE — 93005 ELECTROCARDIOGRAM TRACING: CPT | Performed by: EMERGENCY MEDICINE

## 2019-01-01 PROCEDURE — 77290 THER RAD SIMULAJ FIELD CPLX: CPT

## 2019-01-01 PROCEDURE — 92611 MOTION FLUOROSCOPY/SWALLOW: CPT

## 2019-01-01 PROCEDURE — 87070 CULTURE OTHR SPECIMN AEROBIC: CPT

## 2019-01-01 PROCEDURE — 77030031638 HC FCPS ENDOSC BX COOK -D: Performed by: UROLOGY

## 2019-01-01 PROCEDURE — 74011636320 HC RX REV CODE- 636/320: Performed by: UROLOGY

## 2019-01-01 PROCEDURE — 77030019927 HC TBNG IRR CYSTO BAXT -A: Performed by: UROLOGY

## 2019-01-01 PROCEDURE — 74011250636 HC RX REV CODE- 250/636: Performed by: ANESTHESIOLOGY

## 2019-01-01 PROCEDURE — 99285 EMERGENCY DEPT VISIT HI MDM: CPT | Performed by: EMERGENCY MEDICINE

## 2019-01-01 PROCEDURE — 77334 RADIATION TREATMENT AID(S): CPT

## 2019-01-01 PROCEDURE — 83090 ASSAY OF HOMOCYSTEINE: CPT

## 2019-01-01 PROCEDURE — 71260 CT THORAX DX C+: CPT

## 2019-01-01 PROCEDURE — 0DH63UZ INSERTION OF FEEDING DEVICE INTO STOMACH, PERCUTANEOUS APPROACH: ICD-10-PCS | Performed by: INTERNAL MEDICINE

## 2019-01-01 PROCEDURE — 74230 X-RAY XM SWLNG FUNCJ C+: CPT

## 2019-01-01 PROCEDURE — BT1D1ZZ FLUOROSCOPY OF RIGHT KIDNEY, URETER AND BLADDER USING LOW OSMOLAR CONTRAST: ICD-10-PCS | Performed by: UROLOGY

## 2019-01-01 PROCEDURE — 85384 FIBRINOGEN ACTIVITY: CPT

## 2019-01-01 PROCEDURE — 36415 COLL VENOUS BLD VENIPUNCTURE: CPT

## 2019-01-01 PROCEDURE — 77030018836 HC SOL IRR NACL ICUM -A

## 2019-01-01 PROCEDURE — C1751 CATH, INF, PER/CENT/MIDLINE: HCPCS

## 2019-01-01 PROCEDURE — 77030018846 HC SOL IRR STRL H20 ICUM -A

## 2019-01-01 PROCEDURE — 0BH17EZ INSERTION OF ENDOTRACHEAL AIRWAY INTO TRACHEA, VIA NATURAL OR ARTIFICIAL OPENING: ICD-10-PCS | Performed by: INTERNAL MEDICINE

## 2019-01-01 PROCEDURE — 77030039425 HC BLD LARYNG TRULITE DISP TELE -A: Performed by: ANESTHESIOLOGY

## 2019-01-01 PROCEDURE — 99152 MOD SED SAME PHYS/QHP 5/>YRS: CPT | Performed by: INTERNAL MEDICINE

## 2019-01-01 PROCEDURE — 77030009046 HC CATH BRNCH BLLN OCOA -B: Performed by: INTERNAL MEDICINE

## 2019-01-01 PROCEDURE — 96374 THER/PROPH/DIAG INJ IV PUSH: CPT

## 2019-01-01 PROCEDURE — 77030019940 HC BLNKT HYPOTHRM STRY -B: Performed by: ANESTHESIOLOGY

## 2019-01-01 PROCEDURE — 94002 VENT MGMT INPAT INIT DAY: CPT

## 2019-01-01 PROCEDURE — 74011250636 HC RX REV CODE- 250/636: Performed by: UROLOGY

## 2019-01-01 PROCEDURE — 77030013579 HC DRSG WND CA ALG BMS -A

## 2019-01-01 PROCEDURE — 77030020256 HC SOL INJ NACL 0.9%  500ML

## 2019-01-01 PROCEDURE — 86900 BLOOD TYPING SEROLOGIC ABO: CPT

## 2019-01-01 PROCEDURE — 87449 NOS EACH ORGANISM AG IA: CPT

## 2019-01-01 PROCEDURE — 76450000000

## 2019-01-01 PROCEDURE — 85610 PROTHROMBIN TIME: CPT

## 2019-01-01 PROCEDURE — 99211 OFF/OP EST MAY X REQ PHY/QHP: CPT

## 2019-01-01 PROCEDURE — 99233 SBSQ HOSP IP/OBS HIGH 50: CPT | Performed by: NURSE PRACTITIONER

## 2019-01-01 PROCEDURE — 31500 INSERT EMERGENCY AIRWAY: CPT | Performed by: INTERNAL MEDICINE

## 2019-01-01 PROCEDURE — 05HN33Z INSERTION OF INFUSION DEVICE INTO LEFT INTERNAL JUGULAR VEIN, PERCUTANEOUS APPROACH: ICD-10-PCS | Performed by: INTERNAL MEDICINE

## 2019-01-01 PROCEDURE — 0T768DZ DILATION OF RIGHT URETER WITH INTRALUMINAL DEVICE, VIA NATURAL OR ARTIFICIAL OPENING ENDOSCOPIC: ICD-10-PCS | Performed by: UROLOGY

## 2019-01-01 PROCEDURE — 74011000250 HC RX REV CODE- 250

## 2019-01-01 PROCEDURE — 82728 ASSAY OF FERRITIN: CPT

## 2019-01-01 PROCEDURE — 0TB68ZX EXCISION OF RIGHT URETER, VIA NATURAL OR ARTIFICIAL OPENING ENDOSCOPIC, DIAGNOSTIC: ICD-10-PCS | Performed by: UROLOGY

## 2019-01-01 PROCEDURE — 77295 3-D RADIOTHERAPY PLAN: CPT

## 2019-01-01 PROCEDURE — 77030032490 HC SLV COMPR SCD KNE COVD -B: Performed by: UROLOGY

## 2019-01-01 PROCEDURE — 99153 MOD SED SAME PHYS/QHP EA: CPT | Performed by: INTERNAL MEDICINE

## 2019-01-01 PROCEDURE — 85018 HEMOGLOBIN: CPT

## 2019-01-01 PROCEDURE — 31652 BRONCH EBUS SAMPLNG 1/2 NODE: CPT | Performed by: INTERNAL MEDICINE

## 2019-01-01 PROCEDURE — 84132 ASSAY OF SERUM POTASSIUM: CPT

## 2019-01-01 PROCEDURE — 88172 CYTP DX EVAL FNA 1ST EA SITE: CPT

## 2019-01-01 PROCEDURE — 86301 IMMUNOASSAY TUMOR CA 19-9: CPT

## 2019-01-01 PROCEDURE — 77030019908 HC STETH ESOPH SIMS -A: Performed by: ANESTHESIOLOGY

## 2019-01-01 PROCEDURE — 71046 X-RAY EXAM CHEST 2 VIEWS: CPT

## 2019-01-01 PROCEDURE — 3E0G76Z INTRODUCTION OF NUTRITIONAL SUBSTANCE INTO UPPER GI, VIA NATURAL OR ARTIFICIAL OPENING: ICD-10-PCS | Performed by: INTERNAL MEDICINE

## 2019-01-01 PROCEDURE — 87106 FUNGI IDENTIFICATION YEAST: CPT

## 2019-01-01 PROCEDURE — 81001 URINALYSIS AUTO W/SCOPE: CPT

## 2019-01-01 PROCEDURE — 76210000017 HC OR PH I REC 1.5 TO 2 HR: Performed by: UROLOGY

## 2019-01-01 PROCEDURE — C2617 STENT, NON-COR, TEM W/O DEL: HCPCS | Performed by: UROLOGY

## 2019-01-01 PROCEDURE — 81015 MICROSCOPIC EXAM OF URINE: CPT

## 2019-01-01 PROCEDURE — 93971 EXTREMITY STUDY: CPT

## 2019-01-01 PROCEDURE — 99223 1ST HOSP IP/OBS HIGH 75: CPT | Performed by: NURSE PRACTITIONER

## 2019-01-01 PROCEDURE — 76010000161 HC OR TIME 1 TO 1.5 HR INTENSV-TIER 1: Performed by: UROLOGY

## 2019-01-01 PROCEDURE — 74011250637 HC RX REV CODE- 250/637: Performed by: EMERGENCY MEDICINE

## 2019-01-01 DEVICE — URETERAL STENT
Type: IMPLANTABLE DEVICE | Site: URETER | Status: FUNCTIONAL
Brand: PERCUFLEX™ PLUS

## 2019-01-01 RX ORDER — SUCCINYLCHOLINE CHLORIDE 20 MG/ML INJECTION SOLUTION
SOLUTION
Status: DISCONTINUED
Start: 2019-01-01 | End: 2019-01-01 | Stop reason: WASHOUT

## 2019-01-01 RX ORDER — POTASSIUM CHLORIDE 1.5 G/1.77G
20 POWDER, FOR SOLUTION ORAL 2 TIMES DAILY WITH MEALS
Status: DISCONTINUED | OUTPATIENT
Start: 2019-01-01 | End: 2019-01-01

## 2019-01-01 RX ORDER — LIDOCAINE HYDROCHLORIDE 20 MG/ML
40-120 INJECTION, SOLUTION INFILTRATION; PERINEURAL ONCE
Status: COMPLETED | OUTPATIENT
Start: 2019-01-01 | End: 2019-01-01

## 2019-01-01 RX ORDER — SODIUM CHLORIDE, SODIUM LACTATE, POTASSIUM CHLORIDE, CALCIUM CHLORIDE 600; 310; 30; 20 MG/100ML; MG/100ML; MG/100ML; MG/100ML
75 INJECTION, SOLUTION INTRAVENOUS CONTINUOUS
Status: CANCELLED | OUTPATIENT
Start: 2019-01-01 | End: 2019-01-01

## 2019-01-01 RX ORDER — OXYCODONE HYDROCHLORIDE 5 MG/1
10 TABLET ORAL
Status: CANCELLED | OUTPATIENT
Start: 2019-01-01 | End: 2019-01-01

## 2019-01-01 RX ORDER — ALBUTEROL SULFATE 0.83 MG/ML
2.5 SOLUTION RESPIRATORY (INHALATION)
Status: DISCONTINUED | OUTPATIENT
Start: 2019-01-01 | End: 2019-01-01

## 2019-01-01 RX ORDER — SODIUM CHLORIDE 9 MG/ML
10 INJECTION INTRAMUSCULAR; INTRAVENOUS; SUBCUTANEOUS AS NEEDED
Status: ACTIVE | OUTPATIENT
Start: 2019-01-01 | End: 2019-01-01

## 2019-01-01 RX ORDER — ATROPINE SULFATE 0.4 MG/ML
0.4 INJECTION, SOLUTION ENDOTRACHEAL; INTRAMEDULLARY; INTRAMUSCULAR; INTRAVENOUS; SUBCUTANEOUS
Status: ACTIVE | OUTPATIENT
Start: 2019-01-01 | End: 2019-01-01

## 2019-01-01 RX ORDER — FENTANYL CITRATE 50 UG/ML
100 INJECTION, SOLUTION INTRAMUSCULAR; INTRAVENOUS ONCE
Status: CANCELLED | OUTPATIENT
Start: 2019-01-01 | End: 2019-01-01

## 2019-01-01 RX ORDER — FLUOROURACIL 50 MG/ML
400 INJECTION, SOLUTION INTRAVENOUS ONCE
Status: DISCONTINUED | OUTPATIENT
Start: 2019-01-01 | End: 2019-01-01 | Stop reason: SDUPTHER

## 2019-01-01 RX ORDER — HYDROCODONE BITARTRATE AND ACETAMINOPHEN 7.5; 325 MG/15ML; MG/15ML
5 SOLUTION ORAL
Status: DISCONTINUED | OUTPATIENT
Start: 2019-01-01 | End: 2019-01-01

## 2019-01-01 RX ORDER — OXYCODONE HYDROCHLORIDE 5 MG/1
10 TABLET ORAL
Status: DISCONTINUED | OUTPATIENT
Start: 2019-01-01 | End: 2019-01-01 | Stop reason: HOSPADM

## 2019-01-01 RX ORDER — MIDAZOLAM HYDROCHLORIDE 1 MG/ML
3 INJECTION, SOLUTION INTRAMUSCULAR; INTRAVENOUS ONCE
Status: COMPLETED | OUTPATIENT
Start: 2019-01-01 | End: 2019-01-01

## 2019-01-01 RX ORDER — SODIUM CHLORIDE 0.9 % (FLUSH) 0.9 %
10 SYRINGE (ML) INJECTION AS NEEDED
Status: DISCONTINUED | OUTPATIENT
Start: 2019-01-01 | End: 2019-01-01 | Stop reason: HOSPADM

## 2019-01-01 RX ORDER — POLYVINYL ALCOHOL 14 MG/ML
1 SOLUTION/ DROPS OPHTHALMIC AS NEEDED
Status: DISCONTINUED | OUTPATIENT
Start: 2019-01-01 | End: 2019-01-01

## 2019-01-01 RX ORDER — FAMOTIDINE 20 MG/1
20 TABLET, FILM COATED ORAL ONCE
Status: DISCONTINUED | OUTPATIENT
Start: 2019-01-01 | End: 2019-01-01 | Stop reason: HOSPADM

## 2019-01-01 RX ORDER — NALOXONE HYDROCHLORIDE 0.4 MG/ML
0.4 INJECTION, SOLUTION INTRAMUSCULAR; INTRAVENOUS; SUBCUTANEOUS
Status: DISCONTINUED | OUTPATIENT
Start: 2019-01-01 | End: 2019-01-01 | Stop reason: HOSPADM

## 2019-01-01 RX ORDER — BUDESONIDE 0.5 MG/2ML
500 INHALANT ORAL
Status: DISCONTINUED | OUTPATIENT
Start: 2019-01-01 | End: 2019-01-01

## 2019-01-01 RX ORDER — HEPARIN 100 UNIT/ML
500 SYRINGE INTRAVENOUS AS NEEDED
Status: ACTIVE | OUTPATIENT
Start: 2019-01-01 | End: 2019-01-01

## 2019-01-01 RX ORDER — HEPARIN 100 UNIT/ML
300-500 SYRINGE INTRAVENOUS AS NEEDED
Status: DISCONTINUED | OUTPATIENT
Start: 2019-01-01 | End: 2019-01-01 | Stop reason: HOSPADM

## 2019-01-01 RX ORDER — PROPOFOL 10 MG/ML
INJECTION, EMULSION INTRAVENOUS
Status: COMPLETED
Start: 2019-01-01 | End: 2019-01-01

## 2019-01-01 RX ORDER — DEXTROSE MONOHYDRATE 50 MG/ML
25 INJECTION, SOLUTION INTRAVENOUS CONTINUOUS
Status: ACTIVE | OUTPATIENT
Start: 2019-01-01 | End: 2019-01-01

## 2019-01-01 RX ORDER — HYDROCODONE BITARTRATE AND ACETAMINOPHEN 5; 325 MG/1; MG/1
1 TABLET ORAL
Status: DISCONTINUED | OUTPATIENT
Start: 2019-01-01 | End: 2019-01-01

## 2019-01-01 RX ORDER — ONDANSETRON 2 MG/ML
8 INJECTION INTRAMUSCULAR; INTRAVENOUS ONCE
Status: COMPLETED | OUTPATIENT
Start: 2019-01-01 | End: 2019-01-01

## 2019-01-01 RX ORDER — SODIUM CHLORIDE 0.9 % (FLUSH) 0.9 %
10 SYRINGE (ML) INJECTION AS NEEDED
Status: ACTIVE | OUTPATIENT
Start: 2019-01-01 | End: 2019-01-01

## 2019-01-01 RX ORDER — OSELTAMIVIR PHOSPHATE 75 MG/1
75 CAPSULE ORAL 2 TIMES DAILY
Qty: 10 CAP | Refills: 0 | Status: SHIPPED | OUTPATIENT
Start: 2019-01-01 | End: 2019-01-01

## 2019-01-01 RX ORDER — LORAZEPAM 2 MG/ML
2 INJECTION INTRAMUSCULAR
Status: DISCONTINUED | OUTPATIENT
Start: 2019-01-01 | End: 2019-01-01 | Stop reason: HOSPADM

## 2019-01-01 RX ORDER — SODIUM CHLORIDE 9 MG/ML
25 INJECTION, SOLUTION INTRAVENOUS CONTINUOUS
Status: DISCONTINUED | OUTPATIENT
Start: 2019-01-01 | End: 2019-01-01 | Stop reason: HOSPADM

## 2019-01-01 RX ORDER — HEPARIN 100 UNIT/ML
300-500 SYRINGE INTRAVENOUS AS NEEDED
Status: DISPENSED | OUTPATIENT
Start: 2019-01-01 | End: 2019-01-01

## 2019-01-01 RX ORDER — FLUOROURACIL 50 MG/ML
400 INJECTION, SOLUTION INTRAVENOUS ONCE
Status: COMPLETED | OUTPATIENT
Start: 2019-01-01 | End: 2019-01-01

## 2019-01-01 RX ORDER — CARVEDILOL 3.12 MG/1
3.12 TABLET ORAL 2 TIMES DAILY WITH MEALS
Status: DISCONTINUED | OUTPATIENT
Start: 2019-01-01 | End: 2019-01-01

## 2019-01-01 RX ORDER — SODIUM CHLORIDE, SODIUM LACTATE, POTASSIUM CHLORIDE, CALCIUM CHLORIDE 600; 310; 30; 20 MG/100ML; MG/100ML; MG/100ML; MG/100ML
75 INJECTION, SOLUTION INTRAVENOUS CONTINUOUS
Status: DISCONTINUED | OUTPATIENT
Start: 2019-01-01 | End: 2019-01-01 | Stop reason: HOSPADM

## 2019-01-01 RX ORDER — HEPARIN 100 UNIT/ML
500 SYRINGE INTRAVENOUS ONCE
Status: COMPLETED | OUTPATIENT
Start: 2019-01-01 | End: 2019-01-01

## 2019-01-01 RX ORDER — ATROPINE SULFATE 0.4 MG/ML
0.4 INJECTION, SOLUTION ENDOTRACHEAL; INTRAMEDULLARY; INTRAMUSCULAR; INTRAVENOUS; SUBCUTANEOUS ONCE
Status: COMPLETED | OUTPATIENT
Start: 2019-01-01 | End: 2019-01-01

## 2019-01-01 RX ORDER — FAMOTIDINE 20 MG/1
20 TABLET, FILM COATED ORAL ONCE
Status: CANCELLED | OUTPATIENT
Start: 2019-01-01 | End: 2019-01-01

## 2019-01-01 RX ORDER — DEXAMETHASONE SODIUM PHOSPHATE 100 MG/10ML
10 INJECTION INTRAMUSCULAR; INTRAVENOUS ONCE
Status: COMPLETED | OUTPATIENT
Start: 2019-01-01 | End: 2019-01-01

## 2019-01-01 RX ORDER — OXYCODONE HYDROCHLORIDE 5 MG/1
5 TABLET ORAL
Status: DISCONTINUED | OUTPATIENT
Start: 2019-01-01 | End: 2019-01-01

## 2019-01-01 RX ORDER — FENTANYL CITRATE-0.9 % NACL/PF 25 MCG/ML
25-200 PLASTIC BAG, INJECTION (ML) INJECTION
Status: DISCONTINUED | OUTPATIENT
Start: 2019-01-01 | End: 2019-01-01

## 2019-01-01 RX ORDER — MORPHINE SULFATE 2 MG/ML
2 INJECTION, SOLUTION INTRAMUSCULAR; INTRAVENOUS
Status: DISCONTINUED | OUTPATIENT
Start: 2019-01-01 | End: 2019-01-01

## 2019-01-01 RX ORDER — LORAZEPAM 2 MG/ML
1 INJECTION INTRAMUSCULAR
Status: DISCONTINUED | OUTPATIENT
Start: 2019-01-01 | End: 2019-01-01

## 2019-01-01 RX ORDER — FENTANYL CITRATE 50 UG/ML
INJECTION, SOLUTION INTRAMUSCULAR; INTRAVENOUS AS NEEDED
Status: DISCONTINUED | OUTPATIENT
Start: 2019-01-01 | End: 2019-01-01 | Stop reason: HOSPADM

## 2019-01-01 RX ORDER — BENZONATATE 100 MG/1
100 CAPSULE ORAL
Status: DISCONTINUED | OUTPATIENT
Start: 2019-01-01 | End: 2019-01-01

## 2019-01-01 RX ORDER — PROPOFOL 10 MG/ML
INJECTION, EMULSION INTRAVENOUS AS NEEDED
Status: DISCONTINUED | OUTPATIENT
Start: 2019-01-01 | End: 2019-01-01 | Stop reason: HOSPADM

## 2019-01-01 RX ORDER — MORPHINE SULFATE 2 MG/ML
INJECTION, SOLUTION INTRAMUSCULAR; INTRAVENOUS
Status: COMPLETED
Start: 2019-01-01 | End: 2019-01-01

## 2019-01-01 RX ORDER — POTASSIUM CHLORIDE 29.8 MG/ML
40 INJECTION INTRAVENOUS ONCE
Status: COMPLETED | OUTPATIENT
Start: 2019-01-01 | End: 2019-01-01

## 2019-01-01 RX ORDER — SODIUM CHLORIDE 9 MG/ML
500 INJECTION, SOLUTION INTRAVENOUS ONCE
Status: COMPLETED | OUTPATIENT
Start: 2019-01-01 | End: 2019-01-01

## 2019-01-01 RX ORDER — MORPHINE SULFATE 2 MG/ML
4 INJECTION, SOLUTION INTRAMUSCULAR; INTRAVENOUS
Status: DISCONTINUED | OUTPATIENT
Start: 2019-01-01 | End: 2019-01-01 | Stop reason: HOSPADM

## 2019-01-01 RX ORDER — SODIUM CHLORIDE 9 MG/ML
10 INJECTION INTRAMUSCULAR; INTRAVENOUS; SUBCUTANEOUS AS NEEDED
Status: DISCONTINUED | OUTPATIENT
Start: 2019-01-01 | End: 2019-01-01 | Stop reason: HOSPADM

## 2019-01-01 RX ORDER — SCOLOPAMINE TRANSDERMAL SYSTEM 1 MG/1
1 PATCH, EXTENDED RELEASE TRANSDERMAL
Status: DISCONTINUED | OUTPATIENT
Start: 2019-01-01 | End: 2019-01-01 | Stop reason: HOSPADM

## 2019-01-01 RX ORDER — SODIUM CHLORIDE 0.9 % (FLUSH) 0.9 %
10 SYRINGE (ML) INJECTION
Status: COMPLETED | OUTPATIENT
Start: 2019-01-01 | End: 2019-01-01

## 2019-01-01 RX ORDER — SODIUM BICARBONATE 1 MEQ/ML
100 SYRINGE (ML) INTRAVENOUS ONCE
Status: COMPLETED | OUTPATIENT
Start: 2019-01-01 | End: 2019-01-01

## 2019-01-01 RX ORDER — SODIUM CHLORIDE, SODIUM LACTATE, POTASSIUM CHLORIDE, CALCIUM CHLORIDE 600; 310; 30; 20 MG/100ML; MG/100ML; MG/100ML; MG/100ML
INJECTION, SOLUTION INTRAVENOUS
Status: DISCONTINUED | OUTPATIENT
Start: 2019-01-01 | End: 2019-01-01 | Stop reason: HOSPADM

## 2019-01-01 RX ORDER — FLUOROURACIL 50 MG/ML
400 INJECTION, SOLUTION INTRAVENOUS ONCE
Status: DISCONTINUED | OUTPATIENT
Start: 2019-01-01 | End: 2019-01-01

## 2019-01-01 RX ORDER — SODIUM CHLORIDE 9 MG/ML
25 INJECTION, SOLUTION INTRAVENOUS CONTINUOUS
Status: ACTIVE | OUTPATIENT
Start: 2019-01-01 | End: 2019-01-01

## 2019-01-01 RX ORDER — GUAIFENESIN/DEXTROMETHORPHAN 100-10MG/5
5 SYRUP ORAL
Status: DISCONTINUED | OUTPATIENT
Start: 2019-01-01 | End: 2019-01-01

## 2019-01-01 RX ORDER — ALBUMIN HUMAN 250 G/1000ML
12.5 SOLUTION INTRAVENOUS EVERY 8 HOURS
Status: COMPLETED | OUTPATIENT
Start: 2019-01-01 | End: 2019-01-01

## 2019-01-01 RX ORDER — FLUTICASONE PROPIONATE 110 UG/1
2 AEROSOL, METERED RESPIRATORY (INHALATION) EVERY 12 HOURS
Status: DISCONTINUED | OUTPATIENT
Start: 2019-01-01 | End: 2019-01-01

## 2019-01-01 RX ORDER — PROPOFOL 10 MG/ML
0-50 VIAL (ML) INTRAVENOUS
Status: DISCONTINUED | OUTPATIENT
Start: 2019-01-01 | End: 2019-01-01

## 2019-01-01 RX ORDER — DIPHENOXYLATE HYDROCHLORIDE AND ATROPINE SULFATE 2.5; .025 MG/1; MG/1
1 TABLET ORAL
Status: DISCONTINUED | OUTPATIENT
Start: 2019-01-01 | End: 2019-01-01

## 2019-01-01 RX ORDER — GLYCOPYRROLATE 0.2 MG/ML
0.2 INJECTION INTRAMUSCULAR; INTRAVENOUS
Status: DISCONTINUED | OUTPATIENT
Start: 2019-01-01 | End: 2019-01-01 | Stop reason: HOSPADM

## 2019-01-01 RX ORDER — ENOXAPARIN SODIUM 100 MG/ML
100 INJECTION SUBCUTANEOUS EVERY 24 HOURS
Status: DISCONTINUED | OUTPATIENT
Start: 2019-01-01 | End: 2019-01-01

## 2019-01-01 RX ORDER — MIDAZOLAM HYDROCHLORIDE 1 MG/ML
2 INJECTION, SOLUTION INTRAMUSCULAR; INTRAVENOUS ONCE
Status: CANCELLED | OUTPATIENT
Start: 2019-01-01 | End: 2019-01-01

## 2019-01-01 RX ORDER — FLUTICASONE PROPIONATE 110 UG/1
1 AEROSOL, METERED RESPIRATORY (INHALATION) EVERY 12 HOURS
Status: DISCONTINUED | OUTPATIENT
Start: 2019-01-01 | End: 2019-01-01

## 2019-01-01 RX ORDER — HYDRALAZINE HYDROCHLORIDE 20 MG/ML
10 INJECTION INTRAMUSCULAR; INTRAVENOUS
Status: DISCONTINUED | OUTPATIENT
Start: 2019-01-01 | End: 2019-01-01

## 2019-01-01 RX ORDER — FAMOTIDINE 20 MG/1
20 TABLET, FILM COATED ORAL 2 TIMES DAILY
Status: DISCONTINUED | OUTPATIENT
Start: 2019-01-01 | End: 2019-01-01

## 2019-01-01 RX ORDER — SODIUM BICARBONATE 84 MG/ML
100 INJECTION, SOLUTION INTRAVENOUS ONCE
Status: COMPLETED | OUTPATIENT
Start: 2019-01-01 | End: 2019-01-01

## 2019-01-01 RX ORDER — EPHEDRINE SULFATE 50 MG/ML
INJECTION, SOLUTION INTRAVENOUS AS NEEDED
Status: DISCONTINUED | OUTPATIENT
Start: 2019-01-01 | End: 2019-01-01 | Stop reason: HOSPADM

## 2019-01-01 RX ORDER — SODIUM CHLORIDE 0.9 % (FLUSH) 0.9 %
10 SYRINGE (ML) INJECTION EVERY 8 HOURS
Status: DISCONTINUED | OUTPATIENT
Start: 2019-01-01 | End: 2019-01-01 | Stop reason: HOSPADM

## 2019-01-01 RX ORDER — FLUMAZENIL 0.1 MG/ML
0.2 INJECTION INTRAVENOUS
Status: DISCONTINUED | OUTPATIENT
Start: 2019-01-01 | End: 2019-01-01 | Stop reason: HOSPADM

## 2019-01-01 RX ORDER — SODIUM CHLORIDE, SODIUM LACTATE, POTASSIUM CHLORIDE, CALCIUM CHLORIDE 600; 310; 30; 20 MG/100ML; MG/100ML; MG/100ML; MG/100ML
31 INJECTION, SOLUTION INTRAVENOUS CONTINUOUS
Status: DISPENSED | OUTPATIENT
Start: 2019-01-01 | End: 2019-01-01

## 2019-01-01 RX ORDER — HYDROMORPHONE HYDROCHLORIDE 2 MG/ML
0.5 INJECTION, SOLUTION INTRAMUSCULAR; INTRAVENOUS; SUBCUTANEOUS
Status: CANCELLED | OUTPATIENT
Start: 2019-01-01

## 2019-01-01 RX ORDER — SODIUM CHLORIDE 9 MG/ML
1000 INJECTION, SOLUTION INTRAVENOUS ONCE
Status: COMPLETED | OUTPATIENT
Start: 2019-01-01 | End: 2019-01-01

## 2019-01-01 RX ORDER — MIDAZOLAM HYDROCHLORIDE 1 MG/ML
.25-5 INJECTION, SOLUTION INTRAMUSCULAR; INTRAVENOUS
Status: DISCONTINUED | OUTPATIENT
Start: 2019-01-01 | End: 2019-01-01 | Stop reason: HOSPADM

## 2019-01-01 RX ORDER — CEFAZOLIN SODIUM/WATER 2 G/20 ML
2 SYRINGE (ML) INTRAVENOUS
Status: COMPLETED | OUTPATIENT
Start: 2019-01-01 | End: 2019-01-01

## 2019-01-01 RX ORDER — MIDAZOLAM HYDROCHLORIDE 1 MG/ML
INJECTION, SOLUTION INTRAMUSCULAR; INTRAVENOUS
Status: COMPLETED
Start: 2019-01-01 | End: 2019-01-01

## 2019-01-01 RX ORDER — MIDAZOLAM HYDROCHLORIDE 1 MG/ML
.25-2 INJECTION, SOLUTION INTRAMUSCULAR; INTRAVENOUS
Status: DISCONTINUED | OUTPATIENT
Start: 2019-01-01 | End: 2019-01-01 | Stop reason: HOSPADM

## 2019-01-01 RX ORDER — SUCCINYLCHOLINE CHLORIDE 20 MG/ML INJECTION SOLUTION
100 SOLUTION
Status: DISCONTINUED | OUTPATIENT
Start: 2019-01-01 | End: 2019-01-01

## 2019-01-01 RX ORDER — DEXTROSE MONOHYDRATE 50 MG/ML
25 INJECTION, SOLUTION INTRAVENOUS CONTINUOUS
Status: DISCONTINUED | OUTPATIENT
Start: 2019-01-01 | End: 2019-01-01 | Stop reason: HOSPADM

## 2019-01-01 RX ORDER — FENTANYL CITRATE 50 UG/ML
25-100 INJECTION, SOLUTION INTRAMUSCULAR; INTRAVENOUS
Status: DISCONTINUED | OUTPATIENT
Start: 2019-01-01 | End: 2019-01-01

## 2019-01-01 RX ORDER — LORAZEPAM 2 MG/ML
2 INJECTION INTRAMUSCULAR
Status: DISCONTINUED | OUTPATIENT
Start: 2019-01-01 | End: 2019-01-01

## 2019-01-01 RX ORDER — SODIUM CHLORIDE 9 MG/ML
50 INJECTION, SOLUTION INTRAVENOUS CONTINUOUS
Status: DISCONTINUED | OUTPATIENT
Start: 2019-01-01 | End: 2019-01-01

## 2019-01-01 RX ORDER — HEPARIN 100 UNIT/ML
300-500 SYRINGE INTRAVENOUS AS NEEDED
Status: ACTIVE | OUTPATIENT
Start: 2019-01-01 | End: 2019-01-01

## 2019-01-01 RX ORDER — FENTANYL CITRATE 50 UG/ML
100 INJECTION, SOLUTION INTRAMUSCULAR; INTRAVENOUS ONCE
Status: DISCONTINUED | OUTPATIENT
Start: 2019-01-01 | End: 2019-01-01 | Stop reason: HOSPADM

## 2019-01-01 RX ORDER — ALBUTEROL SULFATE 0.83 MG/ML
2.5 SOLUTION RESPIRATORY (INHALATION)
Status: DISCONTINUED | OUTPATIENT
Start: 2019-01-01 | End: 2019-01-01 | Stop reason: SDUPTHER

## 2019-01-01 RX ORDER — FENTANYL CITRATE 50 UG/ML
12.5-1 INJECTION, SOLUTION INTRAMUSCULAR; INTRAVENOUS
Status: DISCONTINUED | OUTPATIENT
Start: 2019-01-01 | End: 2019-01-01 | Stop reason: HOSPADM

## 2019-01-01 RX ORDER — OXYCODONE HYDROCHLORIDE 5 MG/1
5 TABLET ORAL
Status: CANCELLED | OUTPATIENT
Start: 2019-01-01 | End: 2019-01-01

## 2019-01-01 RX ORDER — POTASSIUM CHLORIDE 750 MG/1
40 TABLET, EXTENDED RELEASE ORAL
Status: COMPLETED | OUTPATIENT
Start: 2019-01-01 | End: 2019-01-01

## 2019-01-01 RX ORDER — FENTANYL CITRATE 50 UG/ML
INJECTION, SOLUTION INTRAMUSCULAR; INTRAVENOUS
Status: COMPLETED
Start: 2019-01-01 | End: 2019-01-01

## 2019-01-01 RX ORDER — IBUPROFEN 200 MG
400 TABLET ORAL
COMMUNITY

## 2019-01-01 RX ORDER — POTASSIUM CHLORIDE 20 MEQ/1
20 TABLET, EXTENDED RELEASE ORAL 2 TIMES DAILY
Status: DISCONTINUED | OUTPATIENT
Start: 2019-01-01 | End: 2019-01-01

## 2019-01-01 RX ORDER — ETOMIDATE 2 MG/ML
10-40 INJECTION INTRAVENOUS
Status: DISCONTINUED | OUTPATIENT
Start: 2019-01-01 | End: 2019-01-01

## 2019-01-01 RX ORDER — LORAZEPAM 2 MG/ML
INJECTION INTRAMUSCULAR
Status: COMPLETED
Start: 2019-01-01 | End: 2019-01-01

## 2019-01-01 RX ORDER — IBUPROFEN 800 MG/1
800 TABLET ORAL
Status: COMPLETED | OUTPATIENT
Start: 2019-01-01 | End: 2019-01-01

## 2019-01-01 RX ORDER — OSELTAMIVIR PHOSPHATE 75 MG/1
75 CAPSULE ORAL
Status: COMPLETED | OUTPATIENT
Start: 2019-01-01 | End: 2019-01-01

## 2019-01-01 RX ORDER — CEFAZOLIN SODIUM/WATER 2 G/20 ML
2 SYRINGE (ML) INTRAVENOUS
Status: CANCELLED | OUTPATIENT
Start: 2019-01-01 | End: 2019-01-01

## 2019-01-01 RX ORDER — LISINOPRIL 5 MG/1
10 TABLET ORAL DAILY
Status: DISCONTINUED | OUTPATIENT
Start: 2019-01-01 | End: 2019-01-01

## 2019-01-01 RX ORDER — SODIUM CHLORIDE, SODIUM LACTATE, POTASSIUM CHLORIDE, CALCIUM CHLORIDE 600; 310; 30; 20 MG/100ML; MG/100ML; MG/100ML; MG/100ML
100 INJECTION, SOLUTION INTRAVENOUS CONTINUOUS
Status: CANCELLED | OUTPATIENT
Start: 2019-01-01

## 2019-01-01 RX ORDER — MORPHINE SULFATE 2 MG/ML
4 INJECTION, SOLUTION INTRAMUSCULAR; INTRAVENOUS
Status: DISCONTINUED | OUTPATIENT
Start: 2019-01-01 | End: 2019-01-01

## 2019-01-01 RX ORDER — ETOMIDATE 2 MG/ML
INJECTION INTRAVENOUS
Status: COMPLETED
Start: 2019-01-01 | End: 2019-01-01

## 2019-01-01 RX ORDER — HYDROMORPHONE HYDROCHLORIDE 2 MG/ML
0.5 INJECTION, SOLUTION INTRAMUSCULAR; INTRAVENOUS; SUBCUTANEOUS
Status: DISCONTINUED | OUTPATIENT
Start: 2019-01-01 | End: 2019-01-01 | Stop reason: HOSPADM

## 2019-01-01 RX ORDER — LEVOFLOXACIN 5 MG/ML
750 INJECTION, SOLUTION INTRAVENOUS EVERY 24 HOURS
Status: DISCONTINUED | OUTPATIENT
Start: 2019-01-01 | End: 2019-01-01

## 2019-01-01 RX ORDER — ENOXAPARIN SODIUM 100 MG/ML
100 INJECTION SUBCUTANEOUS DAILY
Status: DISCONTINUED | OUTPATIENT
Start: 2019-01-01 | End: 2019-01-01 | Stop reason: SDUPTHER

## 2019-01-01 RX ORDER — ONDANSETRON 2 MG/ML
INJECTION INTRAMUSCULAR; INTRAVENOUS AS NEEDED
Status: DISCONTINUED | OUTPATIENT
Start: 2019-01-01 | End: 2019-01-01 | Stop reason: HOSPADM

## 2019-01-01 RX ORDER — LEVOFLOXACIN 5 MG/ML
750 INJECTION, SOLUTION INTRAVENOUS
Status: DISCONTINUED | OUTPATIENT
Start: 2019-01-01 | End: 2019-01-01

## 2019-01-01 RX ORDER — SODIUM CHLORIDE 9 MG/ML
100 INJECTION, SOLUTION INTRAVENOUS CONTINUOUS
Status: DISCONTINUED | OUTPATIENT
Start: 2019-01-01 | End: 2019-01-01 | Stop reason: HOSPADM

## 2019-01-01 RX ORDER — MIDAZOLAM HYDROCHLORIDE 1 MG/ML
2 INJECTION, SOLUTION INTRAMUSCULAR; INTRAVENOUS ONCE
Status: COMPLETED | OUTPATIENT
Start: 2019-01-01 | End: 2019-01-01

## 2019-01-01 RX ORDER — VANCOMYCIN/0.9 % SOD CHLORIDE 1.5G/250ML
1500 PLASTIC BAG, INJECTION (ML) INTRAVENOUS ONCE
Status: COMPLETED | OUTPATIENT
Start: 2019-01-01 | End: 2019-01-01

## 2019-01-01 RX ORDER — SODIUM CHLORIDE 0.9 % (FLUSH) 0.9 %
10-30 SYRINGE (ML) INJECTION AS NEEDED
Status: DISCONTINUED | OUTPATIENT
Start: 2019-01-01 | End: 2019-01-01 | Stop reason: HOSPADM

## 2019-01-01 RX ORDER — PROCHLORPERAZINE EDISYLATE 5 MG/ML
10 INJECTION INTRAMUSCULAR; INTRAVENOUS
Status: DISCONTINUED | OUTPATIENT
Start: 2019-01-01 | End: 2019-01-01

## 2019-01-01 RX ORDER — LIDOCAINE HYDROCHLORIDE 10 MG/ML
0.1 INJECTION INFILTRATION; PERINEURAL AS NEEDED
Status: CANCELLED | OUTPATIENT
Start: 2019-01-01

## 2019-01-01 RX ORDER — OXYCODONE HYDROCHLORIDE 5 MG/1
5 TABLET ORAL
Status: DISCONTINUED | OUTPATIENT
Start: 2019-01-01 | End: 2019-01-01 | Stop reason: HOSPADM

## 2019-01-01 RX ORDER — DEXAMETHASONE SODIUM PHOSPHATE 4 MG/ML
4 INJECTION, SOLUTION INTRA-ARTICULAR; INTRALESIONAL; INTRAMUSCULAR; INTRAVENOUS; SOFT TISSUE ONCE
Status: COMPLETED | OUTPATIENT
Start: 2019-01-01 | End: 2019-01-01

## 2019-01-01 RX ORDER — LIDOCAINE HYDROCHLORIDE 10 MG/ML
0.1 INJECTION INFILTRATION; PERINEURAL AS NEEDED
Status: DISCONTINUED | OUTPATIENT
Start: 2019-01-01 | End: 2019-01-01 | Stop reason: HOSPADM

## 2019-01-01 RX ORDER — HEPARIN 100 UNIT/ML
500 SYRINGE INTRAVENOUS AS NEEDED
Status: DISCONTINUED | OUTPATIENT
Start: 2019-01-01 | End: 2019-01-01 | Stop reason: HOSPADM

## 2019-01-01 RX ORDER — LIDOCAINE HYDROCHLORIDE 20 MG/ML
INJECTION, SOLUTION EPIDURAL; INFILTRATION; INTRACAUDAL; PERINEURAL AS NEEDED
Status: DISCONTINUED | OUTPATIENT
Start: 2019-01-01 | End: 2019-01-01 | Stop reason: HOSPADM

## 2019-01-01 RX ORDER — MIDAZOLAM HYDROCHLORIDE 1 MG/ML
2 INJECTION, SOLUTION INTRAMUSCULAR; INTRAVENOUS ONCE
Status: DISCONTINUED | OUTPATIENT
Start: 2019-01-01 | End: 2019-01-01 | Stop reason: HOSPADM

## 2019-01-01 RX ORDER — SODIUM CHLORIDE 0.9 % (FLUSH) 0.9 %
10 SYRINGE (ML) INJECTION AS NEEDED
Status: DISCONTINUED | OUTPATIENT
Start: 2019-01-01 | End: 2019-01-01

## 2019-01-01 RX ORDER — SUCCINYLCHOLINE CHLORIDE 20 MG/ML
INJECTION INTRAMUSCULAR; INTRAVENOUS AS NEEDED
Status: DISCONTINUED | OUTPATIENT
Start: 2019-01-01 | End: 2019-01-01 | Stop reason: HOSPADM

## 2019-01-01 RX ORDER — SODIUM CHLORIDE, SODIUM LACTATE, POTASSIUM CHLORIDE, CALCIUM CHLORIDE 600; 310; 30; 20 MG/100ML; MG/100ML; MG/100ML; MG/100ML
75 INJECTION, SOLUTION INTRAVENOUS CONTINUOUS
Status: CANCELLED | OUTPATIENT
Start: 2019-01-01

## 2019-01-01 RX ORDER — ONDANSETRON 2 MG/ML
8 INJECTION INTRAMUSCULAR; INTRAVENOUS
Status: ACTIVE | OUTPATIENT
Start: 2019-01-01 | End: 2019-01-01

## 2019-01-01 RX ORDER — FENTANYL CITRATE 50 UG/ML
50 INJECTION, SOLUTION INTRAMUSCULAR; INTRAVENOUS
Status: DISCONTINUED | OUTPATIENT
Start: 2019-01-01 | End: 2019-01-01 | Stop reason: HOSPADM

## 2019-01-01 RX ORDER — DEXAMETHASONE SODIUM PHOSPHATE 4 MG/ML
INJECTION, SOLUTION INTRA-ARTICULAR; INTRALESIONAL; INTRAMUSCULAR; INTRAVENOUS; SOFT TISSUE AS NEEDED
Status: DISCONTINUED | OUTPATIENT
Start: 2019-01-01 | End: 2019-01-01 | Stop reason: HOSPADM

## 2019-01-01 RX ADMIN — DEXAMETHASONE SODIUM PHOSPHATE 12 MG: 4 INJECTION, SOLUTION INTRAMUSCULAR; INTRAVENOUS at 09:55

## 2019-01-01 RX ADMIN — FENTANYL CITRATE 50 MCG: 50 INJECTION INTRAMUSCULAR; INTRAVENOUS at 17:52

## 2019-01-01 RX ADMIN — Medication 10 ML: at 15:01

## 2019-01-01 RX ADMIN — BEVACIZUMAB 350 MG: 400 INJECTION, SOLUTION INTRAVENOUS at 09:53

## 2019-01-01 RX ADMIN — DEXMEDETOMIDINE 0.7 MCG/KG/HR: 100 INJECTION, SOLUTION, CONCENTRATE INTRAVENOUS at 10:19

## 2019-01-01 RX ADMIN — LORAZEPAM 2 MG: 2 INJECTION INTRAMUSCULAR; INTRAVENOUS at 21:39

## 2019-01-01 RX ADMIN — SODIUM CHLORIDE 10 ML: 9 INJECTION INTRAMUSCULAR; INTRAVENOUS; SUBCUTANEOUS at 17:10

## 2019-01-01 RX ADMIN — BEVACIZUMAB 337.5 MG: 400 INJECTION, SOLUTION INTRAVENOUS at 10:37

## 2019-01-01 RX ADMIN — LORAZEPAM 1 MG: 2 INJECTION INTRAMUSCULAR; INTRAVENOUS at 05:04

## 2019-01-01 RX ADMIN — DEXAMETHASONE SODIUM PHOSPHATE 12 MG: 4 INJECTION, SOLUTION INTRAMUSCULAR; INTRAVENOUS at 12:39

## 2019-01-01 RX ADMIN — MORPHINE SULFATE 4 MG: 2 INJECTION, SOLUTION INTRAMUSCULAR; INTRAVENOUS at 19:48

## 2019-01-01 RX ADMIN — BUDESONIDE 500 MCG: 0.5 INHALANT RESPIRATORY (INHALATION) at 07:10

## 2019-01-01 RX ADMIN — PIPERACILLIN, TAZOBACTAM 4.5 G: 4; .5 INJECTION, POWDER, LYOPHILIZED, FOR SOLUTION INTRAVENOUS at 16:37

## 2019-01-01 RX ADMIN — ENOXAPARIN SODIUM 100 MG: 100 INJECTION SUBCUTANEOUS at 13:04

## 2019-01-01 RX ADMIN — SODIUM CHLORIDE 10 ML: 9 INJECTION INTRAMUSCULAR; INTRAVENOUS; SUBCUTANEOUS at 15:12

## 2019-01-01 RX ADMIN — HUMAN INSULIN 4 UNITS: 100 INJECTION, SOLUTION SUBCUTANEOUS at 18:00

## 2019-01-01 RX ADMIN — Medication 10 ML: at 10:50

## 2019-01-01 RX ADMIN — Medication 50 MCG/HR: at 18:41

## 2019-01-01 RX ADMIN — Medication 2 G: at 10:58

## 2019-01-01 RX ADMIN — ALBUTEROL SULFATE 2.5 MG: 2.5 SOLUTION RESPIRATORY (INHALATION) at 19:44

## 2019-01-01 RX ADMIN — SODIUM CHLORIDE 500 ML: 900 INJECTION, SOLUTION INTRAVENOUS at 09:42

## 2019-01-01 RX ADMIN — FAMOTIDINE: 10 INJECTION INTRAVENOUS at 18:20

## 2019-01-01 RX ADMIN — LORAZEPAM 1 MG: 2 INJECTION INTRAMUSCULAR; INTRAVENOUS at 04:43

## 2019-01-01 RX ADMIN — GLYCOPYRROLATE 0.2 MG: 0.2 INJECTION INTRAMUSCULAR; INTRAVENOUS at 19:29

## 2019-01-01 RX ADMIN — METHYLPREDNISOLONE SODIUM SUCCINATE 40 MG: 40 INJECTION, POWDER, FOR SOLUTION INTRAMUSCULAR; INTRAVENOUS at 14:18

## 2019-01-01 RX ADMIN — FENTANYL CITRATE 50 MCG: 50 INJECTION, SOLUTION INTRAMUSCULAR; INTRAVENOUS at 13:18

## 2019-01-01 RX ADMIN — METHYLPREDNISOLONE SODIUM SUCCINATE 40 MG: 40 INJECTION, POWDER, FOR SOLUTION INTRAMUSCULAR; INTRAVENOUS at 05:46

## 2019-01-01 RX ADMIN — BEVACIZUMAB 337.5 MG: 400 INJECTION, SOLUTION INTRAVENOUS at 11:33

## 2019-01-01 RX ADMIN — SODIUM CHLORIDE, SODIUM LACTATE, POTASSIUM CHLORIDE, AND CALCIUM CHLORIDE 75 ML/HR: 600; 310; 30; 20 INJECTION, SOLUTION INTRAVENOUS at 19:31

## 2019-01-01 RX ADMIN — DEXAMETHASONE SODIUM PHOSPHATE 10 MG: 10 INJECTION INTRAMUSCULAR; INTRAVENOUS at 09:40

## 2019-01-01 RX ADMIN — BUDESONIDE 500 MCG: 0.5 INHALANT RESPIRATORY (INHALATION) at 20:04

## 2019-01-01 RX ADMIN — LORAZEPAM 2 MG: 2 INJECTION INTRAMUSCULAR; INTRAVENOUS at 20:10

## 2019-01-01 RX ADMIN — MIDAZOLAM HYDROCHLORIDE 1 MG: 1 INJECTION, SOLUTION INTRAMUSCULAR; INTRAVENOUS at 13:15

## 2019-01-01 RX ADMIN — Medication 10 ML: at 16:10

## 2019-01-01 RX ADMIN — SOYBEAN OIL 250 ML: 20 INJECTION, SOLUTION INTRAVENOUS at 18:23

## 2019-01-01 RX ADMIN — DEXMEDETOMIDINE 0.7 MCG/KG/HR: 100 INJECTION, SOLUTION, CONCENTRATE INTRAVENOUS at 14:23

## 2019-01-01 RX ADMIN — ALBUTEROL SULFATE 2.5 MG: 2.5 SOLUTION RESPIRATORY (INHALATION) at 03:58

## 2019-01-01 RX ADMIN — METHYLPREDNISOLONE SODIUM SUCCINATE 60 MG: 40 INJECTION, POWDER, FOR SOLUTION INTRAMUSCULAR; INTRAVENOUS at 14:04

## 2019-01-01 RX ADMIN — LORAZEPAM 2 MG: 2 INJECTION INTRAMUSCULAR; INTRAVENOUS at 15:43

## 2019-01-01 RX ADMIN — FAMOTIDINE 20 MG: 10 INJECTION, SOLUTION INTRAVENOUS at 20:30

## 2019-01-01 RX ADMIN — BEVACIZUMAB 350 MG: 400 INJECTION, SOLUTION INTRAVENOUS at 09:49

## 2019-01-01 RX ADMIN — MORPHINE SULFATE 2 MG: 2 INJECTION, SOLUTION INTRAMUSCULAR; INTRAVENOUS at 10:37

## 2019-01-01 RX ADMIN — ONDANSETRON 8 MG: 2 INJECTION INTRAMUSCULAR; INTRAVENOUS at 14:09

## 2019-01-01 RX ADMIN — SODIUM CHLORIDE, PRESERVATIVE FREE 500 UNITS: 5 INJECTION INTRAVENOUS at 17:01

## 2019-01-01 RX ADMIN — METHYLPREDNISOLONE SODIUM SUCCINATE 40 MG: 40 INJECTION, POWDER, FOR SOLUTION INTRAMUSCULAR; INTRAVENOUS at 22:06

## 2019-01-01 RX ADMIN — IRINOTECAN HYDROCHLORIDE 320 MG: 20 INJECTION, SOLUTION INTRAVENOUS at 11:17

## 2019-01-01 RX ADMIN — PIPERACILLIN, TAZOBACTAM 4.5 G: 4; .5 INJECTION, POWDER, LYOPHILIZED, FOR SOLUTION INTRAVENOUS at 20:22

## 2019-01-01 RX ADMIN — FLUTICASONE PROPIONATE 1 PUFF: 110 AEROSOL, METERED RESPIRATORY (INHALATION) at 20:57

## 2019-01-01 RX ADMIN — FLUOROURACIL 740 MG: 50 INJECTION, SOLUTION INTRAVENOUS at 13:06

## 2019-01-01 RX ADMIN — FLUTICASONE PROPIONATE 2 PUFF: 110 AEROSOL, METERED RESPIRATORY (INHALATION) at 07:24

## 2019-01-01 RX ADMIN — Medication 10 ML: at 17:55

## 2019-01-01 RX ADMIN — SODIUM CHLORIDE 10 ML: 9 INJECTION INTRAMUSCULAR; INTRAVENOUS; SUBCUTANEOUS at 09:25

## 2019-01-01 RX ADMIN — PROPOFOL 120 MG: 10 INJECTION, EMULSION INTRAVENOUS at 10:55

## 2019-01-01 RX ADMIN — Medication 10 ML: at 14:00

## 2019-01-01 RX ADMIN — ENOXAPARIN SODIUM 100 MG: 100 INJECTION SUBCUTANEOUS at 00:29

## 2019-01-01 RX ADMIN — OXALIPLATIN 150 MG: 5 INJECTION, SOLUTION, CONCENTRATE INTRAVENOUS at 10:35

## 2019-01-01 RX ADMIN — ALBUTEROL SULFATE 2.5 MG: 2.5 SOLUTION RESPIRATORY (INHALATION) at 07:40

## 2019-01-01 RX ADMIN — ALBUTEROL SULFATE 2.5 MG: 2.5 SOLUTION RESPIRATORY (INHALATION) at 23:16

## 2019-01-01 RX ADMIN — FAMOTIDINE: 10 INJECTION INTRAVENOUS at 18:23

## 2019-01-01 RX ADMIN — MORPHINE SULFATE 4 MG: 2 INJECTION, SOLUTION INTRAMUSCULAR; INTRAVENOUS at 19:14

## 2019-01-01 RX ADMIN — BUDESONIDE 500 MCG: 0.5 INHALANT RESPIRATORY (INHALATION) at 07:52

## 2019-01-01 RX ADMIN — LEVOFLOXACIN 750 MG: 5 INJECTION, SOLUTION INTRAVENOUS at 15:25

## 2019-01-01 RX ADMIN — BARIUM SULFATE 15 ML: 400 PASTE ORAL at 10:54

## 2019-01-01 RX ADMIN — VANCOMYCIN HYDROCHLORIDE 1000 MG: 1 INJECTION, POWDER, LYOPHILIZED, FOR SOLUTION INTRAVENOUS at 23:14

## 2019-01-01 RX ADMIN — PIPERACILLIN, TAZOBACTAM 4.5 G: 4; .5 INJECTION, POWDER, LYOPHILIZED, FOR SOLUTION INTRAVENOUS at 11:27

## 2019-01-01 RX ADMIN — GUAIFENESIN AND DEXTROMETHORPHAN 5 ML: 100; 10 SYRUP ORAL at 04:31

## 2019-01-01 RX ADMIN — ALBUTEROL SULFATE 2.5 MG: 2.5 SOLUTION RESPIRATORY (INHALATION) at 16:16

## 2019-01-01 RX ADMIN — LEUCOVORIN CALCIUM 740 MG: 350 INJECTION, POWDER, LYOPHILIZED, FOR SOLUTION INTRAMUSCULAR; INTRAVENOUS at 11:33

## 2019-01-01 RX ADMIN — SODIUM CHLORIDE 1000 ML: 900 INJECTION, SOLUTION INTRAVENOUS at 17:18

## 2019-01-01 RX ADMIN — ONDANSETRON 8 MG: 2 INJECTION INTRAMUSCULAR; INTRAVENOUS at 12:08

## 2019-01-01 RX ADMIN — METHYLPREDNISOLONE SODIUM SUCCINATE 60 MG: 40 INJECTION, POWDER, FOR SOLUTION INTRAMUSCULAR; INTRAVENOUS at 05:22

## 2019-01-01 RX ADMIN — PIPERACILLIN, TAZOBACTAM 4.5 G: 4; .5 INJECTION, POWDER, LYOPHILIZED, FOR SOLUTION INTRAVENOUS at 04:24

## 2019-01-01 RX ADMIN — HUMAN INSULIN 2 UNITS: 100 INJECTION, SOLUTION SUBCUTANEOUS at 18:00

## 2019-01-01 RX ADMIN — DEXTROSE MONOHYDRATE 25 ML/HR: 5 INJECTION, SOLUTION INTRAVENOUS at 10:55

## 2019-01-01 RX ADMIN — SODIUM CHLORIDE, PRESERVATIVE FREE 500 UNITS: 5 INJECTION INTRAVENOUS at 10:50

## 2019-01-01 RX ADMIN — BEVACIZUMAB 350 MG: 400 INJECTION, SOLUTION INTRAVENOUS at 10:55

## 2019-01-01 RX ADMIN — OXALIPLATIN 150 MG: 5 INJECTION, SOLUTION, CONCENTRATE INTRAVENOUS at 11:33

## 2019-01-01 RX ADMIN — SODIUM CHLORIDE 100 ML/HR: 900 INJECTION, SOLUTION INTRAVENOUS at 00:40

## 2019-01-01 RX ADMIN — MORPHINE SULFATE 2 MG: 2 INJECTION, SOLUTION INTRAMUSCULAR; INTRAVENOUS at 00:47

## 2019-01-01 RX ADMIN — SODIUM CHLORIDE, SODIUM LACTATE, POTASSIUM CHLORIDE, AND CALCIUM CHLORIDE 75 ML/HR: 600; 310; 30; 20 INJECTION, SOLUTION INTRAVENOUS at 09:03

## 2019-01-01 RX ADMIN — BEVACIZUMAB 337.5 MG: 400 INJECTION, SOLUTION INTRAVENOUS at 12:55

## 2019-01-01 RX ADMIN — FENTANYL CITRATE 50 MCG: 50 INJECTION, SOLUTION INTRAMUSCULAR; INTRAVENOUS at 12:06

## 2019-01-01 RX ADMIN — FAMOTIDINE 20 MG: 10 INJECTION, SOLUTION INTRAVENOUS at 22:19

## 2019-01-01 RX ADMIN — SODIUM CHLORIDE 25 ML/HR: 900 INJECTION, SOLUTION INTRAVENOUS at 13:36

## 2019-01-01 RX ADMIN — ALBUTEROL SULFATE 2.5 MG: 2.5 SOLUTION RESPIRATORY (INHALATION) at 16:00

## 2019-01-01 RX ADMIN — MORPHINE SULFATE 2 MG: 2 INJECTION, SOLUTION INTRAMUSCULAR; INTRAVENOUS at 00:57

## 2019-01-01 RX ADMIN — HUMAN INSULIN 2 UNITS: 100 INJECTION, SOLUTION SUBCUTANEOUS at 12:00

## 2019-01-01 RX ADMIN — LORAZEPAM 1 MG: 2 INJECTION INTRAMUSCULAR; INTRAVENOUS at 13:53

## 2019-01-01 RX ADMIN — FAMOTIDINE 20 MG: 10 INJECTION, SOLUTION INTRAVENOUS at 20:54

## 2019-01-01 RX ADMIN — ALBUMIN (HUMAN) 12.5 G: 0.25 INJECTION, SOLUTION INTRAVENOUS at 09:40

## 2019-01-01 RX ADMIN — MORPHINE SULFATE 4 MG: 2 INJECTION, SOLUTION INTRAMUSCULAR; INTRAVENOUS at 23:27

## 2019-01-01 RX ADMIN — Medication 10 ML: at 20:41

## 2019-01-01 RX ADMIN — LORAZEPAM 1 MG: 2 INJECTION INTRAMUSCULAR; INTRAVENOUS at 08:53

## 2019-01-01 RX ADMIN — FLUTICASONE PROPIONATE 2 PUFF: 110 AEROSOL, METERED RESPIRATORY (INHALATION) at 21:11

## 2019-01-01 RX ADMIN — PROPOFOL 15 MCG/KG/MIN: 10 INJECTION, EMULSION INTRAVENOUS at 04:23

## 2019-01-01 RX ADMIN — HUMAN INSULIN 4 UNITS: 100 INJECTION, SOLUTION SUBCUTANEOUS at 18:22

## 2019-01-01 RX ADMIN — DEXTROSE MONOHYDRATE 25 ML/HR: 5 INJECTION, SOLUTION INTRAVENOUS at 11:05

## 2019-01-01 RX ADMIN — FAMOTIDINE 20 MG: 20 TABLET ORAL at 17:34

## 2019-01-01 RX ADMIN — ONDANSETRON 4 MG: 2 INJECTION INTRAMUSCULAR; INTRAVENOUS at 11:10

## 2019-01-01 RX ADMIN — SOYBEAN OIL 250 ML: 20 INJECTION, SOLUTION INTRAVENOUS at 18:20

## 2019-01-01 RX ADMIN — HYDROCODONE BITARTRATE AND ACETAMINOPHEN 5 MG: 7.5; 325 SOLUTION ORAL at 00:13

## 2019-01-01 RX ADMIN — ANTACID/ANTIFLATULENT 4 G: 380; 550; 10; 10 GRANULE, EFFERVESCENT ORAL at 09:53

## 2019-01-01 RX ADMIN — SODIUM CHLORIDE 100 ML/HR: 900 INJECTION, SOLUTION INTRAVENOUS at 11:34

## 2019-01-01 RX ADMIN — PIPERACILLIN, TAZOBACTAM 4.5 G: 4; .5 INJECTION, POWDER, LYOPHILIZED, FOR SOLUTION INTRAVENOUS at 12:30

## 2019-01-01 RX ADMIN — BUDESONIDE 500 MCG: 0.5 INHALANT RESPIRATORY (INHALATION) at 07:26

## 2019-01-01 RX ADMIN — MORPHINE SULFATE 2 MG: 2 INJECTION, SOLUTION INTRAMUSCULAR; INTRAVENOUS at 16:37

## 2019-01-01 RX ADMIN — LORAZEPAM 1 MG: 2 INJECTION INTRAMUSCULAR; INTRAVENOUS at 04:20

## 2019-01-01 RX ADMIN — METHYLPREDNISOLONE SODIUM SUCCINATE 60 MG: 40 INJECTION, POWDER, FOR SOLUTION INTRAMUSCULAR; INTRAVENOUS at 22:38

## 2019-01-01 RX ADMIN — ALBUTEROL SULFATE 2.5 MG: 2.5 SOLUTION RESPIRATORY (INHALATION) at 11:37

## 2019-01-01 RX ADMIN — ALBUTEROL SULFATE 2.5 MG: 2.5 SOLUTION RESPIRATORY (INHALATION) at 15:08

## 2019-01-01 RX ADMIN — LEUCOVORIN CALCIUM 728 MG: 350 INJECTION, POWDER, LYOPHILIZED, FOR SUSPENSION INTRAMUSCULAR; INTRAVENOUS at 11:18

## 2019-01-01 RX ADMIN — ENOXAPARIN SODIUM 100 MG: 100 INJECTION SUBCUTANEOUS at 14:21

## 2019-01-01 RX ADMIN — BENZALKONIUM CHLORIDE, SODIUM PHOSPHATE, DIBASIC, EDETATE DISODIUM,SODIUM PHOSPHATE, MONOBASIC, SODIUM CHLORIDE, WATER, PHOSPHORIC ACID, SODIUM HYDROXIDE 1 DROP: 14 SOLUTION INTRAOCULAR at 14:03

## 2019-01-01 RX ADMIN — DEXTROSE MONOHYDRATE 25 ML/HR: 5 INJECTION, SOLUTION INTRAVENOUS at 10:43

## 2019-01-01 RX ADMIN — LISINOPRIL 10 MG: 5 TABLET ORAL at 08:20

## 2019-01-01 RX ADMIN — ALBUMIN (HUMAN) 12.5 G: 0.25 INJECTION, SOLUTION INTRAVENOUS at 06:28

## 2019-01-01 RX ADMIN — POTASSIUM CHLORIDE 40 MEQ: 10 TABLET, EXTENDED RELEASE ORAL at 14:06

## 2019-01-01 RX ADMIN — MORPHINE SULFATE 2 MG: 2 INJECTION, SOLUTION INTRAMUSCULAR; INTRAVENOUS at 18:20

## 2019-01-01 RX ADMIN — LORAZEPAM 2 MG: 2 INJECTION INTRAMUSCULAR; INTRAVENOUS at 03:43

## 2019-01-01 RX ADMIN — Medication 100 MCG/HR: at 15:01

## 2019-01-01 RX ADMIN — MIDAZOLAM 1 MG/HR: 5 INJECTION INTRAMUSCULAR; INTRAVENOUS at 12:39

## 2019-01-01 RX ADMIN — Medication 10 ML: at 14:22

## 2019-01-01 RX ADMIN — BEVACIZUMAB 350 MG: 400 INJECTION, SOLUTION INTRAVENOUS at 14:26

## 2019-01-01 RX ADMIN — FLUOROURACIL 740 MG: 50 INJECTION, SOLUTION INTRAVENOUS at 12:38

## 2019-01-01 RX ADMIN — SOYBEAN OIL 250 ML: 20 INJECTION, SOLUTION INTRAVENOUS at 17:50

## 2019-01-01 RX ADMIN — DEXMEDETOMIDINE 0.4 MCG/KG/HR: 100 INJECTION, SOLUTION, CONCENTRATE INTRAVENOUS at 09:44

## 2019-01-01 RX ADMIN — MORPHINE SULFATE 4 MG: 2 INJECTION, SOLUTION INTRAMUSCULAR; INTRAVENOUS at 20:36

## 2019-01-01 RX ADMIN — ENOXAPARIN SODIUM 100 MG: 100 INJECTION SUBCUTANEOUS at 13:55

## 2019-01-01 RX ADMIN — MORPHINE SULFATE 2 MG: 2 INJECTION, SOLUTION INTRAMUSCULAR; INTRAVENOUS at 00:28

## 2019-01-01 RX ADMIN — LIDOCAINE HYDROCHLORIDE 100 MG: 20 INJECTION, SOLUTION EPIDURAL; INFILTRATION; INTRACAUDAL; PERINEURAL at 10:55

## 2019-01-01 RX ADMIN — METHYLPREDNISOLONE SODIUM SUCCINATE 60 MG: 40 INJECTION, POWDER, FOR SOLUTION INTRAMUSCULAR; INTRAVENOUS at 06:30

## 2019-01-01 RX ADMIN — ALBUTEROL SULFATE 2.5 MG: 2.5 SOLUTION RESPIRATORY (INHALATION) at 15:23

## 2019-01-01 RX ADMIN — MIDAZOLAM HYDROCHLORIDE 1 MG: 1 INJECTION, SOLUTION INTRAMUSCULAR; INTRAVENOUS at 10:03

## 2019-01-01 RX ADMIN — ALBUTEROL SULFATE 2.5 MG: 2.5 SOLUTION RESPIRATORY (INHALATION) at 19:13

## 2019-01-01 RX ADMIN — ONDANSETRON 8 MG: 2 INJECTION INTRAMUSCULAR; INTRAVENOUS at 09:58

## 2019-01-01 RX ADMIN — LEUCOVORIN CALCIUM 740 MG: 350 INJECTION, POWDER, LYOPHILIZED, FOR SOLUTION INTRAMUSCULAR; INTRAVENOUS at 15:01

## 2019-01-01 RX ADMIN — SODIUM CHLORIDE 100 ML: 900 INJECTION, SOLUTION INTRAVENOUS at 20:41

## 2019-01-01 RX ADMIN — PIPERACILLIN, TAZOBACTAM 4.5 G: 4; .5 INJECTION, POWDER, LYOPHILIZED, FOR SOLUTION INTRAVENOUS at 12:28

## 2019-01-01 RX ADMIN — DEXTROSE MONOHYDRATE 25 ML/HR: 5 INJECTION, SOLUTION INTRAVENOUS at 10:28

## 2019-01-01 RX ADMIN — LORAZEPAM 2 MG: 2 INJECTION INTRAMUSCULAR; INTRAVENOUS at 17:18

## 2019-01-01 RX ADMIN — ENOXAPARIN SODIUM 100 MG: 100 INJECTION SUBCUTANEOUS at 12:28

## 2019-01-01 RX ADMIN — LORAZEPAM 2 MG: 2 INJECTION INTRAMUSCULAR; INTRAVENOUS at 15:40

## 2019-01-01 RX ADMIN — PIPERACILLIN, TAZOBACTAM 4.5 G: 4; .5 INJECTION, POWDER, LYOPHILIZED, FOR SOLUTION INTRAVENOUS at 11:38

## 2019-01-01 RX ADMIN — IRINOTECAN HYDROCHLORIDE 320 MG: 20 INJECTION, SOLUTION INTRAVENOUS at 12:50

## 2019-01-01 RX ADMIN — ALBUMIN (HUMAN) 12.5 G: 0.25 INJECTION, SOLUTION INTRAVENOUS at 22:37

## 2019-01-01 RX ADMIN — FAMOTIDINE 20 MG: 10 INJECTION, SOLUTION INTRAVENOUS at 10:43

## 2019-01-01 RX ADMIN — MIDAZOLAM HYDROCHLORIDE 1 MG: 1 INJECTION, SOLUTION INTRAMUSCULAR; INTRAVENOUS at 12:10

## 2019-01-01 RX ADMIN — Medication 10 ML: at 11:05

## 2019-01-01 RX ADMIN — SODIUM CHLORIDE 100 ML/HR: 900 INJECTION, SOLUTION INTRAVENOUS at 14:15

## 2019-01-01 RX ADMIN — MORPHINE SULFATE 4 MG: 2 INJECTION, SOLUTION INTRAMUSCULAR; INTRAVENOUS at 19:29

## 2019-01-01 RX ADMIN — MORPHINE SULFATE 2 MG: 2 INJECTION, SOLUTION INTRAMUSCULAR; INTRAVENOUS at 20:31

## 2019-01-01 RX ADMIN — IOPAMIDOL 100 ML: 755 INJECTION, SOLUTION INTRAVENOUS at 20:41

## 2019-01-01 RX ADMIN — FLUOROURACIL 740 MG: 50 INJECTION, SOLUTION INTRAVENOUS at 13:34

## 2019-01-01 RX ADMIN — LORAZEPAM 1 MG: 2 INJECTION INTRAMUSCULAR; INTRAVENOUS at 14:15

## 2019-01-01 RX ADMIN — SODIUM CHLORIDE 1000 ML: 900 INJECTION, SOLUTION INTRAVENOUS at 13:47

## 2019-01-01 RX ADMIN — ONDANSETRON 8 MG: 2 INJECTION INTRAMUSCULAR; INTRAVENOUS at 11:05

## 2019-01-01 RX ADMIN — POTASSIUM CHLORIDE 20 MEQ: 20 TABLET, EXTENDED RELEASE ORAL at 17:34

## 2019-01-01 RX ADMIN — SODIUM CHLORIDE 10 ML: 9 INJECTION INTRAMUSCULAR; INTRAVENOUS; SUBCUTANEOUS at 09:30

## 2019-01-01 RX ADMIN — METHYLPREDNISOLONE SODIUM SUCCINATE 40 MG: 40 INJECTION, POWDER, FOR SOLUTION INTRAMUSCULAR; INTRAVENOUS at 05:50

## 2019-01-01 RX ADMIN — HYDROCODONE BITARTRATE AND ACETAMINOPHEN 5 MG: 7.5; 325 SOLUTION ORAL at 20:13

## 2019-01-01 RX ADMIN — FLUOROURACIL 728 MG: 50 INJECTION, SOLUTION INTRAVENOUS at 12:50

## 2019-01-01 RX ADMIN — ALBUTEROL SULFATE 2.5 MG: 2.5 SOLUTION RESPIRATORY (INHALATION) at 04:10

## 2019-01-01 RX ADMIN — MIDAZOLAM HYDROCHLORIDE 3 MG: 1 INJECTION, SOLUTION INTRAMUSCULAR; INTRAVENOUS at 16:12

## 2019-01-01 RX ADMIN — LORAZEPAM 1 MG: 2 INJECTION INTRAMUSCULAR; INTRAVENOUS at 19:31

## 2019-01-01 RX ADMIN — DEXAMETHASONE SODIUM PHOSPHATE 10 MG: 10 INJECTION INTRAMUSCULAR; INTRAVENOUS at 12:46

## 2019-01-01 RX ADMIN — Medication 10 ML: at 15:05

## 2019-01-01 RX ADMIN — MORPHINE SULFATE 2 MG: 2 INJECTION, SOLUTION INTRAMUSCULAR; INTRAVENOUS at 05:06

## 2019-01-01 RX ADMIN — CARVEDILOL 3.12 MG: 3.12 TABLET, FILM COATED ORAL at 08:20

## 2019-01-01 RX ADMIN — ALBUTEROL SULFATE 2.5 MG: 2.5 SOLUTION RESPIRATORY (INHALATION) at 08:22

## 2019-01-01 RX ADMIN — SODIUM CHLORIDE 10 ML: 9 INJECTION INTRAMUSCULAR; INTRAVENOUS; SUBCUTANEOUS at 13:05

## 2019-01-01 RX ADMIN — ALBUTEROL SULFATE 2.5 MG: 2.5 SOLUTION RESPIRATORY (INHALATION) at 20:05

## 2019-01-01 RX ADMIN — OXALIPLATIN 150 MG: 5 INJECTION, SOLUTION, CONCENTRATE INTRAVENOUS at 11:15

## 2019-01-01 RX ADMIN — PIPERACILLIN, TAZOBACTAM 4.5 G: 4; .5 INJECTION, POWDER, LYOPHILIZED, FOR SOLUTION INTRAVENOUS at 19:31

## 2019-01-01 RX ADMIN — OXALIPLATIN 150 MG: 5 INJECTION, SOLUTION, CONCENTRATE INTRAVENOUS at 12:43

## 2019-01-01 RX ADMIN — METHYLPREDNISOLONE SODIUM SUCCINATE 40 MG: 40 INJECTION, POWDER, FOR SOLUTION INTRAMUSCULAR; INTRAVENOUS at 10:43

## 2019-01-01 RX ADMIN — FENTANYL CITRATE 50 MCG: 50 INJECTION, SOLUTION INTRAMUSCULAR; INTRAVENOUS at 12:10

## 2019-01-01 RX ADMIN — PIPERACILLIN, TAZOBACTAM 4.5 G: 4; .5 INJECTION, POWDER, LYOPHILIZED, FOR SOLUTION INTRAVENOUS at 04:23

## 2019-01-01 RX ADMIN — Medication 500 UNITS: at 17:10

## 2019-01-01 RX ADMIN — MORPHINE SULFATE 2 MG: 2 INJECTION, SOLUTION INTRAMUSCULAR; INTRAVENOUS at 07:45

## 2019-01-01 RX ADMIN — LORAZEPAM 2 MG: 2 INJECTION INTRAMUSCULAR; INTRAVENOUS at 05:19

## 2019-01-01 RX ADMIN — HEPARIN 500 UNITS: 100 SYRINGE at 16:11

## 2019-01-01 RX ADMIN — FAMOTIDINE: 10 INJECTION INTRAVENOUS at 17:50

## 2019-01-01 RX ADMIN — HUMAN INSULIN 2 UNITS: 100 INJECTION, SOLUTION SUBCUTANEOUS at 06:00

## 2019-01-01 RX ADMIN — FLUOROURACIL 4440 MG: 50 INJECTION, SOLUTION INTRAVENOUS at 17:02

## 2019-01-01 RX ADMIN — BENZONATATE 100 MG: 100 CAPSULE ORAL at 18:03

## 2019-01-01 RX ADMIN — SODIUM CHLORIDE 10 ML: 9 INJECTION INTRAMUSCULAR; INTRAVENOUS; SUBCUTANEOUS at 15:10

## 2019-01-01 RX ADMIN — HUMAN INSULIN 6 UNITS: 100 INJECTION, SOLUTION SUBCUTANEOUS at 05:13

## 2019-01-01 RX ADMIN — ALBUTEROL SULFATE 2.5 MG: 2.5 SOLUTION RESPIRATORY (INHALATION) at 15:46

## 2019-01-01 RX ADMIN — SODIUM CHLORIDE 25 ML/HR: 900 INJECTION, SOLUTION INTRAVENOUS at 10:50

## 2019-01-01 RX ADMIN — Medication 100 MEQ: at 04:55

## 2019-01-01 RX ADMIN — FLUOROURACIL 4368 MG: 50 INJECTION, SOLUTION INTRAVENOUS at 15:30

## 2019-01-01 RX ADMIN — FLUOROURACIL 740 MG: 50 INJECTION, SOLUTION INTRAVENOUS at 14:15

## 2019-01-01 RX ADMIN — SODIUM CHLORIDE 1000 ML: 900 INJECTION, SOLUTION INTRAVENOUS at 14:00

## 2019-01-01 RX ADMIN — HUMAN INSULIN 4 UNITS: 100 INJECTION, SOLUTION SUBCUTANEOUS at 14:09

## 2019-01-01 RX ADMIN — Medication 125 MCG/HR: at 20:21

## 2019-01-01 RX ADMIN — Medication 10 ML: at 09:20

## 2019-01-01 RX ADMIN — HEPARIN 500 UNITS: 100 SYRINGE at 15:32

## 2019-01-01 RX ADMIN — ONDANSETRON 8 MG: 2 INJECTION INTRAMUSCULAR; INTRAVENOUS at 12:44

## 2019-01-01 RX ADMIN — FLUOROURACIL 740 MG: 50 INJECTION, SOLUTION INTRAVENOUS at 13:45

## 2019-01-01 RX ADMIN — LORAZEPAM 1 MG: 2 INJECTION INTRAMUSCULAR; INTRAVENOUS at 12:36

## 2019-01-01 RX ADMIN — LORAZEPAM 2 MG: 2 INJECTION INTRAMUSCULAR; INTRAVENOUS at 19:30

## 2019-01-01 RX ADMIN — FLUOROURACIL 740 MG: 50 INJECTION, SOLUTION INTRAVENOUS at 12:48

## 2019-01-01 RX ADMIN — ONDANSETRON 8 MG: 2 INJECTION INTRAMUSCULAR; INTRAVENOUS at 09:39

## 2019-01-01 RX ADMIN — MORPHINE SULFATE 2 MG: 2 INJECTION, SOLUTION INTRAMUSCULAR; INTRAVENOUS at 04:58

## 2019-01-01 RX ADMIN — Medication 10 ML: at 15:14

## 2019-01-01 RX ADMIN — SODIUM CHLORIDE 25 ML/HR: 900 INJECTION, SOLUTION INTRAVENOUS at 09:55

## 2019-01-01 RX ADMIN — LORAZEPAM 2 MG: 2 INJECTION INTRAMUSCULAR; INTRAVENOUS at 05:34

## 2019-01-01 RX ADMIN — PIPERACILLIN, TAZOBACTAM 4.5 G: 4; .5 INJECTION, POWDER, LYOPHILIZED, FOR SOLUTION INTRAVENOUS at 19:57

## 2019-01-01 RX ADMIN — MORPHINE SULFATE 2 MG: 2 INJECTION, SOLUTION INTRAMUSCULAR; INTRAVENOUS at 11:29

## 2019-01-01 RX ADMIN — ALBUTEROL SULFATE 2.5 MG: 2.5 SOLUTION RESPIRATORY (INHALATION) at 07:55

## 2019-01-01 RX ADMIN — HEPARIN SODIUM (PORCINE) LOCK FLUSH IV SOLN 100 UNIT/ML 500 UNITS: 100 SOLUTION at 15:00

## 2019-01-01 RX ADMIN — FLUOROURACIL 4368 MG: 50 INJECTION, SOLUTION INTRAVENOUS at 13:10

## 2019-01-01 RX ADMIN — FAMOTIDINE 20 MG: 10 INJECTION, SOLUTION INTRAVENOUS at 09:02

## 2019-01-01 RX ADMIN — HUMAN INSULIN 4 UNITS: 100 INJECTION, SOLUTION SUBCUTANEOUS at 12:00

## 2019-01-01 RX ADMIN — Medication 10 ML: at 14:49

## 2019-01-01 RX ADMIN — FAMOTIDINE 20 MG: 10 INJECTION, SOLUTION INTRAVENOUS at 21:50

## 2019-01-01 RX ADMIN — LORAZEPAM 2 MG: 2 INJECTION INTRAMUSCULAR; INTRAVENOUS at 15:12

## 2019-01-01 RX ADMIN — PROPOFOL 10 MCG/KG/MIN: 10 INJECTION, EMULSION INTRAVENOUS at 18:10

## 2019-01-01 RX ADMIN — METHYLPREDNISOLONE SODIUM SUCCINATE 60 MG: 40 INJECTION, POWDER, FOR SOLUTION INTRAMUSCULAR; INTRAVENOUS at 14:21

## 2019-01-01 RX ADMIN — FENTANYL CITRATE 25 MCG: 50 INJECTION, SOLUTION INTRAMUSCULAR; INTRAVENOUS at 11:44

## 2019-01-01 RX ADMIN — LORAZEPAM 2 MG: 2 INJECTION INTRAMUSCULAR; INTRAVENOUS at 07:17

## 2019-01-01 RX ADMIN — LORAZEPAM 2 MG: 2 INJECTION INTRAMUSCULAR; INTRAVENOUS at 21:10

## 2019-01-01 RX ADMIN — OXALIPLATIN 150 MG: 5 INJECTION, SOLUTION, CONCENTRATE INTRAVENOUS at 11:00

## 2019-01-01 RX ADMIN — FAMOTIDINE 20 MG: 10 INJECTION, SOLUTION INTRAVENOUS at 20:46

## 2019-01-01 RX ADMIN — BEVACIZUMAB 350 MG: 400 INJECTION, SOLUTION INTRAVENOUS at 10:15

## 2019-01-01 RX ADMIN — POTASSIUM CHLORIDE 20 MEQ: 1.5 POWDER, FOR SOLUTION ORAL at 08:20

## 2019-01-01 RX ADMIN — ENOXAPARIN SODIUM 100 MG: 100 INJECTION SUBCUTANEOUS at 20:32

## 2019-01-01 RX ADMIN — BUDESONIDE 500 MCG: 0.5 INHALANT RESPIRATORY (INHALATION) at 20:35

## 2019-01-01 RX ADMIN — METHYLPREDNISOLONE SODIUM SUCCINATE 60 MG: 40 INJECTION, POWDER, FOR SOLUTION INTRAMUSCULAR; INTRAVENOUS at 22:16

## 2019-01-01 RX ADMIN — POTASSIUM CHLORIDE 20 MEQ: 1.5 POWDER, FOR SOLUTION ORAL at 18:44

## 2019-01-01 RX ADMIN — LORAZEPAM 2 MG: 2 INJECTION INTRAMUSCULAR; INTRAVENOUS at 09:08

## 2019-01-01 RX ADMIN — LORAZEPAM 1 MG: 2 INJECTION INTRAMUSCULAR; INTRAVENOUS at 03:21

## 2019-01-01 RX ADMIN — Medication 10 ML: at 17:06

## 2019-01-01 RX ADMIN — FENTANYL CITRATE 50 MCG: 50 INJECTION, SOLUTION INTRAMUSCULAR; INTRAVENOUS at 13:40

## 2019-01-01 RX ADMIN — BARIUM SULFATE 355 ML: 0.6 SUSPENSION ORAL at 09:54

## 2019-01-01 RX ADMIN — LORAZEPAM 2 MG: 2 INJECTION INTRAMUSCULAR; INTRAVENOUS at 16:33

## 2019-01-01 RX ADMIN — SODIUM CHLORIDE 25 ML/HR: 900 INJECTION, SOLUTION INTRAVENOUS at 10:20

## 2019-01-01 RX ADMIN — SODIUM CHLORIDE, SODIUM LACTATE, POTASSIUM CHLORIDE, AND CALCIUM CHLORIDE 75 ML/HR: 600; 310; 30; 20 INJECTION, SOLUTION INTRAVENOUS at 09:24

## 2019-01-01 RX ADMIN — DEXTROSE MONOHYDRATE 25 ML/HR: 5 INJECTION, SOLUTION INTRAVENOUS at 11:33

## 2019-01-01 RX ADMIN — METHYLPREDNISOLONE SODIUM SUCCINATE 40 MG: 40 INJECTION, POWDER, FOR SOLUTION INTRAMUSCULAR; INTRAVENOUS at 15:04

## 2019-01-01 RX ADMIN — MORPHINE SULFATE 2 MG: 2 INJECTION, SOLUTION INTRAMUSCULAR; INTRAVENOUS at 16:20

## 2019-01-01 RX ADMIN — LEUCOVORIN CALCIUM 740 MG: 350 INJECTION, POWDER, LYOPHILIZED, FOR SOLUTION INTRAMUSCULAR; INTRAVENOUS at 10:36

## 2019-01-01 RX ADMIN — LEUCOVORIN CALCIUM 740 MG: 350 INJECTION, POWDER, LYOPHILIZED, FOR SOLUTION INTRAMUSCULAR; INTRAVENOUS at 11:45

## 2019-01-01 RX ADMIN — IRINOTECAN HYDROCHLORIDE 320 MG: 20 INJECTION, SOLUTION INTRAVENOUS at 13:30

## 2019-01-01 RX ADMIN — OSELTAMIVIR PHOSPHATE 75 MG: 75 CAPSULE ORAL at 18:20

## 2019-01-01 RX ADMIN — ALBUTEROL SULFATE 2.5 MG: 2.5 SOLUTION RESPIRATORY (INHALATION) at 15:20

## 2019-01-01 RX ADMIN — METHYLPREDNISOLONE SODIUM SUCCINATE 40 MG: 40 INJECTION, POWDER, FOR SOLUTION INTRAMUSCULAR; INTRAVENOUS at 05:20

## 2019-01-01 RX ADMIN — PIPERACILLIN, TAZOBACTAM 4.5 G: 4; .5 INJECTION, POWDER, LYOPHILIZED, FOR SOLUTION INTRAVENOUS at 04:48

## 2019-01-01 RX ADMIN — HUMAN INSULIN 4 UNITS: 100 INJECTION, SOLUTION SUBCUTANEOUS at 23:20

## 2019-01-01 RX ADMIN — SODIUM CHLORIDE 1000 ML: 900 INJECTION, SOLUTION INTRAVENOUS at 16:20

## 2019-01-01 RX ADMIN — METHYLPREDNISOLONE SODIUM SUCCINATE 40 MG: 40 INJECTION, POWDER, FOR SOLUTION INTRAMUSCULAR; INTRAVENOUS at 21:59

## 2019-01-01 RX ADMIN — METHYLPREDNISOLONE SODIUM SUCCINATE 40 MG: 40 INJECTION, POWDER, FOR SOLUTION INTRAMUSCULAR; INTRAVENOUS at 16:25

## 2019-01-01 RX ADMIN — LORAZEPAM 1 MG: 2 INJECTION INTRAMUSCULAR; INTRAVENOUS at 00:19

## 2019-01-01 RX ADMIN — ATROPINE SULFATE 0.4 MG: 0.4 INJECTION, SOLUTION INTRAMUSCULAR; INTRAVENOUS; SUBCUTANEOUS at 12:49

## 2019-01-01 RX ADMIN — LORAZEPAM 1 MG: 2 INJECTION INTRAMUSCULAR; INTRAVENOUS at 07:45

## 2019-01-01 RX ADMIN — METHYLPREDNISOLONE SODIUM SUCCINATE 40 MG: 40 INJECTION, POWDER, FOR SOLUTION INTRAMUSCULAR; INTRAVENOUS at 21:50

## 2019-01-01 RX ADMIN — ALBUTEROL SULFATE 2.5 MG: 2.5 SOLUTION RESPIRATORY (INHALATION) at 15:25

## 2019-01-01 RX ADMIN — LEUCOVORIN CALCIUM 740 MG: 350 INJECTION, POWDER, LYOPHILIZED, FOR SOLUTION INTRAMUSCULAR; INTRAVENOUS at 13:05

## 2019-01-01 RX ADMIN — FENTANYL CITRATE 50 MCG: 50 INJECTION, SOLUTION INTRAMUSCULAR; INTRAVENOUS at 13:15

## 2019-01-01 RX ADMIN — HYDROCODONE BITARTRATE AND ACETAMINOPHEN 1 TABLET: 5; 325 TABLET ORAL at 22:21

## 2019-01-01 RX ADMIN — FLUOROURACIL 740 MG: 50 INJECTION, SOLUTION INTRAVENOUS at 14:45

## 2019-01-01 RX ADMIN — PIPERACILLIN, TAZOBACTAM 4.5 G: 4; .5 INJECTION, POWDER, LYOPHILIZED, FOR SOLUTION INTRAVENOUS at 03:51

## 2019-01-01 RX ADMIN — SODIUM CHLORIDE 25 ML/HR: 900 INJECTION, SOLUTION INTRAVENOUS at 10:55

## 2019-01-01 RX ADMIN — METHYLPREDNISOLONE SODIUM SUCCINATE 40 MG: 40 INJECTION, POWDER, FOR SOLUTION INTRAMUSCULAR; INTRAVENOUS at 22:19

## 2019-01-01 RX ADMIN — MIDAZOLAM HYDROCHLORIDE 2 MG: 1 INJECTION, SOLUTION INTRAMUSCULAR; INTRAVENOUS at 13:10

## 2019-01-01 RX ADMIN — Medication 10 ML: at 13:45

## 2019-01-01 RX ADMIN — ALBUTEROL SULFATE 2.5 MG: 2.5 SOLUTION RESPIRATORY (INHALATION) at 20:35

## 2019-01-01 RX ADMIN — LORAZEPAM 2 MG: 2 INJECTION INTRAMUSCULAR; INTRAVENOUS at 02:36

## 2019-01-01 RX ADMIN — OXALIPLATIN 150 MG: 5 INJECTION, SOLUTION, CONCENTRATE INTRAVENOUS at 15:01

## 2019-01-01 RX ADMIN — ALBUTEROL SULFATE 2.5 MG: 2.5 SOLUTION RESPIRATORY (INHALATION) at 11:27

## 2019-01-01 RX ADMIN — CARVEDILOL 3.12 MG: 3.12 TABLET, FILM COATED ORAL at 18:04

## 2019-01-01 RX ADMIN — Medication 10 ML: at 14:06

## 2019-01-01 RX ADMIN — SODIUM CHLORIDE 10 ML: 9 INJECTION INTRAMUSCULAR; INTRAVENOUS; SUBCUTANEOUS at 11:40

## 2019-01-01 RX ADMIN — SODIUM CHLORIDE 10 ML: 9 INJECTION INTRAMUSCULAR; INTRAVENOUS; SUBCUTANEOUS at 14:00

## 2019-01-01 RX ADMIN — POTASSIUM CHLORIDE 20 MEQ: 1.5 POWDER, FOR SOLUTION ORAL at 18:04

## 2019-01-01 RX ADMIN — METHYLPREDNISOLONE SODIUM SUCCINATE 40 MG: 40 INJECTION, POWDER, FOR SOLUTION INTRAMUSCULAR; INTRAVENOUS at 08:31

## 2019-01-01 RX ADMIN — OXYCODONE HYDROCHLORIDE 5 MG: 5 TABLET ORAL at 09:02

## 2019-01-01 RX ADMIN — PIPERACILLIN, TAZOBACTAM 4.5 G: 4; .5 INJECTION, POWDER, LYOPHILIZED, FOR SOLUTION INTRAVENOUS at 04:22

## 2019-01-01 RX ADMIN — DEXTROSE MONOHYDRATE 25 ML/HR: 5 INJECTION, SOLUTION INTRAVENOUS at 14:58

## 2019-01-01 RX ADMIN — HUMAN INSULIN 2 UNITS: 100 INJECTION, SOLUTION SUBCUTANEOUS at 00:29

## 2019-01-01 RX ADMIN — METHYLPREDNISOLONE SODIUM SUCCINATE 60 MG: 40 INJECTION, POWDER, FOR SOLUTION INTRAMUSCULAR; INTRAVENOUS at 05:13

## 2019-01-01 RX ADMIN — ALBUMIN (HUMAN) 12.5 G: 0.25 INJECTION, SOLUTION INTRAVENOUS at 22:38

## 2019-01-01 RX ADMIN — ONDANSETRON 8 MG: 2 INJECTION INTRAMUSCULAR; INTRAVENOUS at 10:38

## 2019-01-01 RX ADMIN — Medication 10 ML: at 17:00

## 2019-01-01 RX ADMIN — BEVACIZUMAB 350 MG: 400 INJECTION, SOLUTION INTRAVENOUS at 11:55

## 2019-01-01 RX ADMIN — HUMAN INSULIN 4 UNITS: 100 INJECTION, SOLUTION SUBCUTANEOUS at 23:13

## 2019-01-01 RX ADMIN — SODIUM CHLORIDE 1000 ML: 900 INJECTION, SOLUTION INTRAVENOUS at 13:50

## 2019-01-01 RX ADMIN — SODIUM CHLORIDE 25 ML/HR: 900 INJECTION, SOLUTION INTRAVENOUS at 09:30

## 2019-01-01 RX ADMIN — SOYBEAN OIL 250 ML: 20 INJECTION, SOLUTION INTRAVENOUS at 17:46

## 2019-01-01 RX ADMIN — CARVEDILOL 3.12 MG: 3.12 TABLET, FILM COATED ORAL at 17:00

## 2019-01-01 RX ADMIN — FLUOROURACIL 728 MG: 50 INJECTION, SOLUTION INTRAVENOUS at 14:40

## 2019-01-01 RX ADMIN — PIPERACILLIN, TAZOBACTAM 4.5 G: 4; .5 INJECTION, POWDER, LYOPHILIZED, FOR SOLUTION INTRAVENOUS at 19:59

## 2019-01-01 RX ADMIN — PIPERACILLIN, TAZOBACTAM 4.5 G: 4; .5 INJECTION, POWDER, LYOPHILIZED, FOR SOLUTION INTRAVENOUS at 20:32

## 2019-01-01 RX ADMIN — DEXMEDETOMIDINE 0.7 MCG/KG/HR: 100 INJECTION, SOLUTION, CONCENTRATE INTRAVENOUS at 18:56

## 2019-01-01 RX ADMIN — PIPERACILLIN, TAZOBACTAM 4.5 G: 4; .5 INJECTION, POWDER, LYOPHILIZED, FOR SOLUTION INTRAVENOUS at 03:17

## 2019-01-01 RX ADMIN — LORAZEPAM 1 MG: 2 INJECTION INTRAMUSCULAR; INTRAVENOUS at 14:45

## 2019-01-01 RX ADMIN — FAMOTIDINE 20 MG: 10 INJECTION, SOLUTION INTRAVENOUS at 08:06

## 2019-01-01 RX ADMIN — HUMAN INSULIN 2 UNITS: 100 INJECTION, SOLUTION SUBCUTANEOUS at 23:14

## 2019-01-01 RX ADMIN — METHYLPREDNISOLONE SODIUM SUCCINATE 60 MG: 40 INJECTION, POWDER, FOR SOLUTION INTRAMUSCULAR; INTRAVENOUS at 21:19

## 2019-01-01 RX ADMIN — FLUOROURACIL 4440 MG: 50 INJECTION, SOLUTION INTRAVENOUS at 14:36

## 2019-01-01 RX ADMIN — HUMAN INSULIN 2 UNITS: 100 INJECTION, SOLUTION SUBCUTANEOUS at 06:21

## 2019-01-01 RX ADMIN — Medication 10 ML: at 09:25

## 2019-01-01 RX ADMIN — FENTANYL CITRATE 25 MCG: 50 INJECTION, SOLUTION INTRAMUSCULAR; INTRAVENOUS at 11:25

## 2019-01-01 RX ADMIN — BEVACIZUMAB 350 MG: 400 INJECTION, SOLUTION INTRAVENOUS at 14:23

## 2019-01-01 RX ADMIN — HEPARIN 500 UNITS: 100 SYRINGE at 14:49

## 2019-01-01 RX ADMIN — FLUOROURACIL 740 MG: 50 INJECTION, SOLUTION INTRAVENOUS at 11:41

## 2019-01-01 RX ADMIN — SODIUM CHLORIDE 25 ML/HR: 900 INJECTION, SOLUTION INTRAVENOUS at 09:59

## 2019-01-01 RX ADMIN — SODIUM CHLORIDE, SODIUM LACTATE, POTASSIUM CHLORIDE, AND CALCIUM CHLORIDE 75 ML/HR: 600; 310; 30; 20 INJECTION, SOLUTION INTRAVENOUS at 15:11

## 2019-01-01 RX ADMIN — ONDANSETRON 8 MG: 2 INJECTION INTRAMUSCULAR; INTRAVENOUS at 09:52

## 2019-01-01 RX ADMIN — FAMOTIDINE: 10 INJECTION, SOLUTION INTRAVENOUS at 17:09

## 2019-01-01 RX ADMIN — VANCOMYCIN HYDROCHLORIDE 1000 MG: 1 INJECTION, POWDER, LYOPHILIZED, FOR SOLUTION INTRAVENOUS at 05:11

## 2019-01-01 RX ADMIN — SODIUM CHLORIDE 25 ML/HR: 900 INJECTION, SOLUTION INTRAVENOUS at 12:06

## 2019-01-01 RX ADMIN — SODIUM CHLORIDE 25 ML/HR: 900 INJECTION, SOLUTION INTRAVENOUS at 14:07

## 2019-01-01 RX ADMIN — Medication 150 MCG/HR: at 08:11

## 2019-01-01 RX ADMIN — METHYLPREDNISOLONE SODIUM SUCCINATE 40 MG: 40 INJECTION, POWDER, FOR SOLUTION INTRAMUSCULAR; INTRAVENOUS at 20:47

## 2019-01-01 RX ADMIN — ALBUTEROL SULFATE 2.5 MG: 2.5 SOLUTION RESPIRATORY (INHALATION) at 19:35

## 2019-01-01 RX ADMIN — HYDROCODONE BITARTRATE AND ACETAMINOPHEN 5 MG: 7.5; 325 SOLUTION ORAL at 14:45

## 2019-01-01 RX ADMIN — ENOXAPARIN SODIUM 100 MG: 100 INJECTION SUBCUTANEOUS at 00:04

## 2019-01-01 RX ADMIN — SODIUM CHLORIDE, SODIUM LACTATE, POTASSIUM CHLORIDE, CALCIUM CHLORIDE: 600; 310; 30; 20 INJECTION, SOLUTION INTRAVENOUS at 10:47

## 2019-01-01 RX ADMIN — BUDESONIDE 500 MCG: 0.5 INHALANT RESPIRATORY (INHALATION) at 20:12

## 2019-01-01 RX ADMIN — METHYLPREDNISOLONE SODIUM SUCCINATE 40 MG: 40 INJECTION, POWDER, FOR SOLUTION INTRAMUSCULAR; INTRAVENOUS at 20:17

## 2019-01-01 RX ADMIN — HYDRALAZINE HYDROCHLORIDE 10 MG: 20 INJECTION INTRAMUSCULAR; INTRAVENOUS at 16:34

## 2019-01-01 RX ADMIN — Medication 10 ML: at 14:07

## 2019-01-01 RX ADMIN — FLUOROURACIL 4440 MG: 50 INJECTION, SOLUTION INTRAVENOUS at 17:41

## 2019-01-01 RX ADMIN — MORPHINE SULFATE 2 MG: 2 INJECTION, SOLUTION INTRAMUSCULAR; INTRAVENOUS at 20:32

## 2019-01-01 RX ADMIN — DEXAMETHASONE SODIUM PHOSPHATE 12 MG: 4 INJECTION, SOLUTION INTRAMUSCULAR; INTRAVENOUS at 12:10

## 2019-01-01 RX ADMIN — PIPERACILLIN, TAZOBACTAM 4.5 G: 4; .5 INJECTION, POWDER, LYOPHILIZED, FOR SOLUTION INTRAVENOUS at 04:36

## 2019-01-01 RX ADMIN — Medication 10 ML: at 10:49

## 2019-01-01 RX ADMIN — HUMAN INSULIN 2 UNITS: 100 INJECTION, SOLUTION SUBCUTANEOUS at 05:22

## 2019-01-01 RX ADMIN — PIPERACILLIN, TAZOBACTAM 4.5 G: 4; .5 INJECTION, POWDER, LYOPHILIZED, FOR SOLUTION INTRAVENOUS at 05:15

## 2019-01-01 RX ADMIN — MORPHINE SULFATE 4 MG: 2 INJECTION, SOLUTION INTRAMUSCULAR; INTRAVENOUS at 21:17

## 2019-01-01 RX ADMIN — ALBUTEROL SULFATE 2.5 MG: 2.5 SOLUTION RESPIRATORY (INHALATION) at 20:56

## 2019-01-01 RX ADMIN — LORAZEPAM 2 MG: 2 INJECTION INTRAMUSCULAR; INTRAVENOUS at 08:22

## 2019-01-01 RX ADMIN — MORPHINE SULFATE 2 MG: 2 INJECTION, SOLUTION INTRAMUSCULAR; INTRAVENOUS at 07:28

## 2019-01-01 RX ADMIN — OXALIPLATIN 150 MG: 5 INJECTION, SOLUTION, CONCENTRATE INTRAVENOUS at 15:16

## 2019-01-01 RX ADMIN — POTASSIUM CHLORIDE 40 MEQ: 400 INJECTION, SOLUTION INTRAVENOUS at 10:30

## 2019-01-01 RX ADMIN — LORAZEPAM 2 MG: 2 INJECTION INTRAMUSCULAR; INTRAVENOUS at 01:04

## 2019-01-01 RX ADMIN — DEXMEDETOMIDINE 0.4 MCG/KG/HR: 100 INJECTION, SOLUTION, CONCENTRATE INTRAVENOUS at 02:14

## 2019-01-01 RX ADMIN — ONDANSETRON 8 MG: 2 INJECTION INTRAMUSCULAR; INTRAVENOUS at 14:04

## 2019-01-01 RX ADMIN — FENTANYL CITRATE 25 MCG: 50 INJECTION, SOLUTION INTRAMUSCULAR; INTRAVENOUS at 11:43

## 2019-01-01 RX ADMIN — BARIUM SULFATE 20 ML: 980 POWDER, FOR SUSPENSION ORAL at 10:53

## 2019-01-01 RX ADMIN — METHYLPREDNISOLONE SODIUM SUCCINATE 60 MG: 40 INJECTION, POWDER, FOR SOLUTION INTRAMUSCULAR; INTRAVENOUS at 06:36

## 2019-01-01 RX ADMIN — METHYLPREDNISOLONE SODIUM SUCCINATE 60 MG: 40 INJECTION, POWDER, FOR SOLUTION INTRAMUSCULAR; INTRAVENOUS at 21:31

## 2019-01-01 RX ADMIN — DEXAMETHASONE SODIUM PHOSPHATE 12 MG: 4 INJECTION, SOLUTION INTRAMUSCULAR; INTRAVENOUS at 14:07

## 2019-01-01 RX ADMIN — DEXAMETHASONE SODIUM PHOSPHATE 12 MG: 4 INJECTION, SOLUTION INTRAMUSCULAR; INTRAVENOUS at 10:01

## 2019-01-01 RX ADMIN — LORAZEPAM 2 MG: 2 INJECTION INTRAMUSCULAR; INTRAVENOUS at 11:40

## 2019-01-01 RX ADMIN — LORAZEPAM 2 MG: 2 INJECTION INTRAMUSCULAR; INTRAVENOUS at 11:25

## 2019-01-01 RX ADMIN — BUDESONIDE 500 MCG: 0.5 INHALANT RESPIRATORY (INHALATION) at 20:50

## 2019-01-01 RX ADMIN — ALBUTEROL SULFATE 2.5 MG: 2.5 SOLUTION RESPIRATORY (INHALATION) at 07:52

## 2019-01-01 RX ADMIN — FLUOROURACIL 4440 MG: 50 INJECTION, SOLUTION INTRAVENOUS at 11:56

## 2019-01-01 RX ADMIN — MORPHINE SULFATE 4 MG: 2 INJECTION, SOLUTION INTRAMUSCULAR; INTRAVENOUS at 14:23

## 2019-01-01 RX ADMIN — ALBUTEROL SULFATE 2.5 MG: 2.5 SOLUTION RESPIRATORY (INHALATION) at 08:24

## 2019-01-01 RX ADMIN — SODIUM CHLORIDE 25 ML/HR: 900 INJECTION, SOLUTION INTRAVENOUS at 11:35

## 2019-01-01 RX ADMIN — POTASSIUM CHLORIDE 20 MEQ: 20 TABLET, EXTENDED RELEASE ORAL at 17:35

## 2019-01-01 RX ADMIN — ENOXAPARIN SODIUM 100 MG: 100 INJECTION SUBCUTANEOUS at 23:43

## 2019-01-01 RX ADMIN — LIDOCAINE HYDROCHLORIDE 110 MG: 20 INJECTION, SOLUTION INFILTRATION; PERINEURAL at 12:20

## 2019-01-01 RX ADMIN — BARIUM SULFATE 135 ML: 980 POWDER, FOR SUSPENSION ORAL at 09:54

## 2019-01-01 RX ADMIN — METHYLPREDNISOLONE SODIUM SUCCINATE 60 MG: 40 INJECTION, POWDER, FOR SOLUTION INTRAMUSCULAR; INTRAVENOUS at 13:37

## 2019-01-01 RX ADMIN — FLUOROURACIL 4440 MG: 50 INJECTION, SOLUTION INTRAVENOUS at 13:05

## 2019-01-01 RX ADMIN — Medication 150 MCG/HR: at 14:02

## 2019-01-01 RX ADMIN — FAMOTIDINE 20 MG: 10 INJECTION, SOLUTION INTRAVENOUS at 10:06

## 2019-01-01 RX ADMIN — ALBUMIN (HUMAN) 12.5 G: 0.25 INJECTION, SOLUTION INTRAVENOUS at 14:57

## 2019-01-01 RX ADMIN — METHYLPREDNISOLONE SODIUM SUCCINATE 40 MG: 40 INJECTION, POWDER, FOR SOLUTION INTRAMUSCULAR; INTRAVENOUS at 20:54

## 2019-01-01 RX ADMIN — DEXAMETHASONE SODIUM PHOSPHATE 12 MG: 4 INJECTION, SOLUTION INTRAMUSCULAR; INTRAVENOUS at 10:13

## 2019-01-01 RX ADMIN — FLUOROURACIL 4440 MG: 50 INJECTION, SOLUTION INTRAVENOUS at 13:36

## 2019-01-01 RX ADMIN — BUDESONIDE 500 MCG: 0.5 INHALANT RESPIRATORY (INHALATION) at 07:29

## 2019-01-01 RX ADMIN — MIDAZOLAM HYDROCHLORIDE 1 MG: 1 INJECTION, SOLUTION INTRAMUSCULAR; INTRAVENOUS at 13:19

## 2019-01-01 RX ADMIN — LORAZEPAM 2 MG: 2 INJECTION INTRAMUSCULAR; INTRAVENOUS at 19:22

## 2019-01-01 RX ADMIN — MORPHINE SULFATE 2 MG: 2 INJECTION, SOLUTION INTRAMUSCULAR; INTRAVENOUS at 20:30

## 2019-01-01 RX ADMIN — ENOXAPARIN SODIUM 100 MG: 100 INJECTION SUBCUTANEOUS at 22:54

## 2019-01-01 RX ADMIN — SODIUM CHLORIDE 25 ML/HR: 900 INJECTION, SOLUTION INTRAVENOUS at 12:50

## 2019-01-01 RX ADMIN — Medication 100 MCG/HR: at 14:03

## 2019-01-01 RX ADMIN — LEVOFLOXACIN 750 MG: 5 INJECTION, SOLUTION INTRAVENOUS at 17:34

## 2019-01-01 RX ADMIN — FAMOTIDINE: 10 INJECTION INTRAVENOUS at 17:47

## 2019-01-01 RX ADMIN — PIPERACILLIN, TAZOBACTAM 4.5 G: 4; .5 INJECTION, POWDER, LYOPHILIZED, FOR SOLUTION INTRAVENOUS at 03:15

## 2019-01-01 RX ADMIN — PIPERACILLIN, TAZOBACTAM 4.5 G: 4; .5 INJECTION, POWDER, LYOPHILIZED, FOR SOLUTION INTRAVENOUS at 20:48

## 2019-01-01 RX ADMIN — MORPHINE SULFATE 2 MG: 2 INJECTION, SOLUTION INTRAMUSCULAR; INTRAVENOUS at 19:03

## 2019-01-01 RX ADMIN — MORPHINE SULFATE 2 MG: 2 INJECTION, SOLUTION INTRAMUSCULAR; INTRAVENOUS at 20:52

## 2019-01-01 RX ADMIN — FLUOROURACIL 740 MG: 50 INJECTION, SOLUTION INTRAVENOUS at 17:20

## 2019-01-01 RX ADMIN — LORAZEPAM 2 MG: 2 INJECTION INTRAMUSCULAR; INTRAVENOUS at 11:38

## 2019-01-01 RX ADMIN — DEXAMETHASONE SODIUM PHOSPHATE 10 MG: 10 INJECTION INTRAMUSCULAR; INTRAVENOUS at 14:11

## 2019-01-01 RX ADMIN — VANCOMYCIN HYDROCHLORIDE 1500 MG: 10 INJECTION, POWDER, LYOPHILIZED, FOR SOLUTION INTRAVENOUS at 16:53

## 2019-01-01 RX ADMIN — PROCHLORPERAZINE EDISYLATE 10 MG: 5 INJECTION INTRAMUSCULAR; INTRAVENOUS at 08:14

## 2019-01-01 RX ADMIN — LORAZEPAM 2 MG: 2 INJECTION INTRAMUSCULAR; INTRAVENOUS at 00:25

## 2019-01-01 RX ADMIN — LORAZEPAM 1 MG: 2 INJECTION INTRAMUSCULAR; INTRAVENOUS at 23:18

## 2019-01-01 RX ADMIN — SODIUM CHLORIDE 1000 ML: 900 INJECTION, SOLUTION INTRAVENOUS at 16:58

## 2019-01-01 RX ADMIN — SODIUM CHLORIDE 10 ML: 9 INJECTION INTRAMUSCULAR; INTRAVENOUS; SUBCUTANEOUS at 09:50

## 2019-01-01 RX ADMIN — FLUTICASONE PROPIONATE 2 PUFF: 110 AEROSOL, METERED RESPIRATORY (INHALATION) at 07:30

## 2019-01-01 RX ADMIN — ALBUTEROL SULFATE 2.5 MG: 2.5 SOLUTION RESPIRATORY (INHALATION) at 05:14

## 2019-01-01 RX ADMIN — LEUCOVORIN CALCIUM 740 MG: 350 INJECTION, POWDER, LYOPHILIZED, FOR SOLUTION INTRAMUSCULAR; INTRAVENOUS at 10:35

## 2019-01-01 RX ADMIN — PIPERACILLIN, TAZOBACTAM 4.5 G: 4; .5 INJECTION, POWDER, LYOPHILIZED, FOR SOLUTION INTRAVENOUS at 04:32

## 2019-01-01 RX ADMIN — FLUOROURACIL 4368 MG: 50 INJECTION, SOLUTION INTRAVENOUS at 15:12

## 2019-01-01 RX ADMIN — PIPERACILLIN, TAZOBACTAM 4.5 G: 4; .5 INJECTION, POWDER, LYOPHILIZED, FOR SOLUTION INTRAVENOUS at 03:38

## 2019-01-01 RX ADMIN — PIPERACILLIN, TAZOBACTAM 4.5 G: 4; .5 INJECTION, POWDER, LYOPHILIZED, FOR SOLUTION INTRAVENOUS at 19:35

## 2019-01-01 RX ADMIN — PIPERACILLIN, TAZOBACTAM 4.5 G: 4; .5 INJECTION, POWDER, LYOPHILIZED, FOR SOLUTION INTRAVENOUS at 20:17

## 2019-01-01 RX ADMIN — MORPHINE SULFATE 2 MG: 2 INJECTION, SOLUTION INTRAMUSCULAR; INTRAVENOUS at 12:43

## 2019-01-01 RX ADMIN — FENTANYL CITRATE 50 MCG: 50 INJECTION, SOLUTION INTRAMUSCULAR; INTRAVENOUS at 12:19

## 2019-01-01 RX ADMIN — ETOMIDATE 40 MG: 2 INJECTION INTRAVENOUS at 17:51

## 2019-01-01 RX ADMIN — SODIUM CHLORIDE 1000 ML: 900 INJECTION, SOLUTION INTRAVENOUS at 09:02

## 2019-01-01 RX ADMIN — Medication 10 ML: at 14:36

## 2019-01-01 RX ADMIN — LORAZEPAM 1 MG: 2 INJECTION INTRAMUSCULAR; INTRAVENOUS at 09:02

## 2019-01-01 RX ADMIN — METHYLPREDNISOLONE SODIUM SUCCINATE 60 MG: 40 INJECTION, POWDER, FOR SOLUTION INTRAMUSCULAR; INTRAVENOUS at 06:21

## 2019-01-01 RX ADMIN — ETOMIDATE 40 MG: 2 INJECTION, SOLUTION INTRAVENOUS at 17:51

## 2019-01-01 RX ADMIN — ATROPINE SULFATE 0.4 MG: 0.4 INJECTION, SOLUTION INTRAMUSCULAR; INTRAVENOUS; SUBCUTANEOUS at 12:15

## 2019-01-01 RX ADMIN — ALBUTEROL SULFATE 2.5 MG: 2.5 SOLUTION RESPIRATORY (INHALATION) at 11:45

## 2019-01-01 RX ADMIN — ALBUTEROL SULFATE 2.5 MG: 2.5 SOLUTION RESPIRATORY (INHALATION) at 20:50

## 2019-01-01 RX ADMIN — MORPHINE SULFATE 2 MG: 2 INJECTION, SOLUTION INTRAMUSCULAR; INTRAVENOUS at 08:14

## 2019-01-01 RX ADMIN — ALBUTEROL SULFATE 2.5 MG: 2.5 SOLUTION RESPIRATORY (INHALATION) at 12:25

## 2019-01-01 RX ADMIN — LORAZEPAM 2 MG: 2 INJECTION INTRAMUSCULAR; INTRAVENOUS at 21:28

## 2019-01-01 RX ADMIN — MORPHINE SULFATE 4 MG: 2 INJECTION, SOLUTION INTRAMUSCULAR; INTRAVENOUS at 21:38

## 2019-01-01 RX ADMIN — Medication 10 ML: at 16:20

## 2019-01-01 RX ADMIN — MORPHINE SULFATE 2 MG: 2 INJECTION, SOLUTION INTRAMUSCULAR; INTRAVENOUS at 16:10

## 2019-01-01 RX ADMIN — MORPHINE SULFATE 2 MG: 2 INJECTION, SOLUTION INTRAMUSCULAR; INTRAVENOUS at 16:34

## 2019-01-01 RX ADMIN — FAMOTIDINE: 10 INJECTION INTRAVENOUS at 18:39

## 2019-01-01 RX ADMIN — SUCCINYLCHOLINE CHLORIDE 160 MG: 20 INJECTION INTRAMUSCULAR; INTRAVENOUS at 10:55

## 2019-01-01 RX ADMIN — ALBUTEROL SULFATE 2.5 MG: 2.5 SOLUTION RESPIRATORY (INHALATION) at 08:05

## 2019-01-01 RX ADMIN — SODIUM BICARBONATE 100 MEQ: 84 INJECTION, SOLUTION INTRAVENOUS at 05:15

## 2019-01-01 RX ADMIN — DEXMEDETOMIDINE 0.7 MCG/KG/HR: 100 INJECTION, SOLUTION, CONCENTRATE INTRAVENOUS at 04:12

## 2019-01-01 RX ADMIN — LEUCOVORIN CALCIUM 740 MG: 350 INJECTION, POWDER, LYOPHILIZED, FOR SOLUTION INTRAMUSCULAR; INTRAVENOUS at 11:00

## 2019-01-01 RX ADMIN — PIPERACILLIN, TAZOBACTAM 4.5 G: 4; .5 INJECTION, POWDER, LYOPHILIZED, FOR SOLUTION INTRAVENOUS at 04:09

## 2019-01-01 RX ADMIN — PIPERACILLIN, TAZOBACTAM 4.5 G: 4; .5 INJECTION, POWDER, LYOPHILIZED, FOR SOLUTION INTRAVENOUS at 15:02

## 2019-01-01 RX ADMIN — ALBUTEROL SULFATE 2.5 MG: 2.5 SOLUTION RESPIRATORY (INHALATION) at 14:00

## 2019-01-01 RX ADMIN — SODIUM CHLORIDE 25 ML/HR: 900 INJECTION, SOLUTION INTRAVENOUS at 11:05

## 2019-01-01 RX ADMIN — DEXAMETHASONE SODIUM PHOSPHATE 12 MG: 4 INJECTION, SOLUTION INTRAMUSCULAR; INTRAVENOUS at 10:26

## 2019-01-01 RX ADMIN — IBUPROFEN 800 MG: 800 TABLET ORAL at 17:54

## 2019-01-01 RX ADMIN — FLUOROURACIL 728 MG: 50 INJECTION, SOLUTION INTRAVENOUS at 15:15

## 2019-01-01 RX ADMIN — LEUCOVORIN CALCIUM 740 MG: 350 INJECTION, POWDER, LYOPHILIZED, FOR SOLUTION INTRAMUSCULAR; INTRAVENOUS at 10:43

## 2019-01-01 RX ADMIN — PIPERACILLIN, TAZOBACTAM 4.5 G: 4; .5 INJECTION, POWDER, LYOPHILIZED, FOR SOLUTION INTRAVENOUS at 11:46

## 2019-01-01 RX ADMIN — LORAZEPAM 2 MG: 2 INJECTION INTRAMUSCULAR; INTRAVENOUS at 16:54

## 2019-01-01 RX ADMIN — ENOXAPARIN SODIUM 100 MG: 100 INJECTION SUBCUTANEOUS at 12:43

## 2019-01-01 RX ADMIN — ALBUTEROL SULFATE 2.5 MG: 2.5 SOLUTION RESPIRATORY (INHALATION) at 20:33

## 2019-01-01 RX ADMIN — DEXAMETHASONE SODIUM PHOSPHATE 12 MG: 4 INJECTION, SOLUTION INTRAMUSCULAR; INTRAVENOUS at 09:45

## 2019-01-01 RX ADMIN — ALBUMIN (HUMAN) 12.5 G: 0.25 INJECTION, SOLUTION INTRAVENOUS at 13:37

## 2019-01-01 RX ADMIN — PIPERACILLIN, TAZOBACTAM 4.5 G: 4; .5 INJECTION, POWDER, LYOPHILIZED, FOR SOLUTION INTRAVENOUS at 11:49

## 2019-01-01 RX ADMIN — Medication 10 ML: at 10:20

## 2019-01-01 RX ADMIN — BUDESONIDE 500 MCG: 0.5 INHALANT RESPIRATORY (INHALATION) at 19:44

## 2019-01-01 RX ADMIN — LORAZEPAM 1 MG: 2 INJECTION INTRAMUSCULAR; INTRAVENOUS at 22:06

## 2019-01-01 RX ADMIN — ALBUTEROL SULFATE 2.5 MG: 2.5 SOLUTION RESPIRATORY (INHALATION) at 04:24

## 2019-01-01 RX ADMIN — FLUTICASONE PROPIONATE 2 PUFF: 110 AEROSOL, METERED RESPIRATORY (INHALATION) at 21:00

## 2019-01-01 RX ADMIN — ALBUTEROL SULFATE 2.5 MG: 2.5 SOLUTION RESPIRATORY (INHALATION) at 16:51

## 2019-01-01 RX ADMIN — DEXAMETHASONE SODIUM PHOSPHATE 4 MG: 4 INJECTION, SOLUTION INTRAMUSCULAR; INTRAVENOUS at 09:06

## 2019-01-01 RX ADMIN — SODIUM CHLORIDE 1000 ML: 900 INJECTION, SOLUTION INTRAVENOUS at 14:25

## 2019-01-01 RX ADMIN — LEUCOVORIN CALCIUM 740 MG: 350 INJECTION, POWDER, LYOPHILIZED, FOR SOLUTION INTRAMUSCULAR; INTRAVENOUS at 15:16

## 2019-01-01 RX ADMIN — FAMOTIDINE 20 MG: 10 INJECTION, SOLUTION INTRAVENOUS at 08:20

## 2019-01-01 RX ADMIN — ONDANSETRON 8 MG: 2 INJECTION INTRAMUSCULAR; INTRAVENOUS at 12:37

## 2019-01-01 RX ADMIN — FLUOROURACIL 740 MG: 50 INJECTION, SOLUTION INTRAVENOUS at 16:54

## 2019-01-01 RX ADMIN — Medication 10 ML: at 15:30

## 2019-01-01 RX ADMIN — ATROPINE SULFATE 0.4 MG: 0.4 INJECTION, SOLUTION INTRAMUSCULAR; INTRAVENOUS; SUBCUTANEOUS at 11:11

## 2019-01-01 RX ADMIN — FAMOTIDINE 20 MG: 20 TABLET ORAL at 08:16

## 2019-01-01 RX ADMIN — MIDAZOLAM HYDROCHLORIDE 1 MG: 1 INJECTION, SOLUTION INTRAMUSCULAR; INTRAVENOUS at 12:07

## 2019-01-01 RX ADMIN — DEXAMETHASONE SODIUM PHOSPHATE 12 MG: 4 INJECTION, SOLUTION INTRAMUSCULAR; INTRAVENOUS at 10:44

## 2019-01-01 RX ADMIN — TBO-FILGRASTIM 300 MCG: 300 INJECTION, SOLUTION SUBCUTANEOUS at 14:04

## 2019-01-01 RX ADMIN — METHYLPREDNISOLONE SODIUM SUCCINATE 40 MG: 40 INJECTION, POWDER, FOR SOLUTION INTRAMUSCULAR; INTRAVENOUS at 06:25

## 2019-01-01 RX ADMIN — ALBUTEROL SULFATE 2.5 MG: 2.5 SOLUTION RESPIRATORY (INHALATION) at 07:30

## 2019-01-01 RX ADMIN — FENTANYL CITRATE 50 MCG: 50 INJECTION, SOLUTION INTRAMUSCULAR; INTRAVENOUS at 17:52

## 2019-01-01 RX ADMIN — FAMOTIDINE 20 MG: 10 INJECTION, SOLUTION INTRAVENOUS at 20:32

## 2019-01-01 RX ADMIN — PIPERACILLIN, TAZOBACTAM 4.5 G: 4; .5 INJECTION, POWDER, LYOPHILIZED, FOR SOLUTION INTRAVENOUS at 11:55

## 2019-01-01 RX ADMIN — METHYLPREDNISOLONE SODIUM SUCCINATE 40 MG: 40 INJECTION, POWDER, FOR SOLUTION INTRAMUSCULAR; INTRAVENOUS at 14:37

## 2019-01-01 RX ADMIN — PIPERACILLIN, TAZOBACTAM 4.5 G: 4; .5 INJECTION, POWDER, LYOPHILIZED, FOR SOLUTION INTRAVENOUS at 11:30

## 2019-01-01 RX ADMIN — DEXMEDETOMIDINE 0.4 MCG/KG/HR: 100 INJECTION, SOLUTION, CONCENTRATE INTRAVENOUS at 17:01

## 2019-01-01 RX ADMIN — Medication 500 UNITS: at 14:22

## 2019-01-01 RX ADMIN — BEVACIZUMAB 350 MG: 400 INJECTION, SOLUTION INTRAVENOUS at 11:10

## 2019-01-01 RX ADMIN — DEXMEDETOMIDINE 0.4 MCG/KG/HR: 100 INJECTION, SOLUTION, CONCENTRATE INTRAVENOUS at 23:00

## 2019-01-01 RX ADMIN — ALBUTEROL SULFATE 2.5 MG: 2.5 SOLUTION RESPIRATORY (INHALATION) at 20:13

## 2019-01-01 RX ADMIN — MIDAZOLAM HYDROCHLORIDE 1 MG: 1 INJECTION, SOLUTION INTRAMUSCULAR; INTRAVENOUS at 12:19

## 2019-01-01 RX ADMIN — FENTANYL CITRATE 25 MCG: 50 INJECTION, SOLUTION INTRAMUSCULAR; INTRAVENOUS at 11:16

## 2019-01-01 RX ADMIN — HUMAN INSULIN 2 UNITS: 100 INJECTION, SOLUTION SUBCUTANEOUS at 08:00

## 2019-01-01 RX ADMIN — METHYLPREDNISOLONE SODIUM SUCCINATE 40 MG: 40 INJECTION, POWDER, FOR SOLUTION INTRAMUSCULAR; INTRAVENOUS at 05:04

## 2019-01-01 RX ADMIN — LORAZEPAM 2 MG: 2 INJECTION INTRAMUSCULAR; INTRAVENOUS at 11:57

## 2019-01-01 RX ADMIN — BUDESONIDE 500 MCG: 0.5 INHALANT RESPIRATORY (INHALATION) at 11:20

## 2019-01-01 RX ADMIN — ALBUTEROL SULFATE 2.5 MG: 2.5 SOLUTION RESPIRATORY (INHALATION) at 11:20

## 2019-01-01 RX ADMIN — DEXTROSE MONOHYDRATE 25 ML/HR: 5 INJECTION, SOLUTION INTRAVENOUS at 15:07

## 2019-01-01 RX ADMIN — FLUTICASONE PROPIONATE 1 PUFF: 110 AEROSOL, METERED RESPIRATORY (INHALATION) at 08:59

## 2019-01-01 RX ADMIN — SODIUM CHLORIDE, SODIUM LACTATE, POTASSIUM CHLORIDE, AND CALCIUM CHLORIDE 75 ML/HR: 600; 310; 30; 20 INJECTION, SOLUTION INTRAVENOUS at 19:24

## 2019-01-01 RX ADMIN — ALBUTEROL SULFATE 2.5 MG: 2.5 SOLUTION RESPIRATORY (INHALATION) at 07:10

## 2019-01-01 RX ADMIN — MORPHINE SULFATE 2 MG: 2 INJECTION, SOLUTION INTRAMUSCULAR; INTRAVENOUS at 22:37

## 2019-01-01 RX ADMIN — Medication 10 ML: at 13:50

## 2019-01-01 RX ADMIN — DEXTROSE MONOHYDRATE 25 ML/HR: 5 INJECTION, SOLUTION INTRAVENOUS at 11:45

## 2019-01-01 RX ADMIN — ALBUTEROL SULFATE 2.5 MG: 2.5 SOLUTION RESPIRATORY (INHALATION) at 07:29

## 2019-01-01 RX ADMIN — DEXTROSE MONOHYDRATE 25 ML/HR: 5 INJECTION, SOLUTION INTRAVENOUS at 12:05

## 2019-01-01 RX ADMIN — BARIUM SULFATE 20 ML: 400 SUSPENSION ORAL at 10:53

## 2019-01-01 RX ADMIN — ALBUTEROL SULFATE 2.5 MG: 2.5 SOLUTION RESPIRATORY (INHALATION) at 00:29

## 2019-01-01 RX ADMIN — FENTANYL CITRATE 50 MCG: 50 INJECTION, SOLUTION INTRAMUSCULAR; INTRAVENOUS at 13:10

## 2019-01-01 RX ADMIN — ALBUTEROL SULFATE 2.5 MG: 2.5 SOLUTION RESPIRATORY (INHALATION) at 07:35

## 2019-01-01 RX ADMIN — ALBUTEROL SULFATE 2.5 MG: 2.5 SOLUTION RESPIRATORY (INHALATION) at 11:08

## 2019-01-01 RX ADMIN — ALBUTEROL SULFATE 2.5 MG: 2.5 SOLUTION RESPIRATORY (INHALATION) at 20:12

## 2019-01-01 RX ADMIN — DEXAMETHASONE SODIUM PHOSPHATE 4 MG: 4 INJECTION, SOLUTION INTRA-ARTICULAR; INTRALESIONAL; INTRAMUSCULAR; INTRAVENOUS; SOFT TISSUE at 11:10

## 2019-01-01 RX ADMIN — LEUCOVORIN CALCIUM 740 MG: 350 INJECTION, POWDER, LYOPHILIZED, FOR SOLUTION INTRAMUSCULAR; INTRAVENOUS at 11:15

## 2019-01-01 RX ADMIN — CARVEDILOL 3.12 MG: 3.12 TABLET, FILM COATED ORAL at 17:34

## 2019-01-01 RX ADMIN — LEUCOVORIN CALCIUM 728 MG: 350 INJECTION, POWDER, LYOPHILIZED, FOR SOLUTION INTRAMUSCULAR; INTRAVENOUS at 12:50

## 2019-01-01 RX ADMIN — METHYLPREDNISOLONE SODIUM SUCCINATE 60 MG: 40 INJECTION, POWDER, FOR SOLUTION INTRAMUSCULAR; INTRAVENOUS at 13:52

## 2019-01-01 RX ADMIN — ONDANSETRON 8 MG: 2 INJECTION INTRAMUSCULAR; INTRAVENOUS at 10:07

## 2019-01-01 RX ADMIN — ALBUTEROL SULFATE 2.5 MG: 2.5 SOLUTION RESPIRATORY (INHALATION) at 04:43

## 2019-01-01 RX ADMIN — Medication 10 ML: at 15:31

## 2019-01-01 RX ADMIN — METHYLPREDNISOLONE SODIUM SUCCINATE 40 MG: 40 INJECTION, POWDER, FOR SOLUTION INTRAMUSCULAR; INTRAVENOUS at 15:36

## 2019-01-01 RX ADMIN — PIPERACILLIN, TAZOBACTAM 4.5 G: 4; .5 INJECTION, POWDER, LYOPHILIZED, FOR SOLUTION INTRAVENOUS at 12:09

## 2019-01-01 RX ADMIN — FLUTICASONE PROPIONATE 1 PUFF: 110 AEROSOL, METERED RESPIRATORY (INHALATION) at 08:10

## 2019-01-01 RX ADMIN — METHYLPREDNISOLONE SODIUM SUCCINATE 60 MG: 40 INJECTION, POWDER, FOR SOLUTION INTRAMUSCULAR; INTRAVENOUS at 22:37

## 2019-01-01 RX ADMIN — ALBUTEROL SULFATE 2.5 MG: 2.5 SOLUTION RESPIRATORY (INHALATION) at 16:07

## 2019-01-01 RX ADMIN — LEVOFLOXACIN 750 MG: 5 INJECTION, SOLUTION INTRAVENOUS at 18:30

## 2019-01-01 RX ADMIN — Medication 125 MCG/HR: at 18:31

## 2019-01-01 RX ADMIN — FLUTICASONE PROPIONATE 2 PUFF: 110 AEROSOL, METERED RESPIRATORY (INHALATION) at 07:53

## 2019-01-01 RX ADMIN — METHYLPREDNISOLONE SODIUM SUCCINATE 60 MG: 40 INJECTION, POWDER, FOR SOLUTION INTRAMUSCULAR; INTRAVENOUS at 13:08

## 2019-01-01 RX ADMIN — DEXAMETHASONE SODIUM PHOSPHATE 10 MG: 10 INJECTION INTRAMUSCULAR; INTRAVENOUS at 11:07

## 2019-01-01 RX ADMIN — METHYLPREDNISOLONE SODIUM SUCCINATE 60 MG: 40 INJECTION, POWDER, FOR SOLUTION INTRAMUSCULAR; INTRAVENOUS at 14:57

## 2019-01-01 RX ADMIN — PIPERACILLIN, TAZOBACTAM 4.5 G: 4; .5 INJECTION, POWDER, LYOPHILIZED, FOR SOLUTION INTRAVENOUS at 19:30

## 2019-01-01 RX ADMIN — PIPERACILLIN, TAZOBACTAM 4.5 G: 4; .5 INJECTION, POWDER, LYOPHILIZED, FOR SOLUTION INTRAVENOUS at 19:38

## 2019-01-01 RX ADMIN — ALBUTEROL SULFATE 2.5 MG: 2.5 SOLUTION RESPIRATORY (INHALATION) at 11:00

## 2019-01-01 RX ADMIN — Medication 10 ML: at 13:05

## 2019-01-01 RX ADMIN — EPHEDRINE SULFATE 10 MG: 50 INJECTION, SOLUTION INTRAVENOUS at 10:18

## 2019-01-01 RX ADMIN — ONDANSETRON 8 MG: 2 INJECTION INTRAMUSCULAR; INTRAVENOUS at 10:40

## 2019-01-01 RX ADMIN — LORAZEPAM 1 MG: 2 INJECTION INTRAMUSCULAR; INTRAVENOUS at 04:24

## 2019-01-01 RX ADMIN — Medication 10 ML: at 15:11

## 2019-01-01 RX ADMIN — LEUCOVORIN CALCIUM 728 MG: 350 INJECTION, POWDER, LYOPHILIZED, FOR SOLUTION INTRAMUSCULAR; INTRAVENOUS at 13:30

## 2019-01-01 RX ADMIN — HYDROCODONE BITARTRATE AND ACETAMINOPHEN 5 MG: 7.5; 325 SOLUTION ORAL at 04:31

## 2019-01-01 RX ADMIN — LEVOFLOXACIN 750 MG: 5 INJECTION, SOLUTION INTRAVENOUS at 15:34

## 2019-01-01 RX ADMIN — LEVOFLOXACIN 750 MG: 5 INJECTION, SOLUTION INTRAVENOUS at 15:44

## 2019-01-01 RX ADMIN — HEPARIN 500 UNITS: 100 SYRINGE at 13:47

## 2019-01-01 RX ADMIN — BUDESONIDE 500 MCG: 0.5 INHALANT RESPIRATORY (INHALATION) at 08:05

## 2019-01-01 RX ADMIN — ALBUTEROL SULFATE 2.5 MG: 2.5 SOLUTION RESPIRATORY (INHALATION) at 07:26

## 2019-01-01 RX ADMIN — GUAIFENESIN AND DEXTROMETHORPHAN 5 ML: 100; 10 SYRUP ORAL at 17:34

## 2019-01-01 RX ADMIN — FLUOROURACIL 4440 MG: 50 INJECTION, SOLUTION INTRAVENOUS at 14:05

## 2019-01-01 RX ADMIN — FAMOTIDINE 20 MG: 10 INJECTION, SOLUTION INTRAVENOUS at 11:30

## 2019-01-03 NOTE — PROGRESS NOTES
Pt. Arrived to [facility] for:avastin and folfox  Avastin held with patient tolerating folfox without difficulty  Any issues or concerns during this appointment: /105. Per Dr. Negar Thomas hold avastin today. Pt. States understanding of need to begin taking blood pressure medicine today. Patient aware of next appointment on: 1-5-19 @ 1400  Pt.  Discharged: ambulatory home with wife

## 2019-01-03 NOTE — PROGRESS NOTES
1/3/19 saw pt today with Dr. Harper Birmingham for pre chemo cycle 5 FOLFOX and avastin. PET scan shows response to therapy. Tolerating chemo well. PO intake is good. Blood pressure is a little high, discussed taking both coreg and lisinopril. Rx sent to pharmacy. Instructed to keep blood pressure log. He will call Dr. Carlos Manuel Salcedo office to make follow up appt to review PET with surgeon. Follow up in 2 weeks. Encouraged to call with any concerns. Navigation will continue to follow.

## 2019-01-03 NOTE — PROGRESS NOTES
Massage THERAPY: Daily Note    Referring Physician: Latasha Ewing MD  Medical/Referring Diagnosis: Malignant neoplasm of rectum [C20]   Precautions/Allergies: Zithromax [azithromycin]  SUBJECTIVE:  Present Symptoms: None, agreed to hand massage     Pre-Treatment Pain: 1/10   Neuropathy Scale:  1/10  Past Medical History:    Mr. Greg Mcduffie  has a past medical history of Chest pain, Colon cancer ([facility] [address] GERD (gastroesophageal reflux disease), Hypertension, Iron deficiency anemia due to chronic blood loss, PONV (postoperative nausea and vomiting), Rectal cancer ([facility] [address] and Tobacco abuse. Mr. Greg Mcduffie  has a past surgical history that includes vascular surgery procedure unlist (Right); hx hernia repair (2003); hx vascular access; IR ANESTHESIA- PORT PLACEMENT/ TIRSO TO DO (N/A, 10/29/2018); COLOSTOMY VS ILEOSTOMY, EUA WITH BIOSPY (N/A, 6/29/2018); SIGMOIDOSCOPY FLEXIBLE (N/A, 6/27/2018); and COLON BIOPSY (N/A, 6/27/2018). Current Medications:       Current Outpatient Medications:     lisinopril (PRINIVIL, ZESTRIL) 10 mg tablet, Take  by mouth daily. , Disp: , Rfl:     lisinopril (PRINIVIL, ZESTRIL) 10 mg tablet, Take 1 Tab by mouth daily. , Disp: 30 Tab, Rfl: 3    carvedilol (COREG) 3.125 mg tablet, Take 1 Tab by mouth two (2) times daily (with meals). , Disp: 60 Tab, Rfl: 3    pantoprazole (PROTONIX) 40 mg tablet, Take 1 Tab by mouth daily. , Disp: 30 Tab, Rfl: 3    prochlorperazine (COMPAZINE) 10 mg tablet, Take 1 Tab by mouth every six (6) hours as needed. , Disp: 60 Tab, Rfl: 1    ondansetron hcl (ZOFRAN) 8 mg tablet, Take 1 Tab by mouth every eight (8) hours as needed for Nausea., Disp: 60 Tab, Rfl: 1    lidocaine-prilocaine (EMLA) topical cream, Apply  to affected area as needed for Pain.  Apply to port 30-45 min prior to port needle stick, Disp: 30 g, Rfl: 1    Current Facility-Administered Medications:     0.9% sodium chloride infusion, 25 mL/hr, IntraVENous, CONTINUOUS, Luis Enrique Yañez MD    dextrose 5% infusion, 25 mL/hr, IntraVENous, CONTINUOUS, Arin Cosme MD, Last Rate: 25 mL/hr at 01/03/19 1055, 25 mL/hr at 01/03/19 1055    leucovorin (WELLCOVORIN) 740 mg in dextrose 5% 250 mL IVPB, 400 mg/m2 (Treatment Plan Recorded), IntraVENous, ONCE, Arin Cosme MD    oxaliplatin (ELOXATIN) 150 mg in dextrose 5% 250 mL chemo infusion, 150 mg, IntraVENous, ONCE, Arin Cosme MD    fluorouracil (ADRUCIL) chemo syringe 740 mg, 400 mg/m2 (Treatment Plan Recorded), IntraVENous, ONCE, Arin Cosme MD    fluorouracil (ADRUCIL) CADD Cassette (Historical-Pt Supplied) 4,440 mg (Patient Supplied), 2,400 mg/m2 (Treatment Plan Recorded), IntraVENous, ONCE, Arin Cosme MD    saline peripheral flush soln 10 mL, 10 mL, InterCATHeter, PRN, Arin Cosme MD    heparin (porcine) pf 300-500 Units, 300-500 Units, InterCATHeter, PRN, Arin Cosme MD       OBJECTIVE/ASSESSMENT:  Observations of Patient:  No issues related to massage  Response To Treatment: More relaxed   Post-Treatment Pain: 1/10   Neuropathy Scale:  1/10  TREATMENT:    (In addition to Assessment/Re-Assessment sessions the following treatments were rendered)  Treatment Provided:  [x]  Soft tissue massage  []  Healing Touch   Location: forearm(s) bilaterally and hand(s) bilaterally  Patient Position: Seated  Time: 15 minutes    PLAN OF CARE:    []  I will follow up with this patient as needed. [x]  No follow up visit necessary.     Thank you for this referral.  Trina Meade LMT

## 2019-01-03 NOTE — PROGRESS NOTES
I spoke with . Lula Libman regarding the Avastin co-pay program with [employer]. I went over patient's insurance benefits with him including his deductible and max OP amounts. I also went over the co-pay program, enrollment, benefits, what the program covers and does not cover, and billing. Patient agreed to allow me to enroll him on his behalf and signed the LPOA document. LPOA will be scanned into chart.

## 2019-01-05 NOTE — PROGRESS NOTES
Arrived to the [facility]. Chemo pump discontinued. Port flushed per protocol and de-accessed. Patient tolerated well. Any issues or concerns during appointment: none. Patient aware of next infusion appointment on 1/16/19 at 0900. Discharged ambulatory.  
 
Rosa M Morocho RN

## 2019-01-16 NOTE — PROGRESS NOTES
Arrived to infusion after [facility] visit  Reminded take protonix and lisinopril as ordered. Reports debilitating dizziness with some of the adrucil pushes in the past  Tolerated folfox well,adrucil push given very slowly,tolerated well  Adrucil pump infusing at discharge.   Next appt 1/18 @ 3

## 2019-01-16 NOTE — PROGRESS NOTES
1/16/19 saw pt today with Deloris Olivas NP for pre chemo cycle 6 FOLFOX. He is tolerating chemo well. PO intake is good. Reporting heartburn but not taking protonix. Instructed to start. Cold sensitivity is lasting almost until next cycle. Follow up in 2 weeks. Encouraged to call with any concerns. Navigation will continue to follow.

## 2019-01-18 NOTE — PROGRESS NOTES
Arrived to the [facility].  Assessment completed. Patient's Adrucil pump completed and disconnected.  Any issues or concerns during appointment: None. Pt denies dizziness upon standing.  Educated pt on the importance of staying hydrated and to call with any needs or concerns.  Pt verbalized understanding. Port flushed and packed with heparin per protocol. Patient aware of next infusion appointment on 1/30/19 (date) at 0900 (time). Discharged ambulatory.

## 2019-01-30 NOTE — PROGRESS NOTES
Arrived to the [facility]. Avastin, Leucovorin, Oxaliplatin and Adrucil completed. Patient tolerated without problems. Adrucil pump connected and infusing at discharge. Any issues or concerns during appointment: None. Patient aware of next infusion appointment on 2/1/2019 at 1700. Discharged ambulatory to home.

## 2019-01-31 NOTE — PROGRESS NOTES
Saw patient today with Gretta Grant prior to FOLFOX/Avastin. He states his cold paraesthesias are increasing and debilitating but he can button his shirts and work presently. Steven Blackwell started pt on Neurontin and he was instructed to keep skin covered in cold weather. Pt also encouraged to take Imodium for diarrhea to colostomy bag. Pt states he does not like to take medication but he will try harder. Pt also will take Ibuprofen or Aleve for body aches. Nurse navigation will be following.

## 2019-02-01 NOTE — PROGRESS NOTES
Arrived to infusion  Reports adrucil pump complete,remains cold sensitive in   Extremities and orally. no other concerns  Denies n/v , port flushed  Next appt.  2/13

## 2019-02-06 NOTE — PROGRESS NOTES
Patient: Alin Stern MRN: [id number]  SSN: [id number] YOB: 1965  Age: 48 y.o. Sex: male Other Providers:    MD Dario Cabello MD 
 
CHIEF COMPLAINT: Rectal cancer DIAGNOSIS: Adenocarcinoma of the rectum, T3N2M0, Stage III 
 
PREVIOUS TREATMENT:   
1) Flexible sigmoidoscopy w/biopsy, 06/26/18 2) Right transverse diverting colostomy, 6/29/2018 3) Radiation therapy of the pelvis. DOSE: 4500 cGy in 25 fractions pelvis, 1000 cGy in 5 fractions pelvis boost. Total 5500 cGy. Treatment: 8/20/2018 - 9/28/2018. HISTORY OF PRESENT ILLNESS:  Alin Stern is a 48 y.o. male who was initially seen by Dr. Paulette Cook at the request of Dr. Claribel Phillips regarding the role of radiation therapy in treatment of this locally advanced rectal adenocarcinoma. He has a medical history significant for GERD and prior bilateral inguinal hernia repair. He is a former smoker, recently quit. He drinks 1-2 beers daily. Over the few months prior to diagnosis he noted bright red blood per rectum and change in bowel habits with frequent, small caliber stools. He was seen by GI and was scheduled for colonoscopy. However, following his bowel prep he passed only a small amount of stool, but began vomiting. He was seen at the ER on 06/26/18 where a CT scan showed fluid-filled large and small bowel loops with increased soft tissue suggesting an obstructing high rectal cancer. Flexible sigmoidoscopy on 06/26/18 showed a completely obstructing circumferential mass at 5-6 cm from the anal verge which could not be traversed. Extensive biopsies were taken. However, final pathology showed only fragments of hyperplastic polyp with ulceration and inflammation. On 06/29/18 he underwent right transverse diverting colostomy under the direction of Dr. Thaddeus Bhatti. Additional biopsies were taken at that time.   Pathology  Revealed poorly differentiated colorectal adenocarcinoma. Peritoneal fluid was also sent, which revealed only rare atypical cells. No obvious peritoneal disease was seen. Pet/CT scan on 07/19/18 showed hypermetabolic mass in the proximal rectum. In addition, multiple pelvic lymph nodes were suspicious for regional lymph node metastases. Pelvic MRI on 07/23/18 showed the rectal tumor approximately 8 cm in length with extension through the muscularis wall into the perirectal fat on the right. Lymph nodes were seen both within and lateral to the mesorectal fascia. Clinical staging was T3N2M0. He was seen by Dr. Nadia Rodriguez and was recommended to undergo pre-operative chemoradiotherapy. According to Dr. Criss Caballero, surgery will likely consist of a low anterior resection with low rectal anastomosis. He recovered well from transverse colostomy for decompression. Staples removed on 07/17/18. Colostomy functioning well. Patient denies systemic complaints. He continued to pass some fecal material. He had nocturia x4 which was longstanding. He had slow urinary stream and significant urinary hesitancy. INTERVAL HISTORY: Ms Mathieu Johnson returns today 5 months after completing radiation therapy of the pelvis for his rectal cancer. He tolerated chemoradiation fairly well. Colostomy functioning well. He reports that his stools are always liquid having to empty out the bag 3-4 times daily. Denies pain. Post radiation CT shows persistent but smaller rectal tumor with increased right pelvic sidewall, common iliac and retroperitoneal adenopathy. He was not felt to be a candidate for surgery at that time. Recommendation was for chemotherapy to see if he would then become eligible for possibly resection. He was then started on FOLFOX6-Avastin by Dr. Nadia Rodriguez. Repeat PET/CT in December showed responding disease.  He was advised by Dr. Nadia Rodriguez to follow up with surgery to discuss the possibility of resection and continue systemic therapy in the interim. According to Mr Oly Kendall, he met with Dr. Cliff Levine and was told that he is not a surgical candidate and was scheduled for colonoscopy for further evaluation on Friday. 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 Gabopentin escribed by NP with medical oncology, but the pharmacy is stating they did not receive the script. Overall doing well. PAST MEDICAL HISTORY:   
Past Medical History:  
Diagnosis Date  Chest pain 8/18/2016  Colon cancer ([facility] [address]  GERD (gastroesophageal reflux disease)   
 resolved per pt-- prn OTC meds  Hypertension   
 no current treatment, states Coreg dropped BP too much and is not taking  Iron deficiency anemia due to chronic blood loss 07/26/2018  
 denies hx of blood transfusion  PONV (postoperative nausea and vomiting)  Rectal cancer ([facility] [address]  Tobacco abuse 08/18/2016 Quit 6/28/18--- 1 ppd x 30 yrs The patient denies history of collagen vascular diseases, pacemaker insertion, prior radiation or prior chemotherapy. PAST SURGICAL HISTORY:  
Past Surgical History:  
Procedure Laterality Date  FLEXIBLE SIGMOIDOSCOPY N/A 6/27/2018 SIGMOIDOSCOPY FLEXIBLE performed by Swetha Puentes MD at [address]  2003  HX VASCULAR ACCESS  VASCULAR SURGERY PROCEDURE UNLIST Right   
 artery tied off after accident MEDICATIONS:  
 
Current Outpatient Medications:  
  gabapentin (NEURONTIN) 100 mg capsule, Take 1 Cap by mouth three (3) times daily. , Disp: 90 Cap, Rfl: 2   cimetidine (TAGAMET) 300 mg tab, Take 1 Tab by mouth two (2) times a day., Disp: 60 Tab, Rfl: 2 
  lisinopril (PRINIVIL, ZESTRIL) 10 mg tablet, Take 1 Tab by mouth daily. , Disp: 30 Tab, Rfl: 3 
  carvedilol (COREG) 3.125 mg tablet, Take 1 Tab by mouth two (2) times daily (with meals). , Disp: 60 Tab, Rfl: 3 
  pantoprazole (PROTONIX) 40 mg tablet, Take 1 Tab by mouth daily. , Disp: 30 Tab, Rfl: 3   prochlorperazine (COMPAZINE) 10 mg tablet, Take 1 Tab by mouth every six (6) hours as needed. , Disp: 60 Tab, Rfl: 1 
  ondansetron hcl (ZOFRAN) 8 mg tablet, Take 1 Tab by mouth every eight (8) hours as needed for Nausea., Disp: 60 Tab, Rfl: 1 
  lidocaine-prilocaine (EMLA) topical cream, Apply  to affected area as needed for Pain. Apply to port 30-45 min prior to port needle stick, Disp: 30 g, Rfl: 1 ALLERGIES:  
Allergies Allergen Reactions  Zithromax [Azithromycin] Rash SOCIAL HISTORY:  
Social History Socioeconomic History  Marital status:  Spouse name: Not on file  Number of children: Not on file  Years of education: Not on file  Highest education level: Not on file Social Needs  Financial resource strain: Not on file  Food insecurity - worry: Not on file  Food insecurity - inability: Not on file  Transportation needs - medical: Not on file  Transportation needs - non-medical: Not on file Occupational History  Not on file Tobacco Use  Smoking status: Former Smoker Packs/day: 1.00 Years: 30.00 Pack years: 30.00 Types: Cigarettes Start date: 1988 Last attempt to quit: 2018 Years since quittin.6  Smokeless tobacco: Never Used Substance and Sexual Activity  Alcohol use: Yes Alcohol/week: 8.4 oz Types: 14 Cans of beer per week  Drug use: No  
 Sexual activity: Not on file Other Topics Concern [address] Service Not Asked  Blood Transfusions Not Asked  Caffeine Concern Not Asked  Occupational Exposure Not Asked [place] Felling Hazards Not Asked  Sleep Concern Not Asked  Stress Concern Not Asked  Weight Concern Not Asked  Special Diet Not Asked  Back Care Not Asked  Exercise Not Asked  Bike Helmet Not Asked  Seat Belt Not Asked  Self-Exams Not Asked Social History Narrative  Not on file FAMILY HISTORY:  
Family History Problem Relation Age of Onset  Hypertension Mother  Heart Disease Mother REVIEW OF SYSTEMS: Please see the completed review of systems sheet in the chart that I have reviewed today. PHYSICAL EXAMINATION:  
ECOG Performance status 0 
VITAL SIGNS: blood pressure 184/84 Pulse 66  Weight 151 GENERAL: The patient is well-developed, ambulatory, alert and in no acute distress. PATHOLOGY:   
 
06/29/18:  
 DIAGNOSIS  
RECTAL MASS: POORLY DIFFERENTIATED ADENOCARCINOMA. SEE IMMUNOHISTOHEMISTRY REPORT AND COMMENT. Comment Case discussed with Dr. Vicky Charles on 7/5/18.  
tls/7/2/2018 Electronically signed out on 7/6/2018 12:45 by Frank Bruner. Fito Hagen M.D., Ph.D.  
[facility]-IMMUNOHISTOCHEMISTRY Status: Signed Out Nikita Hagen M.D., Ph.D. on 7/6/2018 Interpretation Immunohistochemical Stain Panel: Interpretation: Immunohistochemical slides demonstrate a focus of poorly differentiated tumor showing a cytokeratin 7 negative, cytokeratin 20 positive immunoprofile. Nuclear CDX2 immunoreactivity is also identified. The immunohistochemical features are consistent with a poorly differentiated colorectal adenocarcinoma. Clinical and radiologic correlation is recommended. Findings discussed with Dr. Vicky Charles on 7/5/18. Antibody/Test Marker For Result Cytokeratin 7 Lung, breast, upper GI, serous ovary Negative Cytokeratin 20 Colon, mucinous ovary, urothelium Positive Pancytokeratin (AE1/AE3) Broad spectrum epithelial marker Positive, nuclear pattern S-100 Melanoma No significant staining in  
area of question Synaptophysin Neural and neuroendocrine tumors Negative CDX2 Gastrointestinal marker Positive, nuclear pattern Control sections stain appropriately. Consultant: Dr. Gary Whitaker  
 
06/29/18:  
DIAGNOSIS Peritoneal Fluid:  
RARE ATYPICAL CELLS PRESENT. Cell block: Paucicellular, non-contributory.   
 
06/27/18:  
DIAGNOSIS  
 RECTAL MASS BIOPSIES: FRAGMENTS OF HYPERPLASTIC POLYP, FOCALLY ULCERATED AND INFLAMED. SEE COMMENT. Comment Often hyperplastic large intestinal type mucosa is present at edge of neoplastic mass. If these biopsy fragments represent only a small portion of a much larger mass, unsampled mass could have areas of in situ or even infiltrating carcinoma. Case discussed with Dr. Lucille Menchaca on 6/ 29/ 18 at approximately 0910 hours. LABORATORY:  
Lab Results Component Value Date/Time Sodium 139 01/30/2019 07:56 AM  
 Potassium 3.2 (L) 01/30/2019 07:56 AM  
 Chloride 107 01/30/2019 07:56 AM  
 CO2 25 01/30/2019 07:56 AM  
 Anion gap 7 01/30/2019 07:56 AM  
 Glucose 162 (H) 01/30/2019 07:56 AM  
 BUN 5 (L) 01/30/2019 07:56 AM  
 Creatinine 0.94 01/30/2019 07:56 AM  
 GFR est AA >60 01/30/2019 07:56 AM  
 GFR est non-AA >60 01/30/2019 07:56 AM  
 Calcium 8.0 (L) 01/30/2019 07:56 AM  
 Magnesium 2.0 01/30/2019 07:56 AM  
 Albumin 2.8 (L) 01/30/2019 07:56 AM  
 Protein, total 6.4 01/30/2019 07:56 AM  
 Globulin 3.6 (H) 01/30/2019 07:56 AM  
 A-G Ratio 0.8 (L) 01/30/2019 07:56 AM  
 AST (SGOT) 14 (L) 01/30/2019 07:56 AM  
 ALT (SGPT) 19 01/30/2019 07:56 AM  
 
Lab Results Component Value Date/Time WBC 5.5 01/30/2019 07:56 AM  
 HGB 12.8 (L) 01/30/2019 07:56 AM  
 HCT 37.3 (L) 01/30/2019 07:56 AM  
 PLATELET 605 (L) 54/68/3189 07:56 AM  
 
RADIOLOGY:   
 
7/23/2018: Carl[facility] GEORGES Wo Cont MRI PELVIS WITH CONTRAST. HISTORY: Rectal cancer, staging exam. Recent PET CTs reviewed. FINDINGS:Rectal thickening consistent with primary tumor extends over about an 8 cm length. I believe it extends to within 3 or 4 cm of the levator ani insertion. Margins are indistinct, especially along the right lateral margin. There are enhancing lymph nodes in the perirectal fascia which measure up to 18 mm. There are 11 mm enhancing lymph nodes just lateral to the mesorectal fascia on the right.  Prostate and seminal vesicles grossly unremarkable. Urinary bladder is unremarkable. No gross bony lesions. No free fluid. IMPRESSION: Rectal tumor, approximately 8 cm in length with extension through the muscular wall into the perirectal fat on the right, T3. The tumor extends to within 3 or 4 cm of the levator ani insertion. Lymph nodes both within and lateral to the mesorectal fascia, probably N2.   
 
7/19/2018: Pet/ct Tumor Image Skull Thigh W (ini) Nuclear Medicine Whole Body CT / PET- FDG Study 7/19/2018 3:02 PM INDICATION: Staging for adenocarcinoma of the rectum COMPARISON: CT abdomen and pelvis 6/26/2018. FINDINGS:  Examination of the 3D tomographic PET images demonstrates normal physiologic FDG tracer activity in the head and neck. In the chest there is normal physiologic FDG tracer activity and no suspicious hypermetabolic lymph node, or lung nodule. There are thyroid nodular changes. Below the diaphragm within the abdomen and pelvis in the abdomen there is no abnormal hypermetabolic lymph node, or liver lesion. There are changes of recent surgery with left hemicolectomy and a colostomy site on the right. Expected nonfocal low intensity surgery FDG activity is seen with the surgery changes. There is incidental bilateral mild adrenal thickening, no abnormal FDG activity at these. In the pelvis at the proximal rectum there is a clearly hypermetabolic lesion. SUV Max for this is 7.3. This lesion measures 5.9 cm craniocaudal and has greatest transverse dimensions of 4.3 x 2.4 cm. There are multiple lymph nodes in the mesocolon fat and several of these also show increased FDG uptake-1 is well evident at the near the right pelvic sidewall. There is incidental low level uptake compatible with prior inguinal hernia repairs. No suspicious skeletal lesion. IMPRESSION: 1. Hypermetabolic mass well evident in the proximal rectum compatible with a rectal carcinoma.  There are multiple pelvic lymph nodes, larger ones show associated FDG uptake as well-suspicious for regional lymph node metastases. No evidence of distant metastatic disease on this exam.  
 
6/26/2018: Ct Abd Pelv W Cont CT ABDOMEN AND PELVIS WITH CONTRAST. HISTORY: Abdominal pain and cramping. Blood in stool. COMPARISON: None TECHNIQUE: 5 mm axial scans from above the diaphragms to the pubic symphysis following oral and 100 cc intravenous contrast without acute complication. Intravenous contrast was given to increase the sensitivity to acute inflammation. Radiation dose reduction techniques were used for this study. Our CT scanners use one or more of the following: Automated exposure control, adjustment of the mA and or kV according to patient size, iterative reconstruction. FINDINGS: Abdomen: No free air. The lung bases are clear. There is fluid in the distal esophagus, probably reflux. Oral contrast has not left the stomach. Small bowel loops are fluid-filled and mildly dilated throughout. No transition zone appreciated. Large bowel loops are also fluid-filled. Small hypoattenuating foci in the liver, probably subcentimeter cysts. The spleen is small and homogeneous. . No calcified gallstones. The biliary tree is not dilated. The pancreas is unremarkable. No free fluid, acute inflammatory changes or adenopathy. The kidneys enhance uniformly. No radiopaque renal calculi. No hydronephrosis. The adrenal glands are normal size. Aorta is normal caliber. Pelvis: The colon is fluid-filled down to the level of the rectosigmoid where there is increased soft tissue suggesting an obstructing high rectal mass. The urinary bladder unremarkable. The prostate is mildly enlarged. Seminal vesicles symmetric. IMPRESSION:  Fluid-filled small and large bowel loops with increased soft tissue suggesting an obstructing high rectal cancer. CT of the Chest, Abdomen, and Pelvis 10/17/2018 INDICATION:  Rectal cancer Compared with 06/26/2018.   
 
FINDINGS:  
 Chest CT: The lungs are clear.  No masses or infiltrates are seen. Shyanne Lie is no  
effusion. Shyanne Lie is no significant adenopathy.  There are no bony lesions. Abdomen CT: There are 3 stable low-attenuation lesions in the liver, probably  
benign cysts.  Largest is in the caudate lobe, 8 mm in diameter.  No new or  
enlarging liver lesions are seen.  The adrenal glands and pancreas appear  
normal.  There is normal enhancement of the kidneys. There is no hydronephrosis.  
Shyanne Lie is a single small 6 mm lymph node anterior to the distal IVC. Shyanne Lie is a  
right-sided ostomy. Pelvis CT: The rectal mass is smaller but still present.  Ill-defined soft  
tissue is still noted in the right perihilar rectal fat consistent with local  
tumor extension. Shyanne Lie is an 18 mm soft tissue mass along the right pelvic  
sidewall, likely tumor.  Patient has also developed bilateral common iliac  
adenopathy.  The right-sided node measures 2.6 x 1.5 cm.  There are no bony  
lesions. IMPRESSION:    
1.  Persistent but smaller rectal tumor. 2.  Increased right pelvic sidewall, common iliac, and retroperitoneal  
adenopathy. IMPRESSION:  Alin Stern is a 48 y.o. male with Adenocarcinoma of the rectum, T3N2M0, Stage III. Completed radiation, 9/28/2018.  
-Post radiation CT shows persistent but smaller rectal tumor with increased right pelvic sidewall, common iliac and retroperitoneal adenopathy. He was not felt to be a candidate for surgery at that time. Recommendation was for chemotherapy to see if he would then become eligible for possibly resection. He was then started on FOLFOX6-Avastin by Dr. Claribel Phillips. Repeat PET/CT in January showed responding disease. He was advised by Dr. Claribel Phillips to follow up with surgery to discuss the possibility of resection and continue systemic therapy in the interim.  According to Mr Humberto Harris, he met with Dr. Keith Santos and was told that he is not a surgical candidate and was scheduled for colonoscopy for further evaluation on Friday.  
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 Gabopentin escribed by NP with medical oncology, but the pharmacy is stating they did not receive the script. Resent gabapentin as ordered by [facility], NP, Medical Oncology. There is no further intervention from a radiation standpoint needed at this time. To continue close followup with oncology. We are available if needed. Melo Butt NP February 6, 2019 Portions of this note were copied from prior encounters and reviewed for accuracy, currency, and represent documentation and tasks completed during this encounter. I verify and attest these portions to be unchanged from prior visits.

## 2019-02-06 NOTE — PROGRESS NOTES
Pt here today for FUP post RT to the rectum which ended 9/28/18. Pt has a colostomy placed on 6/29/18. The 12/27/18 PET indicated that the rectal area is stable and a left hilar uptake had resolved. Pt is s/p Xeloda and receiving chemo. He is being followed by Dr. Leatha Mcgovern. Pt will no longer need FUP at [address] [place] [address].

## 2019-02-13 NOTE — PROGRESS NOTES
Saw patient today with Dr Estephania Black prior to [facility]. Pt recently visited with Dr Cliff Levine concerning possible surgery. Dr Marlene Jmaison sent pt there for expert opinion. Patient states Dr Cliff Levine would not agree to do surgery at this time because pt tumor was still too large and attached to peritoneal        Pt states he will go to [facility] and get second opinion. Dr Estephania Black agrees for pt to have CT scan in 4 weeks on a Tues, then Thursday morning GI tumor board and then see Dr Estephania Black.  Nurse navigation is following

## 2019-02-13 NOTE — PROGRESS NOTES
Pt. Arrived to [facility] for: avastin and folfox both of which he tolerated well  Any issues or concerns during this appointment:Chemo ball teaching performed with patient and chemo ball attached with patient stating understanding of use. Patient aware of next appointment on:2-15-19 @ 1700  Pt.  Discharged: ambulatory home

## 2019-02-19 NOTE — PROGRESS NOTES
Spoke with patient and he stated he wanted to cancel scan on 3-12-19. He is going to [facility] on 3-18-19 and cannot have double scans. He still wants to come on 3-14-19 for appt with Dr Hermila Escobar and infusion. Message sent to Wagner Frank and Micheal Portillo.

## 2019-02-27 NOTE — PROGRESS NOTES
Arrived to the [facility]. empliciti completed. Patient tolerated well. Any issues or concerns during appointment: no.  Patient aware of next infusion appointment on 3/1 (date) at 3 (time). Discharged home.

## 2019-02-28 NOTE — PROGRESS NOTES
I saw patient today after pt visit. He will have chemotherapy today and he states he is feeling better than last week's sick visit. He continues to plan visit at [facility] after next chemo. Nurse navigation is following--continued to encourage pt to call with any questions or complaints.

## 2019-03-01 NOTE — PROGRESS NOTES
Arrived to the [facility]. MS Adrucil Pump  completed. Patient tolerated well. Any issues or concerns during appointment: none. Patient aware of next infusion appointment on 03.14.2019 (date) at 0830 (time). Discharged ambulatory to home.

## 2019-03-13 NOTE — PROGRESS NOTES
Arrived to infusion after [facility] visit  No concerns  Folfox/avastin infused,tolerated well  Spoke with Ty Maya NP about 5FU . Patient has had several reactions to 5 FU after leaving infusion and getting to car or lobby feeling lightheaded,dizzy,flushed. Patient requests push be given over at least 10 min. Pharmacy is changing adrucil push to a piggyback over   15 mins.  For each treatment per Ty Maya NP  Adrucil pump connected after piggyback,tolerated well  Disconnect appt 3/15 @ 5

## 2019-03-15 NOTE — PROGRESS NOTES
Arrived to the [facility]. Elatometric infusion pump with adrucil chemo completed. Pump disconnected from port. Patient tolerated well. Any issues or concerns during appointment: NO.  Patient aware of next infusion appointment on 03/27/19 (date) at [phone] (time). Discharged ambulatory.

## 2019-03-23 NOTE — ED NOTES
I have reviewed discharge instructions with the patient and spouse. The patient and spouse verbalized understanding. Patient left ED via Discharge Method: ambulatory to Home with spouse Opportunity for questions and clarification provided. Patient given 1 scripts. To continue your aftercare when you leave the hospital, you may receive an automated call from our care team to check in on how you are doing. This is a free service and part of our promise to provide the best care and service to meet your aftercare needs.  If you have questions, or wish to unsubscribe from this service please call [phone]. Thank you for Choosing our [employer] [facility] Department.

## 2019-03-23 NOTE — ED NOTES
Pt returned from radiology and feels cold. Rechecked temp. 100.1. Last meds were at 1400 and was tylenol. Spoke to MD. Verbal order received for 800mg Motrin. Repeat lactic needed after first bolus of fluids completed and second bolus of NS needed after

## 2019-03-23 NOTE — DISCHARGE INSTRUCTIONS
Take Tamiflu as written  Use tylenol and motrin for fever control.   Alternate doses of each at three hour intervals for temperature over 100.4 F  Call your doctor/follow up doctor to set up appointment for recheck visit  Return to ER for any worsening symptoms or new problems which may arise

## 2019-03-23 NOTE — ED PROVIDER NOTES
51-year-old male currently undergoing chemotherapy for adenocarcinoma of the rectum Awoke this morning with a fever measured 205 Myalgias and diffuse body aches. He denies chest pain or abdominal pain Not had any nausea, vomiting or diarrhea No headache, no vision changes, no URI symptoms Multiple ill contacts Patient also just traveled back by air from [place], [place], where he saw his oncologist 
 
The history is provided by the patient and the spouse. Fever This is a new problem. The current episode started 6 to 12 hours ago. The problem occurs constantly. The problem has not changed since onset. The maximum temperature noted was more than 104 F. Temperature source: forehead thermometer. Associated symptoms include muscle aches and cough. Pertinent negatives include no chest pain, no diarrhea, no vomiting, no headaches, no sore throat, no shortness of breath, no neck pain and no rash. He has tried acetaminophen for the symptoms. The treatment provided mild relief. Past Medical History:  
Diagnosis Date  Chest pain 8/18/2016  Colon cancer ([address]  GERD (gastroesophageal reflux disease)   
 resolved per pt-- prn OTC meds  Hypertension   
 no current treatment, states Coreg dropped BP too much and is not taking  Iron deficiency anemia due to chronic blood loss 07/26/2018  
 denies hx of blood transfusion  PONV (postoperative nausea and vomiting)  Rectal cancer ([address]  Tobacco abuse 08/18/2016 Quit 6/28/18--- 1 ppd x 30 yrs Past Surgical History:  
Procedure Laterality Date  FLEXIBLE SIGMOIDOSCOPY N/A 6/27/2018 SIGMOIDOSCOPY FLEXIBLE performed by Steven Sutherland MD at [address]  2003  HX VASCULAR ACCESS  VASCULAR SURGERY PROCEDURE UNLIST Right   
 artery tied off after accident Family History:  
Problem Relation Age of Onset  Hypertension Mother  Heart Disease Mother Social History Socioeconomic History  Marital status:  Spouse name: Not on file  Number of children: Not on file  Years of education: Not on file  Highest education level: Not on file Occupational History  Not on file Social Needs  Financial resource strain: Not on file  Food insecurity:  
  Worry: Not on file Inability: Not on file  Transportation needs:  
  Medical: Not on file Non-medical: Not on file Tobacco Use  Smoking status: Former Smoker Packs/day: 1.00 Years: 30.00 Pack years: 30.00 Types: Cigarettes Start date: 1988 Last attempt to quit: 2018 Years since quittin.7  Smokeless tobacco: Never Used Substance and Sexual Activity  Alcohol use: Yes Alcohol/week: 8.4 oz Types: 14 Cans of beer per week  Drug use: No  
 Sexual activity: Not on file Lifestyle  Physical activity:  
  Days per week: Not on file Minutes per session: Not on file  Stress: Not on file Relationships  Social connections:  
  Talks on phone: Not on file Gets together: Not on file Attends [employer] service: Not on file Active member of club or organization: Not on file Attends meetings of clubs or organizations: Not on file Relationship status: Not on file  Intimate partner violence:  
  Fear of current or ex partner: Not on file Emotionally abused: Not on file Physically abused: Not on file Forced sexual activity: Not on file Other Topics Concern [address] Service Not Asked  Blood Transfusions Not Asked  Caffeine Concern Not Asked  Occupational Exposure Not Asked [employer] Hazards Not Asked  Sleep Concern Not Asked  Stress Concern Not Asked  Weight Concern Not Asked  Special Diet Not Asked  Back Care Not Asked  Exercise Not Asked  Bike Helmet Not Asked  Seat Belt Not Asked  Self-Exams Not Asked Social History Narrative  Not on file ALLERGIES: Zithromax [azithromycin] Review of Systems Constitutional: Positive for fever. Negative for activity change, chills and diaphoresis. HENT: Negative for dental problem, hearing loss, nosebleeds, rhinorrhea and sore throat. Eyes: Negative for pain, discharge, redness and visual disturbance. Respiratory: Positive for cough. Negative for chest tightness and shortness of breath. Cardiovascular: Negative for chest pain, palpitations and leg swelling. Gastrointestinal: Negative for abdominal pain, constipation, diarrhea, nausea and vomiting. Endocrine: Negative for cold intolerance, heat intolerance, polydipsia and polyuria. Genitourinary: Negative for dysuria and flank pain. Musculoskeletal: Negative for arthralgias, back pain, joint swelling, myalgias and neck pain. Skin: Negative for pallor and rash. Allergic/Immunologic: Negative for environmental allergies and food allergies. Neurological: Negative for dizziness, tremors, light-headedness, numbness and headaches. Hematological: Negative for adenopathy. Does not bruise/bleed easily. Psychiatric/Behavioral: Negative for confusion and dysphoric mood. The patient is not nervous/anxious and is not hyperactive. All other systems reviewed and are negative. Vitals:  
 03/23/19 1717 03/23/19 1718 03/23/19 1745 03/23/19 1745 BP:   140/77 Pulse:      
Resp:      
Temp:    100.1 °F (37.8 °C) SpO2: 99% 99% 100% Weight:      
Height:      
      
 
Physical Exam  
Constitutional: He is oriented to person, place, and time. He appears well-developed and well-nourished. He appears distressed. HENT:  
Head: Normocephalic and atraumatic. Mouth/Throat: Oropharynx is clear and moist. No oropharyngeal exudate. Eyes: Pupils are equal, round, and reactive to light. Conjunctivae and EOM are normal. No scleral icterus. Neck: Normal range of motion. Neck supple. No JVD present. No thyromegaly present. Cardiovascular: Normal rate, regular rhythm, normal heart sounds and intact distal pulses. Exam reveals no gallop and no friction rub. No murmur heard. Pulmonary/Chest: Effort normal and breath sounds normal. No respiratory distress. He has no wheezes. Abdominal: Soft. Bowel sounds are normal. He exhibits no distension. There is no hepatosplenomegaly. There is no tenderness. Musculoskeletal: Normal range of motion. He exhibits no edema, tenderness or deformity. Neurological: He is alert and oriented to person, place, and time. No cranial nerve deficit or sensory deficit. He exhibits normal muscle tone. Coordination normal.  
Skin: Skin is warm and dry. Capillary refill takes less than 2 seconds. No rash noted. Psychiatric: He has a normal mood and affect. His behavior is normal. Judgment and thought content normal.  
Nursing note and vitals reviewed. MDM Number of Diagnoses or Management Options Influenza A: new and requires workup Diagnosis management comments: Workup today reveals a positive flu swab. Blood work and x-ray and urine are unremarkable Patient did have a mild lactic acidosis Patient will be started on Tamiflu Close follow-up with his primary care doctor and oncologist are advised. Patient will need to have cultures followed as well. Amount and/or Complexity of Data Reviewed Clinical lab tests: ordered and reviewed Tests in the radiology section of CPT®: ordered and reviewed Tests in the medicine section of CPT®: ordered and reviewed Decide to obtain previous medical records or to obtain history from someone other than the patient: yes Obtain history from someone other than the patient: yes Review and summarize past medical records: yes Risk of Complications, Morbidity, and/or Mortality Presenting problems: high Diagnostic procedures: high Management options: moderate General comments: Elements of this note have been dictated via voice recognition software. Text and phrases may be limited by the accuracy of the software. The chart has been reviewed, but errors may still be present. Patient Progress Patient progress: improved Procedures

## 2019-03-23 NOTE — ED TRIAGE NOTES
Patient arrives stating he's been feeling bad and has been running a fever. States fever at home, temp 105. Took tylenol at home around 1400 today. Current temp 100. Tachy at 109. Patient alert and oriented. Patient has rectal cancer. Wife at side.

## 2019-03-27 NOTE — PROGRESS NOTES
Arrived to the [facility]. Chemo completed. Patient tolerated well. Continuous chemo connected to patient and instructions reviewed with patient. 40 mEq potassium given po. Any issues or concerns during appointment: None. Patient aware of next infusion appointment on 3/29 (date) at 65 (time). Discharged ambulatory in stable condition.

## 2019-03-29 NOTE — PROGRESS NOTES
Arrived to the [facility]. Chemotherapy pump completed & disconnected from pt. Patient tolerated well. Any issues or concerns during appointment: none. Patient aware of next infusion appointment on 4-10-19 (date) at 56 (time). Discharged via ambulatory.

## 2019-04-02 PROBLEM — E86.0 DEHYDRATION: Status: ACTIVE | Noted: 2019-01-01

## 2019-04-02 NOTE — PROGRESS NOTES
Pt called yesterday on 4-1-19 and stated he felt washed out and fatigued. He was having at least twice as much diarrhea and he is taking 4-6 imodium day. I explained he may take up to 8 tabs daily. We will get pt appt for 1 liter fluid tomorrow and 4mg Dex IV (since pt feels this makes him jittery and he cannot sleep)    Spoke with Alie Dang and we will also give Lomotil for diarrhea and Ativan for sleep/anxiety.  Nurse navigation will continue to follow

## 2019-04-02 NOTE — PROGRESS NOTES
Tolerated 1 liter Normal Saline and Iv Dexamethasone without difficulty. Patient received Rx from navigator for Lomotil and Ativan. Reports ostomy has been putting out increased diarrhea stool not responding to Imodium. Patient discharged via ambulation accompanied by self. Instructed to notify physician of any problems, questions or concerns after discharge. Next appointment is 04/10/19 at 10am with Infusion for chemo with labs and OV prior. Encouraged to push oral hydration.

## 2019-04-24 NOTE — PROGRESS NOTES
Arrived to the [facility]. Assessment complete, labs reviewed. Avastin,  Leucovorin, and Adrucil completed. Adrucil elastomeric pump connected and unclamped for flow, prior to discharge. Patient tolerated without problems. Any issues or concerns during appointment: None. Pt instructed to call for any concerns or questions. Pt verbalized understanding. Patient aware of next infusion appointment on 4/26/2019 (date) at 1500 (time). Discharged ambulatory.

## 2019-04-24 NOTE — PROGRESS NOTES
Saw patient today prior to 5FU/Avastin infusion. Pt states he still has cold neuropathies and sensations associated with Oxaliplatin. Patient also has numbness and tingling in his feet which he feels has not improved.  Pt requests something for severe pain and discomfort affecting sleep-Oxycodone 5mg given for pain--Nurse navigation is following

## 2019-04-26 NOTE — PROGRESS NOTES
Arrived to the [facility]. Chemotherapy pump completed & disconnected from pt. Patient tolerated well. Any issues or concerns during appointment: none. Patient aware of next infusion appointment on 5/8 (date) at 0930 (time). Discharged via ambulatory.

## 2019-05-08 NOTE — PROGRESS NOTES
Arrived to the [facility]. Chemotherapy completed. Patient tolerated well. Adrucil pump infusing via port at discharge, tubing unclamped. Any issues or concerns during appointment: Spoke with Dr. Ely Blunt regarding the Zofran and Decadron pre-med orders. MD would like pt to continue these before Adrucil administration. Patient aware of next infusion appointment on 5/10/19 at 1400. Discharged ambulatory from Infusion.

## 2019-05-09 NOTE — PROGRESS NOTES
Spoke with Dr Gely Davenport concerning pt's biopsy and he stated have patient start Lovenox 1.5mg/kg daily for Lovenox bridge and stop Eliquis until 24 hrs after procedure. Called pt at 12 noon today and he was not taking Eliquis yet and he was working. Advised pt start Lovenox today and rest. Called 100mg script to [facility] at pt request. Pt has sister who is a nurse and she will teach how to give these injections. Pt told not to take injection past noon on 5-13-19 for biopsy. [facility] to Keep Biopsy ON schedule for 5-14-19 and we would Lovenox bridge pt and Dr Gely Davenport' request instead of cancel.

## 2019-05-09 NOTE — PROGRESS NOTES
Spoke with Halle Argueta at Dr Nsetor Mcdaniles [facility] office ([place]) and made her aware pt is now taking Eliquis since pt was previously scheduled for renal bx on 5-14-19. Pt will likely need bx delay.

## 2019-05-09 NOTE — PROGRESS NOTES
Saw patient on 5-8-19 with Dr Elijah Johnson. He has been feeling short of breath of late and Dr Elijah Johnson ordered Ct scan. Pt also has renal bx on 5-14-19. Pt will receive chemo today Avastin/5FU. Pt continues to complain of increasing worsening neuropathy especially to fingers so will start Gabapentin 300mg TID. Levaquin started 500mg daily for 5 days. CT scan showed small pulmonary clot--Eloquis started and pt aware. Reva Navarro today at [facility] and made her aware of up[coming bx on 5-14-19.

## 2019-05-10 NOTE — PROGRESS NOTES
Arrived to infusion. Pump d/c completed without issues. Patient denies new issues. Aware of next appointment on 5/22/19 at 930am. Discharged ambulatory with self.

## 2019-05-14 NOTE — DISCHARGE INSTRUCTIONS
[address]  Department of Interventional Radiology  [facility]  [phone] Office  [phone] Fax    BIOPSY DISCHARGE INSTRUCTIONS    General Instructions:     A biopsy is the removal of a small piece of tissue for microscopic examination or testing. Healthy tissue can be obtained for the purpose of tissue-type matching for transplants. Unhealthy tissues are more commonly biopsied to diagnose disease. Lung Biopsy:     A needle lung biopsy is performed when there is a mass discovered in the lung area. The most serious risk is infection, bleeding, and pneumothorax (a collapsed lung). Signs of pneumothorax include shortness of breath, rapid heart rate, and blueness of the skin. If any of these occur, call 911. Liver Biopsy: This test helps detect cancer, infections, and the cause of an enlargement of the liver or elevated liver enzymes. It also helps to diagnose a number of liver diseases. The pain associated with the procedure may be felt in the shoulder. The risks include internal bleeding, pneumothorax, and injury to the surrounding organs. Renal Biopsy: This procedure is sometimes done to evaluate a transplanted kidney. It is also used to evaluate unexplained decrease in kidney function, blood, or protein in the urine. You may see bright red blood in the urine the first 24 hours after the test. If the bleeding lasts longer, you need to call your doctor. There is a risk of infection and bleeding into the muscle, which may cause soreness. Spinal Biopsy: This test is sometimes done in conjunction with other procedures. Your back will be sore, as we are taking a small sample of bone, which is slightly more difficult to sample than tissue. General Biopsy:     A mass can grow in any area of the body, and we are taking a specimen as ordered by your doctor. The risks are the same.  They include bleeding, pain, and infection. Home Care Instructions: You may resume your regular diet and medication regimen. Do not drink alcohol, drive, or make any important legal decisions in the next 24 hours. Do not lift anything heavier than a gallon of milk until the soreness goes away. You may use over the counter acetaminophen or ibuprofen for the soreness. You may apply an ice pack to the affected area for 20-30 minutes at time for the first 24 hours. After that, you may apply a heat pack. Call If: You should call your Physician and/or the Radiology Nurse if you have any questions or concerns about the biopsy site. Call if you should have increased pain, fever, redness, drainage, or bleeding more than a small spot on the bandage. Follow-Up Instructions: Please see your ordering doctor as he/she has requested. To Reach Us: If you have any questions about your procedure, please call the Interventional Radiology department at [phone]. After business hours (5pm) and weekends, call the answering service at [phone] and ask for the Radiologist on call to be paged. Si tiene Preguntas acerca del procedimiento, por favor llame al departamento de Radiología Intervencional al [phone]. Después de horas de oficina (5 pm) y los fines de [place], llamar al [employer]ip cooper [phone] y pregunte por el Radiologo de [facility]. Interventional Radiology General Nurse Discharge    After general anesthesia or intravenous sedation, for 24 hours or while taking prescription Narcotics:  · Limit your activities  · Do not drive and operate hazardous machinery  · Do not make important personal or business decisions  · Do  not drink alcoholic beverages  · If you have not urinated within 8 hours after discharge, please contact your surgeon on call. * Please give a list of your current medications to your Primary Care Provider.   * Please update this list whenever your medications are discontinued, doses are changed, or new medications (including over-the-counter products) are added. * Please carry medication information at all times in case of emergency situations. These are general instructions for a healthy lifestyle:    No smoking/ No tobacco products/ Avoid exposure to second hand smoke  Surgeon General's Warning:  Quitting smoking now greatly reduces serious risk to your health. Obesity, smoking, and sedentary lifestyle greatly increases your risk for illness  A healthy diet, regular physical exercise & weight monitoring are important for maintaining a healthy lifestyle    You may be retaining fluid if you have a history of heart failure or if you experience any of the following symptoms:  Weight gain of 3 pounds or more overnight or 5 pounds in a week, increased swelling in our hands or feet or shortness of breath while lying flat in bed. Please call your doctor as soon as you notice any of these symptoms; do not wait until your next office visit. Recognize signs and symptoms of STROKE:  F-face looks uneven    A-arms unable to move or move unevenly    S-speech slurred or non-existent    T-time-call 911 as soon as signs and symptoms begin-DO NOT go       Back to bed or wait to see if you get better-TIME IS BRAIN.     Patient Signature:  Date: 5/14/2019  Discharging Nurse: Karen Hurley RN

## 2019-05-14 NOTE — H&P
Department of Interventional Radiology  [phone]    History and Physical    Patient:  Raymon Rashid MRN:  [id number]  SSN:  [id number]    YOB: 1965  Age:  48 y.o. Sex:  male      Primary Care Provider:  Nat Brown DO  Referring Physician:  Mae Zuniga MD    Subjective:     Chief Complaint: renal mass    History of the Present Illness: The patient is a 48 y.o. male who presents for biopsy of a highly suspicious right renal mass. Rectal cancer. NPO. Past Medical History:   Diagnosis Date    Chest pain 8/18/2016    Colon cancer ([address]     Diabetes ([address]     GERD (gastroesophageal reflux disease)     resolved per pt-- prn OTC meds    Hypertension     no current treatment, states Coreg dropped BP too much and is not taking    Iron deficiency anemia due to chronic blood loss 07/26/2018    denies hx of blood transfusion    PONV (postoperative nausea and vomiting)     Rectal cancer ([address]     Thyroid disease     Tobacco abuse 08/18/2016    Quit 6/28/18--- 1 ppd x 30 yrs     Past Surgical History:   Procedure Laterality Date    FLEXIBLE SIGMOIDOSCOPY N/A 6/27/2018    SIGMOIDOSCOPY FLEXIBLE performed by Romana Sans, MD at [facility] 230  2003    HX VASCULAR ACCESS      VASCULAR SURGERY PROCEDURE UNLIST Right     artery tied off after accident        Review of Systems:    Pertinent items are noted in the History of Present Illness. Current Outpatient Medications   Medication Sig    enoxaparin (LOVENOX) 100 mg/mL 100 mg by SubCUTAneous route daily for 5 days.  levoFLOXacin (LEVAQUIN) 500 mg tablet Take 1 Tab by mouth daily.  potassium chloride (KLOR-CON) 20 mEq pack Take 20 mEq by mouth two (2) times daily (with meals).  DISABLED PLACARD (DISABLED PLACARD) DMV Dx.  Colon Cancer  An Inability to walk 100 feet nonstop without aggravating existing medical condition    carvedilol (COREG) 3.125 mg tablet Take 1 Tab by mouth two (2) times daily (with meals).  lisinopril (PRINIVIL, ZESTRIL) 10 mg tablet Take 1 Tab by mouth daily.  cimetidine (TAGAMET) 300 mg tab Take 1 Tab by mouth two (2) times a day.  [START ON 2019] apixaban (ELIQUIS) 5 mg tablet Take 1 Tab by mouth two (2) times a day. Indications: lung embolism    apixaban (ELIQUIS) 5 mg tablet Take 2 Tabs by mouth two (2) times a day for 7 days. Indications: lung embolism    oxyCODONE IR (ROXICODONE) 5 mg immediate release tablet Take 1 Tab by mouth nightly for 3 days. Max Daily Amount: 5 mg.  traMADol (ULTRAM) 50 mg tablet Take 50 mg by mouth every six (6) hours as needed for Pain.  diphenoxylate-atropine (LOMOTIL) 2.5-0.025 mg per tablet One tab 4 times a day as needed for diarrhea    lidocaine-prilocaine (EMLA) topical cream Apply  to affected area as needed for Pain. Apply to port 30-45 min prior to port needle stick     Current Facility-Administered Medications   Medication Dose Route Frequency    lidocaine (XYLOCAINE) 20 mg/mL (2 %) injection  mg   mg IntraDERMal ONCE    0.9% sodium chloride infusion  25 mL/hr IntraVENous CONTINUOUS    midazolam (VERSED) injection 0.25-2 mg  0.25-2 mg IntraVENous Multiple    fentaNYL citrate (PF) injection 12.5-100 mcg  12.5-100 mcg IntraVENous Multiple        Allergies   Allergen Reactions    Zithromax [Azithromycin] Rash       Family History   Problem Relation Age of Onset    Hypertension Mother     Heart Disease Mother      Social History     Tobacco Use    Smoking status: Former Smoker     Packs/day: 1.00     Years: 30.00     Pack years: 30.00     Types: Cigarettes     Start date: 1988     Last attempt to quit: 2018     Years since quittin.8    Smokeless tobacco: Never Used   Substance Use Topics    Alcohol use:  Yes     Alcohol/week: 8.4 oz     Types: 14 Cans of beer per week        Objective:       Physical Examination:    Vitals:    19 1114   BP: (!) 150/100   Pulse: 85   Resp: 16 Temp: 98 °F (36.7 °C)   SpO2: 100%   Weight: 67.1 kg (148 lb)   Height: 5' 9\" (1.753 m)     Blood pressure (!) 150/100, pulse 85, temperature 98 °F (36.7 °C), resp. rate 16, height 5' 9\" (1.753 m), weight 67.1 kg (148 lb), SpO2 100 %.   HEART: regular rate and rhythm  LUNG: clear to auscultation bilaterally  ABDOMEN: normal findings: soft, non-tender  EXTREMITIES: normal strength, tone, and muscle mass    Laboratory:     Lab Results   Component Value Date/Time    Sodium 137 05/08/2019 07:53 AM    Sodium 139 04/24/2019 08:02 AM    Potassium 3.9 05/08/2019 07:53 AM    Potassium 3.7 04/24/2019 08:02 AM    Chloride 104 05/08/2019 07:53 AM    Chloride 108 (H) 04/24/2019 08:02 AM    CO2 24 05/08/2019 07:53 AM    CO2 22 04/24/2019 08:02 AM    Anion gap 9 05/08/2019 07:53 AM    Anion gap 9 04/24/2019 08:02 AM    Glucose 112 (H) 05/08/2019 07:53 AM    Glucose 180 (H) 04/24/2019 08:02 AM    BUN 9 05/08/2019 07:53 AM    BUN 9 04/24/2019 08:02 AM    Creatinine 1.27 05/08/2019 07:53 AM    Creatinine 1.50 04/24/2019 08:02 AM    GFR est AA >60 05/08/2019 07:53 AM    GFR est AA >60 04/24/2019 08:02 AM    GFR est non-AA >60 05/08/2019 07:53 AM    GFR est non-AA 52 (L) 04/24/2019 08:02 AM    Calcium 8.6 05/08/2019 07:53 AM    Calcium 8.4 04/24/2019 08:02 AM    Magnesium 2.0 05/08/2019 07:53 AM    Magnesium 1.9 04/24/2019 08:02 AM    Albumin 3.1 (L) 05/08/2019 07:53 AM    Albumin 2.8 (L) 04/24/2019 08:02 AM    Protein, total 7.3 05/08/2019 07:53 AM    Protein, total 7.1 04/24/2019 08:02 AM    Globulin 4.2 (H) 05/08/2019 07:53 AM    Globulin 4.3 (H) 04/24/2019 08:02 AM    A-G Ratio 0.7 (L) 05/08/2019 07:53 AM    A-G Ratio 0.7 (L) 04/24/2019 08:02 AM    AST (SGOT) 13 (L) 05/08/2019 07:53 AM    AST (SGOT) 14 (L) 04/24/2019 08:02 AM    ALT (SGPT) 12 05/08/2019 07:53 AM    ALT (SGPT) 12 04/24/2019 08:02 AM     Lab Results   Component Value Date/Time    WBC 8.8 05/08/2019 07:53 AM    WBC 10.9 04/24/2019 08:02 AM    HGB 10.6 (L) 05/08/2019 07:53 AM    HGB 10.8 (L) 04/24/2019 08:02 AM    HCT 32.3 (L) 05/08/2019 07:53 AM    HCT 34.0 (L) 04/24/2019 08:02 AM    PLATELET 699 49/73/0267 07:53 AM    PLATELET 468 58/45/2308 08:02 AM     No results found for: APTT, PTP, INR    Assessment:     Right renal mass    Plan:     Planned Procedure:  biopsy    Risks, benefits, and alternatives reviewed with patient and he agrees to proceed with the procedure.       Signed By: Jaylyn Espinoza PA-C     May 14, 2019

## 2019-05-14 NOTE — PROCEDURES
Department of Interventional Radiology  [phone]        Interventional Radiology Brief Procedure Note    Patient: Adiel Headings MRN: [id number]  SSN: [id number]    YOB: 1965  Age: 48 y.o.   Sex: male      Date of Procedure: 5/14/2019    Pre-Procedure Diagnosis: renal mass    Post-Procedure Diagnosis: SAME    Procedure(s): Image Guided Biopsy    Brief Description of Procedure: CT guided renal mass biopsy    Performed By: Lonnie Bryant MD     Assistants: None    Anesthesia:Moderate Sedation    Estimated Blood Loss: Less than 10ml    Specimens:  Pathology    Implants:  None    Findings: right exophytic lesion, possibly a cyst    Complications: None    Recommendations: routine post care     Follow Up: if negative, consider continued followup imaging    Signed By: Lonnie Bryant MD     May 14, 2019

## 2019-05-22 NOTE — PROGRESS NOTES
Patient tolerated chemotherapy well. No reactions. Fluorouracil in Elastomeric infusion pump connected to port to infuse over 46 hrs. See MAR. Patient/family instructed to notify [facility] on call number [phone] of any problems, questions or concerns with pump. Patient/family reminded to use chemotherapy precautions at home. Pt confirmed that Spill kit is available for use at home if needed. Allowed opportunity for patient/family to ask questions. Verbalized understanding of instructions. Patient discharged ambulatory. Next appointment is 05/24/19 at 1400 with Pura.

## 2019-05-22 NOTE — PROGRESS NOTES
Saw patient today with Gabi Stewart NP prior to C14 5FU/Avastin. He states he still has difficulty sleeping due to complaints of pain and discomfort. Pt was told he could take up to (2) Oxycodone 5mg tabs to see if this helps with pain control. Pt also states he has neuropathy to both feet and bilat hands. Pt is now on 5FU/Avastin. Okay for pt to take Avastin after blood clot noted in lungs and pt started on Eliquis. Pt encouraged to call with any questions or concerns.  Nurse navigation will be following

## 2019-05-24 NOTE — PROGRESS NOTES
Arrived to the [facility]. Chemotherapy pump completed &  disconnected. Patient tolerated well. Any issues or concerns during appointment: none. Patient aware of next infusion appointment on 6-5-19 (date) at 0930 (time). Discharged via ambulatory.

## 2019-05-30 NOTE — PROGRESS NOTES
Spoke with patient concerning new findings DVT in right leg ordered by Dr Sabra Pedraza. New orders received for Lovenox 1.5mg/kg/day and stop Eliquis. Called patient and gave orders to him and he verbalized understanding. Pt has not decided on any further procedures at this time and states he will need to speak in depth with his family since he has been very fatigued. I asked pt to return call to me next week.

## 2019-05-30 NOTE — PROGRESS NOTES
Called patient with R lower extremity US results. US shows blood clot which was suspected based on recent CT results. He is already on anti-coagulation for treatment of PE and follows with Oncology. He will notify his Oncologist Dr. Alie Barrera this AM to see if he wants to do any further intervention for this LE edema. I informed him however that anti-coagulation is the usual treatment and therefore there may be nothing else to do for this clot.

## 2019-06-05 NOTE — PROGRESS NOTES
Arrived to infusion after [facility]  Visit  Starting Irinotecan today  Folfiri / avastin infused  Adrucil elastomeric pump infusing at d/c  Next appt 6/7

## 2019-06-05 NOTE — PROGRESS NOTES
Saw patient with Kary Verde prior to Avastin/5FU. Collin Deleon spoke with Dr Esvin Norton and Dr Esvin Norton wants to add Irinotecan to therapy regimen. We will also send pt to IR for Biopsy and see if we can treat with any further medication. Pt tissue will need to be sent for KENYATTA and NGS pathway. Pt received chemocare information today on Irinotecan. Pt encouraged to call with any questions or concerns.

## 2019-06-06 NOTE — PROGRESS NOTES
Spoke with Cheyanne Ayoub today in IR and she stated IR radiologist suggested bronchoscopy for lung nodule. Spoke to Dr Verna Looney and made him aware. Called pt this evening and made him aware of need for lung biopsy bronchoscopy instead of IR biopsy--he is in agreement. Email sent to [facility] and referral made to [facility].

## 2019-06-07 NOTE — PROGRESS NOTES
Pt. Arrived to [facility] for: pump discontinue Any issues or concerns during this appointment: chemotherapy completed and chemo ball removed Patient aware of next appointment on: 6-19-19 @ 9193 Pt. Discharged: ambulatory home

## 2019-06-11 PROBLEM — R59.0 MEDIASTINAL LYMPHADENOPATHY: Status: ACTIVE | Noted: 2019-01-01

## 2019-06-11 PROBLEM — R91.8 PULMONARY NODULES: Status: ACTIVE | Noted: 2019-01-01

## 2019-06-11 NOTE — ROUTINE PROCESS
VSS. No complaints noted. Education given and reviewed with wife who voiced understanding. Pt wheeled out via wheelchair by Andres Petersen.

## 2019-06-11 NOTE — PROCEDURES
PROCEDURE Bronchoscopy with endobronchial ultrasound guided fine needle aspiration of hilar/mediastinal lymph nodes and airway inspection. INDICATION Diagnosis of Mediastinal Lymphadenopathy in patient with history of rectal adenocarcinoma IMAGING 
CT C/A/P 5/8/19 POST OP DIAGNOSIS: 
Station 7 was biopsied and was Positive for malignancy on TA. Based on imaging and EBUS pt is suspected of having metastatic colon cancer. ANESTHESIA Concious sedation with: Fentanyl 200 mcg IV; Versed 4 mg IV; Lidocaine 240 mg to tracheo-bronchial tree and vocal cords. AIRWAY INSPECTION After obtaining informed consent, using a bite block, an Olympus Q 180 video bronchoscope was  introduced into the trachea through the vocal cords without complications. RIGHT 
LOCATION NORM/ABNORMAL DESCRIPTION  
VOCAL CORDS NL   
TRACHEA NL   
LISS NL   
RMSB NL   
RUL NL   
BI NL   
RML NL   
RLL NL   
SUP SEGM RLL NL   
MED BASAL NL   
ANTERIOR BASAL NL   
LATERAL BASAL NL   
POSTERIOR BASAL NL   
      
  
      LEFT 
LOCATION NORM/ABNORMAL DESCRIPTION  
LMSB NL   
ALEX NL   
LINGULA NL   
LLL NL   
SUPERIOR SEG LLL NL   
ETELVINA-MEDIAL LLL NL   
LATERAL LLL NL   
POSTERIOR LLL NL   
 
 
     
EBUS After completing the airway inspection an Olympus  F EBUS bronchoscope was introduced into the trachea through the vocal chords without complication. The balloon was inflated with saline and a mediastinal inspection commenced: STATION SIZE IN CM  
11R inf 2.5cm 11R sup 1.5cm 10R No target 4R 1.5cm 2R No target 7 3cm Given the diffuse nature of disease on the right and B nodules, L side was not fully visualized. After identifying targets the following samples were obtained: STATION PASS# LYMPHOCYTES ATYPIA GRANULOMA DIAGNOSIS  
7 1    Malignant 2 Cell block w/ suction 3 \"     
 4 \"     
 5 \"     
 6 \"     
 7 \"     
 8 \"     
 9 \" The procedure was completed without complication and the patient tolerated the procedure well. EBL: <20cc Diagnosis: Suspect metastatic colon cancer. Plan: 
Patient already followed by Dr Holly Ivy. Will forward results to him for review and ongoing management.   
 
Thuy Ayala MD

## 2019-06-11 NOTE — DISCHARGE INSTRUCTIONS
RESPIRATORY CARE - BRONCHOSCOPY - DISCHARGE INSTRUCTIONS      You received a lot of numbing medication for your throat and nose, and you also received medication to make you sleepy during your procedure. Because of this and because the bronchoscopy may have irritated your airways, we ask that you follow these directions:    1. Do not eat or drink until  1530 . After that, you may have what you please. You may want to try some liquids first, because your throat may be a little sore. 2. Medication may cause drowsiness for several hours, therefore:  · Do not drive or operate machinery for remainder of the day. · No alcohol today  · Do not make any important or legal decisions for 24 hours  · Do not sign any legal documents for 24 hours    3. You may cough up more mucus than usual and you may see some blood, but this is expected and should subside by the following day. 4. If severe throat irritation, coughing, or bleeding continue, call your doctor. 5.         If you run a fever greater than 102, call [facility] at [phone].     6.         Dr. Hussain Combs has asked that follow up with [facility]                 Resume lovenox tonight as long as he's not coughing up large amounts of blood (6/11/2019)                Instructions given to Tayla Alvarado and other family members

## 2019-06-11 NOTE — PROGRESS NOTES
Port to right upper chest flushed with normal saline and 500 units heparin flush. Pressure dressing applied. Pt tolerated well.

## 2019-06-11 NOTE — H&P
HISTORY AND PHYSICAL Ciro Marte 
 
6/11/2019 Date of Admission:  6/11/2019 The patient's chart is reviewed and the patient is discussed with the staff. Subjective:  
 
Patient is a 48 y.o.  male presents with a history of stage IIIB adenocarcinoma of the colon, here for new patient evaluation for bronch with EBUS for possible metastatic disease. His colon cancer was first diagnosed in June 2018. He underwent surgical resection and colostomy in July followedy by XRT and FOLFOX in August. He initially had good response but then showed progression of disease in the spring of 2019. He was changed to 5FU-avastin. He recently had a CT C/A/B which showed multiple pulmonary nodules, small RUL PE, tumor encasement of the lower IVC, possible tumor at the right ureter. He had LE doppler which showed extensive thrombus in the RLE. He was started on anticoagulation in May for these and is currently on lovenox. He underwent CT guided biopsy of the kidney last month which was negative for cancer. He was referred here for attempt at bronchoscopic biopsy to try and prove distant metastatic disease. He reports significant fatigue with any exertion. He also has ongoing LE edema. He has some cough and dyspnea likely related to his PE's. He has been off lovenox for 3 days in anticipation of this procedure. Review of Systems A comprehensive review of systems was negative except for that written in the HPI. Patient Active Problem List  
Diagnosis Code  History of tobacco abuse Z87.891  
 IGT (impaired glucose tolerance) R73.02  
 Hypercholesterolemia E78.00  Screening PSA (prostate specific antigen) Z12.5  Erectile dysfunction due to arterial insufficiency N52.01  
 Plantar fasciitis, right M72.2  Benign essential HTN I10  
 Rectal adenocarcinoma (HCC) C20  
 Rectal obstruction K62.4  
 Colostomy status (HCC) Z93.3  S/P radiation therapy < 4 weeks ago Z92.3  Dehydration E86.0  Mediastinal lymphadenopathy R59.0  Pulmonary nodules R91.8 Prior to Admission Medications Prescriptions Last Dose Informant Patient Reported? Taking? DISABLED PLACARD (DISABLED PLACARD) DMV   No No  
Sig: Dx. Colon Cancer An Inability to walk 100 feet nonstop without aggravating existing medical condition  
carvedilol (COREG) 3.125 mg tablet 6/10/2019 at Unknown time  No Yes Sig: Take 1 Tab by mouth two (2) times daily (with meals). cimetidine (TAGAMET) 300 mg tab 2019 at Unknown time  No Yes Sig: Take 1 Tab by mouth two (2) times a day. diphenoxylate-atropine (LOMOTIL) 2.5-0.025 mg per tablet 2019 at Unknown time  No Yes Sig: One tab 4 times a day as needed for diarrhea  
enoxaparin (LOVENOX) 100 mg/mL 2019  No No  
Si mg by SubCUTAneous route daily. Indications: blood clot in a deep vein of the extremities  
ibuprofen (MOTRIN IB) 200 mg tablet 6/10/2019 at Unknown time  Yes Yes Sig: Take 400 mg by mouth.  
lidocaine-prilocaine (EMLA) topical cream 2019  No No  
Sig: Apply  to affected area as needed for Pain. Apply to port 30-45 min prior to port needle stick  
lisinopril (PRINIVIL, ZESTRIL) 10 mg tablet 6/10/2019 at Unknown time  No Yes Sig: Take 1 Tab by mouth daily. oxyCODONE IR (ROXICODONE) 5 mg immediate release tablet 6/10/2019 at Unknown time  No Yes Sig: Take 1 Tab by mouth every six (6) hours as needed for Pain for up to 30 days. Max Daily Amount: 20 mg. Take 1-2 tabs every 6 hrs for pain as needed Facility-Administered Medications: None Past Medical History:  
Diagnosis Date  Chest pain 2016  [facility] ([facility]  Diabetes ([facility]  GERD (gastroesophageal reflux disease)   
 resolved per pt-- prn OTC meds  Hypertension   
 no current treatment, states Coreg dropped BP too much and is not taking  Iron deficiency anemia due to chronic blood loss 2018  
 denies hx of blood transfusion  PONV (postoperative nausea and vomiting)  [facility]al [facility]  Thyroid disease  Tobacco abuse 2016 Quit 18--- 1 ppd x 30 yrs Past Surgical History:  
Procedure Laterality Date  FLEXIBLE SIGMOIDOSCOPY N/A 2018 SIGMOIDOSCOPY FLEXIBLE performed by Korin Hanson MD at [address]    HX VASCULAR ACCESS  VASCULAR SURGERY PROCEDURE UNLIST Right   
 artery tied off after accident Social History Socioeconomic History  Marital status:  Spouse name: Not on file  Number of children: Not on file  Years of education: Not on file  Highest education level: Not on file Occupational History  Not on file Social Needs  Financial resource strain: Not on file  Food insecurity:  
  Worry: Not on file Inability: Not on file  Transportation needs:  
  Medical: Not on file Non-medical: Not on file Tobacco Use  Smoking status: Former Smoker Packs/day: 1.00 Years: 30.00 Pack years: 30.00 Types: Cigarettes Start date: 1988 Last attempt to quit: 2018 Years since quittin.9  Smokeless tobacco: Never Used Substance and Sexual Activity  Alcohol use: Yes Alcohol/week: 8.4 oz Types: 14 Cans of beer per week  Drug use: No  
 Sexual activity: Not on file Lifestyle  Physical activity:  
  Days per week: Not on file Minutes per session: Not on file  Stress: Not on file Relationships  Social connections:  
  Talks on phone: Not on file Gets together: Not on file Attends Jew service: Not on file Active member of club or organization: Not on file Attends meetings of clubs or organizations: Not on file Relationship status: Not on file  Intimate partner violence:  
  Fear of current or ex partner: Not on file Emotionally abused: Not on file Physically abused: Not on file Forced sexual activity: Not on file Other Topics Concern [address] Service Not Asked  Blood Transfusions Not Asked  Caffeine Concern Not Asked  Occupational Exposure Not Asked Hettie Model Hazards Not Asked  Sleep Concern Not Asked  Stress Concern Not Asked  Weight Concern Not Asked  Special Diet Not Asked  Back Care Not Asked  Exercise Not Asked  Bike Helmet Not Asked  Seat Belt Not Asked  Self-Exams Not Asked Social History Narrative  Not on file Family History Problem Relation Age of Onset  Hypertension Mother  Heart Disease Mother Allergies Allergen Reactions  Zithromax [Azithromycin] Rash Current Facility-Administered Medications Medication Dose Route Frequency  0.9% sodium chloride infusion  100 mL/hr IntraVENous CONTINUOUS  
 midazolam (VERSED) injection 0.25-5 mg  0.25-5 mg IntraVENous Multiple  fentaNYL citrate (PF) injection 50 mcg  50 mcg IntraVENous Multiple  naloxone (NARCAN) injection 0.4 mg  0.4 mg IntraVENous Multiple  flumazenil (ROMAZICON) 0.1 mg/mL injection 0.2 mg  0.2 mg IntraVENous Multiple Objective:  
 
Vitals:  
 06/11/19 1255 06/11/19 1300 06/11/19 1305 06/11/19 1310 BP: 122/74  136/73 135/77 Pulse: 79 80 86 79 Resp:      
Temp:      
SpO2: 99% 97% 97% 96% Weight:      
Height: PHYSICAL EXAM  
 
Constitutional:  the patient is well developed and in no acute distress EENMT:  Sclera clear, pupils equal, oral mucosa moist 
Respiratory: CTA B, no w/r/r 
Cardiovascular:  RRR without M,G,R 
Gastrointestinal: soft and non-tender; with positive bowel sounds. Musculoskeletal: warm without cyanosis. There is RLE edema. Skin:  no jaundice or rashes, no wounds Neurologic: no gross neuro deficits Psychiatric:  alert and oriented x ppt 
 
CT C/A/P: 
5/8/19 No results for input(s): WBC, HGB, HCT, PLT, INR, HGBEXT, HCTEXT, PLTEXT, HGBEXT, HCTEXT, PLTEXT in the last 72 hours. No lab exists for component: INREXT, INREXT No results for input(s): NA, K, CL, GLU, CO2, BUN, CREA, MG, PHOS, CA, TROIQ, ALB, TBIL, TBILI, GPT, ALT, SGOT, BNPP in the last 72 hours. No lab exists for component: TROIP No results for input(s): PH, PCO2, PO2, HCO3, PHI, PCO2I, PO2I, HCO3I in the last 72 hours. No results for input(s): LCAD, LAC in the last 72 hours. Assessment:  (Medical Decision Making) Hospital Problems  Date Reviewed: 6/5/2019 Codes Class Noted POA Mediastinal lymphadenopathy ICD-10-CM: R59.0 ICD-9-CM: 785.6  6/11/2019 Unknown Pulmonary nodules ICD-10-CM: R91.8 ICD-9-CM: 793.19  6/11/2019 Unknown Plan:  (Medical Decision Making) --Will proceed with bronchoscopy with EBUS of hilar/mediastinal lymph nodes. -will forward results to Dr Scott Griffin. More than 50% of the time documented was spent in face-to-face contact with the patient and in the care of the patient on the floor/unit where the patient is located.  
 
Aleja Marinelli MD

## 2019-06-13 NOTE — PROGRESS NOTES
Patient called yesterday 6-12-19 and stated same leg as previous clot is more swollen since biopsy. He also has difficulty placing weight on this leg due to increased pain in leg. Pt remains on pain in leg. Pt called today and stated he thinks leg swelling has decreased and is better and he can place more weight on leg. He remains on Lovenox injections. Spoke with Yony Johnson and she stated have pt call with any worsening symptoms in leg or change in coloring.  Pt agreed and verbalized understanding

## 2019-06-19 NOTE — PROGRESS NOTES
Saw patient with Dr. Lady Monroe for rectal cancer. Advised patient that he must get stent to kidney. Called to facilitate that arrangement with Urology. Advised to continue Lovenox injections for DVT. Previous Caris Testing was indeterminate so we have sent recent tissue for Caris Testing. We'll arrange IV fluids on Day 1, 3, and 7. Arrange post void bladder Ultrasound. Will continue to follow the CEA markers. Office visit with the NP in 2 and MD in 4 with Caris results. Encouraged to call with questions. Navigation will continue to follow.

## 2019-06-19 NOTE — PROGRESS NOTES
Arrived to the [facility]. Assess,emt completed. Labs reviewed. Patient tolerated chemotherapy well. The flourouracil pump was connected to his port before discharge. All clamps on the tubing are open. The fluorouracil pump was checked with Sujit Murrell RN. Any issues or concerns during appointment: none. Patient aware of next infusion appointment on 6/21/19 (date) at 1330 (time) with IV infusion center. Discharged via W/C, with the assistance of this R.N. Patient instructed to call Dr. Kashif Lopez' office immediately for any problems or concerns. He verbalizes understanding.

## 2019-06-19 NOTE — PROGRESS NOTES
Pt will be on vacation in 2 weeks. Per MD ok to delay treatment a week for vacation. Pt to RTC in 3 weeks w/ NP and 5 weeks with MD. Carey Pac that his o/v day will require peripheral labs and we will utilize his port for administration of chemotherapy. Pt informed of Ultrasound appointment and advised to take 32 ounces of water 1 hour prior to procedure. Encouraged to call with questions. Navigation will continue to follow.

## 2019-06-21 NOTE — PROGRESS NOTES
Arrived to the [facility]. Chemotherapy pump completed & disconnected. 1 liter NS given IV. Patient tolerated well. Any issues or concerns during appointment: none. Patient aware of next infusion appointment on 6-26-19 (date) at 1400 (time). Discharged via w/c.

## 2019-06-26 NOTE — PROGRESS NOTES
Arrived to the [facility]. IVF completed. Patient tolerated well. Any issues or concerns during appointment: Encouraged to increase PO fluid intake [place] beach [place]. Patient aware of next infusion appointment on 6-30-19 at 1300. Discharged in [facility] 23 to his car.

## 2019-07-08 NOTE — PROGRESS NOTES
7/8/19 saw pt today with Shalimar, NP for pre chemo cycle 3 FOLFIRI and avastin. He is reporting significant fatigue and cough with shortness of breath. Will start inhalers, remeron and ritalin to help with this. PO intake is good. Follow up in 2 weeks. Encouraged to call with any concerns. Navigation will continue to follow.

## 2019-07-09 NOTE — PROGRESS NOTES
Pt. Discharged ambulatory. Tolerated chemotherapy well. No distress noted. Continuous infusion pump in place and functioning properly. To call physician with any problems or concerns. Understanding voiced. To return to Infusions on 7/11/19.

## 2019-07-11 NOTE — PROGRESS NOTES
[facility] Assessment completed. Patient tolerated IV fluids well. The fluorouracil pump was disconnected from his port today. Any issues or concerns during appointment: complains of worsening shortness of breath. States that inhalers were started 2 days ago but that he finds that he needs them again before they are due. Also, states that diarrhea has increased. Informed Iris Meneses, navigator, of this. She came to see patient and also talked with SWAPNA Osorio NP. A chest x-ray was ordered for patient to have dome once he leaves infusion today. Patient aware of next infusion appointment on 1330 (date) at 7/15/19 (time) with IV infusion center. Discharged via W/C, with assistance of this R.N..  Patient instructed to call Dr. Silvester Romberg' office immediately for any problems or concerns. He verbalizes understanding.

## 2019-07-11 NOTE — PROGRESS NOTES
NATHANAEL Patel called and stated patient was short of breath even more than usual. He was using inhaler and this was supplying minor relief. Spoke with Wilmar and pt will get stat chest Xray and add another day of fluids to schedule next week since pt complains of excessive diarrhea.

## 2019-07-15 NOTE — PROGRESS NOTES
Arrived to the [facility]. Assessment complete. One liter of NS completed. Patient tolerated without problems. Any issues or concerns during appointment: Pt states he feels exhausted and SOB. Jeremy Argueta RN spoke with pt  and informed URSZULA Carpenter NP. No new orders received at this time. Pt made aware Jeremy Argueta RN will follow up with pt during next infusion appointment. Pt instructed to call for any needs. Pt verbalized understanding. Patient aware of next infusion appointment on 7/17/2019 (date) at 65 (time). Discharged discharged via wheelchair.

## 2019-07-17 NOTE — PROGRESS NOTES
Arrived to the [facility]. 1 L NS completed. Patient tolerated well. Any issues or concerns during appointment: none. Patient aware of next infusion appointment on 7/24/19 at 0930. Discharged via wheelchair.     Debbie CourserNATHANAEL

## 2019-07-24 NOTE — H&P
Inpatient Hematology / Oncology History and Physical    Reason for Asmission:  metastatic rectal cancer    History of Present Illness:  Mr. Rahul Smith is a 48 y.o. male admitted on 7/24/2019 with a primary diagnosis of Diagnoses of Dehydration and Rectal adenocarcinoma ([facility] were pertinent to this visit. Beckey Conquest 46 M ex-smoker, regular ETOH use (1-2 beers per day), h/o GERD, bilateral inguinal hernia repair, now being managed for Rectal cancer s/p diverting colostomy, biopsy proven stage 4 (locally advanced unresectable, LAD abd and mediastinum), PE 05/8/19 and RLE DVT 5/29/19 on enoxaparin, Renal mass (biopsy -ve), RT sided ureteral obstruction w likely malignancy. He had progressed on 5FU-A maintenance and was recently changed over to [facility]. He presented to office for C4 after recent cruise trip. Recently came from a cruise to [facility]. Today reports severe fatigue, as well as inability to keep food down. Reports feeling of food getting stuck in the chest and then immediately vomiting this. Also notes feeling cold. Dyspnea slightly worse. Notes greenish phlegm that he is bringing up but feels related to sinus infection. He is scheduled for cystoscopy with right retrograde pyelogram with possible stent placement 7/30/2019. He is here for cycle 4 AA AvastinFOLFIRI however will need to defer this given ongoing symptoms. 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 MSI was stable, MMR was proficient/expressed, and PDL 1 was negative. Will admit for further work-up including CT chest with contrast, EGD, hydration, panculture, and initiation of antibiotic for ongoing sinusitis/reported phlegm as well as chills. Review of Systems:  As mentioned above. All other systems reviewed in full and are unremarkable.        Allergies   Allergen Reactions    Zithromax [Azithromycin] Rash     Past Medical History:   Diagnosis Date    Chest pain 8/18/2016    Colon cancer ([facility]     Diabetes ([facility]     GERD (gastroesophageal reflux disease)     resolved per pt-- prn OTC meds    Hypertension     no current treatment, states Coreg dropped BP too much and is not taking    Iron deficiency anemia due to chronic blood loss 2018    denies hx of blood transfusion    PONV (postoperative nausea and vomiting)     Rectal cancer ([facility]     Thyroid disease     Tobacco abuse 2016    Quit 18--- 1 ppd x 30 yrs     Past Surgical History:   Procedure Laterality Date    FLEXIBLE SIGMOIDOSCOPY N/A 2018    SIGMOIDOSCOPY FLEXIBLE performed by Tenzin Martines MD at [facility] ENDOSCOPY    HX HERNIA REPAIR      HX VASCULAR ACCESS      VASCULAR SURGERY PROCEDURE UNLIST Right     artery tied off after accident     Family History   Problem Relation Age of Onset    Hypertension Mother     Heart Disease Mother      Social History     Socioeconomic History    Marital status:      Spouse name: Not on file    Number of children: Not on file    Years of education: Not on file    Highest education level: Not on file   Occupational History    Not on file   Social Needs    Financial resource strain: Not on file    Food insecurity:     Worry: Not on file     Inability: Not on file    Transportation needs:     Medical: Not on file     Non-medical: Not on file   Tobacco Use    Smoking status: Former Smoker     Packs/day: 1.00     Years: 30.00     Pack years: 30.00     Types: Cigarettes     Start date: 1988     Last attempt to quit: 2018     Years since quittin.0    Smokeless tobacco: Never Used   Substance and Sexual Activity    Alcohol use:  Yes     Alcohol/week: 14.0 standard drinks     Types: 14 Cans of beer per week    Drug use: No    Sexual activity: Not on file   Lifestyle    Physical activity:     Days per week: Not on file     Minutes per session: Not on file    Stress: Not on file   Relationships    Social connections:     Talks on phone: Not on file     Gets together: Not on file     Attends Hinduism service: Not on file     Active member of club or organization: Not on file     Attends meetings of clubs or organizations: Not on file     Relationship status: Not on file    Intimate partner violence:     Fear of current or ex partner: Not on file     Emotionally abused: Not on file     Physically abused: Not on file     Forced sexual activity: Not on file   Other Topics Concern     Service Not Asked    Blood Transfusions Not Asked    Caffeine Concern Not Asked    Occupational Exposure Not Asked    Hobby Hazards Not Asked    Sleep Concern Not Asked    Stress Concern Not Asked    Weight Concern Not Asked    Special Diet Not Asked    Back Care Not Asked    Exercise Not Asked    Bike Helmet Not Asked    [facility] Not Asked    Self-Exams Not Asked   Social History Narrative    Not on file     Current Facility-Administered Medications   Medication Dose Route Frequency Provider Last Rate Last Dose    central line flush (saline) syringe 10 mL  10 mL InterCATHeter PRN Cristian Wiley NP   10 mL at 19 1620    sodium chloride 0.9 % bolus infusion 1,000 mL  1,000 mL IntraVENous CONTINUOUS Cristian Wiley  mL/hr at 19 1620 1,000 mL at 19 1620    levoFLOXacin (LEVAQUIN) 750 mg in D5W IVPB  750 mg IntraVENous Q24H Cristian Wiley NP        [facility] injection 8 mg  8 mg IntraVENous Q8H PRN Cristian Wiley NP        prochlorperazine (COMPAZINE) injection 10 mg  10 mg IntraVENous Q6H PRN Cristian Wiley NP           OBJECTIVE:  Patient Vitals for the past 8 hrs:   BP Temp Pulse Resp SpO2   19 1532 128/71 98.2 °F (36.8 °C) (!) 102 18 94 %     Temp (24hrs), Av.9 °F (36.6 °C), Min:97.5 °F (36.4 °C), Max:98.2 °F (36.8 °C)    No intake/output data recorded. Physical Exam:  Constitutional: Frail appearing male in no acute distress, sitting comfortably in the hospital bed. HEENT: Normocephalic and atraumatic. Oropharynx is clear, mucous membranes are moist.  Pupils are equal, round, and reactive to light. Extraocular muscles are intact. Sclerae anicteric. Neck supple without JVD. No thyromegaly present. Lymph node   No palpable submandibular, cervical, supraclavicular, axillary or inguinal lymph nodes. Skin Warm and dry. No bruising and no rash noted. No erythema. No pallor. Respiratory Lungs are clear to auscultation bilaterally without wheezes, rales or rhonchi, normal air exchange without accessory muscle use. CVS Normal rate, regular rhythm and normal S1 and S2. No murmurs, gallops, or rubs. Abdomen Soft, nontender and nondistended, normoactive bowel sounds. No palpable mass. No hepatosplenomegaly. Neuro Grossly nonfocal with no obvious sensory or motor deficits. MSK Normal range of motion in general.  No edema and no tenderness. Psych Appropriate mood and affect. Labs:    Recent Results (from the past 24 hour(s))   CBC WITH AUTOMATED DIFF    Collection Time: 07/24/19  8:22 AM   Result Value Ref Range    WBC 2.3 (L) 4.3 - 11.1 K/uL    RBC 3.77 (L) 4.23 - 5.67 M/uL    HGB 11.0 (L) 13.6 - 17.2 g/dL    HCT 34.1 (L) 41.1 - 50.3 %    MCV 90.5 79.6 - 97.8 FL    MCH 29.2 26.1 - 32.9 PG    MCHC 32.3 31.4 - 35.0 g/dL    RDW 15.4 (H) 11.9 - 14.6 %    PLATELET 019 620 - 936 K/uL    MPV 8.4 (L) 9.4 - 12.3 FL    ABSOLUTE NRBC 0.00 0.0 - 0.2 K/uL    NEUTROPHILS 35 (L) 47 - 75 %    BAND NEUTROPHILS 4 0 - 6 %    LYMPHOCYTES 19 16 - 44 %    MONOCYTES 26 (H) 3 - 9 %    EOSINOPHILS 8 1 - 8 %    BASOPHILS 4 (H) 0 - 2 %    MYELOCYTES 4 %    ABS. NEUTROPHILS 1.0 (L) 1.7 - 8.2 K/UL    ABS. LYMPHOCYTES 0.4 (L) 0.5 - 4.6 K/UL    ABS. MONOCYTES 0.6 0.1 - 1.3 K/UL    ABS. EOSINOPHILS 0.2 0.0 - 0.8 K/UL    ABS.  BASOPHILS 0.1 0.0 - 0.2 K/UL    RBC COMMENTS SLIGHT  ANISOCYTOSIS + POIKILOCYTOSIS        RBC COMMENTS OCCASIONAL  POLYCHROMASIA        WBC COMMENTS Result Confirmed By Smear      PLATELET COMMENTS ADEQUATE      DF MANUAL     METABOLIC PANEL, COMPREHENSIVE    Collection Time: 07/24/19  8:22 AM   Result Value Ref Range    Sodium 134 (L) 136 - 145 mmol/L    Potassium 3.5 3.5 - 5.1 mmol/L    Chloride 99 98 - 107 mmol/L    CO2 29 21 - 32 mmol/L    Anion gap 6 (L) 7 - 16 mmol/L    Glucose 122 (H) 65 - 100 mg/dL    BUN 9 6 - 23 MG/DL    Creatinine 0.98 0.8 - 1.5 MG/DL    GFR est AA >60 >60 ml/min/1.73m2    GFR est non-AA >60 >60 ml/min/1.73m2    Calcium 8.8 8.3 - 10.4 MG/DL    Bilirubin, total 0.3 0.2 - 1.1 MG/DL    ALT (SGPT) 12 12 - 65 U/L    AST (SGOT) 9 (L) 15 - 37 U/L    Alk. phosphatase 88 50 - 136 U/L    Protein, total 7.4 6.3 - 8.2 g/dL    Albumin 2.6 (L) 3.5 - 5.0 g/dL    Globulin 4.8 (H) 2.3 - 3.5 g/dL    A-G Ratio 0.5 (L) 1.2 - 3.5     MAGNESIUM    Collection Time: 07/24/19  8:22 AM   Result Value Ref Range    Magnesium 2.2 1.8 - 2.4 mg/dL       Imaging:  No images are attached to the encounter.     ASSESSMENT:  Problem List  Date Reviewed: 7/24/2019          Codes Class Noted    Mediastinal lymphadenopathy ICD-10-CM: R59.0  ICD-9-CM: 785.6  6/11/2019        Pulmonary nodules ICD-10-CM: R91.8  ICD-9-CM: 793.19  6/11/2019        Dehydration ICD-10-CM: E86.0  ICD-9-CM: 276.51  4/2/2019        S/P radiation therapy < 4 weeks ago ICD-10-CM: Z92.3  ICD-9-CM: V15.3  10/1/2018        Rectal adenocarcinoma ([facility] ICD-10-CM: C20  ICD-9-CM: 154.1  7/6/2018        Colostomy status ([facility] ICD-10-CM: Z93.3  ICD-9-CM: V44.3  6/29/2018        Rectal obstruction ICD-10-CM: K62.4  ICD-9-CM: 569.2  6/27/2018        Erectile dysfunction due to arterial insufficiency ICD-10-CM: N52.01  ICD-9-CM: 607.84  5/31/2018        Plantar fasciitis, right ICD-10-CM: M72.2  ICD-9-CM: 728.71  5/31/2018        Benign essential HTN ICD-10-CM: I10  ICD-9-CM: 401.1  5/31/2018        IGT (impaired glucose tolerance) ICD-10-CM: R73.02  ICD-9-CM: 790.22  5/30/2018        Hypercholesterolemia ICD-10-CM: E78.00  ICD-9-CM: 272.0  5/30/2018    Overview Signed 6/4/2018  2:20 PM by Cece Lane     ASCVD 10 year risk is 16.0%. Mod to high dose statin indicated. (based on b/p 180/90, 6/4/18)                 Screening PSA (prostate specific antigen) ICD-10-CM: Z12.5  ICD-9-CM: V76.44  5/30/2018        History of tobacco abuse ICD-10-CM: Z87.891  ICD-9-CM: V15.82  8/18/2016            46 M ex-smoker, regular ETOH use (1-2 beers per day), h/o GERD, bilateral inguinal hernia repair, now being managed for Rectal cancer s/p diverting colostomy, biopsy proven stage 4 (locally advanced unresectable, LAD abd and mediastinum), PE 05/8/19 and RLE DVT 5/29/19 on enoxaparin, Renal mass (biopsy -ve), RT sided ureteral obstruction w likely malignancy. He had progressed on 5FU-A maintenance and was recently changed over to [facility]. He presented to office for C4 after recent cruise trip. Recently came from a cruise to [place]. Today reports severe fatigue, as well as inability to keep food down. Reports feeling of food getting stuck in the chest and then immediately vomiting this. Also notes feeling cold. Dyspnea slightly worse. Notes greenish phlegm that he is bringing up but feels related to sinus infection. He is scheduled for cystoscopy with right retrograde pyelogram with possible stent placement 7/30/2019. He is here for cycle 4 AA AvastinFOLFIRI however will need to defer this given ongoing symptoms. 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 MSI was stable, MMR was proficient/expressed, and PDL 1 was negative. Will admit for further work-up including CT chest with contrast, EGD, hydration, panculture, and initiation of antibiotic for ongoing sinusitis/reported phlegm as well as chills.     PLAN:  · Start IV hydration  · W/u for underlying infection  · CT chest w contrast, assess dyspnea as well as food getting stuck in chest  · GI for likely EGD  · UA, CXR, and cultures. Levaquin to cover for infection  · Failure to thrive: supportive care, PT/OT  · F/u in office within week of discharge. Lab studies and imaging studies  were personally reviewed.   Pertinent old records were reviewed    Thank you for allowing us to participate in the care of Mr. Meche Frausto MD  [address]  Office : [phone]  Fax : [phone]

## 2019-07-24 NOTE — PROGRESS NOTES
07/24/19 1642   Dual Skin Pressure Injury Assessment   Dual Skin Pressure Injury Assessment X   Second Care Provider (Based on [address] [place] [address]) Scott Rome RN   Sacrum  Mid  (Blanchable redness)   Ankle  Left  (blanchable redness)   Skin Integumentary   Skin Integumentary (WDL) X    Pressure  Injury Documentation No Pressure Injury Noted-Pressure Ulcer Prevention Initiated   Skin Color Ecchymosis (comment)  (general)   Skin Integrity Other (comment)  (RLQ ostomy)   Wound Prevention and Protection Methods   Orientation of Wound Prevention Posterior   Location of Wound Prevention Sacrum/Coccyx   Dressing Present  No   Wound Offloading (Prevention Methods) Bed, pressure reduction mattress

## 2019-07-24 NOTE — PROGRESS NOTES
Orders received from Ann Smith NP. Blood cultures drawn peripherally by lab personnel; one set obtained from port; urine sent for culture and ua. Antibiotics started as ordered. Resting in bed with family at bedside.

## 2019-07-24 NOTE — PROGRESS NOTES
Pt resting in bed; denies pain at this time; assessment complete. No orders for patient. Gissel Cary NP on floor, made aware. Orders obtained for NS at 100 ml's/hr for starters.

## 2019-07-25 PROBLEM — R11.10 INTRACTABLE VOMITING: Status: ACTIVE | Noted: 2019-01-01

## 2019-07-25 PROBLEM — R13.10 DYSPHAGIA: Status: ACTIVE | Noted: 2019-01-01

## 2019-07-25 NOTE — PROGRESS NOTES
Problem: Falls - Risk of  Goal: *Absence of Falls  Description  Document Maritza Manrique Fall Risk and appropriate interventions in the flowsheet.   Outcome: Progressing Towards Goal  Note:   Fall Risk Interventions:  Mobility Interventions: Patient to call before getting OOB         Medication Interventions: Teach patient to arise slowly

## 2019-07-25 NOTE — PROGRESS NOTES
END OF SHIFT NOTE:    Intake/Output  07/25 0701 - 07/25 1900  In: 4231 [P.O.:360; I.V.:1042]  Out: -    Voiding: YES  Catheter: NO  Drain:              Stool:  0 occurrences. Stool Assessment  Stool Color: Phuong Montalvo (07/24/19 2343)  Stool Appearance: Loose; Watery (07/24/19 2343)  Stool Amount: Medium (07/24/19 2343)  Stool Source/Status: Colostomy (07/24/19 2343)    Emesis:  0 occurrences. VITAL SIGNS  Patient Vitals for the past 12 hrs:   Temp Pulse Resp BP SpO2   07/25/19 1633 97.1 °F (36.2 °C) 90 18 106/76 96 %   07/25/19 1143 97.9 °F (36.6 °C) 97 18 128/81 97 %   07/25/19 0859     93 %   07/25/19 0723 97.4 °F (36.3 °C) 90 18 112/72 91 %       Pain Assessment  Pain 1  Pain Scale 1: Visual (07/25/19 1546)  Pain Intensity 1: 0 (07/25/19 1546)  Patient Stated Pain Goal: 0 (07/25/19 1408)  Pain Reassessment 1: Patient resting w/respiratory rate greater than 10 (07/25/19 1546)  Pain Onset 1: pta (07/25/19 1408)  Pain Location 1: Back; Abdomen (07/25/19 1408)  Pain Orientation 1: Lower (07/25/19 1408)  Pain Description 1: Aching (07/25/19 1408)  Pain Intervention(s) 1: Declines (07/25/19 1408)    Ambulating  Yes    Additional Information: Planning for barium swallow tomorrow. NPO after midnight. Ativan, hycet, tylenol, and robitussin added after pt had a coughing spell and panic attack. Potassium replaced IV. PRN morphine given (see MAR). Family remains at the bedside. Shift report given to oncoming nurse at the bedside.     Sola Rojas

## 2019-07-25 NOTE — CONSULTS
[facility] Consult Note       Primary GI Physician: Dr Anushka Chua    Referring Provider:  Ghassan Rowe NP    Consult Date:  7/25/2019    Admit Date:  7/24/2019    Chief Complaint:  Dysphagia    Subjective:     History of Present Illness:  Patient is a 48 y.o. male with PMH of daily etoh use, GERD, rectal cancer s/p diverting colostomy (Stage 4-locally advanced unresectable, LAD abd and mediastinum), hx of PE 5/8/19, RLE DVT 5/29/19, on Lovenox, renal mass, RT sided ureteral obstruction with likely malignancy who is seen in consultation at the request of Ghassan Rowe NP for dysphagia. He was admitted 7/24/19 with fatigue and inability to tolerate PO intake. He reported dysphagia with immediate regurgitation. He complained of slightly worse dyspnea as well. He is scheduled for cystoscopy with right retrograde pyelogram with possible stent placement 7/30/2019. He is due for cycle 4 of AA AvastinFOLFIRI, but this was deferred due to symptoms. He was started on Levaquin for  sinusitis/reported phlegm. He was also started on Pepcid 20mg BID. He does receive Lovenox every 24 hours. HGB 9.8 currently, was 11.0 at admission. CT chest 7/24/19 with increased mediastinal adenopathy from prior exam. Patchy airspace disease in both lung based. Pulmonary metastases. No PE noted. Pt reports that he has had increased trouble swallowing for awhile, but worse over the last few weeks. He describes as when he wakes up in the morning, he is \"full of phlegm\". If he tries to eat or drink, he will immediately regurgitate this back up. Late morning (pt states about 11am), when he has \"coughed all of the phlegm up\" he no longer has difficulty swallowing and denies regurgitation. He does complain of heartburn and is taking Tagamet prn, which he averages to every day or BID dosing 5 days a week. He also takes TUMS frequently. He has abdominal pain from Lovenox injections, but denies any otherwise.  He denies any current lower GI issues. He does complain of SOB and increased SIFUENTES. PMH:  Past Medical History:   Diagnosis Date    Chest pain 8/18/2016    [facility]     Diabetes ([facility]     GERD (gastroesophageal reflux disease)     resolved per pt-- prn OTC meds    Hypertension     no current treatment, states Coreg dropped BP too much and is not taking    Iron deficiency anemia due to chronic blood loss 07/26/2018    denies hx of blood transfusion    PONV (postoperative nausea and vomiting)     Rectal cancer ([facility]     Thyroid disease     Tobacco abuse 08/18/2016    Quit 6/28/18--- 1 ppd x 30 yrs       PSH:  Past Surgical History:   Procedure Laterality Date    FLEXIBLE SIGMOIDOSCOPY N/A 6/27/2018    SIGMOIDOSCOPY FLEXIBLE performed by Jhon Luna MD at [place] [address]  2003    HX VASCULAR ACCESS      VASCULAR SURGERY PROCEDURE UNLIST Right     artery tied off after accident       Allergies: Allergies   Allergen Reactions    Zithromax [Azithromycin] Rash       Home Medications:  Prior to Admission medications    Medication Sig Start Date End Date Taking? Authorizing Provider   albuterol (PROVENTIL HFA, VENTOLIN HFA, PROAIR HFA) 90 mcg/actuation inhaler Take 1 Puff by inhalation every four (4) hours as needed for Wheezing or Shortness of Breath. 7/8/19  Yes Vira Avendaño NP   carvedilol (COREG) 3.125 mg tablet Take 1 Tab by mouth two (2) times daily (with meals). 5/22/19  Yes Jewels Duarte MD   cimetidine (TAGAMET) 300 mg tab Take 1 Tab by mouth two (2) times a day. 1/16/19  Yes Vira Avendaño NP   magic mouthwash solution Magic mouth wash   Maalox  Lidocaine 2% viscous   Diphenhydramine oral solution     Pharmacy to mix equal portions of ingredients to a total volume as indicated in the dispense amount.  7/17/19   MD fam Lanic mouthwash solution Magic mouth wash   Maalox  Lidocaine 2% viscous   Diphenhydramine oral solution     Pharmacy to mix equal portions of ingredients to a total volume as indicated in the dispense amount. 7/17/19   Dorinda Welsh MD   fluticasone propionate (FLOVENT HFA) 110 mcg/actuation inhaler Take 1 Puff by inhalation every twelve (12) hours. 7/8/19   Filippo Perera NP   enoxaparin (LOVENOX) 100 mg/mL 100 mg by SubCUTAneous route daily. Indications: blood clot in a deep vein of the extremities 6/25/19   Dorinda Welsh MD   ondansetron hcl [facility]) 8 mg tablet Take 1 Tab by mouth every eight (8) hours as needed for Nausea. Indications: Prevent Nausea and Vomiting from Cancer Chemotherapy 6/19/19   Dorinda Welsh MD   prochlorperazine (COMPAZINE) 10 mg tablet Take 1 Tab by mouth every six (6) hours as needed for Nausea. Indications: Prevent Nausea and Vomiting from Cancer Chemotherapy 6/19/19   Dorinda Welsh MD   ibuprofen (MOTRIN IB) 200 mg tablet Take 400 mg by mouth. Provider, Historical   lidocaine-prilocaine (EMLA) topical cream Apply  to affected area as needed for Pain. Apply to port 30-45 min prior to port needle stick 6/5/19   Filippo Perera NP   oxyCODONE IR (ROXICODONE) 5 mg immediate release tablet Take 1 Tab by mouth every six (6) hours as needed for Pain for up to 30 days. Max Daily Amount: 20 mg. Take 1-2 tabs every 6 hrs for pain as needed 5/22/19 7/8/19  Jesus Langston NP   lisinopril (PRINIVIL, ZESTRIL) 10 mg tablet Take 1 Tab by mouth daily. 5/22/19   Dorinda Weslh MD   DISABLED PLACARD ([address] [place] [address]) DMV Dx.  Colon Cancer  An Inability to walk 100 feet nonstop without aggravating existing medical condition 4/10/19   Dorinda Welsh MD   diphenoxylate-atropine (LOMOTIL) 2.5-0.025 mg per tablet One tab 4 times a day as needed for diarrhea 4/2/19   Filippo Perera NP       Hospital Medications:  Current Facility-Administered Medications   Medication Dose Route Frequency    albuterol (PROVENTIL VENTOLIN) nebulizer solution 2.5 mg  2.5 mg Nebulization Q4H PRN    carvedilol (COREG) tablet 3.125 mg  3.125 mg Oral BID WITH MEALS    famotidine (PEPCID) tablet 20 mg  20 mg Oral BID    diphenoxylate-atropine (LOMOTIL) tablet 1 Tab  1 Tab Oral QID PRN    fluticasone (FLOVENT HFA) 110 mcg inhaler (Patient Supplied)  1 Puff Inhalation Q12H    lisinopril (PRINIVIL, ZESTRIL) tablet 10 mg  10 mg Oral DAILY    magic mouthwash (SIMON) oral suspension 5 mL  5 mL Oral Q4H PRN    oxyCODONE IR (ROXICODONE) tablet 5 mg  5 mg Oral Q6H PRN    0.9% sodium chloride infusion  100 mL/hr IntraVENous CONTINUOUS    central line flush (saline) syringe 10 mL  10 mL InterCATHeter PRN    levoFLOXacin (LEVAQUIN) 750 mg in D5W IVPB  750 mg IntraVENous Q24H    prochlorperazine (COMPAZINE) injection 10 mg  10 mg IntraVENous Q6H PRN    morphine injection 2 mg  2 mg IntraVENous Q4H PRN    HYDROcodone-acetaminophen (NORCO) 5-325 mg per tablet 1 Tab  1 Tab Oral Q4H PRN    enoxaparin (LOVENOX) injection 100 mg  100 mg SubCUTAneous Q24H       Social History:  Social History     Tobacco Use    Smoking status: Former Smoker     Packs/day: 1.00     Years: 30.00     Pack years: 30.00     Types: Cigarettes     Start date: 1988     Last attempt to quit: 2018     Years since quittin.0    Smokeless tobacco: Never Used   Substance Use Topics    Alcohol use: Yes     Alcohol/week: 14.0 standard drinks     Types: 14 Cans of beer per week       Pt denies any history of drug use, blood transfusions, or tattoos. Family History:  Family History   Problem Relation Age of Onset    Hypertension Mother     Heart Disease Mother        Review of Systems:  A detailed 10 system ROS is obtained, with pertinent positives as listed above. All others are negative. Diet:  Regular    Objective:     Physical Exam:  Vitals:  Visit Vitals  /72 (BP 1 Location: Right arm, BP Patient Position: At rest)   Pulse 90   Temp 97.4 °F (36.3 °C)   Resp 18   Wt 57.4 kg (126 lb 9.6 oz)   SpO2 93%   BMI 18.43 kg/m²     Gen:  Pt is alert, cooperative, no acute distress. Chronically ill appearing  Skin:  Extremities and face reveal no rashes. HEENT: Sclerae anicteric. Extra-occular muscles are intact. No oral ulcers. No abnormal pigmentation of the lips. The neck is supple. Cardiovascular: Regular rate and rhythm. No murmurs, gallops, or rubs. Respiratory:  Comfortable breathing with no accessory muscle use. Clear breath sounds anteriorly with no wheezes, rales, or rhonchi. GI:  Abdomen nondistended, soft, and nontender. Normal active bowel sounds. No enlargement of the liver or spleen. No masses palpable. RLQ ostomy. Rectal:  Deferred  Musculoskeletal:  No pitting edema of the lower legs. Neurological:  Gross memory appears intact. Patient is alert and oriented. Psychiatric:  Mood appears appropriate with judgement intact. Lymphatic:  No cervical or supraclavicular adenopathy. Laboratory:    Recent Labs     07/25/19  0809 07/24/19  0822   WBC 2.2* 2.3*   HGB 9.8* 11.0*   HCT 30.5* 34.1*    427   MCV 90.8 90.5    134*   K 3.2* 3.5    99   CO2 29 29   BUN 5* 9   CREA 0.95 0.98   CA 7.9* 8.8   MG 1.9 2.2   * 122*   AP 74 88   SGOT 11* 9*   ALT 15 12   TBILI 0.2 0.3   ALB 2.2* 2.6*   TP 6.0* 7.4      CT OF THE CHEST WITH INTRAVENOUS CONTRAST. 7/24/19   INDICATION: Shortness of breath. .  Rectal adenocarcinoma with metastatic  disease.    COMPARISON: None.   TECHNIQUE:   2.5 mm axial scans from above the aortic arch to the lung bases  with 100 cc nonionic intravenous contrast without acute complication. Intravenous contrast was given to evaluate for pulmonary embolism.   FINDINGS:  The degree of opacification of the pulmonary arteries is adequate. No intraluminal filling defects within the pulmonary arterial tree to suggest  pulmonary embolism. Aorta is normal caliber with a uniform lumen without  evidence of dissection. Lungs demonstrate multiple nodules, groundglass  opacities and small pneumonic consolidation in the bases.  There is extensive  mediastinal adenopathy which has increased a great deal from the prior exam..   Included portion of the upper abdomen are unremarkable. No gross bony lesions. .   IMPRESSION  IMPRESSION:  Mediastinal adenopathy is increased great deal from the prior exam.  There is patchy airspace disease in both lung bases. Pulmonary metastases. Negative for pulmonary embolism.        Assessment:     Active Problems:    Dysphagia (7/25/2019)      Intractable vomiting (7/25/2019)    Patient is a 48 y.o. male with PMH of daily etoh use, GERD, rectal cancer s/p diverting colostomy (Stage 4-locally advanced unresectable, LAD abd and mediastinum), hx of PE 5/8/19, RLE DVT 5/29/19, on Lovenox, renal mass, RT sided ureteral obstruction with likely malignancy who is seen in consultation at the request of Bandar White NP for dysphagia. He was admitted 7/24/19 with fatigue and inability to tolerate PO intake. He reported dysphagia with immediate regurgitation. He states this in in the morning until he is able to \"cough up phlegm\" then he has no issue for the rest of the day. He does have frequent heartburn and uses Tagamet prn. He is on Pepcid BID here. He complains of increased SOB and SIFUENTES. CT chest with findings as above. He last received AA AvastinFOLFIRI 2 weeks ago. He was due for next round, but this was delayed due to current symptoms. Differential includes GERD, esophagitis, PUD, esophageal narrowing/stricture, gastritis, PND/increased sputum/phlegm. Plan:     -Continue Pepcid BID  -Will plan on EGD tomorrow with Dr Marie Dickens. Dilation cannot be performed due to recent AA AvastinFOLFIRI infusion  -NPO after midnight  -Will hold Lovenox after midnight for procedure  -Further recommendations pending procedure findings    SCOUT Cameron    Patient is seen and examined in collaboration with Dr. Marie Dickens. Assessment and plan as per Dr. Marie Dickens.     I have seen and examined the patient, and I have directed and agreed to the plan as described. He insists that he does not have any esophageal dysphagia but rather excessive mucus production during the night that is either coughed or post-nasal drip. This leads to difficulty until this is cleared, then all foods/drinks/pills are tolerated normally. He is high risk for endoscopic complications due to Avastin within 28 days and low likelihood of benefit.   We will instead obtain barium esophagram.    Nicolasa Healy MD

## 2019-07-25 NOTE — PROGRESS NOTES
Spiritual Care Visit. Initial visit. Attempted visit by [facility]. Neutropenic Precautions. Patient was not in room; left a card. Entered by Linden Philippe, Staff .  Jessica, Juan Manuel.

## 2019-07-25 NOTE — PROGRESS NOTES
END OF SHIFT NOTE:    Intake/Output  07/24 1901 - 07/25 0700  In: 3820 [I.V.:3820]  Out: 175 [Urine:175]   Voiding: YES  Catheter: NO  Drain:              Stool:  0 occurrences. Stool Assessment  Stool Color: Shelley Anglinr (07/24/19 2343)  Stool Appearance: Loose; Watery (07/24/19 2343)  Stool Amount: Medium (07/24/19 2343)  Stool Source/Status: Colostomy (07/24/19 2343)    Emesis:  0 occurrences. VITAL SIGNS  Patient Vitals for the past 12 hrs:   Temp Pulse Resp BP SpO2   07/25/19 0433 97.5 °F (36.4 °C) 88 18 115/71 95 %   07/24/19 2332 97.6 °F (36.4 °C) 87 18 101/68 95 %   07/24/19 1919 98.2 °F (36.8 °C) (!) 105 24 114/67 96 %       Pain Assessment  Pain 1  Pain Scale 1: Visual (07/25/19 0524)  Pain Intensity 1: 0 (07/25/19 0524)  Patient Stated Pain Goal: 0 (07/25/19 0524)  Pain Reassessment 1: Patient resting w/respiratory rate greater than 10 (07/25/19 0524)  Pain Onset 1: past few nights (07/25/19 0500)  Pain Location 1: Back (07/25/19 0500)  Pain Orientation 1: Left; Lower;Right (07/25/19 0500)  Pain Description 1: Aching (07/25/19 0500)  Pain Intervention(s) 1: Medication (see MAR) (07/25/19 0500)    Ambulating  Yes    Additional Information: Patient had pain in his lower back on both left and right side where kidneys are located. Given morphine twice and norco once for the pain. Went for CT. VSS. No needs voiced. Shift report given to oncoming nurse at the bedside.     Joy Rubalcava

## 2019-07-25 NOTE — PROGRESS NOTES
Pt short of breath and states he is having a \"full on panic attack\" after returning from the bathroom and having a coughing spell. O2 sat 99% and lung sounds are coarse. NP notified. Orders given for 1mg IV ativan Q4H PRN and decrease fluids to 50ml/hr. Orders placed for tessalon and robitussin per vinny sops.

## 2019-07-25 NOTE — PROGRESS NOTES
[facility] Hematology & Oncology        Inpatient Hematology / Oncology Progress Note      Admission Date: 2019  3:18 PM  Reason for Admission/Hospital Course: Dysphagia [R13.10]  Intractable vomiting [R11.10]      24 Hour Events:  Afebrile, vitals stable  Still not keeping anything down - liquids coming up  Fatigued      ROS:  Constitutional: Positive for fatigue; negative for fever, chills, weakness   CV:  negative for chest pain, palpitations, edema. Respiratory:  negative for dyspnea, cough, wheezing. GI:  negative for nausea, abdominal pain, diarrhea. 10 point review of systems is otherwise negative with the exception of the elements mentioned above in the HPI. Allergies   Allergen Reactions    Zithromax [Azithromycin] Rash       OBJECTIVE:  Patient Vitals for the past 8 hrs:   BP Temp Pulse Resp SpO2   19 1143 128/81 97.9 °F (36.6 °C) 97 18 97 %   19 0859     93 %   19 0723 112/72 97.4 °F (36.3 °C) 90 18 91 %     Temp (24hrs), Av.8 °F (36.6 °C), Min:97.4 °F (36.3 °C), Max:98.2 °F (36.8 °C)     0701 -  1900  In: 639 [P.O.:120; I.V.:434]  Out: -     Physical Exam:  Constitutional: Well developed, well nourished male in no acute distress, sitting comfortably in the hospital bed. HEENT: Normocephalic and atraumatic. Oropharynx is clear, mucous membranes are moist. Neck supple     Lymph node   Deferred   Skin Warm and dry. No bruising and no rash noted. No erythema. No pallor. Respiratory Lungs are clear to auscultation bilaterally without wheezes, rales or rhonchi, normal air exchange without accessory muscle use. CVS Normal rate, regular rhythm and normal S1 and S2. No murmurs, gallops, or rubs. Abdomen Soft, nontender and nondistended, normoactive bowel sounds. No palpable mass. No hepatosplenomegaly. Neuro Grossly nonfocal with no obvious sensory or motor deficits. MSK Normal range of motion in general.  No edema and no tenderness. Psych Appropriate mood and affect.         Labs:      Recent Labs     07/25/19  0809 07/24/19  0822   WBC 2.2* 2.3*   RBC 3.36* 3.77*   HGB 9.8* 11.0*   HCT 30.5* 34.1*   MCV 90.8 90.5   MCH 29.2 29.2   MCHC 32.1 32.3   RDW 15.5* 15.4*    427   GRANS 29* 35*   LYMPH 18 19   MONOS 34* 26*   EOS 5 8   BASOS 1 4*   IG 13*  --    DF AUTOMATED MANUAL   ANEU 0.6* 1.0*   ABL 0.4* 0.4*   ABM 0.8 0.6   YESI 0.1 0.2   ABB 0.0 0.1   AIG 0.3  --         Recent Labs     07/25/19  0809 07/24/19  0822    134*   K 3.2* 3.5    99   CO2 29 29   AGAP 6* 6*   * 122*   BUN 5* 9   CREA 0.95 0.98   GFRAA >60 >60   GFRNA >60 >60   CA 7.9* 8.8   SGOT 11* 9*   AP 74 88   TP 6.0* 7.4   ALB 2.2* 2.6*   GLOB 3.8* 4.8*   AGRAT 0.6* 0.5*   MG 1.9 2.2         Imaging:    Medications:  Current Facility-Administered Medications   Medication Dose Route Frequency    albuterol (PROVENTIL VENTOLIN) nebulizer solution 2.5 mg  2.5 mg Nebulization Q4H PRN    carvedilol (COREG) tablet 3.125 mg  3.125 mg Oral BID WITH MEALS    famotidine (PEPCID) tablet 20 mg  20 mg Oral BID    diphenoxylate-atropine (LOMOTIL) tablet 1 Tab  1 Tab Oral QID PRN    fluticasone (FLOVENT HFA) 110 mcg inhaler (Patient Supplied)  1 Puff Inhalation Q12H    lisinopril (PRINIVIL, ZESTRIL) tablet 10 mg  10 mg Oral DAILY    magic mouthwash (SIMON) oral suspension 5 mL  5 mL Oral Q4H PRN    oxyCODONE IR (ROXICODONE) tablet 5 mg  5 mg Oral Q6H PRN    0.9% sodium chloride infusion  100 mL/hr IntraVENous CONTINUOUS    potassium chloride 40 mEq IVPB  40 mEq IntraVENous ONCE    potassium chloride (K-DUR, KLOR-CON) SR tablet 20 mEq  20 mEq Oral BID    central line flush (saline) syringe 10 mL  10 mL InterCATHeter PRN    levoFLOXacin (LEVAQUIN) 750 mg in D5W IVPB  750 mg IntraVENous Q24H    prochlorperazine (COMPAZINE) injection 10 mg  10 mg IntraVENous Q6H PRN    morphine injection 2 mg  2 mg IntraVENous Q4H PRN    HYDROcodone-acetaminophen (NORCO) 5-325 mg per tablet 1 Tab  1 Tab Oral Q4H PRN    enoxaparin (LOVENOX) injection 100 mg  100 mg SubCUTAneous Q24H         ASSESSMENT:    Problem List  Date Reviewed: 7/24/2019          Codes Class Noted    Dysphagia ICD-10-CM: R13.10  ICD-9-CM: 787.20  7/25/2019        Intractable vomiting ICD-10-CM: R11.10  ICD-9-CM: 536.2  7/25/2019        Mediastinal lymphadenopathy ICD-10-CM: R59.0  ICD-9-CM: 785.6  6/11/2019        Pulmonary nodules ICD-10-CM: R91.8  ICD-9-CM: 793.19  6/11/2019        Dehydration ICD-10-CM: E86.0  ICD-9-CM: 276.51  4/2/2019        S/P radiation therapy < 4 weeks ago ICD-10-CM: Z92.3  ICD-9-CM: V15.3  10/1/2018        Rectal adenocarcinoma ([facility] ICD-10-CM: C20  ICD-9-CM: 154.1  7/6/2018        Colostomy status ([facility] ICD-10-CM: Z93.3  ICD-9-CM: V44.3  6/29/2018        Rectal obstruction ICD-10-CM: K62.4  ICD-9-CM: 569.2  6/27/2018        Erectile dysfunction due to arterial insufficiency ICD-10-CM: N52.01  ICD-9-CM: 607.84  5/31/2018        Plantar fasciitis, right ICD-10-CM: M72.2  ICD-9-CM: 728.71  5/31/2018        Benign essential HTN ICD-10-CM: I10  ICD-9-CM: 401.1  5/31/2018        IGT (impaired glucose tolerance) ICD-10-CM: R73.02  ICD-9-CM: 790.22  5/30/2018        Hypercholesterolemia ICD-10-CM: E78.00  ICD-9-CM: 272.0  5/30/2018    Overview Signed 6/4/2018  2:20 PM by Clifford Cramp     ASCVD 10 year risk is 16.0%. Mod to high dose statin indicated. (based on b/p 180/90, 6/4/18)                 Screening PSA (prostate specific antigen) ICD-10-CM: Z12.5  ICD-9-CM: V76.44  5/30/2018        History of tobacco abuse ICD-10-CM: Z87.891  ICD-9-CM: V15.82  8/18/2016              PLAN:  Dysphagia/vomiting  - GI evaluation. Planning EGD tomorrow (cannot do dilation due to recent avastin). Started pepcid BID  - Continue IV fluids    Sinusitis/chills  - Continue levaquin. BCx pending. UA negative.  CT chest with no acute findings    Met rectal katherine  - S/p FOLFIRI-Avastin, last given 7/9    Recently on cruise to [facility]. No other sick contacts    Leukopenia/anemia  - Secondary to chemotherapy. Granix x 1 today    Lovenox: on hold prior to EGD    Home pending findings/work up by GI    Goals and plan of care reviewed with the patient. All questions answered to the best of our ability. Rosa Morse NP   [facility] Hematology & Oncology  [address] [address]  Office : [phone]  Fax : [phone]     Attending Addendum:  Patient seen with BARB De Jesus. Mr Ronal Macdonald is a 68-year-old man with known rectal adenocarcinoma. He is a patient of Dr. Hien Sheriff. Past medical history includes daily alcohol use, GERD, previous smoking. He progressed on 5-FU maintenance and was recently changed to [facility]. On day of admission, he presented to the cancer center for evaluation prior to cycle 4. Of note, he recently came back from a cruise. He reported fatigue and inability to keep food down. He is admitted for further GI evaluation. I personally performed a face to face diagnostic evaluation on this patient. My findings are as follows: A&ox3, cachectic and chronically ill appearing, lungs clear, heart regular, abdomen benign and no LE edema. CT chest without acute changes. GI consult for eval.  IVF. As for sinusitis - started on levofloxacin. Labs reviewed, will get Granix today for chemotherapy related leukopenia. C/w supportive care. I have reviewed and agree with the care plan.               April Garcia MD  [facility] Hematology and Oncology  [address] [place] [address]  Office : [phone]  Fax : [phone]

## 2019-07-26 PROBLEM — C78.00 METASTATIC CANCER TO LUNG (HCC): Chronic | Status: ACTIVE | Noted: 2019-01-01

## 2019-07-26 PROBLEM — C20 RECTAL ADENOCARCINOMA (HCC): Chronic | Status: ACTIVE | Noted: 2018-07-06

## 2019-07-26 PROBLEM — J96.00 ACUTE RESPIRATORY FAILURE (HCC): Status: ACTIVE | Noted: 2019-01-01

## 2019-07-26 PROBLEM — T17.908A ASPIRATION INTO AIRWAY: Status: ACTIVE | Noted: 2019-01-01

## 2019-07-26 PROBLEM — C78.00 METASTATIC CANCER TO LUNG (HCC): Status: ACTIVE | Noted: 2019-01-01

## 2019-07-26 NOTE — PROGRESS NOTES
END OF SHIFT NOTE:    Intake/Output  No intake/output data recorded. Voiding: YES  Catheter: NO  Drain:              Stool:  0 occurrences. Stool Assessment  Stool Color: Figueroa Chi (07/24/19 2343)  Stool Appearance: Loose; Watery (07/24/19 2343)  Stool Amount: Medium (07/24/19 2343)  Stool Source/Status: Colostomy (07/24/19 2343)    Emesis:  0 occurrences. VITAL SIGNS  Patient Vitals for the past 12 hrs:   Temp Pulse Resp BP SpO2   07/26/19 0310 97.3 °F (36.3 °C) 90 18 121/72 98 %   07/25/19 2310 98 °F (36.7 °C) 95 18 112/63 97 %   07/25/19 1913     98 %   07/25/19 1906 98.2 °F (36.8 °C) 92 18 108/57 97 %       Pain Assessment  Pain 1  Pain Scale 1: Visual (07/26/19 0506)  Pain Intensity 1: 0 (07/26/19 0506)  Patient Stated Pain Goal: 0 (07/26/19 0506)  Pain Reassessment 1: Patient resting w/respiratory rate greater than 10 (07/26/19 0506)  Pain Onset 1: ongoing (07/26/19 0013)  Pain Location 1: Back (07/26/19 0433)  Pain Orientation 1: Upper (07/26/19 0433)  Pain Description 1: Aching (07/26/19 0433)  Pain Intervention(s) 1: Medication (see MAR) (07/26/19 9832)    Ambulating  Yes    Additional Information: Patient with pain all night in his back and sides. Also had two panic attacks over night. Given PRN breathing treatment and ativan. VSS. No needs voiced. Shift report given to oncoming nurse at the bedside.     Brantley November

## 2019-07-26 NOTE — PROGRESS NOTES
[facility] Progress Note         Admit Date:  7/24/2019    Today's Date:  7/26/2019    CC:  Dysphagia    Subjective:     Patient currently NPO for barium swallow today. Per nurse at bedside, transport is on the way. Pt had no trouble with swallowing yesterday afternoon. He has not tried anything this am due to NPO status. He currently has increased SOB due to anxiety. Nurse providing medications to manage. No other complaints.      Medications:   Current Facility-Administered Medications   Medication Dose Route Frequency    albuterol (PROVENTIL VENTOLIN) nebulizer solution 2.5 mg  2.5 mg Nebulization Q4H PRN    carvedilol (COREG) tablet 3.125 mg  3.125 mg Oral BID WITH MEALS    famotidine (PEPCID) tablet 20 mg  20 mg Oral BID    diphenoxylate-atropine (LOMOTIL) tablet 1 Tab  1 Tab Oral QID PRN    fluticasone (FLOVENT HFA) 110 mcg inhaler (Patient Supplied)  1 Puff Inhalation Q12H    lisinopril (PRINIVIL, ZESTRIL) tablet 10 mg  10 mg Oral DAILY    magic mouthwash (SIMON) oral suspension 5 mL  5 mL Oral Q4H PRN    oxyCODONE IR (ROXICODONE) tablet 5 mg  5 mg Oral Q6H PRN    0.9% sodium chloride infusion  50 mL/hr IntraVENous CONTINUOUS    potassium chloride (K-DUR, KLOR-CON) SR tablet 20 mEq  20 mEq Oral BID    guaiFENesin-dextromethorphan (ROBITUSSIN DM) 100-10 mg/5 mL syrup 5 mL  5 mL Oral Q6H PRN    benzonatate (TESSALON) capsule 100 mg  100 mg Oral TID PRN    HYDROcodone-acetaminophen (HYCET) 0.5-21.7 mg/mL oral solution 5 mg  5 mg Oral Q4H PRN    LORazepam (ATIVAN) injection 1 mg  1 mg IntraVENous Q4H PRN    central line flush (saline) syringe 10 mL  10 mL InterCATHeter PRN    levoFLOXacin (LEVAQUIN) 750 mg in D5W IVPB  750 mg IntraVENous Q24H    prochlorperazine (COMPAZINE) injection 10 mg  10 mg IntraVENous Q6H PRN    morphine injection 2 mg  2 mg IntraVENous Q4H PRN    enoxaparin (LOVENOX) injection 100 mg  100 mg SubCUTAneous Q24H       Review of Systems:  ROS was obtained, with pertinent positives as listed above. No chest pain or SOB. Diet:  NPO    Objective:   Vitals:  Visit Vitals  /64 (BP 1 Location: Right arm)   Pulse (!) 111   Temp 98.2 °F (36.8 °C)   Resp 18   Wt 59 kg (130 lb)   SpO2 99%   BMI 18.93 kg/m²     Intake/Output:  No intake/output data recorded. 07/24 1901 - 07/26 0700  In: 5222 [P.O.:360; I.V.:4862]  Out: 175 [Urine:175]  Exam:  General appearance: alert, cooperative, no distress. Chronically ill appearing  Lungs: clear to auscultation bilaterally anteriorly  Heart: regular rate and rhythm  Abdomen: soft, non-tender. Bowel sounds normal. No masses, no organomegaly. RLQ ostomy  Extremities: extremities normal, atraumatic, no cyanosis or edema  Neuro:  alert and oriented    Data Review (Labs):    Recent Labs     07/26/19  0301 07/25/19  1653 07/25/19  0809 07/24/19  0822   WBC 4.5  --  2.2* 2.3*   HGB 9.3*  --  9.8* 11.0*   HCT 29.7*  --  30.5* 34.1*     --  401 427   MCV 94.6  --  90.8 90.5     --  138 134*   K 3.9 4.0 3.2* 3.5     --  103 99   CO2 26  --  29 29   BUN 4*  --  5* 9   CREA 0.95  --  0.95 0.98   CA 8.2*  --  7.9* 8.8   MG 1.8  --  1.9 2.2   *  --  106* 122*   AP 75  --  74 88   SGOT 10*  --  11* 9*   ALT 9*  --  15 12   TBILI 0.2  --  0.2 0.3   ALB 2.0*  --  2.2* 2.6*   TP 5.7*  --  6.0* 7.4     CT OF THE CHEST WITH INTRAVENOUS CONTRAST. 7/24/19   INDICATION: Shortness of breath. Bambi To adenocarcinoma with metastatic  disease.    COMPARISON: None.   TECHNIQUE:   2.5 mm axial scans from above the aortic arch to the lung bases  with 100 cc nonionic intravenous contrast without acute complication. Intravenous contrast was given to evaluate for pulmonary embolism.   FINDINGS:  The degree of opacification of the pulmonary arteries is adequate.    No intraluminal filling defects within the pulmonary arterial tree to suggest  pulmonary embolism. Faith Plato is normal caliber with a uniform lumen without  evidence of dissection. Lungs demonstrate multiple nodules, groundglass  opacities and small pneumonic consolidation in the bases. There is extensive  mediastinal adenopathy which has increased a great deal from the prior exam..   Included portion of the upper abdomen are unremarkable. No gross bony lesions. .   IMPRESSION  IMPRESSION:  Mediastinal adenopathy is increased great deal from the prior exam.  There is patchy airspace disease in both lung bases. Pulmonary metastases. Negative for pulmonary embolism.        Assessment:     Active Problems:    Dysphagia (7/25/2019)      Intractable vomiting (7/25/2019)    Patient is a 48 y.o. male with PMH of daily etoh use, GERD, rectal cancer s/p diverting colostomy (Stage 4-locally advanced unresectable, LAD abd and mediastinum), hx of PE 5/8/19, RLE DVT 5/29/19, on Lovenox, renal mass, RT sided ureteral obstruction with likely malignancy who is seen in consultation at the request of Elizabeth Evans NP for dysphagia. He was admitted 7/24/19 with fatigue and inability to tolerate PO intake. He reported dysphagia with immediate regurgitation. He states this in in the morning until he is able to \"cough up phlegm\" then he has no issue for the rest of the day. He does have frequent heartburn and uses Tagamet prn. He is on Pepcid BID here. He complains of increased SOB and SIFUENTES. CT chest with findings as above. He last received AA AvastinFOLFIRI 2 weeks ago. He was due for next round, but this was delayed due to current symptoms. Differential includes GERD, esophagitis, PUD, esophageal narrowing/stricture, gastritis, PND/increased sputum/phlegm. Plan:     -Continue Pepcid BID  -Barium swallow pending  -Further recommendations pending results    SCOUT Mcneil    Patient is seen and examined in collaboration with Dr. Douglas Yang. Assessment and plan as per Dr. Douglas Yang    I have seen and examined the patient, and I have directed and agreed to the plan as described.   He has experienced a significant decline in respiratory status. His wife says he had difficulty during the night, and he became worse after his barium esophagram, during which at least some degree of aspiration occurred. The only notable finding on the esophagram otherwise is extrinsic compression of the distal esophagus due to his malignancy. The best treatment of this would be determined by Oncology. As previously described, he has no difficulty swallowing after clearing mucus from the esophagus. I would not recommend esophageal stenting for an extrinsic mass. I would recommend Speech pathology evaluation of aspiration after his respiratory status improves. If a Dobhoff feeding tube becomes necessary, this should be able to be placed at bedside without any problem. [facility] is signing off. Please call as needed. MD JUNE Fagan MD

## 2019-07-26 NOTE — PROGRESS NOTES
Chart reviewed  Discharge pending GI work up  No discharge needs at this time. Case management will continue to follow.

## 2019-07-26 NOTE — PROGRESS NOTES
A follow up visit was made to the patient. Emotional support, spiritual presence and   prayer were provided. His wife, Desiree Gómez, his daughter and sister in law were present. They were discouraged and tearful.       [employer]

## 2019-07-26 NOTE — CONSULTS
CONSULT NOTE    [facility] Room    7/26/2019    Date of Admission:  7/24/2019    The patient's chart is reviewed and the patient is discussed with the staff. Subjective:     Patient is a 48 y.o.  male, PMH Colon cancer, DM, HTN, rectal cancer, MYRON, former tobacco abuse, and GERD seen and evaluated at the request of Dr. Tien Rebolledo for acute respiratory distress. The patient was admitted on 7/24/2019 by Oncology for dehydration and rectal adenocarcinoma confirmed to be Stage IV per biopsy. Patient was initiated on 5FU-A and recently switched to [address]. The patient apparently c/o dysphagia and his food getting \"stuck\" at a recent Oncology appointment following a cruise to the [facility]. Today the patient went transported to radiology for a barium swallow which revealed aspiration of contrast as well as extrinsic soft tissue mass creating filling defect in distal esophagus. Patient noted to be wheezing throughout and had to be changed from O2 at 2L NC to 15L HFNC then BIPAP. ABG reveals pH 7.32, CO2 47.2, O2 165, HCO3 24.3. Patient was on Flovent and albuterol at home. Review of Systems  Pertinent items are noted in HPI.     Patient Active Problem List   Diagnosis Code    History of tobacco abuse Z87.891    IGT (impaired glucose tolerance) R73.02    Hypercholesterolemia E78.00    Screening PSA (prostate specific antigen) Z12.5    Erectile dysfunction due to arterial insufficiency N52.01    Plantar fasciitis, right M72.2    Benign essential HTN I10    Rectal adenocarcinoma (HCC) C20    Rectal obstruction K62.4    Colostomy status (HCC) Z93.3    S/P radiation therapy < 4 weeks ago Z92.3    Dehydration E86.0    Mediastinal lymphadenopathy R59.0    Pulmonary nodules R91.8    Dysphagia R13.10    Intractable vomiting R11.10    Metastatic cancer to lung ([facility] [address] C78.00    Acute respiratory failure (HCC) J96.00    Aspiration into airway T17.908A           Prior to Admission Medications   Prescriptions Last Dose Informant Patient Reported? Taking? DISABLED PLACARD (DISABLED PLACARD) DMV   No No   Sig: Dx. Colon Cancer  An Inability to walk 100 feet nonstop without aggravating existing medical condition   albuterol (PROVENTIL HFA, VENTOLIN HFA, PROAIR HFA) 90 mcg/actuation inhaler 2019 at Unknown time  No Yes   Sig: Take 1 Puff by inhalation every four (4) hours as needed for Wheezing or Shortness of Breath. carvedilol (COREG) 3.125 mg tablet 2019 at Unknown time  No Yes   Sig: Take 1 Tab by mouth two (2) times daily (with meals). cimetidine (TAGAMET) 300 mg tab 2019 at Unknown time  No Yes   Sig: Take 1 Tab by mouth two (2) times a day. diphenoxylate-atropine (LOMOTIL) 2.5-0.025 mg per tablet Not Taking at Unknown time  No No   Sig: One tab 4 times a day as needed for diarrhea   enoxaparin (LOVENOX) 100 mg/mL   No No   Si mg by SubCUTAneous route daily. Indications: blood clot in a deep vein of the extremities   fluticasone propionate (FLOVENT HFA) 110 mcg/actuation inhaler   No No   Sig: Take 1 Puff by inhalation every twelve (12) hours. ibuprofen (MOTRIN IB) 200 mg tablet   Yes No   Sig: Take 400 mg by mouth.   lidocaine-prilocaine (EMLA) topical cream   No No   Sig: Apply  to affected area as needed for Pain. Apply to port 30-45 min prior to port needle stick   lisinopril (PRINIVIL, ZESTRIL) 10 mg tablet   No No   Sig: Take 1 Tab by mouth daily. magic mouthwash solution   No No   Sig: Magic mouth wash   Maalox  Lidocaine 2% viscous   Diphenhydramine oral solution     Pharmacy to mix equal portions of ingredients to a total volume as indicated in the dispense amount.   magic mouthwash solution   No No   Sig: Magic mouth wash   Maalox  Lidocaine 2% viscous   Diphenhydramine oral solution     Pharmacy to mix equal portions of ingredients to a total volume as indicated in the dispense amount.    ondansetron hcl (ZOFRAN) 8 mg tablet   No No   Sig: Take 1 Tab by mouth every eight (8) hours as needed for Nausea. Indications: Prevent Nausea and Vomiting from Cancer Chemotherapy   oxyCODONE IR (ROXICODONE) 5 mg immediate release tablet   No No   Sig: Take 1 Tab by mouth every six (6) hours as needed for Pain for up to 30 days. Max Daily Amount: 20 mg. Take 1-2 tabs every 6 hrs for pain as needed   prochlorperazine (COMPAZINE) 10 mg tablet   No No   Sig: Take 1 Tab by mouth every six (6) hours as needed for Nausea.  Indications: Prevent Nausea and Vomiting from Cancer Chemotherapy      Facility-Administered Medications: None       Past Medical History:   Diagnosis Date    Chest pain 8/18/2016    Colon cancer [facility] [address]     Diabetes ([facility] [address]     GERD (gastroesophageal reflux disease)     resolved per pt-- prn OTC meds    Hypertension     no current treatment, states Coreg dropped BP too much and is not taking    Iron deficiency anemia due to chronic blood loss 07/26/2018    denies hx of blood transfusion    PONV (postoperative nausea and vomiting)     Rectal cancer ([facility] [address]     Thyroid disease     Tobacco abuse 08/18/2016    Quit 6/28/18--- 1 ppd x 30 yrs     Past Surgical History:   Procedure Laterality Date    FLEXIBLE SIGMOIDOSCOPY N/A 6/27/2018    SIGMOIDOSCOPY FLEXIBLE performed by Gely Gayle MD at [facility] ENDOSCOPY    HX HERNIA REPAIR  2003    HX VASCULAR ACCESS      VASCULAR SURGERY PROCEDURE UNLIST Right     artery tied off after accident     Social History     Socioeconomic History    Marital status:      Spouse name: Not on file    Number of children: Not on file    Years of education: Not on file    Highest education level: Not on file   Occupational History    Not on file   Social Needs    Financial resource strain: Not on file    Food insecurity:     Worry: Not on file     Inability: Not on file    Transportation needs:     Medical: Not on file     Non-medical: Not on file   Tobacco Use    Smoking status: Former Smoker     Packs/day: 1.00     Years: 30.00     Pack years: 30.00     Types: Cigarettes     Start date: 1988     Last attempt to quit: 2018     Years since quittin.0    Smokeless tobacco: Never Used   Substance and Sexual Activity    Alcohol use:  Yes     Alcohol/week: 14.0 standard drinks     Types: 14 Cans of beer per week    Drug use: No    Sexual activity: Not on file   Lifestyle    Physical activity:     Days per week: Not on file     Minutes per session: Not on file    Stress: Not on file   Relationships    Social connections:     Talks on phone: Not on file     Gets together: Not on file     Attends Oriental orthodox service: Not on file     Active member of club or organization: Not on file     Attends meetings of clubs or organizations: Not on file     Relationship status: Not on file    Intimate partner violence:     Fear of current or ex partner: Not on file     Emotionally abused: Not on file     Physically abused: Not on file     Forced sexual activity: Not on file   Other Topics Concern     Service Not Asked    Blood Transfusions Not Asked    Caffeine Concern Not Asked    Occupational Exposure Not Asked    Hobby Hazards Not Asked    Sleep Concern Not Asked    Stress Concern Not Asked    Weight Concern Not Asked    Special Diet Not Asked    Back Care Not Asked    Exercise Not Asked    Bike Helmet Not Asked    Seat Belt Not Asked    Self-Exams Not Asked   Social History Narrative    Not on file     Family History   Problem Relation Age of Onset    Hypertension Mother     Heart Disease Mother      Allergies   Allergen Reactions    Zithromax [Azithromycin] Rash       Current Facility-Administered Medications   Medication Dose Route Frequency    piperacillin-tazobactam (ZOSYN) 4.5 g in 0.9% sodium chloride (MBP/ADV) 100 mL  4.5 g IntraVENous Q8H    albuterol (PROVENTIL VENTOLIN) nebulizer solution 2.5 mg  2.5 mg Nebulization Q4H PRN    carvedilol (COREG) tablet 3.125 mg  3.125 mg Oral BID WITH MEALS    famotidine (PEPCID) tablet 20 mg  20 mg Oral BID    diphenoxylate-atropine (LOMOTIL) tablet 1 Tab  1 Tab Oral QID PRN    fluticasone (FLOVENT HFA) 110 mcg inhaler (Patient Supplied)  1 Puff Inhalation Q12H    lisinopril (PRINIVIL, ZESTRIL) tablet 10 mg  10 mg Oral DAILY    magic mouthwash (SIMON) oral suspension 5 mL  5 mL Oral Q4H PRN    oxyCODONE IR (ROXICODONE) tablet 5 mg  5 mg Oral Q6H PRN    0.9% sodium chloride infusion  50 mL/hr IntraVENous CONTINUOUS    potassium chloride (K-DUR, KLOR-CON) SR tablet 20 mEq  20 mEq Oral BID    guaiFENesin-dextromethorphan (ROBITUSSIN DM) 100-10 mg/5 mL syrup 5 mL  5 mL Oral Q6H PRN    benzonatate (TESSALON) capsule 100 mg  100 mg Oral TID PRN    HYDROcodone-acetaminophen (HYCET) 0.5-21.7 mg/mL oral solution 5 mg  5 mg Oral Q4H PRN    LORazepam (ATIVAN) injection 1 mg  1 mg IntraVENous Q4H PRN    central line flush (saline) syringe 10 mL  10 mL InterCATHeter PRN    prochlorperazine (COMPAZINE) injection 10 mg  10 mg IntraVENous Q6H PRN    morphine injection 2 mg  2 mg IntraVENous Q4H PRN    enoxaparin (LOVENOX) injection 100 mg  100 mg SubCUTAneous Q24H         Objective:     Vitals:    07/26/19 0826 07/26/19 1040 07/26/19 1106 07/26/19 1122   BP:  137/88  116/85   Pulse:  (!) 126 (!) 114 (!) 117   Resp:  18 (!) 32    Temp:  97.8 °F (36.6 °C)     SpO2: 99% 94%  100%   Weight:           PHYSICAL EXAM     Constitutional:  the patient is well developed and in no acute distress  EENMT:  Sclera clear, pupils equal, oral mucosa moist  Respiratory: Wheezing throughout, on 15L HFNC  Cardiovascular:  RRR without M,G,R  Gastrointestinal: soft and non-tender; with positive bowel sounds. Musculoskeletal: warm without cyanosis. There is no lower extremity edema.   Skin:  no jaundice or rashes, no wounds   Neurologic: no gross neuro deficits     Psychiatric:  alert and oriented x 3    CXR:  Pending    Recent Labs     07/26/19  0301 07/25/19  0809 07/24/19  0822   WBC 4.5 2.2* 2.3*   HGB 9.3* 9.8* 11.0*   HCT 29.7* 30.5* 34.1*    401 427     Recent Labs     07/26/19  0301 07/25/19  1653 07/25/19  0809 07/24/19  0822     --  138 134*   K 3.9 4.0 3.2* 3.5     --  103 99   *  --  106* 122*   CO2 26  --  29 29   BUN 4*  --  5* 9   CREA 0.95  --  0.95 0.98   MG 1.8  --  1.9 2.2   CA 8.2*  --  7.9* 8.8   ALB 2.0*  --  2.2* 2.6*   TBILI 0.2  --  0.2 0.3   ALT 9*  --  15 12   SGOT 10*  --  11* 9*     Recent Labs     07/26/19  1110   PHI 7.320*   PCO2I 47.2*   PO2I 165*   HCO3I 24.3     No results for input(s): LCAD, LAC in the last 72 hours. Assessment:  (Medical Decision Making)     Hospital Problems  Date Reviewed: 7/26/2019          Codes Class Noted POA    Metastatic cancer to lung [facility]) (Chronic) ICD-10-CM: C78.00  ICD-9-CM: 197.0  7/26/2019 Yes    Overview Signed 7/26/2019 11:06 AM by Leola Zayas NP     Per EBUS in June 2019             Acute respiratory failure ([facility] [address] ICD-10-CM: J96.00  ICD-9-CM: 518.81  7/26/2019 Yes    On BIPAP    Aspiration into airway ICD-10-CM: T17.908A  ICD-9-CM: 934.9  7/26/2019 Unknown    Per barium swallow    Dysphagia ICD-10-CM: R13.10  ICD-9-CM: 787.20  7/25/2019 Yes        Intractable vomiting ICD-10-CM: R11.10  ICD-9-CM: 536.2  7/25/2019 Yes        Rectal adenocarcinoma (HCC) (Chronic) ICD-10-CM: C20  ICD-9-CM: 154.1  7/6/2018 Yes    Chronic, treated by Oncology          Plan:  (Medical Decision Making)     --BIPAP for now, wean as tolerated  --CXR pending  --Antibiotics for aspiration pneumonia, appreciate Pharmacy  --nebulizer treatments as needed      More than 50% of the time documented was spent in face-to-face contact with the patient and in the care of the patient on the floor/unit where the patient is located. Thank you very much for this referral.  We appreciate the opportunity to participate in this patient's care. Will follow along with above stated plan.     Damari Andrea, BARB Lungs: less wheezing  Heart:  RRR with no Murmur/Rubs/Gallops    Additional Comments: Will need BIPAP, IV steroids and BD. Change ABX to Zosyn to cover aspiration and transfer to ICU. Discussed with oncology          I have spoken with and examined the patient. I agree with the above assessment and plan as documented.     Rosita Cowart MD

## 2019-07-26 NOTE — PROGRESS NOTES
Problem: Falls - Risk of  Goal: *Absence of Falls  Description  Document [facility] Fall Risk and appropriate interventions in the flowsheet.   Outcome: Progressing Towards Goal  Note:   Fall Risk Interventions:  Mobility Interventions: Communicate number of staff needed for ambulation/transfer         Medication Interventions: Teach patient to arise slowly

## 2019-07-26 NOTE — PROGRESS NOTES
1040 - Upon pt's return to floor, pt is increasingly dyspneic and agonally breathing. Pt's lung sounds are coarse and wheezy. NP notified and asked to come assess pt. VSS. NP and MD in to see pt. Order given for a stat CXR and troponin. Pt placed on 15L high flow nasal cannula (per MD) and respiratory called to see pt.     1050 - ICU rover in to assess pt. ABG obtained. Consult placed to the intensivist.     1113 - Pt placed on BIPAP. 56 - Dr. Mandy Forbes in to see pt. Order placed to transfer pt to ICU.    1144 - Report given at the beside to [facility] (ICU). 1200 - [facility] Rima Diaz [facility].

## 2019-07-26 NOTE — PROGRESS NOTES
Bedside shift change report given to Rajinder Ceron RN (oncoming nurse) by John De Dios RN (offgoing nurse). Report included the following information SBAR, Kardex, Intake/Output, MAR, Recent Results, Cardiac Rhythm Stach and Alarm Parameters .

## 2019-07-26 NOTE — PROGRESS NOTES
[facility]        Inpatient Hematology / Oncology Progress Note      Admission Date: 2019  3:18 PM  Reason for Admission/Hospital Course: Dysphagia [R13.10]  Intractable vomiting [R11.10]      24 Hour Events:  Afebrile, VSS  On return from Barium Swallow this AM, respiratory distress-CC MD and rover to room, initiated BiPap and moved to unit   Lethargic  Barium Swallow: + aspiration  Loose stools started yesterday evening-not watery  C/O back pain and anxiety/panic attacks last PM  LVQ-D3 for sinusitis      ROS:  Constitutional: Positive for fatigue, weakness, lethargic; negative for fever, chills  CV:  negative for chest pain, palpitations, edema. Respiratory:  +dyspnea-respiratory distress  GI:  +nausea, vomiting, diarrhea. 10 point review of systems is otherwise negative with the exception of the elements mentioned above in the HPI. Allergies   Allergen Reactions    Zithromax [Azithromycin] Rash       OBJECTIVE:  Patient Vitals for the past 8 hrs:   BP Temp Pulse Resp SpO2   19 0826     99 %   19 0716 101/64 98.2 °F (36.8 °C) (!) 111 18 98 %   19 0310 121/72 97.3 °F (36.3 °C) 90 18 98 %     Temp (24hrs), Av.8 °F (36.6 °C), Min:97.1 °F (36.2 °C), Max:98.2 °F (36.8 °C)     0701 -  1900  In: -   Out: 300 [Urine:300]    Physical Exam:  Constitutional: Acutely ill male in respiratory distress, lying comfortably in the hospital bed. Family at bedside. HEENT: Normocephalic and atraumatic. Oropharynx is clear, mucous membranes are moist. Neck supple     Lymph node   Deferred   Skin Warm and dry. No bruising and no rash noted. No erythema. No pallor. Respiratory Lungs with scattered wheezes, tachypenic, no accessory muscle use. CVS Mildly tachycardic rate, regular rhythm and normal S1 and S2. No murmurs, gallops, or rubs. Abdomen Soft, nontender and nondistended, normoactive bowel sounds. No palpable mass. No hepatosplenomegaly. Neuro Lethargic. Grossly nonfocal with no obvious sensory or motor deficits. MSK Normal range of motion in general.  No edema and no tenderness. Psych Lethargic        Labs:      Recent Labs     07/26/19  0301 07/25/19  0809 07/24/19  0822   WBC 4.5 2.2* 2.3*   RBC 3.14* 3.36* 3.77*   HGB 9.3* 9.8* 11.0*   HCT 29.7* 30.5* 34.1*   MCV 94.6 90.8 90.5   MCH 29.6 29.2 29.2   MCHC 31.3* 32.1 32.3   RDW 15.7* 15.5* 15.4*    401 427   GRANS 36* 29* 35*   LYMPH 13 18 19   MONOS 29* 34* 26*   EOS 7 5 8   BASOS 1 1 4*   IG 14* 13*  --    DF AUTOMATED AUTOMATED MANUAL   ANEU 1.7 0.6* 1.0*   ABL 0.6 0.4* 0.4*   ABM 1.3 0.8 0.6   YESI 0.3 0.1 0.2   ABB 0.0 0.0 0.1   AIG 0.6* 0.3  --         Recent Labs     07/26/19  0301 07/25/19  1653 07/25/19  0809 07/24/19  0822     --  138 134*   K 3.9 4.0 3.2* 3.5     --  103 99   CO2 26  --  29 29   AGAP 9  --  6* 6*   *  --  106* 122*   BUN 4*  --  5* 9   CREA 0.95  --  0.95 0.98   GFRAA >60  --  >60 >60   GFRNA >60  --  >60 >60   CA 8.2*  --  7.9* 8.8   SGOT 10*  --  11* 9*   AP 75  --  74 88   TP 5.7*  --  6.0* 7.4   ALB 2.0*  --  2.2* 2.6*   GLOB 3.7*  --  3.8* 4.8*   AGRAT 0.5*  --  0.6* 0.5*   MG 1.8  --  1.9 2.2         Imaging:  XR CHEST SNGL V [[id number]] Collected: 07/26/19 1159   Order Status: Completed Updated: 07/26/19 1202   Narrative:     Portable chest x-ray    CLINICAL INDICATION: Shortness of breath    FINDINGS: Single AP view the chest compared to a similar exam dated 7/11/2019  shows new reticular nodular interstitial densities throughout both lungs with  more patchy airspace opacity in the right upper lobe. A right-sided chest port  is in place. The cardiac silhouette and mediastinum are stable. Impression:     IMPRESSION: New, diffuse reticular nodular densities throughout the lungs with  patchy opacity in the right upper lobe. Atypical pneumonia or pulmonary edema  should be considered.    XR BA SWALLOW ESOPHOGRAM [[id number]] Collected: 07/26/19 0958   Order Status: Completed Updated: 07/26/19 1026   Narrative:     History: Dysphasia. Stage IV rectal cancer. EXAM: Barium swallow    TECHNIQUE: 1.3 minutes fluoroscopy time is utilized. The patient ingests  effervescent granules followed by thick and thin consistencies of barium. 18  fluoroscopic images are available for review. No comparison    FINDINGS: The exam is markedly limited. The patient is unable to perform the  exam the upright position, and has very limited mobility. There is aspiration of  the thin contrast. Contrast flows through the esophagus into the stomach without  difficulty. There is a hiatal hernia present. Just above the hiatal hernia,  there is a filling defect of the distal esophagus, corresponding to a soft  tissue mass adjacent to the esophagus on the recent CT chest. No definite  gastroesophageal reflux, or ulceration. Impression:     IMPRESSION:  1. Just above the hiatal hernia, there is an extrinsic soft tissue mass creating  a filling defect within the distal esophagus, corresponding to a soft tissue  mass seen on the recent CT chest.  2. Aspiration of oral contrast. Correlation with modified barium swallow  recommended. CT CHEST W CONT [[id number]] Collected: 07/24/19 2133   Order Status: Completed Updated: 07/24/19 2138   Narrative:     CT OF THE CHEST WITH INTRAVENOUS CONTRAST. INDICATION: Shortness of breath. Soraya Fey adenocarcinoma with metastatic  disease. COMPARISON: None. TECHNIQUE:   2.5 mm axial scans from above the aortic arch to the lung bases  with 100 cc nonionic intravenous contrast without acute complication. Intravenous contrast was given to evaluate for pulmonary embolism. FINDINGS:  The degree of opacification of the pulmonary arteries is adequate.    No intraluminal filling defects within the pulmonary arterial tree to suggest  pulmonary embolism. Levada Pronto is normal caliber with a uniform lumen without  evidence of dissection. Lungs demonstrate multiple nodules, groundglass  opacities and small pneumonic consolidation in the bases. There is extensive  mediastinal adenopathy which has increased a great deal from the prior exam..   Included portion of the upper abdomen are unremarkable. No gross bony lesions. .   Impression:     IMPRESSION:  Mediastinal adenopathy is increased great deal from the prior exam.  There is patchy airspace disease in both lung bases. Pulmonary metastases. Negative for pulmonary embolism.           Medications:  Current Facility-Administered Medications   Medication Dose Route Frequency    albuterol (PROVENTIL VENTOLIN) nebulizer solution 2.5 mg  2.5 mg Nebulization Q4H PRN    carvedilol (COREG) tablet 3.125 mg  3.125 mg Oral BID WITH MEALS    famotidine (PEPCID) tablet 20 mg  20 mg Oral BID    diphenoxylate-atropine (LOMOTIL) tablet 1 Tab  1 Tab Oral QID PRN    fluticasone (FLOVENT HFA) 110 mcg inhaler (Patient Supplied)  1 Puff Inhalation Q12H    lisinopril (PRINIVIL, ZESTRIL) tablet 10 mg  10 mg Oral DAILY    magic mouthwash (SIMON) oral suspension 5 mL  5 mL Oral Q4H PRN    oxyCODONE IR (ROXICODONE) tablet 5 mg  5 mg Oral Q6H PRN    0.9% sodium chloride infusion  50 mL/hr IntraVENous CONTINUOUS    potassium chloride (K-DUR, KLOR-CON) SR tablet 20 mEq  20 mEq Oral BID    guaiFENesin-dextromethorphan (ROBITUSSIN DM) 100-10 mg/5 mL syrup 5 mL  5 mL Oral Q6H PRN    benzonatate (TESSALON) capsule 100 mg  100 mg Oral TID PRN    HYDROcodone-acetaminophen (HYCET) 0.5-21.7 mg/mL oral solution 5 mg  5 mg Oral Q4H PRN    LORazepam (ATIVAN) injection 1 mg  1 mg IntraVENous Q4H PRN    central line flush (saline) syringe 10 mL  10 mL InterCATHeter PRN    levoFLOXacin (LEVAQUIN) 750 mg in D5W IVPB  750 mg IntraVENous Q24H    prochlorperazine (COMPAZINE) injection 10 mg  10 mg IntraVENous Q6H PRN    morphine injection 2 mg  2 mg IntraVENous Q4H PRN    enoxaparin (LOVENOX) injection 100 mg  100 mg SubCUTAneous Q24H         ASSESSMENT:    Problem List  Date Reviewed: 7/24/2019          Codes Class Noted    Dysphagia ICD-10-CM: R13.10  ICD-9-CM: 787.20  7/25/2019        Intractable vomiting ICD-10-CM: R11.10  ICD-9-CM: 536.2  7/25/2019        Mediastinal lymphadenopathy ICD-10-CM: R59.0  ICD-9-CM: 785.6  6/11/2019        Pulmonary nodules ICD-10-CM: R91.8  ICD-9-CM: 793.19  6/11/2019        Dehydration ICD-10-CM: E86.0  ICD-9-CM: 276.51  4/2/2019        S/P radiation therapy < 4 weeks ago ICD-10-CM: Z92.3  ICD-9-CM: V15.3  10/1/2018        Rectal adenocarcinoma ([facility] ICD-10-CM: C20  ICD-9-CM: 154.1  7/6/2018        Colostomy status ([facility] ICD-10-CM: Z93.3  ICD-9-CM: V44.3  6/29/2018        Rectal obstruction ICD-10-CM: K62.4  ICD-9-CM: 569.2  6/27/2018        Erectile dysfunction due to arterial insufficiency ICD-10-CM: N52.01  ICD-9-CM: 607.84  5/31/2018        Plantar fasciitis, right ICD-10-CM: M72.2  ICD-9-CM: 728.71  5/31/2018        Benign essential HTN ICD-10-CM: I10  ICD-9-CM: 401.1  5/31/2018        IGT (impaired glucose tolerance) ICD-10-CM: R73.02  ICD-9-CM: 790.22  5/30/2018        Hypercholesterolemia ICD-10-CM: E78.00  ICD-9-CM: 272.0  5/30/2018    Overview Signed 6/4/2018  2:20 PM by Neighborhoodsdonovan German     ASCVD 10 year risk is 16.0%. Mod to high dose statin indicated. (based on b/p 180/90, 6/4/18)                 Screening PSA (prostate specific antigen) ICD-10-CM: Z12.5  ICD-9-CM: V76.44  5/30/2018        History of tobacco abuse ICD-10-CM: Z87.891  ICD-9-CM: V15.82  8/18/2016              PLAN:  Dysphagia/vomiting  - GI evaluation. Planning EGD tomorrow (cannot do dilation due to recent avastin). Started pepcid BID  - Continue IV fluids  7/26 Barium swallow today with aspiration and extrinsic soft tissue mass creating filling defect in distal esophagus    Sinusitis/chills  - Continue levaquin. BCx pending. UA negative.  CT chest with no acute findings  7/26 D3 LVQ    Met rectal katherine  - S/p FOLFIRI-Avastin, last given 7/9    Recently on cruise to [facility]. No other sick contacts    Leukopenia/anemia  - Secondary to chemotherapy. Granix x 1 today  7/26 WBC up to 4.5    Lovenox: on hold prior to procedures    Home pending findings/work up by GI    Disposition: Due to acute respiratory distress s/p Barrium Swallow with aspiration, CC team was called to room and he was place don BiPap and when transferred to CCU. IV steroids and Zosyn was added. Family was updated on his status and confirm full code status. CT chest was reviewed with wife that confirms disease progression. Rocío Nicholson NP   [facility]  [address] [employer] [address]  Office : [phone]  Fax : [phone]     Attending Addendum:  Patient seen with NP. Mr Melissa Srinivasan is a 54-year-old man with known rectal adenocarcinoma. He is a patient of Dr. Venancio Snellen. Past medical history includes daily alcohol use, GERD, previous smoking. He progressed on 5-FU maintenance and was recently changed to [facility]. On day of admission, he presented to the cancer center for evaluation prior to cycle 4. Of note, he recently came back from a cruise. He reported fatigue and inability to keep food down. He is admitted for further GI evaluation. Today, he was seen post barium swallow with acute changed in respiratory status. Supplemental O2 increased. STAT CXR/ABG/trop ordered. I discussed the case with ICU extender. Wife updated at bedside. Informed of acute change and next steps in management. Wants him to remain full code. I personally performed a face to face diagnostic evaluation on this patient. My findings are as follows: altered and clearly with increased WOB, pale and diaphoretic,  cachectic and chronically ill appearing, lungs with scattered rhonchi, heart regular, abdomen benign and no LE edema. CT chest without acute changes. Stop IVF.   As for sinusitis - on levofloxacin. Labs reviewed, got Granix 7/25 for chemotherapy related leukopenia. CT scan results  discussed with the pt's wife. C/w supportive care - will escalate care to ICU for BiPAP. Appreciate critical care/ICU recs.   Case d/w Dr Stevie Shine.       I have reviewed and agree with the care plan.                 Carlitos Pollack, [address] [place] [address] Hematology and Oncology  [address] [place] [address]  Office : [phone]  Fax : [phone]

## 2019-07-26 NOTE — PROGRESS NOTES
Pt arrived in ICU and placed on monitors and BiPAP at 40% and 16/8. Alert and oriented. R lung wheezes throughout, L lung clear. No overt distress. R chest port aspirates blood and flushes easily. Zosyn infusing. ST on the monitor in 120s and hemodynamically stable. Dual skin assessment performed with Amada Blevins RN. No pressure wounds, BLE varicosities. Allevyn placed. No evidence of increased WOB while on BiPAP.

## 2019-07-27 NOTE — PROGRESS NOTES
Pt has only put out 275ml of urine so far this shift. Dr. Kathy Bagley notified, orders received for chest xray.

## 2019-07-27 NOTE — PROGRESS NOTES
Nutrition:  Reason for assessment: Consult for TF management per nutritional support protocols. (Dr. [facility])  Assessment:   Diet: DIET NPO    PMH:  Past Medical History:   Diagnosis Date    Colon cancer (HCC)-Rectal Adenocarcinoma     Diabetes ([address]     GERD (gastroesophageal reflux disease)     Hypertension     Iron deficiency anemia due to chronic blood loss 07/26/2018    Lung cancer      Thyroid disease     Tobacco abuse 08/18/2016     Food/Nutrition History: The patient is to remain NPO until a modified barium swallow study can take place to confirm no silent aspiration. The patient is currently with Dobhoff for tube feeding until MBS can be complete. He is currently in the ICU for respiratory distress. FT in place,  Abdomen WNL. Date of Last BM: Colostomy noted with 100 ml output over past 24 hours. Pertinent Medications: Pepcid, Solumedrol, Zosyn  Pertinent labs:  Lab Results   Component Value Date/Time    Sodium 137 07/27/2019 03:13 AM    Potassium 3.6 07/27/2019 03:13 AM    Chloride 100 07/27/2019 03:13 AM    CO2 26 07/27/2019 03:13 AM    Anion gap 11 07/27/2019 03:13 AM    Glucose 159 (H) 07/27/2019 03:13 AM    BUN 9 07/27/2019 03:13 AM    Creatinine 1.16 07/27/2019 03:13 AM    Calcium 8.6 07/27/2019 03:13 AM    Albumin 2.1 (L) 07/27/2019 03:13 AM   NO POC Glucose checks recorded. Patient may benefit from POC Glucose checks and SSI with current dose of solu-medrol. Anthropometrics: Height: 5' 9.5\",  Weight Source: Standing scale (comment), Weight: 59.7 kg (131 lb 9.6 oz), Body mass index is 19.16 kg/m². BMI class of normal weight.   Edema: Trace  Macronutrient needs (based on current BW of 59.7 kg):  EER:  0485-7196 kcal /day (25-30 kcal/kg)  EPR:  60-72 grams protein/day (1-1.2 grams/kg)  Max CHO:  225 grams/day (50% kcal)  Fluid:  1ml/kcal  Intake/Comparative standards: Current NPO status does not meet kcal or protein needs    Nutrition Diagnosis: Difficulty swallowing r/t dysphagia as evidenced by currently awaiting MBS study to determine if patient can safely swallow for oral diet. Intervention:  Meals and snacks: NPO  EN:Start TF of Glucerna 1.2 at 10 ml/hr and increase by 10 ml/hr q 4 hrs as tolerated to goal rate of 60ml/hr with 25ml water q 1hr to provide 1728 kcal/day (100% of needs), 86 grams protein/day (100% of needs), 165 grams CHO/day (does not exceed max CHO),  and ~ 1810 ml free water/day (100% of needs). Nutrition Supplement Therapy: Electrolyte replacement per nutritional support protocols are active on MAR. Discharge nutrition plan: Too soon to determine      Zeeshan Sims [address], [address] [place] [address],  [phone]

## 2019-07-27 NOTE — PROGRESS NOTES
07/27/19 0530   Oxygen Therapy   O2 Sat (%) 99 %   Pulse via Oximetry 93 beats per minute   O2 Device Nasal cannula   O2 Flow Rate (L/min) 3 l/min   CPAP/BIPAP   CPAP/BIPAP Start/Stop Off   Device Mode BIPAP;S/T     Pt removed from BiPAP, placed on NC. RN aware. Will monitor accordingly.

## 2019-07-27 NOTE — PROGRESS NOTES
PROGRESS NOTE    Noah Cook    7/27/2019    Date of Admission:  7/24/2019    The patient's chart is reviewed and the patient is discussed with the staff. Patient is a 48 y.o.  male, PMH Colon cancer, DM, HTN, rectal cancer, MYRON, former tobacco abuse, and GERD seen and evaluated at the request of Dr. Jyotsna Alvarez for acute respiratory distress. The patient was admitted on 7/24/2019 by Oncology for dehydration and rectal adenocarcinoma confirmed to be Stage IV per biopsy. Patient was initiated on 5FU-A and recently switched to [address]. The patient apparently c/o dysphagia and his food getting \"stuck\" at a recent Oncology appointment following a cruise to the [facility]. Today the patient went transported to radiology for a barium swallow which revealed aspiration of contrast as well as extrinsic soft tissue mass creating filling defect in distal esophagus. Patient noted to be wheezing throughout and had to be changed from O2 at 2L NC to 15L HFNC then BIPAP. ABG reveals pH 7.32, CO2 47.2, O2 165, HCO3 24.3. Patient was on Flovent and albuterol at home. Subjective:      Feels much better now off BiPAP on 3LNC       Review of Systems  Pertinent items are noted in HPI.     Patient Active Problem List   Diagnosis Code    History of tobacco abuse Z87.891    IGT (impaired glucose tolerance) R73.02    Hypercholesterolemia E78.00    Screening PSA (prostate specific antigen) Z12.5    Erectile dysfunction due to arterial insufficiency N52.01    Plantar fasciitis, right M72.2    Benign essential HTN I10    Rectal adenocarcinoma (HCC) C20    Rectal obstruction K62.4    Colostomy status (HCC) Z93.3    S/P radiation therapy < 4 weeks ago Z92.3    Dehydration E86.0    Mediastinal lymphadenopathy R59.0    Pulmonary nodules R91.8    Dysphagia R13.10    Intractable vomiting R11.10    Metastatic cancer to lung ([facility] [address] C78.00    Acute respiratory failure (HCC) J96.00    Aspiration into airway T17.908A           Prior to Admission Medications   Prescriptions Last Dose Informant Patient Reported? Taking? DISABLED PLACARD (DISABLED PLACARD) DMV   No No   Sig: Dx. Colon Cancer  An Inability to walk 100 feet nonstop without aggravating existing medical condition   albuterol (PROVENTIL HFA, VENTOLIN HFA, PROAIR HFA) 90 mcg/actuation inhaler 2019 at Unknown time  No Yes   Sig: Take 1 Puff by inhalation every four (4) hours as needed for Wheezing or Shortness of Breath. carvedilol (COREG) 3.125 mg tablet 2019 at Unknown time  No Yes   Sig: Take 1 Tab by mouth two (2) times daily (with meals). cimetidine (TAGAMET) 300 mg tab 2019 at Unknown time  No Yes   Sig: Take 1 Tab by mouth two (2) times a day. diphenoxylate-atropine (LOMOTIL) 2.5-0.025 mg per tablet Not Taking at Unknown time  No No   Sig: One tab 4 times a day as needed for diarrhea   enoxaparin (LOVENOX) 100 mg/mL   No No   Si mg by SubCUTAneous route daily. Indications: blood clot in a deep vein of the extremities   fluticasone propionate (FLOVENT HFA) 110 mcg/actuation inhaler   No No   Sig: Take 1 Puff by inhalation every twelve (12) hours. ibuprofen (MOTRIN IB) 200 mg tablet   Yes No   Sig: Take 400 mg by mouth.   lidocaine-prilocaine (EMLA) topical cream   No No   Sig: Apply  to affected area as needed for Pain. Apply to port 30-45 min prior to port needle stick   lisinopril (PRINIVIL, ZESTRIL) 10 mg tablet   No No   Sig: Take 1 Tab by mouth daily. magic mouthwash solution   No No   Sig: Magic mouth wash   Maalox  Lidocaine 2% viscous   Diphenhydramine oral solution     Pharmacy to mix equal portions of ingredients to a total volume as indicated in the dispense amount.   magic mouthwash solution   No No   Sig: Magic mouth wash   Maalox  Lidocaine 2% viscous   Diphenhydramine oral solution     Pharmacy to mix equal portions of ingredients to a total volume as indicated in the dispense amount.    ondansetron hcl (ZOFRAN) 8 mg tablet   No No   Sig: Take 1 Tab by mouth every eight (8) hours as needed for Nausea. Indications: Prevent Nausea and Vomiting from Cancer Chemotherapy   oxyCODONE IR (ROXICODONE) 5 mg immediate release tablet   No No   Sig: Take 1 Tab by mouth every six (6) hours as needed for Pain for up to 30 days. Max Daily Amount: 20 mg. Take 1-2 tabs every 6 hrs for pain as needed   prochlorperazine (COMPAZINE) 10 mg tablet   No No   Sig: Take 1 Tab by mouth every six (6) hours as needed for Nausea.  Indications: Prevent Nausea and Vomiting from Cancer Chemotherapy      Facility-Administered Medications: None       Past Medical History:   Diagnosis Date    Chest pain 8/18/2016    Colon cancer ([facility] [address]     Diabetes ([facility] [address]     GERD (gastroesophageal reflux disease)     resolved per pt-- prn OTC meds    Hypertension     no current treatment, states Coreg dropped BP too much and is not taking    Iron deficiency anemia due to chronic blood loss 07/26/2018    denies hx of blood transfusion    PONV (postoperative nausea and vomiting)     Rectal cancer ([facility] [address]     Thyroid disease     Tobacco abuse 08/18/2016    Quit 6/28/18--- 1 ppd x 30 yrs     Past Surgical History:   Procedure Laterality Date    FLEXIBLE SIGMOIDOSCOPY N/A 6/27/2018    SIGMOIDOSCOPY FLEXIBLE performed by Andreea Luke MD at [place] [address]  2003    HX VASCULAR ACCESS      VASCULAR SURGERY PROCEDURE UNLIST Right     artery tied off after accident     Social History     Socioeconomic History    Marital status:      Spouse name: Not on file    Number of children: Not on file    Years of education: Not on file    Highest education level: Not on file   Occupational History    Not on file   Social Needs    Financial resource strain: Not on file    Food insecurity:     Worry: Not on file     Inability: Not on file    Transportation needs:     Medical: Not on file     Non-medical: Not on file   Tobacco Use    Smoking status: Former Smoker     Packs/day: 1.00     Years: 30.00     Pack years: 30.00     Types: Cigarettes     Start date: 1988     Last attempt to quit: 2018     Years since quittin.0    Smokeless tobacco: Never Used   Substance and Sexual Activity    Alcohol use:  Yes     Alcohol/week: 14.0 standard drinks     Types: 14 Cans of beer per week    Drug use: No    Sexual activity: Not on file   Lifestyle    Physical activity:     Days per week: Not on file     Minutes per session: Not on file    Stress: Not on file   Relationships    Social connections:     Talks on phone: Not on file     Gets together: Not on file     Attends Religion service: Not on file     Active member of club or organization: Not on file     Attends meetings of clubs or organizations: Not on file     Relationship status: Not on file    Intimate partner violence:     Fear of current or ex partner: Not on file     Emotionally abused: Not on file     Physically abused: Not on file     Forced sexual activity: Not on file   Other Topics Concern     Service Not Asked    Blood Transfusions Not Asked    Caffeine Concern Not Asked    Occupational Exposure Not Asked   Kayce Daubs Hazards Not Asked    Sleep Concern Not Asked    Stress Concern Not Asked    Weight Concern Not Asked    Special Diet Not Asked    Back Care Not Asked    Exercise Not Asked    Bike Helmet Not Asked    Seat Belt Not Asked    Self-Exams Not Asked   Social History Narrative    Not on file     Family History   Problem Relation Age of Onset    Hypertension Mother     Heart Disease Mother      Allergies   Allergen Reactions    Zithromax [Azithromycin] Rash       Current Facility-Administered Medications   Medication Dose Route Frequency    piperacillin-tazobactam (ZOSYN) 4.5 g in 0.9% sodium chloride (MBP/ADV) 100 mL  4.5 g IntraVENous Q8H    methylPREDNISolone (PF) (SOLU-MEDROL) injection 40 mg  40 mg IntraVENous Q8H    fluticasone (FLOVENT HFA) 110 mcg inhaler (Patient Supplied)  2 Puff Inhalation Q12H    albuterol (PROVENTIL VENTOLIN) nebulizer solution 2.5 mg  2.5 mg Nebulization Q4H RT    famotidine (PF) (PEPCID) 20 mg in sodium chloride 0.9% 10 mL injection  20 mg IntraVENous Q12H    albuterol (PROVENTIL VENTOLIN) nebulizer solution 2.5 mg  2.5 mg Nebulization Q4H PRN    carvedilol (COREG) tablet 3.125 mg  3.125 mg Oral BID WITH MEALS    diphenoxylate-atropine (LOMOTIL) tablet 1 Tab  1 Tab Oral QID PRN    lisinopril (PRINIVIL, ZESTRIL) tablet 10 mg  10 mg Oral DAILY    magic mouthwash (SIMON) oral suspension 5 mL  5 mL Oral Q4H PRN    oxyCODONE IR (ROXICODONE) tablet 5 mg  5 mg Oral Q6H PRN    potassium chloride (K-DUR, KLOR-CON) SR tablet 20 mEq  20 mEq Oral BID    guaiFENesin-dextromethorphan (ROBITUSSIN DM) 100-10 mg/5 mL syrup 5 mL  5 mL Oral Q6H PRN    benzonatate (TESSALON) capsule 100 mg  100 mg Oral TID PRN    HYDROcodone-acetaminophen (HYCET) 0.5-21.7 mg/mL oral solution 5 mg  5 mg Oral Q4H PRN    LORazepam (ATIVAN) injection 1 mg  1 mg IntraVENous Q4H PRN    central line flush (saline) syringe 10 mL  10 mL InterCATHeter PRN    prochlorperazine (COMPAZINE) injection 10 mg  10 mg IntraVENous Q6H PRN    morphine injection 2 mg  2 mg IntraVENous Q4H PRN    enoxaparin (LOVENOX) injection 100 mg  100 mg SubCUTAneous Q24H         Objective:     Vitals:    07/27/19 0700 07/27/19 0714 07/27/19 0729 07/27/19 0755   BP: 108/72 119/76 105/75    Pulse: 85 85 78    Resp: 23 26 25    Temp: 97.8 °F (36.6 °C)      SpO2: 99% 97% 96% 95%   Weight:           PHYSICAL EXAM     Constitutional:  the patient is well developed and in no acute distress  EENMT:  Sclera clear, pupils equal, oral mucosa moist  Respiratory: minimal wheezing on the R CTA otherwise on 3LNC O2  Cardiovascular:  RRR without M,G,R  Gastrointestinal: soft and non-tender; with positive bowel sounds. Musculoskeletal: warm without cyanosis.  There is no lower extremity edema.  Skin:  no jaundice or rashes, no wounds   Neurologic: no gross neuro deficits     Psychiatric:  alert and oriented x 3    CXR:      Recent Labs     07/27/19  0313 07/26/19  0301 07/25/19  0809   WBC 10.5 4.5 2.2*   HGB 9.9* 9.3* 9.8*   HCT 31.7* 29.7* 30.5*    382 401     Recent Labs     07/27/19  0313 07/26/19  1055 07/26/19  0301 07/25/19  1653 07/25/19  0809     --  138  --  138   K 3.6  --  3.9 4.0 3.2*     --  103  --  103   *  --  103*  --  106*   CO2 26  --  26  --  29   BUN 9  --  4*  --  5*   CREA 1.16  --  0.95  --  0.95   MG 2.0  --  1.8  --  1.9   CA 8.6  --  8.2*  --  7.9*   TROIQ  --  <0.02*  --   --   --    ALB 2.1*  --  2.0*  --  2.2*   TBILI 0.3  --  0.2  --  0.2   ALT 12  --  9*  --  15   SGOT 13*  --  10*  --  11*     Recent Labs     07/26/19  1110   PHI 7.320*   PCO2I 47.2*   PO2I 165*   HCO3I 24.3     No results for input(s): LCAD, LAC in the last 72 hours.     Assessment:  (Medical Decision Making)     Patient Active Problem List   Diagnosis Code    History of tobacco abuse Z87.891    IGT (impaired glucose tolerance) R73.02    Hypercholesterolemia E78.00    Screening PSA (prostate specific antigen) Z12.5    Erectile dysfunction due to arterial insufficiency N52.01    Plantar fasciitis, right M72.2    Benign essential HTN I10    Rectal adenocarcinoma (HCC) C20    Rectal obstruction K62.4    Colostomy status (HCC) Z93.3    S/P radiation therapy < 4 weeks ago Z92.3    Dehydration E86.0    Mediastinal lymphadenopathy R59.0    Pulmonary nodules R91.8    Dysphagia R13.10    Intractable vomiting R11.10    Metastatic cancer to lung (HCC) C78.00    Acute respiratory failure (HCC) J96.00    Aspiration into airway T17.908A           Plan:  (Medical Decision Making)     Hospital Problems  Date Reviewed: 7/26/2019          Codes Class Noted POA    Metastatic cancer to lung [facility]) (Chronic) ICD-10-CM: C78.00  ICD-9-CM: 197.0  7/26/2019 Yes    Overview Signed 7/26/2019 11:06 AM by Elvia Vargas NP     Per EBUS in June 2019             Acute respiratory failure [facility]) ICD-10-CM: J96.00  ICD-9-CM: 518.81  7/26/2019 Yes    Now off BiPAP and improved    Aspiration into airway ICD-10-CM: T17.908A  ICD-9-CM: 934.9  7/26/2019 Unknown    Per barium swallow- likely needs DHT for nutrition    Dysphagia ICD-10-CM: R13.10  ICD-9-CM: 787.20  7/25/2019 Yes    NPO    Intractable vomiting ICD-10-CM: R11.10  ICD-9-CM: 536.2  7/25/2019 Yes        Rectal adenocarcinoma (HCC) (Chronic) ICD-10-CM: C20  ICD-9-CM: 154.1  7/6/2018 Yes    Chronic, treated by Oncology          --BIPAP PRN  --CXR improved  --Antibiotics for aspiration pneumonia,   --nebulizer treatments as needed and IV steroids      More than 50% of the time documented was spent in face-to-face contact with the patient and in the care of the patient on the floor/unit where the patient is located.     Total CC time spent 30 min      Jonh Napoles MD

## 2019-07-27 NOTE — PROGRESS NOTES
Respiratory Care Services     Policy Number: [id number]    Title: Noninvasive Positive Pressure                         Ventilation, (BIPAP VISION) and            Respironics (V60)    Effective Date: 2005, 2017    Revised Date: 2012, 2014, 2015, 2016   I. Description: Non Invasive ventilation (NIV) is a ventilatory assist technique used as an alternative to endotracheal intubation. NIV is pressure ventilation delivered as BIPAP (Bi-level Positive Airway Pressure) which is a low pressure electronically driven device intended for use as a ventilatory support system for patients who have an intact respiratory drive. The device provides non-invasive ventilatory assistance through the use of a nasal or full face mask. BiPAP  (two levels of ventilatory support) known as inspired positive airway pressure (IPAP) and  positive airway support (EPAP). One level of support can also be delivered which is delivered as EPAP or CPAP which is delivered throughout the respiratory cycle. The aim is to maintain adequate ventilation and minimize the effort of breathing. The BIPAP Vision System and the Respironics V60 are the two systems available for non-invasive ventilation. These devices are also capable of being used for invasive ventilation in the critical care units only. BIPAP Vision: This device uses an electronic pressure control sensing monitor pressure differential in the patient circuit. This feedback allows for adjustment of the flow and pressure output  to assist in inhalation or exhalation through the administration at two distinct levels of positive pressure. During inspiration, the level is variably positive and is always higher than the expiratory level. During exhalation, pressure is variably positive or near ambient. In addition, this device has the ability to compensate for leaks through automatic               adjustment of the trigger threshold.  This capability allows for the application of BIPAP    for mask-applied ventilation assistance. Respironics V60  The Respironics V60 uses Auto-Trak technology to help ensure patient synchrony and therapy acceptance and is designed to address the specific challenges of NIV. By providing auto-adaptive leak compensation, inspiratory triggering, and expiratory cycling, Auto-Trak delivers optimal synchrony in the face of dynamic leak and changing patient demand. The Respironics V60 is designed to include pediatric use and is equipped with several modes, which allows the practitioner to meet the specific needs of the patients. See Appendix 1 for definitions of settings. II. Policy: The administration of non-invasive positive pressure ventilation (NPPV) will be the responsibility of the Respiratory Care Practitioner (RCP). Upon receipt of a physician order, proper patient instruction, set up and monitoring will be provided to ensure patient understanding, compliance and proper utilization of prescribed therapy. A. A patient may be allowed to use their own NPPV machine after having their equipment checked and approved by [employer]. B. A consent and Release for Home Ventilator form must be signed by the patient and witnessed by a health care provider if the patient chooses to use his home ventilator. C. A patient that is being treated for acute respiratory failure and placed on non-invasive ventilation must be placed in a critical care unit, per Medical Director. D.  A patient who is being treated for sleep apnea may be treated on the floors. E.   A patient has the option of using a hospital owned NPPV unit. F.   A patient may use a hospital unit and their own mask if they choose. G. Patients may be placed on full face mask with NPPV units on the hospital floors            under the following conditions:  1. Patient has an end stage disease process.   2. Patient was placed on full face mask and NPPV unit in the Critical Care Units and it has been established as safe and effective therapy. 3. Patient is ordered full face mask with NPPV unit for home use. 4. In the event patient is to be set up or transitioned to home on a NPPV unit, the Respironics V60 (in the AVAPS mode) shall be utilized while the patient is in the hospital. If a Jevon Amato is unavailable, a home NPPV unit may be provided by the DME provider for no more than 48 hours. III. Responsibility: Director, Christine Woodson, and all Respiratory Care          Practitioners with documented competency. IV. Indications for non-invasive ventilation:  A. Acute respiratory failure (Patient must be in Critical Care Unit)  B. Chronic respiratory failure  C. Alveolar hypoventilation  D. Documented sleep apnea  V. Contraindications:  A. Patients with severe respiratory failure without a spontaneous respiratory drive  B. Noninvasive ventilation may be contraindicated for patients with the followin. Inability to maintain a patent airway or adequately clear secretions  2. Acute sinusitis or otitis media  3. Risk for aspiration of gastric contents  4. Hypotension  5. Pre-existing pneumothorax or pneumomediastinum  6. Epistaxis  7. Recent facial, oral or skull surgery or trauma  8. history of allergy or sensitivity to mask materials where the risk from allergic reaction outweighs the benefit of ventilatory assistance  C. Potential Complications:  1. Cardiovascular compromise  2. Skin breakdown and discomfort from mask  3. Gastric distention  4. Increased intracranial pressure  5. Pulmonary barotraumas    VI. Mask fit  A. It is essential that the patient be correctly fitted with an appropriate size mask. 1. Choose mask according to sizing template on disposable setups.   2.  The mask should fit comfortably on the patients nose,  not occluding the            nares and the base of the mask should fit comfortably between the chin and bottom lip see picture on setup guide. 3.  Headgear should fit comfortably not tight. The bottom edge of the headgear        should sit at the base of the nape of the neck with side straps underneath the        earlobes. 4. The face masks are better for patients who are dyspneic and tachypneic as            they tend to mouth breathe with a nasal mask and reduce the effectiveness          of the ventilation. 5. Masks that are too tight are uncomfortable and cause pressure areas and              masks that are too loose leak which reduce the efficiency of the system. 6. When using a nasal mask the patient must keep their mouth closed to obtain       the desired effect of the ventilation. 7. Place an Allevyn Gentle Border dressing over bridge of nose to avoid skin          breakdown. VII. Equipment for BIPAP Vision  A. BIPAP Vision Ventilatory Support Unit  B. BIPAP Vision disposable circuit with proximal pressure and exhalation port. C. Main flow bacterial filter  D. Nasal or face mask and disposable head strap  F. Smooth inner lumen tubing for connecting the humidifier system to the BIPAP unit. G. Pulse oximetry equipment and supplies  H. Oxygen analyzer with circuit adapter  I. Device specific humidification system which must be used when using BIPAP. VIII. Procedure for Non-invasive ventilation via BIPAP Vision:    A. BIPAP Vision Set Up  1. Assemble the circuit with exhalation port proximal to the patient. 2.  A bacterial filter and oxygen analyzer should be place between the                             machines patient interface port and the patient circuit. If using the oxygen               module, connect to a 50 psi oxygen source. 3. Attach humidifier to the system. 4. Plug electrical cord in AC outlet. Press START on the back of the machine.    5. The Vision will perform a self-test as indicated by the display screen,                        System Self-Test in Progress.   6. Perform the Test Exhalation Port second button from top, left of screen. a) Insure that whistle port and pressure tubing are in line. b) Occlude end of whistle port at end of tubing throughout the test.  c) Press START TEST, top button right screen; this tests the leak of the            circuit. 7. Assess appropriateness of physicians orders and set ventilatory parameters accordingly. Initial settings as well as changes to ventilatory parameters must be accompanied by a physician order. 8. Select the proper mode by first selecting the mode button at the bottom of screen. a) Choose CPAP or ST mode, top button, right side of screen per  order. b) Activate view mode by pressing the [facility] button, bottom right of screen. 9. Select the parameters button below the screen. a) Choose a parameter from the left and right sides of screen. Press the soft button for the parameter of choice. Once it is highlighted, spin knob clockwise to increase value, and counter clockwise to decrease value in the parameter block. Repress the button for that particular parameter to activate the new value. b) Consult the BIPAP Vision Ventilatory Support System Clinical Manual for specific information on the modes of operation and set parameters. 10.   Select alarms button, below screen. Set values for Hi Pressure, Lo Pressure, Lo      Pressure Delay, Apnea, Lo Min Vent, Hi Rate, Lo Rate as appropriate for patient. B. Post Procedure -Cleaning and Sterilization for the BIPAP Vision  1. Before cleaning the unit, turn the Start/Stop switch to the Stop position and unplug the electrical cord from the wall and from the rear of the unit. 2. Clean the front panel with water or 70% Isopropyl Alcohol only. Do not immerse the Vision unit in water. 3. Clean the exterior of the enclosure with a hospital approved disinfectant solution. Do not allow any liquid to enter the inside of the ventilator.   4. Refer to the BIPAP Vision Ventilatory Support System Clinical Manual, Chapter 15 Cleaning and Routine Maintenance and Replacing the [facility]. IX. Procedure for non-invasive ventilation using the V60:  A. Set up machine circuit using disposable mask and circuit setups only. 1.  Ensure that there is always an exhalation port at the base of the mask for C02 to be . 2. Set mode and settings as per physician orders. 3.  See Appendix 1 for definitions of all modes, usual and alarm settings. 4. Set Alarms on machine. See usual alarm settings in appendix. 5.  Turn on machine and gently hold mask over nose/face until patient becomes accustomed to the airflow. 6. Attach the head strap. 7. Stay with patient until the 02 saturations and observations are stable. B. Monitor patients response for:   1. Decreased Heart rate, respiratory rate, and blood pressure. 2. Decreased sweating   3. Decreased work of breathing (as per baseline observations)   4. Patient feels more comfortable  5. Patient finds it easier to breathe     X. Monitoring:  A. While in use, the RCP should check the patient and non-invasive unit no less than every four hours. B. Failure of NIV should be suspected if:   1. The patient is unable to maintain adequate oxygenation, decreasing 02 saturations despite increases in 02.  2. There is a reduction in neurological or conscious state. 3. The patient has excessive secretions or increasing respiratory rate. 4. Failure of PaCO2 or PH to improve on ABG sample. 5. The patient has poorly compliant lungs. XI. Weaning                A. Weaning or cessation of non-invasive ventilation should occur under the following                        conditions:  1. PaC02 returns to normal and patient maintains O2 saturations with minimal                oxygen. 2. Respiratory rate is returned within normal limits. 3. Patient is unable to tolerate non-invasive ventilation.   4. Patients dyspnea is reduced. 5. Patient exhibits normal overnight ventilation. 6. Patient is receiving palliative treatment. XII. Documentation:  A. Documentation should include the followin. Ventilator settings comply with physician order. 2. Ventilator is functioning properly as evidenced by a check of measured volumes, rates, pressures and FIO2. 3. Alarms are set appropriately. 4. Measured inspired gas temperature (invasive mode only)  5. Vital signs (pulse, respiratory rate, oxygen saturation, breath sounds)  6. Patient tolerance to therapy. 7. Manual resuscitator and appropriate size mask at patient bedside      REFERENCES  BIPAP Vision [address],[facility] 280 Therapy and Respiratory Care Section. RAAD Romero 2001. Introducing non-invasive positive pressure ventilation. Nursing Standard. 15,26, 42-45. Diana Skinner. 2003. Using non-invasive ventilation in acute wards: part 2. Nursing Standard. 18,1, 41-44. Trish 47. Use of continuous positive airway pressure (CPAP) in the critically ill-physiological principles. Critical Care [phone]     REBEKA Guo2002. Bi-level positive airway pressure (BiPAP) and acute cardiogenicpulmonary edema   ( ACPO) in the emergency department. Critical Care 15 (2):51-63    JULITO Ryder2002. Non-invasive BiPap-implementation of a new service. Intensive and Critical Care Nursing [phone]     CRYSTAL Villanueva.,et al 2002. Non-invasive ventilation in acute respiratory failure.  [place] [phone]       DEFINITIONS AND USUAL SETTINGS                                                            APPENDIX 1                                                             Settings   Settings in  Active   CPAP Settings in  Active   BiPAP Description Range Usual Setting  Used in NIV         CPAP Mode                     Continuous Positive Airway  Pressure   4-24 cmH20 8-14     ST or  BiPAP MODE                           Spontaneous Timed or BiLevel Positive Airway Pressure Ventilation. Two level system of alternating during non- invasive ventilation in sync with breathing-set IPAP and EPAP      __     __   AVAPS Mode    (only available  on V60)          The volume-targeted AVAPS (average volume-assured pressure support) mode combines the attributes of pressure-controlled and volume-targeted ventilation. __     __       EPAP                                       Positive Airway Pressure. Recruits under ventilated alveoli to remain open during expiration by providing a constant pressure throughout resp. cycle. Must be less than or equal to IPAP    4-25 cmH20 5-14                 Settings Settings in Active CPAP Settings in Active BiPAP Description Range Usual Setting Used in NIV     IPAP                           Inspired Positive Airway Pressure provides pressure throughout the inspiratory phase to support patient ventilation  4-40 cmH20 10-20               I-time                    Inspiratory time- time taken to inspire in seconds  0.3  3.0 sec 1:3   FIO2                   Oxygen delivered  21- 100% As ordered         Ramp time                                      The Ramp Time function helps your patient adapt to ventilation by gradually increasing inspiratory and expiratory pressure over a set interval (minutes). This time gradually delivers pressures so to reduce patient anxiety and increases comfort. Off  or  5-45 minutes 10 minutes                   Rate (RR)          Patients respiratory rate 4- 60 BPM 4     Rise time          Speed at which the inspiratory pressure rises to the set pressure. 1-5  (1 is the fastest) Set at 3                   Patient Data  Data Description Range Usual setting in NIV   Breath phase/trigger Indicator  Bar in left hand corner. Colored according to breath trigger.   n/a n/a   PIP Positive Inspiratory pressure  0-50 n/a   Patient total leak Est. or unintentional leak 0-200 n/a   Patient trigger Patient triggered breaths as a percentage  0-100% Should be 100%   Respiratory rate Respiratory rate 0-90 n/a   Ti/Ttot Inspiratory duty cycle or inspiratory time divided by total cycle time  0-91% n/a   Minute volume Est. minute ventilation. TV x rate=MV  0-99 LPM n/a   Tidal volume Est.  tidal volume  0-3000 ml n/a     Alarms  Alarm Description Range Set  at   [facility] Rate High respiratory rate 5-90 10 breaths above patients breath rate   Low Rate Low respiratory rate alarm 1-89 BPM 5 breaths below patients breath rate   Hi VT High tidal volume alarm 200-2500 ml 200 ml above patients own   Low VT Low tidal volume alarm OFF  1500 ml OFF   HIP High Inspiratory pressure alarm 5-50 cm H20 10 cm above IPAP   LIP Low inspiratory pressure alarm OFF, 1-40 cmH20 OFF   Lo VE Low minute vent alarm OFF, 0.1 to 99L/min OFF   LIP T Low inspiratory delay time 5-60 seconds 5 seconds       CPAP Settings BiPAP Settings     Set CPAP level as ordered Set breath rate as ordered     Set FIO2 as ordered Set I-time as 1.3     Set Ramp time as ordered Set EPAP as ordered     Set C-Flex at 3 Set IPAP as ordered      Set Rise time as ordered      Set FIO2 as ordered                                      Respiratory Care Services  Consent and Release for Home Ventilator      Patient Name: _________________________________________ Room: ___________    SSN: _______________________________           Account Number:  __________________    Use and care of my personal home ventilator, (invasive or non-invasive) mechanical life support device while at [facility] system has been discussed with me by my physician and I have been provided with an opportunity to ask questions which have been answered to my satisfaction. I also understand a respiratory care practitioner is not expected to remain in my room or general vicinity at all times.     It is understood that every precaution consistent with the best medical practice will be taken and I give Respiratory Care Services permission to monitor my ventilator during my hospital stay. I hereby release [employer] system, its agents, employees and medical staff from liability arising from any and all claims, demands, losses and damages to person and/or property as a result of the above mentioned requests. I certify that I have read and understand this consent agreement and release and that I am legally qualified to park this authorization. ___________________  Date    _____________________________________        ________________________  Signature of Patient or Parent (of minor patient,                  Relationship (if other than Patient)  longterm parent) if applicable. Closest Relative,  Guardian or legal Representative    _____________________________________  Witness  Legal representative is defined as person named by the court as a guardian, executor or , or having power of  specifically set forth to authorize this action.     [phone]3/02, 7/14[facility]   Consent and Release for Home Ventilator

## 2019-07-27 NOTE — PROGRESS NOTES
SPEECH LANGUAGE PATHOLOGY: BEDSIDE SWALLOW NOTE: Initial Assessment    NAME/AGE/GENDER: Dorian Sanchez is a 48 y.o. male  DATE: 7/27/2019  PRIMARY DIAGNOSIS: Dysphagia [R13.10]  Intractable vomiting [R11.10]  Procedure(s) (LRB):  ESOPHAGOGASTRODUODENOSCOPY (EGD)/BMI 19 ROOM 503 (N/A)    ICD-10: Treatment Diagnosis: R13.13 pharyngeal phase dysphagia  INTERDISCIPLINARY COLLABORATION: Registered Nurse  PRECAUTIONS/ALLERGIES: Zithromax [azithromycin]   ASSESSMENT:   Based on the objective data described below, Mr. Bebeto Ordoñez presents with a history of esophageal dysphagia  with noted aspiration in barium swallow. Pt with positive signs. sx of aspiration with thin liquids; cough. Pt tolerated nectar thick, pureed, mixed and solid. Per MD recommend NPO until a MBS can be completed secondary to esophageal dysphagia. Pt would benefit from a Modified barium swallow study to rule out aspiration and assess pharyngeal phase dysphagia. Patient will benefit from skilled intervention to address the below impairments. ?????? ? ? This section established at most recent assessment??????????  PROBLEM LIST (Impairments causing functional limitations):  1. dysphagia  REHABILITATION POTENTIAL FOR STATED GOALS: Good  PLAN OF CARE:   Patient will benefit from skilled intervention to address the following impairments. RECOMMENDATIONS AND PLANNED INTERVENTIONS (Benefits and precautions of therapy have been discussed with the patient.):  · NPO with alternative means of nutrition  MEDICATIONS:  · Whole in puree  · Non-oral  ASPIRATION PRECAUTIONS:  · Slow rate of intake  · Small bites/sips  · Upright at 90 degrees during meal  COMPENSATORY STRATEGIES/MODIFICATIONS INCLUDING:  · Cup/sip  OTHER RECOMMENDATIONS (including follow up treatment recommendations):    · Family training/education  · Laryngeal exercises  · .mbs   RECOMMENDED DIET MODIFICATIONS DISCUSSED WITH:  · Nursing  · Family  · Patient  FREQUENCY/DURATION: Continue to follow patient 3 times a week for duration of hospital stay to address above goals. RECOMMENDED REHABILITATION/EQUIPMENT: (at time of discharge pending progress): Due to the probability of continued deficits (see above) this patient will likely need continued skilled speech therapy after discharge. SUBJECTIVE:   \"I cough sometimes in the morning\"  History of Present Injury/Illness: Mr. Rahul Smith  has a past medical history of Chest pain (8/18/2016), Colon cancer ([facility] [address] Diabetes ([facility] [address] GERD (gastroesophageal reflux disease), Hypertension, Iron deficiency anemia due to chronic blood loss (07/26/2018), PONV (postoperative nausea and vomiting), Rectal cancer ([facility] [address] Thyroid disease, and Tobacco abuse (08/18/2016). He also  has a past surgical history that includes flexible sigmoidoscopy (N/A, 6/27/2018); vascular surgery procedure unlist (Right); hx hernia repair (2003); and hx vascular access. Present Symptoms:   Pain Scale 1: Numeric (0 - 10)  Pain Intensity 1: 0  Current Medications:   No current facility-administered medications on file prior to encounter. Current Outpatient Medications on File Prior to Encounter   Medication Sig Dispense Refill    albuterol (PROVENTIL HFA, VENTOLIN HFA, PROAIR HFA) 90 mcg/actuation inhaler Take 1 Puff by inhalation every four (4) hours as needed for Wheezing or Shortness of Breath. 1 Inhaler 2    carvedilol (COREG) 3.125 mg tablet Take 1 Tab by mouth two (2) times daily (with meals). 60 Tab 3    cimetidine (TAGAMET) 300 mg tab Take 1 Tab by mouth two (2) times a day. 60 Tab 2    magic mouthwash solution Magic mouth wash   Maalox  Lidocaine 2% viscous   Diphenhydramine oral solution     Pharmacy to mix equal portions of ingredients to a total volume as indicated in the dispense amount.  480 mL 3    magic mouthwash solution Magic mouth wash   Maalox  Lidocaine 2% viscous   Diphenhydramine oral solution     Pharmacy to mix equal portions of ingredients to a total volume as indicated in the dispense amount. 480 mL 2    fluticasone propionate (FLOVENT HFA) 110 mcg/actuation inhaler Take 1 Puff by inhalation every twelve (12) hours. 1 Inhaler 2    enoxaparin (LOVENOX) 100 mg/mL 100 mg by SubCUTAneous route daily. Indications: blood clot in a deep vein of the extremities 30 Syringe 2    ondansetron hcl (ZOFRAN) 8 mg tablet Take 1 Tab by mouth every eight (8) hours as needed for Nausea. Indications: Prevent Nausea and Vomiting from Cancer Chemotherapy 60 Tab 1    prochlorperazine (COMPAZINE) 10 mg tablet Take 1 Tab by mouth every six (6) hours as needed for Nausea. Indications: Prevent Nausea and Vomiting from Cancer Chemotherapy 60 Tab 1    ibuprofen (MOTRIN IB) 200 mg tablet Take 400 mg by mouth.  lidocaine-prilocaine (EMLA) topical cream Apply  to affected area as needed for Pain. Apply to port 30-45 min prior to port needle stick 30 g 1    oxyCODONE IR (ROXICODONE) 5 mg immediate release tablet Take 1 Tab by mouth every six (6) hours as needed for Pain for up to 30 days. Max Daily Amount: 20 mg. Take 1-2 tabs every 6 hrs for pain as needed 90 Tab 0    lisinopril (PRINIVIL, ZESTRIL) 10 mg tablet Take 1 Tab by mouth daily. 30 Tab 3    DISABLED PLACARD (DISABLED PLACARD) DMV Dx.  Colon Cancer  An Inability to walk 100 feet nonstop without aggravating existing medical condition 1 Each 0    diphenoxylate-atropine (LOMOTIL) 2.5-0.025 mg per tablet One tab 4 times a day as needed for diarrhea 60 Tab 2       Current Dietary Status:      Social History/Home Situation:   Home Environment: Private residence  One/Two Story Residence: One story  Living Alone: No  Support Systems: Family member(s)  Patient Expects to be Discharged to[de-identified] Private residence  Current DME Used/Available at Home: None  OBJECTIVE:   Respiratory Status:  Nasal cannula  3 l/min  CXR Results:  MRI/CT Results  Oral Assessment:  Oral Assessment  Labial: No impairment  Dentition: Natural  Oral Hygiene: adequate  Lingual: No impairment    P.O. Trials:  Patient Position: upright in bed  The patient was given teaspoon amounts of the following:   Consistency Presented: Nectar thick liquid;Puree; Solid;Mixed consistency  How Presented: Spoon;Cup/sip; Self-fed/presented    ORAL PHASE:  Bolus Acceptance: No impairment  Bolus Formation/Control: No impairment  Propulsion: No impairment     Oral Residue: None    PHARYNGEAL PHASE:        Aspiration Signs/Symptoms: Weak cough  Vocal Quality: No impairment           Pharyngeal Phase Characteristics: Suspected pharyngeal residue      Tool Used: Dysphagia Outcome and Severity Scale (NATALY)    Score Comments   Normal Diet  ? 7 With no strategies or extra time needed   Functional Swallow  ? 6 May have mild oral or pharyngeal delay       Mild Dysphagia    ? 5 Which may require one diet consistency restricted (those who demonstrate penetration which is entirely cleared on MBS would be included)   Mild-Moderate Dysphagia  ? 4 With 1-2 diet consistencies restricted       Moderate Dysphagia  ? 3 With 2 or more diet consistencies restricted       Moderately Severe Dysphagia  ? 2 With partial PO strategies (trials with ST only)       Severe Dysphagia  ? 1 With inability to tolerate any PO safely          Score:  Initial:5 Most Recent: X (Date: --)   Interpretation of Tool: The Dysphagia Outcome and Severity Scale (NATALY) is a simple, easy-to-use, 7-point scale developed to systematically rate the functional severity of dysphagia based on objective assessment and make recommendations for diet level, independence level, and type of nutrition.        Payor: BLUE CROSS / Plan: [place] BLUE [facility] / Product Type: PPO /     TREATMENT:    (In addition to Assessment/Re-Assessment sessions the following treatments were rendered)  Assessment/Reassessment only, no treatment provided today  MODALITIES:     ORAL MOTOR  EXERCISES:    Demarcus Lee / Edel Daly EXERCISES:    __________________________________________________________________________________________________  Safety:   After treatment position/precautions:  · RN notified  · Family at bedside  · Upright in Bed  Treatment Assessment:    Progression/Medical Necessity:   · Patient demonstrates good  ·  rehab potential due to higher previous functional level. Compliance with Program/Exercises: Will assess as treatment progresses  Reason for Continuation of Services/Other Comments:  · Patient continues to require skilled intervention due to dysphagia   · .   Recommendations/Intent for next treatment session: \"Treatment next visit will focus on MBS  Total Treatment Duration:  Time In: 0915  Time Out: Arlene DARNELL/ANGEL/SLP

## 2019-07-27 NOTE — ROUTINE PROCESS
Respiratory Care Services     Policy Number: -[id number]    Title: Oxygen Protocol    Effective Date: 01/1996    Revised Date: 06/2013, 02/29/2016, 4/2018, 7/2019    Reviewed Date: 05/2014, 03/2015, 06/2017        I. Policy: The Oxygen Protocol will be initiated for all patients upon written order from physician for administration of oxygen therapy or if a patient is found to have an oxygen saturation of 88% or less. Special consideration: the goal of oxygen therapy for COPD patients is to maintain oxygen saturation between 88% - 92% to comply with GOLD Guidelines. II. Purpose: To provide protocol driven respiratory therapy for the administration of oxygen at concentrations greater than that in ambient air with the intent of treating or preventing the symptoms and manifestations of hypoxia. III. Responsibility: Director Respiratory Care Services, all Respiratory Care Practitioners     IV. Indications:   A. Implement this protocol for patients when physician orders oxygen to be administered or when patient is found to have an oxygen saturation of 88% or less. B. To assure routine monitoring of patient's oxygen saturation b.i.d. and to make appropriate adjustments in accordance with ordered oxygen saturation parameters. C. To assure continuity of respiratory care that meets [facility] Clinical Practice Guidelines and GOLD Guidelines. D. Hb < 8  E. Sickle Cell anemia crisis    V. Assessment:  Assess the following parameters to determine the need to adjust oxygen:  A. Measurement of patient's oxygen saturation via pulse oximetry. B. Observation of patient's color, respiratory effort, and responsiveness. C. Measurement of heart rate and respiratory rate. D. Complete a three-step ambulatory oxygen saturation when ordered. VI. Initiation:  Upon receipt of an order for oxygen, the RCP will:   A.  Verify order in the patient's EMR, which should include the desired oxygen saturation to be maintained. B. The patient shall be placed on oxygen with humidity (except for those oxygen delivery devices that do not require humidity, i.e. venturi masks and non-rebreather masks) as ordered by the physician to achieve the prescribed oxygen saturation. C. In the event that no saturation is specified, a saturation of 90% will be maintained. D. Patients, who are found to have a SaO2 of 88% or less, may be started on supplemental oxygen as described above. E. Patients admitted with cardiac problems/disease shall be maintained at 92% per Chest Committee recommendation. F. The patient will be informed of the \"no smoking policy\" and instructed in the proper use of oxygen therapy. G. Once desired oxygen saturation has been achieved, the RCP will document FIO2 and oxygen saturation in the respiratory section of the patient's EMR. VII. Maintenance:   A. 30-second oxygen saturation check will be taken to maintain the saturation ordered by the physician each day. B. Patients will be assessed each shift and as needed by pulse oximetry to determine if oxygen needs to be decreased, increased or discontinued. C. If changes in FIO2 are indicated, all changes will be documented in the respiratory section of the patient's EMR. D. If no changes in FIO2 are required, the patient's oxygen flow rate and saturation will be recorded in the respiratory section of the patient's EMR. E. Per [facility], patients who are receiving oxygen therapy but are not on oxygen at home, should be weaned off oxygen as soon as possible or when anticipated discharge becomes evident. Aleena Cindy will be discontinued after oxygen saturation has been maintained for 24 hours on room air and documented in the patient's EMR. G. Patients on the Inpatient Rehabilitation area on 9th floor will be exempt from having their oxygen discontinued per protocol.  Oxygen may be weaned but will be changed to prn to meet the needs of the patient when exercising and participating in therapy. H. The goal of oxygen therapy is to maintain patients with a diagnosis of COPD at oxygen saturation between 88% - 92% to comply with GOLD Guidelines. VIII. Safety: RCP will address the following safety issues:  A. Identify patient using the two patient identifiers name and birth date via ID bracelet. B. Perform hand hygiene per hospital policy utilizing Standard Precautions for all patients and following transmission-based isolation as indicated per hospital policy. C. Cardiac patients will be maintained at an oxygen saturation of 92%. D. If a patient's FIO2 requirements necessitate changing oxygen delivery devices to a high concentration of oxygen, documentation indicating the change must be included in the progress notes, as well as in the respiratory flowsheet. E. If a patient has a hemoglobin level <8 mg. RCP will consult physician before discontinuing oxygen. IX. Interventions:   A. RCP will assess patient for signs of respiratory distress or suspicion of CO2 retention. B. An ABG may be obtained for patients exhibiting respiratory distress or sickle cell      crisis. C. An order should be entered into patient's EMR for ABG under per protocol. X. Documentation  A. Document assessment findings in the respiratory section of the patient's EMR. B. Document changes in therapy per protocol in the respiratory orders section and in the care plan section of the patient's EMR. C. Document patient education in the patient education section of the patient's EMR. XI. Reportable Conditions:  Report to the physician immediately:  A. Acute changes in patient's respiratory status. B. An oxygen saturation <85%. C. A change in oxygen delivery device to provide a high concentration of oxygen. XII. Patient Instructions: Review with Patient  A. Purpose of oxygen therapy  B. Proper technique for using oxygen  C.  No smoking policy    Approval: Pulmonary Committee (1-25-96)  Revision: Chest Committee (4-28-05)    L - Respiratory Care Department Policy, Procedure and Protocol Guideline Manual, 1995,         MIRYAM Galvin. L - Therapist Driven Respiratory Care Protocols  A Practitioner's Guide for Criteria-Based       Respiratory Care by Manjit Solis M.D., and MIRYAM Zepeda, BENJI. L - The rationale for therapist-driven protocols: an update. Respiratory Care 1998. N  [facility] Clinical Practice Guidelines.

## 2019-07-27 NOTE — PROGRESS NOTES
Dr Oscar Cruz B wishing to wait until modified barium swallow performed to confirm no silent aspiration is occurring before ordering a diet. 8Fr Dobbhoff inserted to 65cm via R nare. Pt tolerated well. No coughing after initial insertion, air bolus auscultated, O2 saturation at baseline, and clear vocal quality. KUB pending for definitive placement confirmation.

## 2019-07-27 NOTE — PROGRESS NOTES
Bedside shift change report given to [address] (oncoming nurse) by Nato Petit (offgoing nurse). Report included the following information Kardex, Intake/Output, MAR, Recent Results, Med Rec Status and Cardiac Rhythm SR/ST.

## 2019-07-28 NOTE — PROGRESS NOTES
[facility]        Inpatient Hematology / Oncology Progress Note      Admission Date: 2019  3:18 PM  Reason for Admission/Hospital Course: Dysphagia [R13.10]  Intractable vomiting [R11.10]      24 Hour Events:  Afebrile, /75   On return from Barium Swallow , respiratory distress-CC MD and rover to room, initiated BiPap and moved to unit   MS much better, responded well to BiPAP  Barium Swallow: + aspiration  C/O back pain and anxiety/panic attacks last PM  LVQ-D4 for sinusitis  CXR better compared to yesterday     ROS:  Constitutional: Positive for fatigue, weakness, lethargic; negative for fever, chills  CV:  negative for chest pain, palpitations, edema. Respiratory:  +dyspnea-respiratory distress  GI:  +nausea, vomiting, diarrhea. 10 point review of systems is otherwise negative with the exception of the elements mentioned above in the HPI. Allergies   Allergen Reactions    Zithromax [Azithromycin] Rash       OBJECTIVE:  Patient Vitals for the past 8 hrs:   BP Temp Pulse Resp SpO2   19 2129 127/79  87 (!) 36 95 %   19 2100 119/77  83 (!) 37 96 %   19 2029 123/79  84 (!) 38 96 %   19 2018     99 %   19 2000 121/76 98.5 °F (36.9 °C) 94 (!) 41 100 %   19 1929 128/81  96 (!) 36 98 %   19 1900 135/83  91 (!) 37 100 %   19 1829 126/77  98  99 %   19 1629 125/72  99 28 99 %   19 1529 118/78  97 (!) 44 96 %   19 1500 112/66  97 (!) 35 98 %   19 1429 110/69  97 26 98 %   19 1400 126/78  (!) 102 (!) 33 99 %     Temp (24hrs), Av.9 °F (36.6 °C), Min:97.6 °F (36.4 °C), Max:98.5 °F (36.9 °C)    1901 -  0700  In: 125 [I.V.:100]  Out: 125 [Urine:125]    Physical Exam:  Constitutional: Acutely ill male in no respiratory distress, lying comfortably in the hospital bed. Family at bedside. HEENT: Normocephalic and atraumatic.  Oropharynx is clear, mucous membranes are moist. Neck supple     Lymph node   Deferred   Skin Warm and dry. No bruising and no rash noted. No erythema. No pallor. Respiratory Lungs with better BS, no wheezing, no accessory muscle use. CVS Mildly tachycardic rate, regular rhythm and normal S1 and S2. No murmurs, gallops, or rubs. Abdomen Soft, nontender and nondistended, normoactive bowel sounds. No palpable mass. No hepatosplenomegaly. Neuro Grossly nonfocal with no obvious sensory or motor deficits. MSK Normal range of motion in general.  No edema and no tenderness.    Psych Mood appropriate        Labs:      Recent Labs     07/27/19  0313 07/26/19  0301 07/25/19  0809   WBC 10.5 4.5 2.2*   RBC 3.41* 3.14* 3.36*   HGB 9.9* 9.3* 9.8*   HCT 31.7* 29.7* 30.5*   MCV 93.0 94.6 90.8   MCH 29.0 29.6 29.2   MCHC 31.2* 31.3* 32.1   RDW 15.8* 15.7* 15.5*    382 401   GRANS 80* 36* 29*   LYMPH 4* 13 18   MONOS 7 29* 34*   EOS 0* 7 5   BASOS 0 1 1   IG 9* 14* 13*   DF AUTOMATED AUTOMATED AUTOMATED   ANEU 8.5* 1.7 0.6*   ABL 0.4* 0.6 0.4*   ABM 0.7 1.3 0.8   YESI 0.0 0.3 0.1   ABB 0.0 0.0 0.0   AIG 0.9* 0.6* 0.3        Recent Labs     07/27/19  0313 07/26/19  0301 07/25/19  1653 07/25/19  0809    138  --  138   K 3.6 3.9 4.0 3.2*    103  --  103   CO2 26 26  --  29   AGAP 11 9  --  6*   * 103*  --  106*   BUN 9 4*  --  5*   CREA 1.16 0.95  --  0.95   GFRAA >60 >60  --  >60   GFRNA >60 >60  --  >60   CA 8.6 8.2*  --  7.9*   SGOT 13* 10*  --  11*   AP 80 75  --  74   TP 6.6 5.7*  --  6.0*   ALB 2.1* 2.0*  --  2.2*   GLOB 4.5* 3.7*  --  3.8*   AGRAT 0.5* 0.5*  --  0.6*   MG 2.0 1.8  --  1.9         Imaging:  XR CHEST SNGL V [[id number]] Collected: 07/26/19 1150   Order Status: Completed Updated: 07/26/19 1202   Narrative:     Portable chest x-ray    CLINICAL INDICATION: Shortness of breath    FINDINGS: Single AP view the chest compared to a similar exam dated 7/11/2019  shows new reticular nodular interstitial densities throughout both lungs with  more patchy airspace opacity in the right upper lobe. A right-sided chest port  is in place. The cardiac silhouette and mediastinum are stable. Impression:     IMPRESSION: New, diffuse reticular nodular densities throughout the lungs with  patchy opacity in the right upper lobe. Atypical pneumonia or pulmonary edema  should be considered. XR Angie Sherman [[id number]] Collected: 07/26/19 1850   Order Status: Completed Updated: 07/26/19 1026   Narrative:     History: Dysphasia. Stage IV rectal cancer. EXAM: Barium swallow    TECHNIQUE: 1.3 minutes fluoroscopy time is utilized. The patient ingests  effervescent granules followed by thick and thin consistencies of barium. 18  fluoroscopic images are available for review. No comparison    FINDINGS: The exam is markedly limited. The patient is unable to perform the  exam the upright position, and has very limited mobility. There is aspiration of  the thin contrast. Contrast flows through the esophagus into the stomach without  difficulty. There is a hiatal hernia present. Just above the hiatal hernia,  there is a filling defect of the distal esophagus, corresponding to a soft  tissue mass adjacent to the esophagus on the recent CT chest. No definite  gastroesophageal reflux, or ulceration. Impression:     IMPRESSION:  1. Just above the hiatal hernia, there is an extrinsic soft tissue mass creating  a filling defect within the distal esophagus, corresponding to a soft tissue  mass seen on the recent CT chest.  2. Aspiration of oral contrast. Correlation with modified barium swallow  recommended. CT CHEST W CONT [[id number]] Collected: 07/24/19 2133   Order Status: Completed Updated: 07/24/19 2138   Narrative:     CT OF THE CHEST WITH INTRAVENOUS CONTRAST. INDICATION: Shortness of breath. Milbert Garcia adenocarcinoma with metastatic  disease. COMPARISON: None.     TECHNIQUE:   2.5 mm axial scans from above the aortic arch to the lung bases  with 100 cc nonionic intravenous contrast without acute complication. Intravenous contrast was given to evaluate for pulmonary embolism. FINDINGS:  The degree of opacification of the pulmonary arteries is adequate. No intraluminal filling defects within the pulmonary arterial tree to suggest  pulmonary embolism. Margpayton Kirks is normal caliber with a uniform lumen without  evidence of dissection. Lungs demonstrate multiple nodules, groundglass  opacities and small pneumonic consolidation in the bases. There is extensive  mediastinal adenopathy which has increased a great deal from the prior exam..   Included portion of the upper abdomen are unremarkable. No gross bony lesions. .   Impression:     IMPRESSION:  Mediastinal adenopathy is increased great deal from the prior exam.  There is patchy airspace disease in both lung bases. Pulmonary metastases. Negative for pulmonary embolism.           Medications:  Current Facility-Administered Medications   Medication Dose Route Frequency    albuterol (PROVENTIL VENTOLIN) nebulizer solution 2.5 mg  2.5 mg Nebulization Q6H PRN    NUTRITIONAL SUPPORT ELECTROLYTE PRN ORDERS   Does Not Apply PRN    potassium chloride (KLOR-CON) packet for solution 20 mEq  20 mEq Oral BID WITH MEALS    piperacillin-tazobactam (ZOSYN) 4.5 g in 0.9% sodium chloride (MBP/ADV) 100 mL  4.5 g IntraVENous Q8H    methylPREDNISolone (PF) (SOLU-MEDROL) injection 40 mg  40 mg IntraVENous Q8H    fluticasone (FLOVENT HFA) 110 mcg inhaler (Patient Supplied)  2 Puff Inhalation Q12H    famotidine (PF) (PEPCID) 20 mg in sodium chloride 0.9% 10 mL injection  20 mg IntraVENous Q12H    carvedilol (COREG) tablet 3.125 mg  3.125 mg Oral BID WITH MEALS    diphenoxylate-atropine (LOMOTIL) tablet 1 Tab  1 Tab Oral QID PRN    lisinopril (PRINIVIL, ZESTRIL) tablet 10 mg  10 mg Oral DAILY    magic mouthwash (SIMON) oral suspension 5 mL  5 mL Oral Q4H PRN    oxyCODONE IR (ROXICODONE) tablet 5 mg  5 mg Oral Q6H PRN    guaiFENesin-dextromethorphan (ROBITUSSIN DM) 100-10 mg/5 mL syrup 5 mL  5 mL Oral Q6H PRN    benzonatate (TESSALON) capsule 100 mg  100 mg Oral TID PRN    HYDROcodone-acetaminophen (HYCET) 0.5-21.7 mg/mL oral solution 5 mg  5 mg Oral Q4H PRN    LORazepam (ATIVAN) injection 1 mg  1 mg IntraVENous Q4H PRN    central line flush (saline) syringe 10 mL  10 mL InterCATHeter PRN    prochlorperazine (COMPAZINE) injection 10 mg  10 mg IntraVENous Q6H PRN    morphine injection 2 mg  2 mg IntraVENous Q4H PRN    enoxaparin (LOVENOX) injection 100 mg  100 mg SubCUTAneous Q24H         ASSESSMENT:    Problem List  Date Reviewed: 7/26/2019          Codes Class Noted    Metastatic cancer to lung [facility]) (Chronic) ICD-10-CM: C78.00  ICD-9-CM: 197.0  7/26/2019    Overview Signed 7/26/2019 11:06 AM by Katarina Sanchez NP     Per EBUS in June 2019             Acute respiratory failure ([facility].) ICD-10-CM: J96.00  ICD-9-CM: 518.81  7/26/2019        Aspiration into airway ICD-10-CM: H43.797T  ICD-9-CM: 934.9  7/26/2019        Dysphagia ICD-10-CM: R13.10  ICD-9-CM: 787.20  7/25/2019        Intractable vomiting ICD-10-CM: R11.10  ICD-9-CM: 536.2  7/25/2019        Mediastinal lymphadenopathy ICD-10-CM: R59.0  ICD-9-CM: 785.6  6/11/2019        Pulmonary nodules ICD-10-CM: R91.8  ICD-9-CM: 793.19  6/11/2019        Dehydration ICD-10-CM: E86.0  ICD-9-CM: 276.51  4/2/2019        S/P radiation therapy < 4 weeks ago ICD-10-CM: Z92.3  ICD-9-CM: V15.3  10/1/2018        Rectal adenocarcinoma ([facility] (Chronic) ICD-10-CM: C20  ICD-9-CM: 154.1  7/6/2018        Col[facility] status ([facility] ICD-10-CM: Z93.3  ICD-9-CM: V44.3  6/29/2018        Rectal obstruction ICD-10-CM: K62.4  ICD-9-CM: 569.2  6/27/2018        Erectile dysfunction due to arterial insufficiency ICD-10-CM: N52.01  ICD-9-CM: 607.84  5/31/2018        Plantar fasciitis, right ICD-10-CM: M72.2  ICD-9-CM: 728.71  5/31/2018        Benign essential HTN ICD-10-CM: I10  ICD-9-CM: 401.1 5/31/2018        IGT (impaired glucose tolerance) ICD-10-CM: R73.02  ICD-9-CM: 790.22  5/30/2018        Hypercholesterolemia ICD-10-CM: E78.00  ICD-9-CM: 272.0  5/30/2018    Overview Signed 6/4/2018  2:20 PM by Coleen Burks     ASCVD 10 year risk is 16.0%. Mod to high dose statin indicated. (based on b/p 180/90, 6/4/18)                 Screening PSA (prostate specific antigen) ICD-10-CM: Z12.5  ICD-9-CM: V76.44  5/30/2018        History of tobacco abuse ICD-10-CM: Z87.891  ICD-9-CM: V15.82  8/18/2016              PLAN:  Dysphagia/vomiting  - GI evaluation. Planning EGD tomorrow (cannot do dilation due to recent avastin). Started pepcid BID  - Continue IV fluids  7/26 Barium swallow today with aspiration and extrinsic soft tissue mass creating filling defect in distal esophagus  7/27 will reach out to XRT to see if this area amenable to radiation     Respiratory distress   7/26/19 Due to acute respiratory distress s/p Barrium Swallow with aspiration, CC team was called to room and he was place don BiPap and when transferred to CCU. IV steroids and Zosyn was added. Family was updated on his status and confirm full code status. CT chest was reviewed with wife that confirms disease progression. 7/27 much improved after BiPAP, remains in the ICU     Sinusitis/chills  - Continue levaquin. BCx pending. UA negative. CT chest with no acute findings  7/26 D3 LVQ    Met rectal katherine  - S/p FOLFIRI-Avastin, last given 7/9    Recently on cruise to [place]. No other sick contacts    Leukopenia/anemia  - Secondary to chemotherapy.  Granix x 1 today  7/26 WBC up to 4.5    Lovenox: on hold prior to procedures    Home pending findings/work up by KAYLEIGH Penn MD   [address] [place] [address] Hematology & Oncology  [address] [employer] [address]  Office : [phone]  Fax : [phone]

## 2019-07-28 NOTE — PROGRESS NOTES
TRANSFER - OUT REPORT:    Verbal report given to [facility] RN(name) on Wilhelm Homans  being transferred to [facility](unit) for routine progression of care       Report consisted of patients Situation, Background, Assessment and   Recommendations(SBAR). Information from the following report(s) SBAR, Kardex, Procedure Summary, Intake/Output, Recent Results and Cardiac Rhythm Sinus Rhythm was reviewed with the receiving nurse. Lines:   Venous Access Device 8Fr Angiodynaics Right Upper Chest Port 10/29/18 Upper chest (subclavicular area, right (Active)   Central Line Being Utilized Yes 7/28/2019  7:00 AM   Criteria for Appropriate Use Long term IV/antibiotic administration 7/28/2019  7:00 AM   Site Assessment Clean, dry, & intact 7/28/2019  7:00 AM   Date of Last Dressing Change 07/24/19 7/26/2019  7:14 PM   Dressing Status Clean, dry, & intact 7/28/2019  7:00 AM   Dressing Type Disk with Chlorhexadine gluconate (CHG); Transparent;Tape 7/28/2019  7:00 AM   Action Taken Other (comment) 7/26/2019  8:52 AM   Date Accessed (Medial Site) 07/24/19 7/26/2019  8:50 AM   Access Time (Medial Site) 1710 7/17/2019  5:10 PM   Access Needle Size (Site #1) 20 G 7/26/2019 12:18 PM   Access Needle Length (Medial Site) 0.75 inches 7/26/2019 12:18 PM   Positive Blood Return (Medial Site) Yes 7/28/2019  7:00 AM   Action Taken (Medial Site) Flushed; Infusing 7/28/2019  7:00 AM   Date Needle Changed (Medial Site) 01/30/19 2/1/2019  4:53 PM   Positive Blood Return (Lateral Site) Yes 7/26/2019 12:18 PM   Action Taken (Lateral Site) Infusing 7/26/2019 12:18 PM   Alcohol Cap Used No 7/27/2019  8:00 PM        Opportunity for questions and clarification was provided.       Patient transported with:   O2 @ 3 liters  RN  Home albuterol inhaler

## 2019-07-28 NOTE — PROGRESS NOTES
Bedside, Verbal and Written shift change report given to Cabrera Jones3 (oncoming nurse) by Nicholas Arthur (offgoing nurse). Report included the following information SBAR, Kardex, Intake/Output, MAR, Recent Results and Cardiac Rhythm Sinus Rhythm.

## 2019-07-28 NOTE — PROGRESS NOTES
Problem: Falls - Risk of  Goal: *Absence of Falls  Outcome: Progressing Towards Goal  Note:   Fall Risk Interventions:  Mobility Interventions: Assess mobility with egress test, Communicate number of staff needed for ambulation/transfer, Strengthening exercises (ROM-active/passive), Patient to call before getting OOB, PT Consult for mobility concerns         Medication Interventions: Evaluate medications/consider consulting pharmacy, Patient to call before getting OOB    Elimination Interventions: Call light in reach, Patient to call for help with toileting needs, Toileting schedule/hourly rounds    History of Falls Interventions: Door open when patient unattended, Evaluate medications/consider consulting pharmacy, Room close to nurse's station

## 2019-07-28 NOTE — PROGRESS NOTES
No change in assessment. VSS. Continues to have periodic non productive cough. NPO. Tolerating tube feeding.

## 2019-07-28 NOTE — PROGRESS NOTES
Bedside shift change report given to [facility] (oncoming nurse) by Christa Woodson (offgoing nurse). Report included the following information Kardex, Intake/Output, MAR, Recent Results, Med Rec Status and Cardiac Rhythm SR. Alert and oriented x4 on 4 LNC. NG tube, tolerating TF. Uses urinal.  Turns self in bed. Non productive cough. Right chest port, capped. NO DTIs/No pressure ulcers observed.

## 2019-07-28 NOTE — PROGRESS NOTES
PROGRESS NOTE    Rubén Hall    7/28/2019    Date of Admission:  7/24/2019    The patient's chart is reviewed and the patient is discussed with the staff. Patient is a 48 y.o.  male, PMH Colon cancer, DM, HTN, rectal cancer, MYRON, former tobacco abuse, and GERD seen and evaluated at the request of Dr. Pavithra Carbajal for acute respiratory distress. The patient was admitted on 7/24/2019 by Oncology for dehydration and rectal adenocarcinoma confirmed to be Stage IV per biopsy. Patient was initiated on 5FU-A and recently switched to [address]. The patient apparently c/o dysphagia and his food getting \"stuck\" at a recent Oncology appointment following a cruise to the [facility]. Today the patient went transported to radiology for a barium swallow which revealed aspiration of contrast as well as extrinsic soft tissue mass creating filling defect in distal esophagus. Patient noted to be wheezing throughout and had to be changed from O2 at 2L NC to 15L HFNC then BIPAP. ABG reveals pH 7.32, CO2 47.2, O2 165, HCO3 24.3. Patient was on Flovent and albuterol at home. Subjective:      Feels much better now off BiPAP on 3LNC       Review of Systems  Pertinent items are noted in HPI.     Patient Active Problem List   Diagnosis Code    History of tobacco abuse Z87.891    IGT (impaired glucose tolerance) R73.02    Hypercholesterolemia E78.00    Screening PSA (prostate specific antigen) Z12.5    Erectile dysfunction due to arterial insufficiency N52.01    Plantar fasciitis, right M72.2    Benign essential HTN I10    Rectal adenocarcinoma (HCC) C20    Rectal obstruction K62.4    Colostomy status (HCC) Z93.3    S/P radiation therapy < 4 weeks ago Z92.3    Dehydration E86.0    Mediastinal lymphadenopathy R59.0    Pulmonary nodules R91.8    Dysphagia R13.10    Intractable vomiting R11.10    Metastatic cancer to lung ([facility] [address] C78.00    Acute respiratory failure (HCC) J96.00    Aspiration into airway T17.908A           Prior to Admission Medications   Prescriptions Last Dose Informant Patient Reported? Taking? DISABLED PLACARD (DISABLED PLACARD) DMV   No No   Sig: Dx. Colon Cancer  An Inability to walk 100 feet nonstop without aggravating existing medical condition   albuterol (PROVENTIL HFA, VENTOLIN HFA, PROAIR HFA) 90 mcg/actuation inhaler 2019 at Unknown time  No Yes   Sig: Take 1 Puff by inhalation every four (4) hours as needed for Wheezing or Shortness of Breath. carvedilol (COREG) 3.125 mg tablet 2019 at Unknown time  No Yes   Sig: Take 1 Tab by mouth two (2) times daily (with meals). cimetidine (TAGAMET) 300 mg tab 2019 at Unknown time  No Yes   Sig: Take 1 Tab by mouth two (2) times a day. diphenoxylate-atropine (LOMOTIL) 2.5-0.025 mg per tablet Not Taking at Unknown time  No No   Sig: One tab 4 times a day as needed for diarrhea   enoxaparin (LOVENOX) 100 mg/mL   No No   Si mg by SubCUTAneous route daily. Indications: blood clot in a deep vein of the extremities   fluticasone propionate (FLOVENT HFA) 110 mcg/actuation inhaler   No No   Sig: Take 1 Puff by inhalation every twelve (12) hours. ibuprofen (MOTRIN IB) 200 mg tablet   Yes No   Sig: Take 400 mg by mouth.   lidocaine-prilocaine (EMLA) topical cream   No No   Sig: Apply  to affected area as needed for Pain. Apply to port 30-45 min prior to port needle stick   lisinopril (PRINIVIL, ZESTRIL) 10 mg tablet   No No   Sig: Take 1 Tab by mouth daily. magic mouthwash solution   No No   Sig: Magic mouth wash   Maalox  Lidocaine 2% viscous   Diphenhydramine oral solution     Pharmacy to mix equal portions of ingredients to a total volume as indicated in the dispense amount.   magic mouthwash solution   No No   Sig: Magic mouth wash   Maalox  Lidocaine 2% viscous   Diphenhydramine oral solution     Pharmacy to mix equal portions of ingredients to a total volume as indicated in the dispense amount.    ondansetron hcl (ZOFRAN) 8 mg tablet   No No   Sig: Take 1 Tab by mouth every eight (8) hours as needed for Nausea. Indications: Prevent Nausea and Vomiting from Cancer Chemotherapy   oxyCODONE IR (ROXICODONE) 5 mg immediate release tablet   No No   Sig: Take 1 Tab by mouth every six (6) hours as needed for Pain for up to 30 days. Max Daily Amount: 20 mg. Take 1-2 tabs every 6 hrs for pain as needed   prochlorperazine (COMPAZINE) 10 mg tablet   No No   Sig: Take 1 Tab by mouth every six (6) hours as needed for Nausea.  Indications: Prevent Nausea and Vomiting from Cancer Chemotherapy      Facility-Administered Medications: None       Past Medical History:   Diagnosis Date    Chest pain 8/18/2016    Colon cancer ([facility] [address]     Diabetes ([facility] [address]     GERD (gastroesophageal reflux disease)     resolved per pt-- prn OTC meds    Hypertension     no current treatment, states Coreg dropped BP too much and is not taking    Iron deficiency anemia due to chronic blood loss 07/26/2018    denies hx of blood transfusion    PONV (postoperative nausea and vomiting)     Rectal cancer ([facility] [address]     Thyroid disease     Tobacco abuse 08/18/2016    Quit 6/28/18--- 1 ppd x 30 yrs     Past Surgical History:   Procedure Laterality Date    FLEXIBLE SIGMOIDOSCOPY N/A 6/27/2018    SIGMOIDOSCOPY FLEXIBLE performed by Luz Maria Ayala MD at . [facility]  2003    HX VASCULAR ACCESS      VASCULAR SURGERY PROCEDURE UNLIST Right     artery tied off after accident     Social History     Socioeconomic History    Marital status:      Spouse name: Not on file    Number of children: Not on file    Years of education: Not on file    Highest education level: Not on file   Occupational History    Not on file   Social Needs    Financial resource strain: Not on file    Food insecurity:     Worry: Not on file     Inability: Not on file    Transportation needs:     Medical: Not on file     Non-medical: Not on file   Tobacco Use    Smoking status: Former Smoker     Packs/day: 1.00     Years: 30.00     Pack years: 30.00     Types: Cigarettes     Start date: 1988     Last attempt to quit: 2018     Years since quittin.0    Smokeless tobacco: Never Used   Substance and Sexual Activity    Alcohol use:  Yes     Alcohol/week: 14.0 standard drinks     Types: 14 Cans of beer per week    Drug use: No    Sexual activity: Not on file   Lifestyle    Physical activity:     Days per week: Not on file     Minutes per session: Not on file    Stress: Not on file   Relationships    Social connections:     Talks on phone: Not on file     Gets together: Not on file     Attends Nondenominational service: Not on file     Active member of club or organization: Not on file     Attends meetings of clubs or organizations: Not on file     Relationship status: Not on file    Intimate partner violence:     Fear of current or ex partner: Not on file     Emotionally abused: Not on file     Physically abused: Not on file     Forced sexual activity: Not on file   Other Topics Concern     Service Not Asked    Blood Transfusions Not Asked    Caffeine Concern Not Asked    Occupational Exposure Not Asked   Jeananne Starch Hazards Not Asked    Sleep Concern Not Asked    Stress Concern Not Asked    Weight Concern Not Asked    Special Diet Not Asked    Back Care Not Asked    Exercise Not Asked    Bike Helmet Not Asked    Seat Belt Not Asked    Self-Exams Not Asked   Social History Narrative    Not on file     Family History   Problem Relation Age of Onset    Hypertension Mother     Heart Disease Mother      Allergies   Allergen Reactions    Zithromax [Azithromycin] Rash       Current Facility-Administered Medications   Medication Dose Route Frequency    albuterol (PROVENTIL VENTOLIN) nebulizer solution 2.5 mg  2.5 mg Nebulization Q6H PRN    NUTRITIONAL SUPPORT ELECTROLYTE PRN ORDERS   Does Not Apply PRN    potassium chloride (KLOR-CON) packet for solution 20 mEq  20 mEq Oral BID WITH MEALS    piperacillin-tazobactam (ZOSYN) 4.5 g in 0.9% sodium chloride (MBP/ADV) 100 mL  4.5 g IntraVENous Q8H    methylPREDNISolone (PF) (SOLU-MEDROL) injection 40 mg  40 mg IntraVENous Q8H    fluticasone (FLOVENT HFA) 110 mcg inhaler (Patient Supplied)  2 Puff Inhalation Q12H    famotidine (PF) (PEPCID) 20 mg in sodium chloride 0.9% 10 mL injection  20 mg IntraVENous Q12H    carvedilol (COREG) tablet 3.125 mg  3.125 mg Oral BID WITH MEALS    diphenoxylate-atropine (LOMOTIL) tablet 1 Tab  1 Tab Oral QID PRN    lisinopril (PRINIVIL, ZESTRIL) tablet 10 mg  10 mg Oral DAILY    magic mouthwash (SIMON) oral suspension 5 mL  5 mL Oral Q4H PRN    oxyCODONE IR (ROXICODONE) tablet 5 mg  5 mg Oral Q6H PRN    guaiFENesin-dextromethorphan (ROBITUSSIN DM) 100-10 mg/5 mL syrup 5 mL  5 mL Oral Q6H PRN    benzonatate (TESSALON) capsule 100 mg  100 mg Oral TID PRN    HYDROcodone-acetaminophen (HYCET) 0.5-21.7 mg/mL oral solution 5 mg  5 mg Oral Q4H PRN    LORazepam (ATIVAN) injection 1 mg  1 mg IntraVENous Q4H PRN    central line flush (saline) syringe 10 mL  10 mL InterCATHeter PRN    prochlorperazine (COMPAZINE) injection 10 mg  10 mg IntraVENous Q6H PRN    morphine injection 2 mg  2 mg IntraVENous Q4H PRN    enoxaparin (LOVENOX) injection 100 mg  100 mg SubCUTAneous Q24H         Objective:     Vitals:    07/28/19 0730 07/28/19 0800 07/28/19 0830 07/28/19 0900   BP: 135/80 114/76 139/72 124/77   Pulse: 91 95 95 97   Resp: 23 (!) 32 (!) 33 (!) 34   Temp:       SpO2: 94% 96% 97% 96%   Weight:           PHYSICAL EXAM     Constitutional:  the patient is well developed and in no acute distress  EENMT:  Sclera clear, pupils equal, oral mucosa moist  Respiratory: minimal wheezing on the R CTA otherwise on 3LNC O2  Cardiovascular:  RRR without M,G,R  Gastrointestinal: soft and non-tender; with positive bowel sounds. Musculoskeletal: warm without cyanosis.  There is no lower extremity edema.  Skin:  no jaundice or rashes, no wounds   Neurologic: no gross neuro deficits     Psychiatric:  alert and oriented x 3    CXR:      Recent Labs     07/28/19  0519 07/27/19  0313 07/26/19  0301   WBC 27.6* 10.5 4.5   HGB 10.0* 9.9* 9.3*   HCT 32.0* 31.7* 29.7*   * 398 382     Recent Labs     07/28/19  0519 07/27/19  0313 07/26/19  1055 07/26/19  0301    137  --  138   K 3.7 3.6  --  3.9    100  --  103   * 159*  --  103*   CO2 27 26  --  26   BUN 19 9  --  4*   CREA 1.13 1.16  --  0.95   MG 2.4 2.0  --  1.8   CA 8.9 8.6  --  8.2*   TROIQ  --   --  <0.02*  --    ALB 2.1* 2.1*  --  2.0*   TBILI 0.2 0.3  --  0.2   ALT 9* 12  --  9*   SGOT 17 13*  --  10*     Recent Labs     07/26/19  1110   PHI 7.320*   PCO2I 47.2*   PO2I 165*   HCO3I 24.3     No results for input(s): LCAD, LAC in the last 72 hours.     Assessment:  (Medical Decision Making)     Patient Active Problem List   Diagnosis Code    History of tobacco abuse Z87.891    IGT (impaired glucose tolerance) R73.02    Hypercholesterolemia E78.00    Screening PSA (prostate specific antigen) Z12.5    Erectile dysfunction due to arterial insufficiency N52.01    Plantar fasciitis, right M72.2    Benign essential HTN I10    Rectal adenocarcinoma ([facility] [address] C20    Rectal obstruction K62.4    Colostomy status (HCC) Z93.3    S/P radiation therapy < 4 weeks ago Z92.3    Dehydration E86.0    Mediastinal lymphadenopathy R59.0    Pulmonary nodules R91.8    Dysphagia R13.10    Intractable vomiting R11.10    Metastatic cancer to lung ([facility] [address] C78.00    Acute respiratory failure (HCC) J96.00    Aspiration into airway T17.908A           Plan:  (Medical Decision Making)     Hospital Problems  Date Reviewed: 7/26/2019          Codes Class Noted POA    Metastatic cancer to lung [facility]) (Chronic) ICD-10-CM: C78.00  ICD-9-CM: 197.0  7/26/2019 Yes    Overview Signed 7/26/2019 11:06 AM by Kyler Mcleod NP     Per EBUS in June 2019 Acute respiratory failure ([facility] [address] ICD-10-CM: J96.00  ICD-9-CM: 518.81  7/26/2019 Yes    Now off BiPAP and improved    Aspiration into airway ICD-10-CM: T17.908A  ICD-9-CM: 934.9  7/26/2019 Unknown    Per barium swallow- likely needs DHT for nutrition    Dysphagia ICD-10-CM: R13.10  ICD-9-CM: 787.20  7/25/2019 Yes    NPO    Intractable vomiting ICD-10-CM: R11.10  ICD-9-CM: 536.2  7/25/2019 Yes        Rectal adenocarcinoma (HCC) (Chronic) ICD-10-CM: C20  ICD-9-CM: 154.1  7/6/2018 Yes    Chronic, treated by Oncology          --CXR improved  --Antibiotics for aspiration pneumonia,   --nebulizer treatments as needed and IV steroids  --NG DHT in place  --For MBS  --Continue tube feeds for now  --Once cleared with swallowing by SLP, start feeding PO with DHT in place. If esophageal extrinsic compression is asymptomatic and patient can eat without vomiting or regurgitation- remove DHT. Discussed with him and his wife. Transfer to 5th floor. Still wheezing on L Continue BD and steroids. More than 50% of the time documented was spent in face-to-face contact with the patient and in the care of the patient on the floor/unit where the patient is located.     Total CC time spent 30 min      Anna Welch MD

## 2019-07-29 NOTE — PROGRESS NOTES
[facility] Hematology & Oncology        Inpatient Hematology / Oncology Progress Note      Admission Date: 2019  3:18 PM  Reason for Admission/Hospital Course: Dysphagia [R13.10]  Intractable vomiting [R11.10]      24 Hour Events:  Afebrile, VSS  Frustrated today about \"not being on the ball\" with his cancer  O2 stable at 3L O2  Wife at side  Agreeable to PEG  Barium swallow this morning  Continues zosyn for aspiration pna  Transferred to floor yesterday    ROS:  Constitutional: Positive for fatigue, weakness, lethargic; negative for fever, chills  CV:  negative for chest pain, palpitations, edema. Respiratory: Dyspnea better - still periods of anxiety  GI:  No abdominal pain, no vomiting (has been NPO)    10 point review of systems is otherwise negative with the exception of the elements mentioned above in the HPI. Allergies   Allergen Reactions    Zithromax [Azithromycin] Rash       OBJECTIVE:  Patient Vitals for the past 8 hrs:   BP Temp Pulse Resp SpO2   19 1215 150/85 98 °F (36.7 °C) 84 22 99 %   19 0724     95 %   19 0640 (!) 130/91 98 °F (36.7 °C) 68 23 96 %     Temp (24hrs), Av.8 °F (36.6 °C), Min:97.5 °F (36.4 °C), Max:98 °F (36.7 °C)     0701 -  1900  In: 101 [I.V.:101]  Out: 300 [Urine:300]    Physical Exam:  Constitutional: Acutely ill male in respiratory distress, lying comfortably in the hospital bed. Family at bedside. HEENT: Normocephalic and atraumatic. Oropharynx is clear, mucous membranes are moist. Neck supple     Lymph node   Deferred   Skin Warm and dry. No bruising and no rash noted. No erythema. No pallor. Respiratory Lungs clear, wheeze to right lung. No accessory muscle use. On supplemental O2   CVS Mildly tachycardic rate, regular rhythm and normal S1 and S2. No murmurs, gallops, or rubs. Abdomen Soft, nontender and nondistended, normoactive bowel sounds. No palpable mass. No hepatosplenomegaly. Neuro Lethargic.  Grossly nonfocal with no obvious sensory or motor deficits. MSK Normal range of motion in general.  No edema and no tenderness. Psych Lethargic        Labs:      Recent Labs     07/29/19  0306 07/28/19  0519 07/27/19  0313   WBC 37.7* 27.6* 10.5   RBC 3.65* 3.41* 3.41*   HGB 10.7* 10.0* 9.9*   HCT 34.3* 32.0* 31.7*   MCV 94.0 93.8 93.0   MCH 29.3 29.3 29.0   MCHC 31.2* 31.3* 31.2*   RDW 15.9* 15.8* 15.8*   * 462* 398   GRANS 63 69 80*   LYMPH 2* 2* 4*   MONOS 6 7 7   EOS 0* 0* 0*   BASOS 0 0 0   IG 29* 22* 9*   DF AUTOMATED AUTOMATED AUTOMATED   ANEU 23.7* 19.0* 8.5*   ABL 0.8 0.6 0.4*   ABM 2.3* 1.9* 0.7   YESI 0.0 0.0 0.0   ABB 0.0 0.0 0.0   AIG 10.9* 6.1* 0.9*        Recent Labs     07/29/19  0306 07/28/19 0519 07/27/19 0313    141 137   K 4.2 3.7 3.6    103 100   CO2 29 27 26   AGAP 9 11 11   * 137* 159*   BUN 26* 19 9   CREA 1.08 1.13 1.16   GFRAA >60 >60 >60   GFRNA >60 >60 >60   CA 8.9 8.9 8.6   SGOT 22 17 13*    80 80   TP 6.7 6.5 6.6   ALB 2.3* 2.1* 2.1*   GLOB 4.4* 4.4* 4.5*   AGRAT 0.5* 0.5* 0.5*   MG 2.9* 2.4 2.0         Imaging:  XR CHEST SNGL V [[id number]] Collected: 07/26/19 1158   Order Status: Completed Updated: 07/26/19 1202   Narrative:     Portable chest x-ray    CLINICAL INDICATION: Shortness of breath    FINDINGS: Single AP view the chest compared to a similar exam dated 7/11/2019  shows new reticular nodular interstitial densities throughout both lungs with  more patchy airspace opacity in the right upper lobe. A right-sided chest port  is in place. The cardiac silhouette and mediastinum are stable. Impression:     IMPRESSION: New, diffuse reticular nodular densities throughout the lungs with  patchy opacity in the right upper lobe. Atypical pneumonia or pulmonary edema  should be considered. XR Jarred Will [[id number]] Collected: 07/26/19 8979   Order Status: Completed Updated: 07/26/19 1026   Narrative:     History: Dysphasia.  Stage IV rectal cancer. EXAM: Barium swallow    TECHNIQUE: 1.3 minutes fluoroscopy time is utilized. The patient ingests  effervescent granules followed by thick and thin consistencies of barium. 18  fluoroscopic images are available for review. No comparison    FINDINGS: The exam is markedly limited. The patient is unable to perform the  exam the upright position, and has very limited mobility. There is aspiration of  the thin contrast. Contrast flows through the esophagus into the stomach without  difficulty. There is a hiatal hernia present. Just above the hiatal hernia,  there is a filling defect of the distal esophagus, corresponding to a soft  tissue mass adjacent to the esophagus on the recent CT chest. No definite  gastroesophageal reflux, or ulceration. Impression:     IMPRESSION:  1. Just above the hiatal hernia, there is an extrinsic soft tissue mass creating  a filling defect within the distal esophagus, corresponding to a soft tissue  mass seen on the recent CT chest.  2. Aspiration of oral contrast. Correlation with modified barium swallow  recommended. CT CHEST W CONT [[id number]] Collected: 07/24/19 2133   Order Status: Completed Updated: 07/24/19 2138   Narrative:     CT OF THE CHEST WITH INTRAVENOUS CONTRAST. INDICATION: Shortness of breath. Rosalva Stalling adenocarcinoma with metastatic  disease. COMPARISON: None. TECHNIQUE:   2.5 mm axial scans from above the aortic arch to the lung bases  with 100 cc nonionic intravenous contrast without acute complication. Intravenous contrast was given to evaluate for pulmonary embolism. FINDINGS:  The degree of opacification of the pulmonary arteries is adequate. No intraluminal filling defects within the pulmonary arterial tree to suggest  pulmonary embolism. Sarbjit Duel is normal caliber with a uniform lumen without  evidence of dissection. Lungs demonstrate multiple nodules, groundglass  opacities and small pneumonic consolidation in the bases.  There is extensive  mediastinal adenopathy which has increased a great deal from the prior exam..   Included portion of the upper abdomen are unremarkable. No gross bony lesions. .   Impression:     IMPRESSION:  Mediastinal adenopathy is increased great deal from the prior exam.  There is patchy airspace disease in both lung bases. Pulmonary metastases. Negative for pulmonary embolism.           Medications:  Current Facility-Administered Medications   Medication Dose Route Frequency    albuterol (PROVENTIL VENTOLIN) nebulizer solution 2.5 mg  2.5 mg Nebulization Q6H PRN    NUTRITIONAL SUPPORT ELECTROLYTE PRN ORDERS   Does Not Apply PRN    potassium chloride (KLOR-CON) packet for solution 20 mEq  20 mEq Oral BID WITH MEALS    piperacillin-tazobactam (ZOSYN) 4.5 g in 0.9% sodium chloride (MBP/ADV) 100 mL  4.5 g IntraVENous Q8H    methylPREDNISolone (PF) (SOLU-MEDROL) injection 40 mg  40 mg IntraVENous Q8H    fluticasone (FLOVENT HFA) 110 mcg inhaler (Patient Supplied)  2 Puff Inhalation Q12H    famotidine (PF) (PEPCID) 20 mg in sodium chloride 0.9% 10 mL injection  20 mg IntraVENous Q12H    carvedilol (COREG) tablet 3.125 mg  3.125 mg Oral BID WITH MEALS    diphenoxylate-atropine (LOMOTIL) tablet 1 Tab  1 Tab Oral QID PRN    lisinopril (PRINIVIL, ZESTRIL) tablet 10 mg  10 mg Oral DAILY    magic mouthwash (SIMON) oral suspension 5 mL  5 mL Oral Q4H PRN    oxyCODONE IR (ROXICODONE) tablet 5 mg  5 mg Oral Q6H PRN    guaiFENesin-dextromethorphan (ROBITUSSIN DM) 100-10 mg/5 mL syrup 5 mL  5 mL Oral Q6H PRN    benzonatate (TESSALON) capsule 100 mg  100 mg Oral TID PRN    HYDROcodone-acetaminophen (HYCET) 0.5-21.7 mg/mL oral solution 5 mg  5 mg Oral Q4H PRN    LORazepam (ATIVAN) injection 1 mg  1 mg IntraVENous Q4H PRN    central line flush (saline) syringe 10 mL  10 mL InterCATHeter PRN    prochlorperazine (COMPAZINE) injection 10 mg  10 mg IntraVENous Q6H PRN    morphine injection 2 mg  2 mg IntraVENous Q4H PRN  enoxaparin (LOVENOX) injection 100 mg  100 mg SubCUTAneous Q24H         ASSESSMENT:    Problem List  Date Reviewed: 7/26/2019          Codes Class Noted    Metastatic cancer to lung [facility]) (Chronic) ICD-10-CM: C78.00  ICD-9-CM: 197.0  7/26/2019    Overview Signed 7/26/2019 11:06 AM by Remo Gowers, NP     Per EBUS in June 2019             Acute respiratory failure ([facility] ICD-10-CM: J96.00  ICD-9-CM: 518.81  7/26/2019        Aspiration into airway ICD-10-CM: N19.502L  ICD-9-CM: 934.9  7/26/2019        Dysphagia ICD-10-CM: R13.10  ICD-9-CM: 787.20  7/25/2019        Intractable vomiting ICD-10-CM: R11.10  ICD-9-CM: 536.2  7/25/2019        Mediastinal lymphadenopathy ICD-10-CM: R59.0  ICD-9-CM: 785.6  6/11/2019        Pulmonary nodules ICD-10-CM: R91.8  ICD-9-CM: 793.19  6/11/2019        Dehydration ICD-10-CM: E86.0  ICD-9-CM: 276.51  4/2/2019        S/P radiation therapy < 4 weeks ago ICD-10-CM: Z92.3  ICD-9-CM: V15.3  10/1/2018        Rectal adenocarcinoma ([facility] (Chronic) ICD-10-CM: C20  ICD-9-CM: 154.1  7/6/2018        Colostomy status ([facility] ICD-10-CM: Z93.3  ICD-9-CM: V44.3  6/29/2018        Rectal obstruction ICD-10-CM: K62.4  ICD-9-CM: 569.2  6/27/2018        Erectile dysfunction due to arterial insufficiency ICD-10-CM: N52.01  ICD-9-CM: 607.84  5/31/2018        Plantar fasciitis, right ICD-10-CM: M72.2  ICD-9-CM: 728.71  5/31/2018        Benign essential HTN ICD-10-CM: I10  ICD-9-CM: 401.1  5/31/2018        IGT (impaired glucose tolerance) ICD-10-CM: R73.02  ICD-9-CM: 790.22  5/30/2018        Hypercholesterolemia ICD-10-CM: E78.00  ICD-9-CM: 272.0  5/30/2018    Overview Signed 6/4/2018  2:20 PM by Jennifer Marquis     ASCVD 10 year risk is 16.0%. Mod to high dose statin indicated.  (based on b/p 180/90, 6/4/18)                 Screening PSA (prostate specific antigen) ICD-10-CM: Z12.5  ICD-9-CM: V76.44  5/30/2018        History of tobacco abuse ICD-10-CM: V16.381  ICD-9-CM: V15.82  8/18/2016 PLAN:  Dysphagia/vomiting  - GI evaluation. Planning EGD tomorrow (cannot do dilation due to recent avastin). Started pepcid BID  - Continue IV fluids  7/26 Barium swallow today with aspiration and extrinsic soft tissue mass creating filling defect in distal esophagus  7/29 Spoke with Ignacia Bueno (out of town) and Yared. Needs palliative radiation to mediastinal adenopathy that is compressing esophagus. Planned for consult this afternoon. Pt agreeable to PEG given length of time it may take for radiation to improve his ability to swallow. GI reconsulted for placement    Aspiration pneumonia  - Continue levaquin. BCx pending. UA negative. CT chest with no acute findings  7/26 D3 LVQ  7/29 Continues zosyn. Noted leukocytosis. Met rectal ca  - S/p FOLFIRI-Avastin, last given 7/9 7/29 Suspicion for progression on recent CT. Family is aware. Further treatment TBD outpatient with Dr. Bethany Reed  - Secondary to chemotherapy. Granix x 1 today  7/26 WBC up to 4.5  7/29 Continues with elevated counts    Hx hydronephrosis  - Planned for cystoscopy and stent placement tomorrow with urology (was scheduled for outpatient). Ok from [employer]. Discussed with pulmonary and no contraindication from their perspective. Discussed with Tony Edwards NP    Lovenox: on hold prior to procedures    Home pending findings/work up by GI    Disposition: TBD.  Needs to start radiation and have PEG placed prior to discharge                  [facility]  [address] [address]  Office : [phone]  Fax : [phone]   I personally saw, exammed and counselled the patient, and discussed with NP, agree with above history/assessment/plan. 48 y.o.male rectal cancer s/p chemorad in 2018 but refractory, developed mediastinal LAD, received FOLFOX/avastin and progressed, change to FOLFIRI/avastin but admitted with esophageal dysphagia, had aspiration in barium swallow and had to be care in ICU, now respiratory status stable, pt wants to excalate to \" out-of-box rx\" and we discussed his disease had shown great resistance to major regimens, there are oral options but he has to solve the dysphagia problem for both rx and feeding, agreed to pursue palliative XRT of mediastinal LN, meanwhile try TF with dobhoff, supportive care. Rectal cancer Mel Kern M.D.   [address]  Office : [phone]  Fax : [phone]

## 2019-07-29 NOTE — PROGRESS NOTES
Date of Outreach Update:  Jerald Brown was seen and assessed. Pt AOx4, family at the bedside. Per hem/onc and GI, pt will need PEG at a later date, not safe to consistently eat. Lung sounds decreased, 96% on 3L NC. Primary RN paging GI for dobhoff reinsertion order. MEWS Score: 2 (07/29/19 1532)  Vitals:    07/29/19 0724 07/29/19 1215 07/29/19 1532 07/29/19 1736   BP:  150/85 (!) 149/97 (!) 146/93   Pulse:  84 77 84   Resp:  22 25    Temp:  98 °F (36.7 °C) 98 °F (36.7 °C)    SpO2: 95% 99% 99%    Weight:             Pain Assessment  Pain Intensity 1: 0 (07/29/19 1304)  Pain Location 1: Back  Pain Intervention(s) 1: Medication (see MAR)  Patient Stated Pain Goal: 0      Previous Outreach assessment has been reviewed. There have been no significant clinical changes since the completion of the last dated Outreach assessment. Will continue to follow up per outreach protocol.     Signed By:   Lashanda Davila RN    July 29, 2019 5:53 PM

## 2019-07-29 NOTE — PROGRESS NOTES
Problem: Dysphagia (Adult)  Goal: *Acute Goals and Plan of Care (Insert Text)  Description  STG: Pt will demonstrate zero signs/sx of aspiration with mechanical soft textures with nectar thick liquids with 90% accuracy during all meals. STG: Pt will complete a full speech, language and cognitive evaluation without assistance with 100% accuracy during session. STG: Pt will complete a Modified barium swallow study with zero signs/sx of aspiration  with 100% accuracy during swallow study. Goal met 7/29/19  STG: Pt will complete laryngeal strengthening exercises x10 each with 80% accuracy. Goal added 7/29/19  LTG: Pt will demonstrate zero signs/sx of aspiration with least restrictive diet with 100% accuracy during all meals. Outcome: Progressing Towards Goal     Speech language pathology: modified barium swallow study: Initial Assessment    NAME/AGE/GENDER: Katharina Whitten is a 48 y.o. male  DATE: 7/29/2019  PRIMARY DIAGNOSIS: Dysphagia [R13.10]  Intractable vomiting [R11.10]  Procedure(s) (LRB):  ESOPHAGOGASTRODUODENOSCOPY (EGD)/BMI 19 ROOM 503 (N/A)    ICD-10: Treatment Diagnosis: Pharyngoesophageal dysphagia (R13.14)  INTERDISCIPLINARY COLLABORATION: Radiologist, Dr. Dinorah Hopkins  PRECAUTIONS/ALLERGIES: Zithromax [azithromycin] ASSESSMENT/PLAN OF CARE:Based on the objective data described below, Mr. Alison Franklin presents with expressed fear of choking throughout study. Patient exhibited deep laryngeal penetration with thin liquids presented by spoon that did not improve with effortful swallow strategy. . No laryngeal penetration or aspiration observed with nectar thick liquids regardless of presentation or pudding. Laryngeal penetration observed during swallow with mixed consistencies. Cracker deferred, per patient request.  When medically appropriate, recommend initiate chopped mechanical soft diet with ground meats. No mixed consistencies. Thicken all liquids to nectar consistency.  Give meds whole or crushed in puree as tolerated. Patient should be fully upright for all PO and remain upright for 20-30 min following PO. Follow reflux and aspiration precautions. SLP to follow for laryngeal strengthening exercises and training in safe swallowing strategies. ???? ? ? This section established at most recent assessment????????  RECOMMENDATIONS AND PLANNED INTERVENTIONS (Benefits and precautions of therapy have been discussed with the patient.):  · PO diet texture:  Mechanical soft with ground meat/chopped vegetables and no mixed consistencies  · Liquids:  nectar  MEDICATIONS:  · Whole or crushed in puree as tolerated  COMPENSATORY STRATEGIES/MODIFICATIONS INCLUDING:  · Alternate liquids/solids  · Small bites and sips  · Upright for all PO  · Reflux precautions  OTHER RECOMMENDATIONS (including follow up treatment recommendations): · Family training/education  · Laryngeal exercises  · Patient education  FREQUENCY/DURATION: Continue to follow patient 3 times a week for duration of hospital stay to address above goals. RECOMMENDED REHABILITATION/EQUIPMENT: (at time of discharge pending progress): Due to the probability of continued deficits (see above) this patient will likely need continued skilled speech therapy after discharge. SUBJECTIVE:   Patient pleasant and cooperative, but obviously anxious and requiring a lot of reassurance throughout assessment. History of Present Injury/Illness: Mr. Renetta Hall  has a past medical history of Chest pain (8/18/2016), Colon cancer ([facility] Diabetes ([address] GERD (gastroesophageal reflux disease), Hypertension, Iron deficiency anemia due to chronic blood loss (07/26/2018), PONV (postoperative nausea and vomiting), Rectal cancer ([facility] Thyroid disease, and Tobacco abuse (08/18/2016). Paola Adhikari He also  has a past surgical history that includes flexible sigmoidoscopy (N/A, 6/27/2018); vascular surgery procedure unlist (Right); hx hernia repair (2003); and hx vascular access.    Present Symptoms: aspiration during barium swallow   Pain Intensity 1: 0  Pain Location 1: Back  Pain Orientation 1: Upper  Pain Intervention(s) 1: Medication (see MAR)  Current Dietary Status:  NPO  Social History/Home Situation:   Home Environment: Private residence  One/Two Story Residence: One story  Living Alone: No  Support Systems: Family member(s)  Patient Expects to be Discharged to[de-identified] Private residence  Current DME Used/Available at Home: None  OBJECTIVE:   Cognitive/Communication Status:  Mental Status  Neurologic State: Alert  Orientation Level: Appropriate for age, Oriented X4  Cognition: Appropriate decision making, Follows commands  Perception: Appears intact  Perseveration: No perseveration noted  Safety/Judgement: Awareness of environment, Insight into deficits    Oral Assessment:  Oral Assessment  Labial: No impairment  Dentition: Natural  Oral Hygiene: adequate  Lingual: No impairment  Velum: No impairment    Vocal Quality: adequate    Patient Viewed:    Film Views: Lateral, Fluoro    Oral Prepatory:  The patient was given the following: Consistency Presented:  Thin liquid, Pudding, Mixed consistency, Nectar thick liquid  How Presented: Self-fed/presented, SLP-fed/presented, Cup/sip, Cup/gulp, Spoon, Straw    Oral Phase:  Bolus Acceptance: No impairment  Bolus Formation/Control: No impairment  Propulsion: No impairment     Oral Residue: None  Initiation of Swallow: No impairment  Oral Phase Severity: Minimal    Pharyngeal Phase:  Timing: No impairment  Decreased Tongue Base Retraction?: No  Laryngeal Elevation: WFL (within functional limits)  Penetration: During swallow, To cords, From initial swallow(with thin liquids and mixed consistencies only)  Aspiration/Timing: No evidence of aspiration  Aspiration/Penetration Score: 4 (Penetration/No residue-Contrast contacts the folds, and is ejected)     Pharyngeal Dysfunction: Decreased elevation/closure  Pharyngeal Phase Severity: Mild  Pharyngeal-Esophageal Segment: Suspected esophageal dysphagia    Assessment/Reassessment only, no treatment provided today. Tool Used: Dysphagia Outcome and Severity Scale (NATALY)    Score Comments   Normal Diet  [] 7 With no strategies or extra time needed       Functional Swallow  [] 6 May have mild oral or pharyngeal delay       Mild Dysphagia    [] 5 Which may require one diet consistency restricted (those who demonstrate penetration which is entirely cleared on MBS would be included)   Mild-Moderate Dysphagia  [] 4 With 1-2 diet consistencies restricted       Moderate Dysphagia  [] 3 With 2 or more diet consistencies restricted       Moderately Severe Dysphagia  [] 2 With partial PO strategies (trials with ST only)       Severe Dysphagia  [] 1 With inability to tolerate any PO safely          Score:  Initial: 5 Most Recent: 4 (Date:7/29/2019 )   Interpretation of Tool: The Dysphagia Outcome and Severity Scale (NATALY) is a simple, easy-to-use, 7-point scale developed to systematically rate the functional severity of dysphagia based on objective assessment and make recommendations for diet level, independence level, and type of nutrition. Payor: BLUE CROSS / Plan: [facility] / Product Type: PPO /     Safety:   After treatment position/precautions:  · Patient waiting in radiology holding bay for transport back to room.     Recommendations for treatment: PO trials, laryngeal exercises  Total Treatment Duration:  Time In: 1020   Time Out: [place], [place], CCC-SLP

## 2019-07-29 NOTE — PROGRESS NOTES
Date of Outreach Update:  Addison Lombard was seen and assessed. Pt awake, alert, oriented x 4 post modified barium swallow. Lung sounds decreased, expiratory wheezes noted in the right lung. Pt passed for mechanical soft, nectar thick liquids, however has opted to have a peg tube placed. O2 sats currently 98%  On 4L NC      MEWS Score: 2 (07/29/19 0640)  Vitals:    07/28/19 2313 07/29/19 0259 07/29/19 0640 07/29/19 0724   BP: 144/80 124/82 (!) 130/91    Pulse: 69 90 68    Resp: 26 26 23    Temp: 97.6 °F (36.4 °C) 97.9 °F (36.6 °C) 98 °F (36.7 °C)    SpO2: 99% 99% 96% 95%   Weight:             Pain Assessment  Pain Intensity 1: 0 (07/29/19 0840)  Pain Location 1: Back  Pain Intervention(s) 1: Medication (see MAR)  Patient Stated Pain Goal: 0      Previous Outreach assessment has been reviewed. There have been no significant clinical changes since the completion of the last dated Outreach assessment. Will continue to follow up per outreach protocol.     Signed By:   April Smtih RN    July 29, 2019 12:08 PM

## 2019-07-29 NOTE — CONSULTS
Patient: Jaiden Da Silva MRN: [id number]  SSN: [id number]    YOB: 1965  Age: 48 y.o. Sex: male      Other Providers:  Felipe Klinefelter MD.    CHIEF COMPLAINT: Fatigue and trouble swallowing. DIAGNOSIS: Metastatic rectal adenocarcinoma with tumor in mediastinum compressing esophagus    PREVIOUS TREATMENT:  1) Neoadjuvant chemoRT. Progressed prior to surgery with metastatic disease  2) After failure: Avastin FOLFOX until 5/18 when switched to FOLFIRI    HISTORY OF PRESENT ILLNESS:  Jaiden Da Silva is a 48 y.o. male who I am seeing at the request of in-patient oncology. He was originally diagnosed and treated for rectal cancer in the fall of 2018. Please see the note below from his original ealuation by my Partner, Dr. Britta Hanson. INITIAL EVALUATION IN 2018:  Over the few months prior to diagnosis he noted bright red blood per rectum and change in bowel habits with frequent, small caliber stools.  He was seen by GI and was scheduled for colonoscopy. Dick Puga, following his bowel prep he passed only a small amount of stool, but began vomiting. He was seen at the ER on 06/26/18 where a CT scan showed fluid-filled large and small bowel loops with increased soft tissue suggesting an obstructing high rectal cancer.  Flexible sigmoidoscopy on 06/26/18 showed a completely obstructing circumferential mass at 5-6 cm from the anal verge which could not be traversed.  Extensive biopsies were taken. Dick Puga, final pathology showed only fragments of hyperplastic polyp with ulceration and inflammation. On 06/29/18 he underwent right transverse diverting colostomy under the direction of Dr. Tamara Farrar.  Additional biopsies were taken at that time.  Pathology  Revealed poorly differentiated colorectal adenocarcinoma.  Peritoneal fluid was also sent, which revealed only rare atypical cells.  No obvious peritoneal disease was seen.  Pet/CT scan on 07/19/18 showed hypermetabolic mass in the proximal rectum.  In addition, multiple pelvic lymph nodes were suspicious for regional lymph node metastases.  Pelvic MRI on 07/23/18 showed the rectal tumor approximately 8 cm in length with extension through the muscularis wall into the perirectal fat on the right.   Lymph nodes were seen both within and lateral to the mesorectal fascia. Clinical staging was T3N2M0. He was seen by Dr. Jeremy Luke and was recommended to undergo pre-operative chemoradiotherapy. According to Dr. Cherry Farrell, surgery would likely consist of a low anterior resection with low rectal anastomosis. CURRENT HISTORY OF PRESENT ILLNESS:  After his initial evaluation, he went on to complete radiation 9/28/18. Unfortunately his reevaluation prior to surgery showed extension into the right pelvic sidewall with common iliac and retroperitoneal adenopathy suggesting progression and he was not felt to be a surgical candidate. He was started on systemic chemotherapy in November 2018 and been followed since that time with serial imaging. He was reevaluated in early 2019 by surgery but it was not felt to be beneficial form and therefore it was not pursued. He did get a second opinion from GEOVANNA English who agreed with recommendations locally. He was seen in medical oncology for cycle 4 of Avastin FOLFIRI 7/24/19. At that time he was reporting severe fatigue and inability to keep food down with a sensation of food getting stuck in his chest and vomiting. As a result he was admitted with plans for hydration and workup at that time. Barium swallow 7/26/2019 showed extrinsic soft tissue masquerading a filling defect in the distal esophagus. CT demonstrated this mass 7/24/2019. With these symptoms we were consulted to consider radiation to alleviate this compression. He is also being evaluated by GI with recommendations for a PEG tube. He was being managed as well for aspiration pneumonia.     PAST MEDICAL HISTORY:    Past Medical History:   Diagnosis Date    Chest pain 8/18/2016    [facility]     Diabetes ([facility]     GERD (gastroesophageal reflux disease)     resolved per pt-- prn OTC meds    Hypertension     no current treatment, states Coreg dropped BP too much and is not taking    Iron deficiency anemia due to chronic blood loss 07/26/2018    denies hx of blood transfusion    PONV (postoperative nausea and vomiting)     Rectal cancer ([facility] [address] [facility]     Thyroid disease     Tobacco abuse 08/18/2016    Quit 6/28/18--- 1 ppd x 30 yrs       The patient denies history of collagen vascular diseases, pacemaker insertion, prior radiation or prior chemotherapy. PAST SURGICAL HISTORY:   Past Surgical History:   Procedure Laterality Date    FLEXIBLE SIGMOIDOSCOPY N/A 6/27/2018    SIGMOIDOSCOPY FLEXIBLE performed by Amber Mckeon MD at [place] [address]  2003    HX VASCULAR ACCESS      VASCULAR SURGERY PROCEDURE UNLIST Right     artery tied off after accident       MEDICATIONS:   No current facility-administered medications for this encounter. No current outpatient medications on file.     Facility-Administered Medications Ordered in Other Encounters:     albuterol (PROVENTIL VENTOLIN) nebulizer solution 2.5 mg, 2.5 mg, Nebulization, Q6H PRN, Humble Lucia MD, 2.5 mg at 07/28/19 0730    NUTRITIONAL SUPPORT ELECTROLYTE PRN ORDERS, , Does Not Apply, PRN, Humble Lucia MD    potassium chloride (KLOR-CON) packet for solution 20 mEq, 20 mEq, Oral, BID WITH MEALS, Humble Lucia MD, Stopped at 07/29/19 0800    piperacillin-tazobactam (ZOSYN) 4.5 g in 0.9% sodium chloride (MBP/ADV) 100 mL, 4.5 g, IntraVENous, Q8H, Lm Amador MD, Last Rate: 25 mL/hr at 07/29/19 1130, 4.5 g at 07/29/19 1130    methylPREDNISolone (PF) (SOLU-MEDROL) injection 40 mg, 40 mg, IntraVENous, Q8H, Zohreh Dalton NP, 40 mg at 07/29/19 0550    fluticasone (FLOVENT HFA) 110 mcg inhaler (Patient Supplied), 2 Puff, Inhalation, Q12H, Elizabeth West NP, 2 Puff at 07/29/19 8636    famotidine (PF) (PEPCID) 20 mg in sodium chloride 0.9% 10 mL injection, 20 mg, IntraVENous, Q12H, Lm Amador MD, 20 mg at 07/29/19 1130    carvedilol (COREG) tablet 3.125 mg, 3.125 mg, Oral, BID WITH MEALS, Jeri Patient, NP, Stopped at 07/29/19 0800    diphenoxylate-atropine (LOMOTIL) tablet 1 Tab, 1 Tab, Oral, QID PRN, Jeri Patient, NP    lisinopril (PRINIVIL, ZESTRIL) tablet 10 mg, 10 mg, Oral, DAILY, Jeri Patient, NP, Stopped at 07/29/19 0900    magic mouthwash (SIMON) oral suspension 5 mL, 5 mL, Oral, Q4H PRN, Jeri Patient, NP    oxyCODONE IR (ROXICODONE) tablet 5 mg, 5 mg, Oral, Q6H PRN, Jeri Patient, NP, 5 mg at 07/26/19 0902    guaiFENesin-dextromethorphan (ROBITUSSIN DM) 100-10 mg/5 mL syrup 5 mL, 5 mL, Oral, Q6H PRN, Jonda Alpers, MD, 5 mL at 07/26/19 0431    benzonatate (TESSALON) capsule 100 mg, 100 mg, Oral, TID PRN, Jonda Alpers, MD, 100 mg at 07/27/19 1803    HYDROcodone-acetaminophen (HYCET) 0.5-21.7 mg/mL oral solution 5 mg, 5 mg, Oral, Q4H PRN, Jonda Alpers, MD, 5 mg at 07/26/19 0431    LORazepam (ATIVAN) injection 1 mg, 1 mg, IntraVENous, Q4H PRN, Jeri Patient, NP, 1 mg at 07/29/19 1236    central line flush (saline) syringe 10 mL, 10 mL, InterCATHeter, PRN, Jeri Patient, NP, 10 mL at 07/24/19 1620    prochlorperazine (COMPAZINE) injection 10 mg, 10 mg, IntraVENous, Q6H PRN, Jeri Patient, NP, 10 mg at 07/25/19 1001    morphine injection 2 mg, 2 mg, IntraVENous, Q4H PRN, Jeri Patient, NP, 2 mg at 07/26/19 2030    enoxaparin (LOVENOX) injection 100 mg, 100 mg, SubCUTAneous, Q24H, Jonda Alpers, MD, 100 mg at 07/28/19 2032    ALLERGIES:   Allergies   Allergen Reactions    Zithromax [Azithromycin] Rash       SOCIAL HISTORY:   Social History     Socioeconomic History    Marital status:      Spouse name: Not on file    Number of children: Not on file    Years of education: Not on file    Highest education level: Not on file   Occupational History    Not on file   Social Needs    Financial resource strain: Not on file    Food insecurity:     Worry: Not on file     Inability: Not on file    Transportation needs:     Medical: Not on file     Non-medical: Not on file   Tobacco Use    Smoking status: Former Smoker     Packs/day: 1.00     Years: 30.00     Pack years: 30.00     Types: Cigarettes     Start date: 1988     Last attempt to quit: 2018     Years since quittin.0    Smokeless tobacco: Never Used   Substance and Sexual Activity    Alcohol use:  Yes     Alcohol/week: 14.0 standard drinks     Types: 14 Cans of beer per week    Drug use: No    Sexual activity: Not on file   Lifestyle    Physical activity:     Days per week: Not on file     Minutes per session: Not on file    Stress: Not on file   Relationships    Social connections:     Talks on phone: Not on file     Gets together: Not on file     Attends Anabaptism service: Not on file     Active member of club or organization: Not on file     Attends meetings of clubs or organizations: Not on file     Relationship status: Not on file    Intimate partner violence:     Fear of current or ex partner: Not on file     Emotionally abused: Not on file     Physically abused: Not on file     Forced sexual activity: Not on file   Other Topics Concern     Service Not Asked    Blood Transfusions Not Asked    Caffeine Concern Not Asked    Occupational Exposure Not Asked   Auther Bloch Hazards Not Asked    Sleep Concern Not Asked    Stress Concern Not Asked    Weight Concern Not Asked    Special Diet Not Asked    Back Care Not Asked    Exercise Not Asked    Bike Helmet Not Asked    [place] [address] Not Asked    Self-Exams Not Asked   Social History Narrative    Not on file       FAMILY HISTORY:   Family History   Problem Relation Age of Onset    Hypertension Mother     Heart Disease Mother        REVIEW OF SYSTEMS: Please see the completed review of systems sheet in the chart that I have reviewed today. PHYSICAL EXAMINATION:   ECOG Performance status 3  VITAL SIGNS:   Visit Vitals  BP (!) 147/93   Pulse 98   Temp 98.8 °F (37.1 °C)   Resp 30   SpO2 94%        GENERAL: The patient is alert and examined in his tranpsport stretcher. alert and in no acute distress but tachypnic. HEENT: Head is normocephalic, atraumatic. Pupils are equal, round and reactive to light and accommodation. Extraocular movement intact. Hearing is intact bilaterally to finger rub. Oral cavity reveals no lesions. Mucous membranes are moist. RESPIRATORY: Lungs are coarse with rales. There is increased respiratory effort. GASTROINTESTINAL: The abdomen is soft, mildly tender at the xiphoid process, nondistended with no hepatospelnomagaly. Digital rectal examination: deferred LYMPHATIC: There is no cervical, supraclavicular or axillary lymphadenopathy bilaterally. MUSCULOSKELETAL: Extremities reveal no cyanosis, clubbing or edema.  is 5+/5. NEURO:  Cranial nerves II-XII grossly intact. Muscular strength and sensation are intact throughout all four extremities.         PATHOLOGY:    See HPI    LABORATORY:   Lab Results   Component Value Date/Time    Sodium 141 07/29/2019 03:06 AM    Potassium 4.2 07/29/2019 03:06 AM    Chloride 103 07/29/2019 03:06 AM    CO2 29 07/29/2019 03:06 AM    Anion gap 9 07/29/2019 03:06 AM    Glucose 148 (H) 07/29/2019 03:06 AM    BUN 26 (H) 07/29/2019 03:06 AM    Creatinine 1.08 07/29/2019 03:06 AM    GFR est AA >60 07/29/2019 03:06 AM    GFR est non-AA >60 07/29/2019 03:06 AM    Calcium 8.9 07/29/2019 03:06 AM    Magnesium 2.9 (H) 07/29/2019 03:06 AM    Albumin 2.3 (L) 07/29/2019 03:06 AM    Protein, total 6.7 07/29/2019 03:06 AM    Globulin 4.4 (H) 07/29/2019 03:06 AM    A-G Ratio 0.5 (L) 07/29/2019 03:06 AM    AST (SGOT) 22 07/29/2019 03:06 AM    ALT (SGPT) 13 07/29/2019 03:06 AM     Lab Results   Component Value Date/Time    WBC 37.7 (H) 07/29/2019 03:06 AM    HGB 10.7 (L) 07/29/2019 03:06 AM    HCT 34.3 (L) 07/29/2019 03:06 AM    PLATELET 687 (H) 32/27/3973 03:06 AM       RADIOLOGY:    Xr Chest Sngl V    Result Date: 7/27/2019   Portable view of the chest COMPARISON: July 26, 2019. CLINICAL HISTORY: Tachypnea. FINDINGS: Stable right-sided chest port. There are bibasilar opacities as well as patchy densities in the right upper lung. No pneumothorax or pulmonary edema. No large pleural effusion. Cardiac mediastinal contour and the surrounding bones are stable. IMPRESSION: 1. Patchy bibasilar opacities as well as patchy opacities in the right upper lung. Findings suspicious for infection. 2. No significant change compared to prior exam.    Xr Ba Swallow Esophogram    Result Date: 7/29/2019  History: Dysphasia. Stage IV rectal cancer. EXAM: Barium swallow TECHNIQUE: 1.3 minutes fluoroscopy time is utilized. The patient ingests effervescent granules followed by thick and thin consistencies of barium. 18 fluoroscopic images are available for review. No comparison FINDINGS: The exam is markedly limited. The patient is unable to perform the exam the upright position, and has very limited mobility. There is aspiration of the thin contrast. Contrast flows through the esophagus into the stomach without difficulty. There is a hiatal hernia present. Just above the hiatal hernia, there is a filling defect of the distal esophagus, corresponding to a soft tissue mass adjacent to the esophagus on the recent CT chest. No definite gastroesophageal reflux, or ulceration. IMPRESSION: 1. Just above the hiatal hernia, there is an extrinsic soft tissue mass creating a filling defect within the distal esophagus, corresponding to a soft tissue mass seen on the recent CT chest. 2. Aspiration of oral contrast. Correlation with modified barium swallow recommended.     Xr Swallow Func Video    Result Date: 7/29/2019  Exam: Modified Barium Swallow INDICATION: Oropharyngeal dysphagia with recent aspiration. Fluoro time: 2.0 minutes Spot films:  0 Fluoroscopy was used to evaluate pharyngeal function while the patient was given barium solutions and barium coated solids. FINDINGS: Patient with aspiration with thin liquids and deep penetration with thick liquids. IMPRESSION: 1. Patient with aspiration with thin liquids. Please see full report from speech language pathologist.     Ct Chest W Cont    Result Date: 7/24/2019  CT OF THE CHEST WITH INTRAVENOUS CONTRAST. INDICATION: Shortness of breath. .  Rectal adenocarcinoma with metastatic disease. COMPARISON: None. TECHNIQUE:   2.5 mm axial scans from above the aortic arch to the lung bases with 100 cc nonionic intravenous contrast without acute complication. Intravenous contrast was given to evaluate for pulmonary embolism. FINDINGS:  The degree of opacification of the pulmonary arteries is adequate. No intraluminal filling defects within the pulmonary arterial tree to suggest pulmonary embolism. Aorta is normal caliber with a uniform lumen without evidence of dissection. Lungs demonstrate multiple nodules, groundglass opacities and small pneumonic consolidation in the bases. There is extensive mediastinal adenopathy which has increased a great deal from the prior exam.. Included portion of the upper abdomen are unremarkable. No gross bony lesions. .     IMPRESSION:  Mediastinal adenopathy is increased great deal from the prior exam. There is patchy airspace disease in both lung bases. Pulmonary metastases. Negative for pulmonary embolism.      Ct Chest Abd Pelv W Cont    Result Date: 5/8/2019  CT chest, abdomen, and pelvis done with oral and IV contrast May 8, 2019 Reference exam: CT chest March 20, 2019 Indication: Rectal adenocarcinoma and short of breath Technique: Radiation dose reduction techniques were used for this study using one or more of the following: automated exposure control; adjustment of mA and/or kV (according to patient size); iterative reconstruction. 5 mm axial images were taken through the chest, abdomen, and pelvis using oral and IV contrast.  100 cc Isovue-370 IV contrast  was administered to better evaluate the pulmonary vasculature and the abdominal and pelvic contents. Chest: Degenerative changes are seen in the spine. There is asymmetry of the thyroid gland again noted on the left measuring up to 2 x 1.4 cm. Aortopulmonary window nodes measure up to 1.4 x 1 cm compared to 1 x 0.5 cm. Right hilar node or mass measures 2.4 x 2.2 cm compared to 1.2 x 0.9 cm, left hilar mass measures 2.5 x 1.5 cm not present on the previous study, infrahilar mass on the left measures 2.7 x 1.5 cm causing deformity of the left lower lobe arteries. Right infrahilar hilar adenopathy also surrounds vasculature, and measures 2.6 x 1.8 cm. Coronary artery calcifications are seen. There is a filling defect seen in the right upper lobe pulmonary arteries consistent with a small PE. The lung fields now show multiple nodules measuring up to 8 mm in the left lower lobe, 7 mm in the left upper lobe, 6 mm in the right lower lobe, 1 cm in the right middle lobe. There are also areas of apparent bronchial occlusion and bronchiectasis bilaterally. Groundglass opacity that may be postobstructive atelectasis is seen posteriorly in the right upper lobe. ABDOMEN: The CT abdomen is compared to the MRI abdomen of March 20, 2019. The liver shows small simple appearing cysts again, too small to fully characterize but seen on the MR, measuring up to  1 cm. The spleen shows a prominent splenule adjacent to the hilum. Both adrenal shows some diffuse thickening but no focal lesion. There is relative decreased enhancement of the right kidney compared to the left with hydronephrosis and hydroureter down into the pelvis suggesting acute obstruction of the ureter. There is an ostomy site seen that appears to be a loop ostomy of transverse colon to the right of midline.  Pancreas and gallbladder appear normal. Left kidney appears normal. The lower pole of the right kidney shows an exophytic solid structure that may be a mass measuring 1.3 cm. Para-aortic and pericaval lymph nodes measure up to 1.6 x 1.4 cm. Pelvis: Patient has a high riding cecum within the abdomen. There is right hydroureter down into the pelvis, with bladder wall thickening but no obstructing lesion seen of the bladder although there is enhancement that may be a mass within the ureter as it crosses over top of adenopathy in the upper pelvis on the right posteriorly that measures 3.4 x 2.2 cm that encases a portion of the right common and external and internal iliac arteries. There may be involvement of the lower inferior vena cava and thrombus here. There is bladder wall thickening, there is no intraperitoneal free air, nor ascites seen. IMPRESSION: 1. Multiple pulmonary nodules that may be metastatic disease, as well as some filling of dilated bronchi. 2. Small PE in the right upper lobe, with what may be postobstructive consolidation and atelectasis or wedge-shaped infarction, difficult to tell. 3. Apparent tumor encasement of the lower inferior vena cava and bifurcation of the common iliac veins, with suspicion for thrombus. There may be tumor involvement and partial occlusion of the right ureter at this level as well. 4. Diffuse wall thickening of the bladder. This initial impression was called to nurse Ash Agustin at the referring office at 3:35 PM May 8, 2019. Us Retroperitoneum Comp    Result Date: 6/20/2019  EXAMINATION: RENAL ULTRASOUND 6/20/2019 4:23 PM ACCESSION NUMBER:  [id number] COMPARISON: CT from 05/08/2019 INDICATION: Oliguria TECHNIQUE: Multiple real-time sonographic images were obtained of the kidneys utilizing a multihertz transducer. FINDINGS: RIGHT KIDNEY: Size: 10.3 cm Cortex: Normal renal cortical echogenicity. Collecting system: There is moderate hydronephrosis.  LEFT KIDNEY: Size: 11.6 cm Cortex: Normal renal cortical echogenicity. Collecting system: No evidence of hydronephrosis. URINARY BLADDER: The prevoid volume is 322 mL. The post void volume is 39 mL. IVC: Patent AORTA: 1.8 cm in its distal portion. No evidence of an aneurysm. IMPRESSION: Moderate right hydronephrosis which is similar to a May 2019 CT. Moderate post void residual    Ct Bx Renal Rt    Result Date: 5/14/2019  Title: CT guided renal mass biopsy. History: History of rectal cancer. Indeterminate right renal mass. . Consent: Informed written and oral consent was obtained from the patient after explanation of benefits and risks (including, but not limited to: Infection, Hemorrhage, Visceral Injury, Insufficient biopsy, Pneumothorax). The patient's questions were answered to their satisfaction. The patient stated understanding and requested that we proceed. Technique: All CT scans at this facility are performed using dose reduction/dose modulation techniques, as appropriate the performed exam, including the following:  Automated Exposure Control; Adjustment of the mA and/or kV according to patient size (this includes techniques or standardized protocols for targeted exams where dose is matched to indication/reason for exam); and Use of Iterative Reconstruction Technique. Procedure:  Sterile barrier technique (including:  cap, mask, sterile gloves, sterile sheet, hand hygiene, and cutaneous antisepsis) were used. With the patient prone a preliminary CT scan through the right flank was performed with radio-opaque markers on the skin. Following routine prep and drape of the right flank, a local field block with lidocaine was achieved. Using intermittent CT technique, a 17-gauge needle was advanced into the peripheral renal lesion. Using a 18-gauge needle, 3 core biopsies were performed. .  A post-biopsy CT scan was obtained. Hemostasis was achieved with manual compression. A bandage was applied. Complications: None.  Radiation Exposure Indices: Total Exam DLP = 148 mGy-cm Contrast:  0 milliliters. Medications:  Under physician supervision, 15 minutes of moderate intravenous conscious sedation was performed by administering Versed, Fentanyl intravenously. Continuous pulse oximetry, heart rate, and blood pressure monitoring was performed with an independent, trained observer present. Specimen: To Pathology     Impression: Uncomplicated CT guided right renal mass biopsy. Plan:  Bedrest observation for 1 hour. IMPRESSION:  Kristie Lewis is a 48 y.o. male with metastatic rectal cancer. We discussed the natural history of metastatic disease and the rationale and role of radiation. Specifically we discussed radiation therapy as a means of controlling pain or in his situation, to alleviate compression on his esophagus leading to significant disability with weight loss, nausea and vomiting. I outlined a course of 2 weeks of radiation, 30 Gy in 10 fractions. I would agree PEG tube is necessary as I don't expect a significant response over the next week but that in 2-3 weeks he may see a significant response so that this tube could be removed and he manage orally. We discussed the logistics of a treatment planning session and the time required to generate a treatment plan. The side effects of treatment were reviewed and he has signed informed consent. We will move forward with simulation shortly with treatment to begin tomorrow. PLAN:    1) Consented patient for treatment with external beam radiation after discussing risk, benefits, and side effects from treatment. 2) Reviewed available research treatment and cancer care protocols for which patient may be eligible. Unfortunately there are no matching clinical trials available at this time. 3) Coordinate care and start date with in-patient medical oncology and GI. 4) CT Simulation planned for today with the first of 10 fractions to be started tomorrow.     Diana Morrow MD   July 29, 2019

## 2019-07-29 NOTE — PROGRESS NOTES
Date of Outreach Update:    Chart reviewed, no acute needs identified at this time. Pt currently at a modified barium swallow. Will see when patient returns.     MEWS Score: 2 (07/29/19 0640)  Vitals:    07/28/19 2313 07/29/19 0259 07/29/19 0640 07/29/19 0724   BP: 144/80 124/82 (!) 130/91    Pulse: 69 90 68    Resp: 26 26 23    Temp: 97.6 °F (36.4 °C) 97.9 °F (36.6 °C) 98 °F (36.7 °C)    SpO2: 99% 99% 96% 95%   Weight:             Pain Assessment  Pain Intensity 1: 0 (07/29/19 0840)  Pain Location 1: Back  Pain Intervention(s) 1: Medication (see MAR)  Patient Stated Pain Goal: 0        Signed By:   Carlos Phillips RN    July 29, 2019 10:34 AM

## 2019-07-29 NOTE — CONSULTS
[facility] Re-Consult Note       Primary GI Physician: Dr Sony Plascencia    Referring Provider:  Nicki Grajeda NP    Consult Date:  7/29/2019    Admit Date:  7/24/2019    Chief Complaint:  PEG Eval    Subjective:     History of Present Illness:  Patient is a 48 y.o. male with PMH of  regular ETOH use (1-2 beers per day), h/o GERD, bilateral inguinal hernia repair, now being managed for Rectal cancer s/p diverting colostomy, biopsy proven stage 4 (locally advanced unresectable, LAD abd and mediastinum), PE 05/8/19 and RLE DVT 5/29/19 on enoxaparin, Renal mass (biopsy -ve), RT sided ureteral obstruction w likely malignancy, who is seen in consultation at the request of Nicki Grajeda NP for above. H was admitted with  7/24/19 with fatigue and inability to tolerate PO intake. He was started on Levaquin for  sinusitis/reported phlegm. He was also started on Pepcid 20mg BID. He does receive Lovenox every 24 hours. CT chest 7/24/19 with increased mediastinal adenopathy from prior exam. Patchy airspace disease in both lung based. Pulmonary metastases. No PE noted. He had BS w extrinsic compression on esophagus. He is on Zosyn for possible aspiration pneumonia. He is getting palliative radiation to mediastinal adenopathy that is compressing esophagus, beginning tomorrow. He was on Avastin, last given 7/9     [place] 6/27/19 with rectal mass with pathology consistent with poorly differentiated adenocarcinoma. BS 7/29/19  IMPRESSION:  1. Patient with aspiration with thin liquids. Please see full report from speech  language pathologist.    XR BA [facility] [[id number]] Collected: 07/26/19 0958   Order Status: Completed Updated: 07/26/19 1026   Narrative:       EXAM: Barium swallow 7/26/19      FINDINGS: The exam is markedly limited. The patient is unable to perform the  exam the upright position, and has very limited mobility.  There is aspiration of  the thin contrast. Contrast flows through the esophagus into the stomach without  difficulty. There is a hiatal hernia present. Just above the hiatal hernia,  there is a filling defect of the distal esophagus, corresponding to a soft  tissue mass adjacent to the esophagus on the recent CT chest. No definite  gastroesophageal reflux, or ulceration. Impression:     IMPRESSION:  1. Just above the hiatal hernia, there is an extrinsic soft tissue mass creating  a filling defect within the distal esophagus, corresponding to a soft tissue  mass seen on the recent CT chest.  2. Aspiration of oral contrast. Correlation with modified barium swallow  recommended.            PMH:  Past Medical History:   Diagnosis Date    Chest pain 8/18/2016    [facility] [address]     Diabetes ([facility] [address]     GERD (gastroesophageal reflux disease)     resolved per pt-- prn OTC meds    Hypertension     no current treatment, states Coreg dropped BP too much and is not taking    Iron deficiency anemia due to chronic blood loss 07/26/2018    denies hx of blood transfusion    PONV (postoperative nausea and vomiting)     Rectal [facility] ([facility] [address]     Thyroid disease     Tobacco abuse 08/18/2016    Quit 6/28/18--- 1 ppd x 30 yrs       PSH:  Past Surgical History:   Procedure Laterality Date    FLEXIBLE SIGMOIDOSCOPY N/A 6/27/2018    SIGMOIDOSCOPY FLEXIBLE performed by Dora Odonnell MD at [place] [id number]  2003    HX VASCULAR ACCESS      VASCULAR SURGERY PROCEDURE UNLIST Right     artery tied off after accident       Allergies: Allergies   Allergen Reactions    Zithromax [Azithromycin] Rash       Home Medications:  Prior to Admission medications    Medication Sig Start Date End Date Taking? Authorizing Provider   albuterol (PROVENTIL HFA, VENTOLIN HFA, PROAIR HFA) 90 mcg/actuation inhaler Take 1 Puff by inhalation every four (4) hours as needed for Wheezing or Shortness of Breath.  7/8/19  Yes Miguel Ángel Ricardo NP   carvedilol (COREG) 3.125 mg tablet Take 1 Tab by mouth two (2) times daily (with meals). 5/22/19  Yes Lolis Guevara MD   cimetidine (TAGAMET) 300 mg tab Take 1 Tab by mouth two (2) times a day. 1/16/19  Yes Mona Meza NP   magic mouthwash solution Magic mouth wash   Maalox  Lidocaine 2% viscous   Diphenhydramine oral solution     Pharmacy to mix equal portions of ingredients to a total volume as indicated in the dispense amount. 7/17/19   Lolis Guevara MD   magic mouthwash solution Magic mouth wash   Maalox  Lidocaine 2% viscous   Diphenhydramine oral solution     Pharmacy to mix equal portions of ingredients to a total volume as indicated in the dispense amount. 7/17/19   Lolis Guevara MD   fluticasone propionate (FLOVENT HFA) 110 mcg/actuation inhaler Take 1 Puff by inhalation every twelve (12) hours. 7/8/19   Mona Meza NP   enoxaparin (LOVENOX) 100 mg/mL 100 mg by SubCUTAneous route daily. Indications: blood clot in a deep vein of the extremities 6/25/19   Lolis Guevara MD   ondansetron hcl [facility]) 8 mg tablet Take 1 Tab by mouth every eight (8) hours as needed for Nausea. Indications: Prevent Nausea and Vomiting from Cancer Chemotherapy 6/19/19   Lolis Guevara MD   prochlorperazine (COMPAZINE) 10 mg tablet Take 1 Tab by mouth every six (6) hours as needed for Nausea. Indications: Prevent Nausea and Vomiting from Cancer Chemotherapy 6/19/19   Lolis Guevara MD   ibuprofen (MOTRIN IB) 200 mg tablet Take 400 mg by mouth. Provider, Historical   lidocaine-prilocaine (EMLA) topical cream Apply  to affected area as needed for Pain. Apply to port 30-45 min prior to port needle stick 6/5/19   Mona Meza NP   oxyCODONE IR (ROXICODONE) 5 mg immediate release tablet Take 1 Tab by mouth every six (6) hours as needed for Pain for up to 30 days. Max Daily Amount: 20 mg. Take 1-2 tabs every 6 hrs for pain as needed 5/22/19 7/8/19  Fernie Negrete NP   lisinopril (PRINIVIL, ZESTRIL) 10 mg tablet Take 1 Tab by mouth daily.  5/22/19   Lolis Guevara MD DISABLED PLACARD (DISABLED PLACARD) DMV Dx.  Colon Cancer  An Inability to walk 100 feet nonstop without aggravating existing medical condition 4/10/19   Mayte Brian MD   diphenoxylate-atropine (LOMOTIL) 2.5-0.025 mg per tablet One tab 4 times a day as needed for diarrhea 4/2/19   Tatiana Jimenez NP       Hospital Medications:  Current Facility-Administered Medications   Medication Dose Route Frequency    albuterol (PROVENTIL VENTOLIN) nebulizer solution 2.5 mg  2.5 mg Nebulization Q6H PRN    NUTRITIONAL SUPPORT ELECTROLYTE PRN ORDERS   Does Not Apply PRN    potassium chloride (KLOR-CON) packet for solution 20 mEq  20 mEq Oral BID WITH MEALS    piperacillin-tazobactam (ZOSYN) 4.5 g in 0.9% sodium chloride (MBP/ADV) 100 mL  4.5 g IntraVENous Q8H    methylPREDNISolone (PF) (SOLU-MEDROL) injection 40 mg  40 mg IntraVENous Q8H    fluticasone (FLOVENT HFA) 110 mcg inhaler (Patient Supplied)  2 Puff Inhalation Q12H    famotidine (PF) (PEPCID) 20 mg in sodium chloride 0.9% 10 mL injection  20 mg IntraVENous Q12H    carvedilol (COREG) tablet 3.125 mg  3.125 mg Oral BID WITH MEALS    diphenoxylate-atropine (LOMOTIL) tablet 1 Tab  1 Tab Oral QID PRN    lisinopril (PRINIVIL, ZESTRIL) tablet 10 mg  10 mg Oral DAILY    magic mouthwash (SIMON) oral suspension 5 mL  5 mL Oral Q4H PRN    oxyCODONE IR (ROXICODONE) tablet 5 mg  5 mg Oral Q6H PRN    guaiFENesin-dextromethorphan (ROBITUSSIN DM) 100-10 mg/5 mL syrup 5 mL  5 mL Oral Q6H PRN    benzonatate (TESSALON) capsule 100 mg  100 mg Oral TID PRN    HYDROcodone-acetaminophen (HYCET) 0.5-21.7 mg/mL oral solution 5 mg  5 mg Oral Q4H PRN    LORazepam (ATIVAN) injection 1 mg  1 mg IntraVENous Q4H PRN    central line flush (saline) syringe 10 mL  10 mL InterCATHeter PRN    prochlorperazine (COMPAZINE) injection 10 mg  10 mg IntraVENous Q6H PRN    morphine injection 2 mg  2 mg IntraVENous Q4H PRN    enoxaparin (LOVENOX) injection 100 mg  100 mg SubCUTAneous Q24H Social History:  Social History     Tobacco Use    Smoking status: Former Smoker     Packs/day: 1.00     Years: 30.00     Pack years: 30.00     Types: Cigarettes     Start date: 1988     Last attempt to quit: 2018     Years since quittin.0    Smokeless tobacco: Never Used   Substance Use Topics    Alcohol use: Yes     Alcohol/week: 14.0 standard drinks     Types: 14 Cans of beer per week       Pt denies any history of drug use, blood transfusions, or tattoos. Family History:  Family History   Problem Relation Age of Onset    Hypertension Mother     Heart Disease Mother        Review of Systems:  A detailed 10 system ROS is obtained, with pertinent positives as listed above. All others are negative. Diet:      Objective:     Physical Exam:  Vitals:  Visit Vitals  /85 (BP 1 Location: Right arm)   Pulse 84   Temp 98 °F (36.7 °C)   Resp 22   Wt 60.7 kg (133 lb 13.1 oz)   SpO2 99%   BMI 19.48 kg/m²     Gen:  Pt is alert, cooperative, no acute distress  Skin:  Extremities and face reveal no rashes. HEENT: Sclerae anicteric. Extra-occular muscles are intact. No oral ulcers. No abnormal pigmentation of the lips. The neck is supple. Cardiovascular: Regular rate and rhythm. No murmurs, gallops, or rubs. Respiratory:  Comfortable breathing with no accessory muscle use. Clear breath sounds anteriorly with no wheezes, rales, or rhonchi. GI:  Abdomen nondistended, soft, and nontender. Normal active bowel sounds. No enlargement of the liver or spleen. No masses palpable. Rectal:  Deferred  Musculoskeletal:  No pitting edema of the lower legs. Neurological:  Gross memory appears intact. Patient is alert and oriented. Psychiatric:  Mood appears appropriate with judgement intact. Lymphatic:  No cervical or supraclavicular adenopathy.     Laboratory:    Recent Labs     19  0306 19  0519 19  0313   WBC 37.7* 27.6* 10.5   HGB 10.7* 10.0* 9.9*   HCT 34.3* 32.0* 31.7* * 462* 398   MCV 94.0 93.8 93.0    141 137   K 4.2 3.7 3.6    103 100   CO2 29 27 26   BUN 26* 19 9   CREA 1.08 1.13 1.16   CA 8.9 8.9 8.6   MG 2.9* 2.4 2.0   * 137* 159*    80 80   SGOT 22 17 13*   ALT 13 9* 12   TBILI 0.2 0.2 0.3   ALB 2.3* 2.1* 2.1*   TP 6.7 6.5 6.6      CT OF THE CHEST WITH INTRAVENOUS CONTRAST. 7/24/19   INDICATION: Shortness of breath. Heath Kawasaki adenocarcinoma with metastatic  disease.    COMPARISON: None.   TECHNIQUE:   2.5 mm axial scans from above the aortic arch to the lung bases  with 100 cc nonionic intravenous contrast without acute complication. Intravenous contrast was given to evaluate for pulmonary embolism.   FINDINGS:  The degree of opacification of the pulmonary arteries is adequate. No intraluminal filling defects within the pulmonary arterial tree to suggest  pulmonary embolism. [place] is normal caliber with a uniform lumen without  evidence of dissection. Lungs demonstrate multiple nodules, groundglass  opacities and small pneumonic consolidation in the bases. There is extensive  mediastinal adenopathy which has increased a great deal from the prior exam..   Included portion of the upper abdomen are unremarkable. No gross bony lesions. .   IMPRESSION  IMPRESSION:  Mediastinal adenopathy is increased great deal from the prior exam.  There is patchy airspace disease in both lung bases. Pulmonary metastases. Negative for pulmonary embolism.      Assessment:     Active Problems:    Rectal adenocarcinoma ([address] (7/6/2018)      Dysphagia (7/25/2019)      Intractable vomiting (7/25/2019)      Metastatic cancer to lung [facility]) (7/26/2019)      Overview: Per EBUS in June 2019      Acute respiratory failure ([address] (7/26/2019)      Aspiration into airway (7/26/2019)      Patient is a 48 y.o. male with PMH of daily etoh use, GERD, rectal cancer s/p diverting colostomy (Stage 4-locally advanced unresectable, LAD abd and mediastinum), hx of PE 5/8/19, RLE DVT 5/29/19, on Lovenox, renal mass, RT sided ureteral obstruction with likely malignancy who is seen in consultation at the request of Silvio Menjivar NP for PEG Eval. He was admitted 7/24/19 with fatigue and inability to tolerate PO intake. He reported dysphagia with immediate regurgitation. CT chest with findings as above. BS w extensive compression of esophagus. He last received AA AvastinFOLFIRI 7/9/19. He was due for next round, but this was delayed due to current symptoms. Plan:     - Could perform PEG for feeding August 6 (for elective procedures recommendation is at least 28 days off Avastin and hold for 28 days after)  - Had NG yesterday that came out overnight - if unable to take in enough thickened liquids/nutrition consider re inserting this and beginning TF    Inge Monsivais NP  Patient is seen and examined in collaboration with Dr. Tda Cartwright. Assessment and plan as per Dr. Yajaira Reyez.

## 2019-07-29 NOTE — INTERDISCIPLINARY ROUNDS
1556-Interdisciplinary rounds with charge nurse, provider,  and .     Presenting Problem: rectal ca   Daily Goal started XRT  Expected Discharge Date: TBD  Expected Discharge Needs: PEG  Patient/Family Concerns addressed

## 2019-07-29 NOTE — PROGRESS NOTES
SPEECH PATHOLOGY NOTE:  Modified Barium Swallow study complete. Patient expressed fear of choking throughout study. Patient exhibited deep laryngeal penetration with thin liquids presented by spoon that did not improve with effortful swallow strategy. . No laryngeal penetration or aspiration observed with nectar thick liquids regardless of presentation or pudding. Laryngeal penetration observed during swallow with mixed consistencies. Cracker deferred, per patient request.  Recommend initiate chopped mechanical soft diet with ground meats. No mixed consistencies. Thicken all liquids to nectar consistency. Give meds whole or crushed in puree as tolerated. Patient should be fully upright for all PO and remain upright for 20-30 min following PO. Follow reflux and aspiration precautions. SLP to follow for laryngeal strengthening exercises and training in safe swallowing strategies. Full report to follow.   Wili Weber MA, CCC-SLP

## 2019-07-29 NOTE — PROGRESS NOTES
[facility]        Inpatient Hematology / Oncology Progress Note      Admission Date: 2019  3:18 PM  Reason for Admission/Hospital Course: Dysphagia [R13.10]  Intractable vomiting [R11.10]      24 Hour Events:  Afebrile, breathing better - for transfer out of ICU   Family updated at bedside   C/w Zosyn for aspiration PNA   Rise in wbc noted     ROS:  Constitutional: Positive for fatigue; negative for fever, chills  CV:  negative for chest pain, palpitations, edema. Respiratory:  +dyspnea-respiratory distress better   GI:  +nausea resolved, vomiting, diarrhea. 10 point review of systems is otherwise negative with the exception of the elements mentioned above in the HPI. Allergies   Allergen Reactions    Zithromax [Azithromycin] Rash       OBJECTIVE:  Patient Vitals for the past 8 hrs:   BP Temp Pulse Resp SpO2   19 1736 (!) 146/93  84     19 1532 (!) 149/97 98 °F (36.7 °C) 77 25 99 %   19 1215 150/85 98 °F (36.7 °C) 84 22 99 %     Temp (24hrs), Av °F (36.7 °C), Min:97.5 °F (36.4 °C), Max:98.8 °F (37.1 °C)     0701 -  1900  In: 251 [I.V.:251]  Out: 600 [Urine:600]    Physical Exam:  Constitutional: Acutely ill male in no respiratory distress, lying comfortably in the hospital bed. Family at bedside. HEENT: Normocephalic and atraumatic. Oropharynx is clear, mucous membranes are moist. Neck supple     Lymph node   Deferred   Skin Warm and dry. No bruising and no rash noted. No erythema. No pallor. Respiratory Lungs with better BS, no wheezing, no accessory muscle use. CVS Mildly tachycardic rate, regular rhythm and normal S1 and S2. No murmurs, gallops, or rubs. Abdomen Soft, nontender and nondistended, normoactive bowel sounds. No palpable mass. No hepatosplenomegaly. Neuro Grossly nonfocal with no obvious sensory or motor deficits. MSK Normal range of motion in general.  No edema and no tenderness.    Psych Mood appropriate Labs:      Recent Labs     07/29/19  0306 07/28/19  0519 07/27/19  0313   WBC 37.7* 27.6* 10.5   RBC 3.65* 3.41* 3.41*   HGB 10.7* 10.0* 9.9*   HCT 34.3* 32.0* 31.7*   MCV 94.0 93.8 93.0   MCH 29.3 29.3 29.0   MCHC 31.2* 31.3* 31.2*   RDW 15.9* 15.8* 15.8*   * 462* 398   GRANS 63 69 80*   LYMPH 2* 2* 4*   MONOS 6 7 7   EOS 0* 0* 0*   BASOS 0 0 0   IG 29* 22* 9*   DF AUTOMATED AUTOMATED AUTOMATED   ANEU 23.7* 19.0* 8.5*   ABL 0.8 0.6 0.4*   ABM 2.3* 1.9* 0.7   YESI 0.0 0.0 0.0   ABB 0.0 0.0 0.0   AIG 10.9* 6.1* 0.9*        Recent Labs     07/29/19  0306 07/28/19  0519 07/27/19  0313    141 137   K 4.2 3.7 3.6    103 100   CO2 29 27 26   AGAP 9 11 11   * 137* 159*   BUN 26* 19 9   CREA 1.08 1.13 1.16   GFRAA >60 >60 >60   GFRNA >60 >60 >60   CA 8.9 8.9 8.6   SGOT 22 17 13*    80 80   TP 6.7 6.5 6.6   ALB 2.3* 2.1* 2.1*   GLOB 4.4* 4.4* 4.5*   AGRAT 0.5* 0.5* 0.5*   MG 2.9* 2.4 2.0         Imaging:  XR CHEST SNGL V [[id number]] Collected: 07/26/19 1159   Order Status: Completed Updated: 07/26/19 1202   Narrative:     Portable chest x-ray    CLINICAL INDICATION: Shortness of breath    FINDINGS: Single AP view the chest compared to a similar exam dated 7/11/2019  shows new reticular nodular interstitial densities throughout both lungs with  more patchy airspace opacity in the right upper lobe. A right-sided chest port  is in place. The cardiac silhouette and mediastinum are stable. Impression:     IMPRESSION: New, diffuse reticular nodular densities throughout the lungs with  patchy opacity in the right upper lobe. Atypical pneumonia or pulmonary edema  should be considered. XR Patria Cohen [[id number]] Collected: 07/26/19 5026   Order Status: Completed Updated: 07/26/19 1026   Narrative:     History: Dysphasia. Stage IV rectal cancer. EXAM: Barium swallow    TECHNIQUE: 1.3 minutes fluoroscopy time is utilized.  The patient ingests  effervescent granules followed by thick and thin consistencies of barium. 18  fluoroscopic images are available for review. No comparison    FINDINGS: The exam is markedly limited. The patient is unable to perform the  exam the upright position, and has very limited mobility. There is aspiration of  the thin contrast. Contrast flows through the esophagus into the stomach without  difficulty. There is a hiatal hernia present. Just above the hiatal hernia,  there is a filling defect of the distal esophagus, corresponding to a soft  tissue mass adjacent to the esophagus on the recent CT chest. No definite  gastroesophageal reflux, or ulceration. Impression:     IMPRESSION:  1. Just above the hiatal hernia, there is an extrinsic soft tissue mass creating  a filling defect within the distal esophagus, corresponding to a soft tissue  mass seen on the recent CT chest.  2. Aspiration of oral contrast. Correlation with modified barium swallow  recommended. CT CHEST W CONT [[id number]] Collected: 07/24/19 2133   Order Status: Completed Updated: 07/24/19 2138   Narrative:     CT OF THE CHEST WITH INTRAVENOUS CONTRAST. INDICATION: Shortness of breath. [place] adenocarcinoma with metastatic  disease. COMPARISON: None. TECHNIQUE:   2.5 mm axial scans from above the aortic arch to the lung bases  with 100 cc nonionic intravenous contrast without acute complication. Intravenous contrast was given to evaluate for pulmonary embolism. FINDINGS:  The degree of opacification of the pulmonary arteries is adequate. No intraluminal filling defects within the pulmonary arterial tree to suggest  pulmonary embolism. Leotha Sandeep is normal caliber with a uniform lumen without  evidence of dissection. Lungs demonstrate multiple nodules, groundglass  opacities and small pneumonic consolidation in the bases. There is extensive  mediastinal adenopathy which has increased a great deal from the prior exam..   Included portion of the upper abdomen are unremarkable.  No gross bony lesions. .   Impression:     IMPRESSION:  Mediastinal adenopathy is increased great deal from the prior exam.  There is patchy airspace disease in both lung bases. Pulmonary metastases. Negative for pulmonary embolism.           Medications:  Current Facility-Administered Medications   Medication Dose Route Frequency    albuterol (PROVENTIL VENTOLIN) nebulizer solution 2.5 mg  2.5 mg Nebulization Q6H PRN    NUTRITIONAL SUPPORT ELECTROLYTE PRN ORDERS   Does Not Apply PRN    potassium chloride (KLOR-CON) packet for solution 20 mEq  20 mEq Oral BID WITH MEALS    piperacillin-tazobactam (ZOSYN) 4.5 g in 0.9% sodium chloride (MBP/ADV) 100 mL  4.5 g IntraVENous Q8H    methylPREDNISolone (PF) (SOLU-MEDROL) injection 40 mg  40 mg IntraVENous Q8H    fluticasone (FLOVENT HFA) 110 mcg inhaler (Patient Supplied)  2 Puff Inhalation Q12H    famotidine (PF) (PEPCID) 20 mg in sodium chloride 0.9% 10 mL injection  20 mg IntraVENous Q12H    carvedilol (COREG) tablet 3.125 mg  3.125 mg Oral BID WITH MEALS    diphenoxylate-atropine (LOMOTIL) tablet 1 Tab  1 Tab Oral QID PRN    lisinopril (PRINIVIL, ZESTRIL) tablet 10 mg  10 mg Oral DAILY    magic mouthwash (SIMON) oral suspension 5 mL  5 mL Oral Q4H PRN    oxyCODONE IR (ROXICODONE) tablet 5 mg  5 mg Oral Q6H PRN    guaiFENesin-dextromethorphan (ROBITUSSIN DM) 100-10 mg/5 mL syrup 5 mL  5 mL Oral Q6H PRN    benzonatate (TESSALON) capsule 100 mg  100 mg Oral TID PRN    HYDROcodone-acetaminophen (HYCET) 0.5-21.7 mg/mL oral solution 5 mg  5 mg Oral Q4H PRN    LORazepam (ATIVAN) injection 1 mg  1 mg IntraVENous Q4H PRN    central line flush (saline) syringe 10 mL  10 mL InterCATHeter PRN    prochlorperazine (COMPAZINE) injection 10 mg  10 mg IntraVENous Q6H PRN    morphine injection 2 mg  2 mg IntraVENous Q4H PRN    enoxaparin (LOVENOX) injection 100 mg  100 mg SubCUTAneous Q24H         ASSESSMENT:    Problem List  Date Reviewed: 7/26/2019          Codes Class Noted    Metastatic cancer to lung [facility]) (Chronic) ICD-10-CM: C78.00  ICD-9-CM: 197.0  7/26/2019    Overview Signed 7/26/2019 11:06 AM by Yanet Díaz NP     Per EBUS in June 2019             Acute respiratory failure ([facility] ICD-10-CM: J96.00  ICD-9-CM: 518.81  7/26/2019        Aspiration into airway ICD-10-CM: T17.908A  ICD-9-CM: 934.9  7/26/2019        Dysphagia ICD-10-CM: R13.10  ICD-9-CM: 787.20  7/25/2019        Intractable vomiting ICD-10-CM: R11.10  ICD-9-CM: 536.2  7/25/2019        Mediastinal lymphadenopathy ICD-10-CM: R59.0  ICD-9-CM: 785.6  6/11/2019        Pulmonary nodules ICD-10-CM: R91.8  ICD-9-CM: 793.19  6/11/2019        Dehydration ICD-10-CM: E86.0  ICD-9-CM: 276.51  4/2/2019        S/P radiation therapy < 4 weeks ago ICD-10-CM: Z92.3  ICD-9-CM: V15.3  10/1/2018        Rectal adenocarcinoma ([facility] (Chronic) ICD-10-CM: C20  ICD-9-CM: 154.1  7/6/2018        Colostomy status ([facility] ICD-10-CM: Z93.3  ICD-9-CM: V44.3  6/29/2018        Rectal obstruction ICD-10-CM: K62.4  ICD-9-CM: 569.2  6/27/2018        Erectile dysfunction due to arterial insufficiency ICD-10-CM: N52.01  ICD-9-CM: 607.84  5/31/2018        Plantar fasciitis, right ICD-10-CM: M72.2  ICD-9-CM: 728.71  5/31/2018        Benign essential HTN ICD-10-CM: I10  ICD-9-CM: 401.1  5/31/2018        IGT (impaired glucose tolerance) ICD-10-CM: R73.02  ICD-9-CM: 790.22  5/30/2018        Hypercholesterolemia ICD-10-CM: E78.00  ICD-9-CM: 272.0  5/30/2018    Overview Signed 6/4/2018  2:20 PM by Coleen Burks     ASCVD 10 year risk is 16.0%. Mod to high dose statin indicated. (based on b/p 180/90, 6/4/18)                 Screening PSA (prostate specific antigen) ICD-10-CM: Z12.5  ICD-9-CM: V76.44  5/30/2018        History of tobacco abuse ICD-10-CM: Z87.891  ICD-9-CM: V15.82  8/18/2016              PLAN:  Dysphagia/vomiting  - GI evaluation. Planning EGD tomorrow (cannot do dilation due to recent avastin).  Started pepcid BID  - Continue IV fluids  7/26 Barium swallow today with aspiration and extrinsic soft tissue mass creating filling defect in distal esophagus  7/27 will reach out to XRT to see if this area amenable to radiation   7/28 tolerating TF     Respiratory distress   7/26/19 Due to acute respiratory distress s/p Barrium Swallow with aspiration, CC team was called to room and he was place don BiPap and when transferred to CCU. IV steroids and Zosyn was added. Family was updated on his status and confirm full code status. CT chest was reviewed with wife that confirms disease progression. 7/27 much improved after BiPAP, remains in the ICU   7/28 for transfer out of ICU     Sinusitis/chills  - Continue levaquin. BCx pending. UA negative. CT chest with no acute findings  7/26 D3 LVQ  7/27 antibiotics broadened to Zosyn for aspiration     Met rectal katherine  - S/p FOLFIRI-Avastin, last given 7/9    Recently on cruise to [facility]. No other sick contacts    Leukopenia/anemia  - Secondary to chemotherapy.  Granix x 1 today  7/26 WBC up to 4.5  7/28 wbc up to 27, with chronic diarrhea (colostomy bag), will check c diff     Lovenox: on hold prior to procedures    Home pending findings/work up by KAYLEIGH Gomez MD   [facility]  [id number] [address]  Office : [phone]  Fax : [phone]

## 2019-07-29 NOTE — PROGRESS NOTES
Pt transported via [address] from [facility] for a re-consult for a new mediastinal lesion that is causing MELIDA. Pt presents with 4L of O2 via N/C with respirations of 30+ and and O2 sat of 94%. Pt has received RT in the recent past to his rectal mass which completed 9/28/18. Pt has been receiving chemo--his next cycle is on 8/7/19. He has a PEG tube pending--a Swallow Study today indicated that pt is aspirating thin liquids. A 5/14/19 pathology report indicated a negative renal lesion. The 6/11/19 mediastinal lymph node biopsy indicated mets from his rectal cancer pressing on his esophagus. Pt stated he has intermittent chest discomfort and pressure from the chest lesion, and severe SOB. Pt stated that he remembers receiving RT and has no questions about it at this time. He will sign RT consents and have the CT/Sim (mapping) done today. Pt will start RT to the mediastinal mass tomorrow.

## 2019-07-29 NOTE — PROGRESS NOTES
Patient called this RN into room, stated he tried to run to the bathroom and pulled out the dobhoff tube. Paged NP and she said to go ahead and put it back in if patient is agreeable to this. Will let patient know what doctor said. Told patient that I called doctor and she said to go ahead and insert another dobhoff. Patient requested that we wait until after procedure tomorrow and doesn't want one inserted right now. Will wait until after procedure then. Will continue to monitor.

## 2019-07-29 NOTE — PROGRESS NOTES
1530-TRANSFER - IN REPORT:    Verbal report received from Sami Moseley RN on Keyona Connors  being received from ICU for routine progression of care      Report consisted of patients Situation, Background, Assessment and   Recommendations(SBAR). Information from the following report(s) SBAR was reviewed with the receiving nurse. Opportunity for questions and clarification was provided. Assessment will be completed upon patients arrival to unit and care will be assumed. 1959-C-Diff testing has been ordered but it did not meet testing criteria because:  · The patient has had enteric feedings. · Discussed with Vidhi Hanson NP. Instructed to override protocol. Stool take to lab.

## 2019-07-29 NOTE — PROGRESS NOTES
Date of Outreach Update:  Jerry Nagel was seen and assessed. MEWS Score: 2 (07/29/19 0259)  Vitals:    07/28/19 1858 07/28/19 2014 07/28/19 2313 07/29/19 0259   BP: 134/80  144/80 124/82   Pulse: 95  69 90   Resp: 26  26 26   Temp: 97.5 °F (36.4 °C)  97.6 °F (36.4 °C) 97.9 °F (36.6 °C)   SpO2: 99%  99% 99%   Weight:  60.7 kg (133 lb 13.1 oz)           Pain Assessment  Pain Intensity 1: 0 (07/28/19 2030)  Pain Location 1: Back  Pain Intervention(s) 1: Medication (see MAR)  Patient Stated Pain Goal: 0      Previous Outreach assessment has been reviewed. There have been no significant clinical changes since the completion of the last dated Outreach assessment. Will continue to follow up per outreach protocol.     Signed By:   Vaughn Mercedes RN    July 29, 2019 5:40 AM

## 2019-07-29 NOTE — PROGRESS NOTES
[facility] CRITICAL CARE OUTREACH NURSE PROGRESS REPORT      SUBJECTIVE: Called to assess patient secondary to transfer from ICU. MEWS Score: 2 (07/28/19 1858)  Vitals:    07/28/19 1459 07/28/19 1604 07/28/19 1858 07/28/19 2014   BP: 140/75 133/90 134/80    Pulse: 88 92 95    Resp: (!) 33 28 26    Temp:  97.7 °F (36.5 °C) 97.5 °F (36.4 °C)    SpO2: 97% 99% 99%    Weight:    60.7 kg (133 lb 13.1 oz)      LAB DATA:    Recent Labs     07/28/19 0519 07/27/19 0313 07/26/19  0301    137 138   K 3.7 3.6 3.9    100 103   CO2 27 26 26   AGAP 11 11 9   * 159* 103*   BUN 19 9 4*   CREA 1.13 1.16 0.95   GFRAA >60 >60 >60   GFRNA >60 >60 >60   CA 8.9 8.6 8.2*   MG 2.4 2.0 1.8   ALB 2.1* 2.1* 2.0*   TP 6.5 6.6 5.7*   GLOB 4.4* 4.5* 3.7*   AGRAT 0.5* 0.5* 0.5*   ALT 9* 12 9*        Recent Labs     07/28/19 0519 07/27/19 0313 07/26/19  0301   WBC 27.6* 10.5 4.5   HGB 10.0* 9.9* 9.3*   HCT 32.0* 31.7* 29.7*   * 398 382          OBJECTIVE: On arrival to room, I found patient to be resting. Pain Assessment  Pain Intensity 1: 0 (07/28/19 2030)  Pain Location 1: Back  Pain Intervention(s) 1: Medication (see MAR)  Patient Stated Pain Goal: 0                                 ASSESSMENT: A&O x4. On 3L NC with SpO2 99%. Respirations even and unlabored. Ostomy output being tested for Cdiff. Denies SOB, dizziness or chest pain. No needs at this time. PLAN: Will continue to follow per ICU outreach protocol.

## 2019-07-29 NOTE — PROGRESS NOTES
Problem: Falls - Risk of  Goal: *Absence of Falls  Description  Document Haleyemilee Roscoeemperatriz Fall Risk and appropriate interventions in the flowsheet.   Outcome: Progressing Towards Goal  Note:   Fall Risk Interventions:  Mobility Interventions: Bed/chair exit alarm, Communicate number of staff needed for ambulation/transfer, Patient to call before getting OOB         Medication Interventions: Bed/chair exit alarm, Patient to call before getting OOB, Teach patient to arise slowly    Elimination Interventions: Bed/chair exit alarm, Call light in reach, Patient to call for help with toileting needs, Stay With Me (per policy), Urinal in reach    History of Falls Interventions: Door open when patient unattended

## 2019-07-29 NOTE — PROGRESS NOTES
END OF SHIFT NOTE:    Intake/Output  No intake/output data recorded. Voiding: YES  Catheter: NO  Drain:              Stool:  0 occurrences. Stool Assessment  Stool Color: Ricardo Mc (07/28/19 2014)  Stool Appearance: Loose (07/28/19 2014)  Stool Amount: Medium (07/28/19 2014)  Stool Source/Status: Colostomy (07/28/19 2014)    Emesis:  0 occurrences. VITAL SIGNS  Patient Vitals for the past 12 hrs:   Temp Pulse Resp BP SpO2   07/29/19 1736  84  (!) 146/93    07/29/19 1532 98 °F (36.7 °C) 77 25 (!) 149/97 99 %   07/29/19 1215 98 °F (36.7 °C) 84 22 150/85 99 %       Pain Assessment  Pain 1  Pain Scale 1: Visual (07/29/19 1304)  Pain Intensity 1: 0 (07/29/19 1304)  Patient Stated Pain Goal: 0 (07/29/19 0840)  Pain Reassessment 1: Patient resting w/respiratory rate greater than 10 (07/29/19 1304)  Pain Onset 1: ongoing (07/26/19 0850)  Pain Location 1: Back (07/26/19 2030)  Pain Orientation 1: Upper (07/26/19 1218)  Pain Description 1: Aching (07/26/19 0850)  Pain Intervention(s) 1: Medication (see MAR) (07/26/19 2030)    Ambulating  Yes    Additional Information: C-diff negative. Modified barium swallow completed - per Dr. Tae Kuhn, pt still NPO. All PO meds held (NP aware). GI consulted for PEG placement. Pt went for D1 of XRT today. Shift report given to oncoming nurse at the bedside.     Shanda Aceves

## 2019-07-29 NOTE — PROGRESS NOTES
Nutrition - F/U   TF management per nutritional support protocols. (Dr. Gavin Mccall[facility])  Assessment:  TF was initiated on Saturday d/t dysphagia and NPO status. NGT was self-removed early this morning and left out for MBS today. SLP recommends mechanical soft and nectar thickened liquids. Per oncology team, diet will not be advanced past NPO and a PEG will be placed for nutrition. Pt had simulation for radiation (plans for 10 fractions) to mediastinal adenopathy compressing esophagus. Ostomy: 850 cc of output x 24 hours. Abdomen with active bowel sounds. Glucose 148 mg/dl. Pt is on solu-medrol (40 mg q8). I spoke with pt and numerous family members at bedside following MBS. Pt was able to state that he was told he can not consume regular liquids but will need thick liquids. Pt was able to give a recall of his diet PTA. Per pt, he was consuming mostly fruits, mashed potatoes, applesauce, ice cream, and other similar foods. He states that he was told to drink ensure but was not doing so (does not elaborate). Anthropometrics: Height: 5' 9.5\",  Weight Source: Bed, Weight: 60.7 kg (133 lb 13.1 oz), Body mass index is 19.48 kg/m². BMI class of normal weight.   Edema: Trace to BLEs  Last 3 Recorded Weights in this Encounter    07/27/19 0449 07/28/19 0356 07/28/19 2014   Weight: 59.7 kg (131 lb 9.6 oz) 61.6 kg (135 lb 14.4 oz) 60.7 kg (133 lb 13.1 oz)     WT / BMI 7/28/2019 7/24/2019 7/24/2019 7/24/2019 7/15/2019   WEIGHT 133 lb 13.1 oz    136 lb 3.2 oz     WT / BMI 7/11/2019 7/9/2019 7/8/2019 6/19/2019 6/11/2019   WEIGHT 139 lb 9.6 oz 138 lb 6.4 oz 140 lb 6 oz 142 lb 148 lb     WT / BMI 6/5/2019 5/28/2019 5/24/2019 5/22/2019 5/22/2019   WEIGHT 148 lb 6.4 oz 148 lb 9.6 oz 149 lb 6.4 oz  148 lb     WT / BMI 5/14/2019 5/10/2019 5/8/2019 4/26/2019   WEIGHT 148 lb 149 lb 12.8 oz 148 lb 11.2 oz 150 lb 6.4 oz   Per weights listed in EMR, pt has had a potential 17 pound, 11.3% clinically significant weight loss over 3 months PTA. Macronutrient needs (59.7 kg):  EER:  7566-0310 kcal /day (25-30 kcal/kg)  EPR:  60-72 grams protein/day (1-1.2 grams/kg)  Max CHO:  225 grams/day (50% kcal)  Fluid:  1ml/kcal  Intake/Comparative standards: Current NPO status does not meet kcal or protein needs. Nutrition Diagnosis:   1. Difficulty swallowing r/t mediastinal adenopathy as evidenced by recommendations of a modified diet and PEG placement for nutrition. 2. Unintended weight loss r/t dysphagia and hypermetabolic disease state, as evidenced by pt with metastatic rectal cancer consuming a liquid diet PTA that was inadequate in energy/PRO and significant weight loss noted above. Intervention:  Meals and snacks: NPO  EN:If PEG is not able to be placed in the next ~2-3 days, recommend replacement of dobhoff and resuming EN until PEG can be placed. Recommend: TF of Glucerna 1.2 at 10 ml/hr and increase by 10 ml/hr q 4 hrs as tolerated to goal rate of 60ml/hr with 25ml water q 1hr to provide 1728 kcal/day (100% of needs), 86 grams protein/day (100% of needs), 165 grams CHO/day (does not exceed max CHO),  and ~ 1810 ml free water/day (100% of needs). Nutrition Supplement Therapy: Electrolyte replacement per nutritional support protocols are active on MAR. Discharge nutrition plan: Pt will most likely rely on TF via PEG for >90% of nutrition needs at discharge. Hopefully will tolerate bolus TF.    Coordination of care: BAR[facility] [address], [address] [place] [address], [address] [place] [address], [address] [place] [address] Ave.

## 2019-07-29 NOTE — CDMP QUERY
The patient was admitted for dysphagia and found to later have a positive barium swallow study for aspiration. Please clarify in progress note and discharge summary if treating any of the following.     --Aspiration pneumonia treated and relevant present on admission  --Aspiration pneumonia treated and relevant after admission  --Aspiration pneumonia ruled out  --Aspiration pneumonia resolved  --Other  --Clinically unable to determine at this time    Risk Factors: immunocompromised from cancer,   Clinical Indicators: complaining of dysphagia, c/o intractable vomiting, positive for aspiration during barium swallow study  Treatments: GI consult, dobhoff tube, NPO, possible EGD and peg    Thank you,  Allison Marcelo, [facility]  [phone]

## 2019-07-29 NOTE — ANESTHESIA PREPROCEDURE EVALUATION
Anesthetic History   No history of anesthetic complications            Review of Systems / Medical History  Pertinent labs reviewed    Pulmonary          Shortness of breath (Worsening SOB) and smoker         Neuro/Psych   Within defined limits           Cardiovascular    Hypertension              Exercise tolerance: >4 METS     GI/Hepatic/Renal     GERD: well controlled           Endo/Other        Cancer (colon/rectal) and anemia     Other Findings   Comments: Mediastina adenopathy compressing esophagus - consult for PEG         Physical Exam    Airway  Mallampati: II  TM Distance: 4 - 6 cm  Neck ROM: normal range of motion   Mouth opening: Normal     Cardiovascular  Regular rate and rhythm,  S1 and S2 normal,  no murmur, click, rub, or gallop             Dental  No notable dental hx       Pulmonary  Breath sounds clear to auscultation               Abdominal  GI exam deferred       Other Findings            Anesthetic Plan    ASA: 4  Anesthesia type: general          Induction: Intravenous  Anesthetic plan and risks discussed with: Patient and Spouse      Worsening SOB - being followed by Pulmonary. Concern for Mediastinal mass from lymphadenopathy. Mr. Whitley Cousin states that breathing NOT worse when supine but his breathing is labored overall as I talk with him. Will need to be reassessed in am prior to surgery.  Lovenox being held per nursing

## 2019-07-29 NOTE — PROGRESS NOTES
END OF SHIFT NOTE:    Intake/Output  07/28 1901 - 07/29 0700  In: 1124   Out: 375    Voiding: YES  Catheter: NO  Drain:              Stool:  1 occurrences. Stool Assessment  Stool Color: Josh Bill (07/28/19 2014)  Stool Appearance: Loose (07/28/19 2014)  Stool Amount: Medium (07/28/19 2014)  Stool Source/Status: Colostomy (07/28/19 2014)    Emesis:  0 occurrences. VITAL SIGNS  Patient Vitals for the past 12 hrs:   Temp Pulse Resp BP SpO2   07/29/19 0259 97.9 °F (36.6 °C) 90 26 124/82 99 %   07/28/19 2313 97.6 °F (36.4 °C) 69 26 144/80 99 %   07/28/19 1858 97.5 °F (36.4 °C) 95 26 134/80 99 %       Pain Assessment  Pain 1  Pain Scale 1: Numeric (0 - 10) (07/28/19 2030)  Pain Intensity 1: 0 (07/28/19 2030)  Patient Stated Pain Goal: 0 (07/28/19 2030)  Pain Reassessment 1: Patient resting w/respiratory rate greater than 10 (07/27/19 0700)  Pain Onset 1: ongoing (07/26/19 0850)  Pain Location 1: Back (07/26/19 2030)  Pain Orientation 1: Upper (07/26/19 1218)  Pain Description 1: Aching (07/26/19 0850)  Pain Intervention(s) 1: Medication (see MAR) (07/26/19 2030)    Ambulating  Yes    Additional Information: Patient had a rough night. Very emotional this morning. Feels like nothing is being done about his cancer and feels like he has no answers since the time he has been admitted. Try to reassure him and family member and told them to let the doctors know of their concerns. Very frustrated and anxious about everything. VSS. Shift report given to oncoming nurse at the bedside.     Jeffry Berger

## 2019-07-30 PROBLEM — E78.00 HYPERCHOLESTEROLEMIA: Chronic | Status: ACTIVE | Noted: 2018-05-30

## 2019-07-30 PROBLEM — J96.01 ACUTE RESPIRATORY FAILURE WITH HYPOXIA (HCC): Status: ACTIVE | Noted: 2019-01-01

## 2019-07-30 PROBLEM — N52.01 ERECTILE DYSFUNCTION DUE TO ARTERIAL INSUFFICIENCY: Chronic | Status: ACTIVE | Noted: 2018-05-31

## 2019-07-30 PROBLEM — Z86.711 HX PULMONARY EMBOLISM: Chronic | Status: ACTIVE | Noted: 2019-01-01

## 2019-07-30 PROBLEM — Z93.3 COLOSTOMY STATUS (HCC): Chronic | Status: ACTIVE | Noted: 2018-06-29

## 2019-07-30 PROBLEM — Z86.718 HISTORY OF DVT OF LOWER EXTREMITY: Chronic | Status: ACTIVE | Noted: 2019-01-01

## 2019-07-30 PROBLEM — R09.02 HYPOXIA: Status: ACTIVE | Noted: 2019-01-01

## 2019-07-30 PROBLEM — N13.30 HYDROURETERONEPHROSIS: Status: ACTIVE | Noted: 2019-01-01

## 2019-07-30 PROBLEM — I10 BENIGN ESSENTIAL HTN: Chronic | Status: ACTIVE | Noted: 2018-05-31

## 2019-07-30 PROBLEM — Z12.5 SCREENING PSA (PROSTATE SPECIFIC ANTIGEN): Chronic | Status: ACTIVE | Noted: 2018-05-30

## 2019-07-30 NOTE — PROGRESS NOTES
SPEECH PATHOLOGY NOTE:    Per chart review patient is NPO and off floor for procedure this AM. Will follow up at later time/date as patient is available and as schedule permits.       Vinny Mead MS, CCC-SLP

## 2019-07-30 NOTE — PERIOP NOTES
at bedside. Pt is tachypneic, retracting, and requires 10 L hiflo nc to maintain O2 sat > 90%. Orders for chest XRay received. He has spoken to Dr. Ej Gongora with pulmonology.  Pulmonology to see pt prior to return to floor    1315: Dr. Claudine Nelson and pulmonary NP Vaibhav Philippe at bedside

## 2019-07-30 NOTE — PROGRESS NOTES
TRANSFER - OUT REPORT:    Verbal report given to Vernon Plascencia RN (name) on Milagro Sanchez  being transferred to ICU (unit) for change in patient condition(SOB after procedure)       Report consisted of patients Situation, Background, Assessment and   Recommendations(SBAR). Information from the following report(s) SBAR, MAR and Recent Results was reviewed with the receiving nurse. Lines:   Venous Access Device 8Fr Angiodynaics Right Upper Chest Port 10/29/18 Upper chest (subclavicular area, right (Active)   Central Line Being Utilized Yes 7/30/2019 12:23 PM   Criteria for Appropriate Use Long term IV/antibiotic administration 7/30/2019 12:23 PM   Site Assessment Clean, dry, & intact 7/30/2019 12:23 PM   Date of Last Dressing Change 07/24/19 7/30/2019 12:23 PM   Dressing Status Clean, dry, & intact 7/30/2019 12:23 PM   Dressing Type Disk with Chlorhexadine gluconate (CHG) 7/30/2019 12:23 PM   Action Taken Blood drawn 7/30/2019  4:23 AM   Date Accessed (Medial Site) 07/24/19 7/30/2019  8:00 AM   Access Time (Medial Site) 1710 7/17/2019  5:10 PM   Access Needle Size (Site #1) 20 G 7/26/2019 12:18 PM   Access Needle Length (Medial Site) 0.75 inches 7/26/2019 12:18 PM   Positive Blood Return (Medial Site) Yes 7/30/2019  9:00 AM   Action Taken (Medial Site) Flushed; Infusing 7/30/2019  9:00 AM   Date Needle Changed (Medial Site) 07/24/19 7/30/2019  8:00 AM   Positive Blood Return (Lateral Site) Yes 7/26/2019 12:18 PM   Action Taken (Lateral Site) Infusing 7/26/2019 12:18 PM   Alcohol Cap Used No 7/30/2019  8:00 AM        Opportunity for questions and clarification was provided.

## 2019-07-30 NOTE — PROGRESS NOTES
Dorian Sanchez  Admission Date: 7/24/2019             Daily Progress Note: 7/30/2019    The patient's chart is reviewed and the patient is discussed with the staff. 48 y.o.  male, PMH Colon cancer, DM, HTN, rectal cancer, MYRON, former tobacco abuse, and GERD seen and evaluated at the request of Dr. Teresita Neal for acute respiratory distress. The patient was admitted on 7/24/2019 by Oncology for dehydration and rectal adenocarcinoma confirmed to be Stage IV per biopsy. Patient was initiated on 5FU-A and recently switched to [facility].  XRT planned for mediastinal node. Barium swallow revealed aspiration of contrast as well as extrinsic soft tissue mass creating filling defect in distal esophagus. Mechanical soft diet with chopped/ground meats and nectar thick liquids recommended per speech therapy. On abx for suspected aspiration pneumonia. Has been hypoxic and requiring oxygen support. Seen by urology for right hydroureteronephrosis. Had cystoscopy with stent placement/biopsy on 7/30 - mass? Subjective:    Seen in the PACU. Complaining that he needs to urinate. Asking for wife. Wants to go home. States that cough is unchanged from before.      Current Facility-Administered Medications   Medication Dose Route Frequency    HYDROcodone-acetaminophen (HYCET) 0.5-21.7 mg/mL oral solution 5 mg  5 mg Oral Q4H PRN    HYDROmorphone (PF) (DILAUDID) injection 0.5 mg  0.5 mg IntraVENous Multiple    lactated Ringers infusion  75 mL/hr IntraVENous CONTINUOUS    oxyCODONE IR (ROXICODONE) tablet 5 mg  5 mg Oral ONCE PRN    oxyCODONE IR (ROXICODONE) tablet 10 mg  10 mg Oral ONCE PRN    promethazine (PHENERGAN) with saline injection 3.25 mg  3.25 mg IntraVENous Q15MIN PRN    albuterol (PROVENTIL VENTOLIN) nebulizer solution 2.5 mg  2.5 mg Nebulization Q6H PRN    NUTRITIONAL SUPPORT ELECTROLYTE PRN ORDERS   Does Not Apply PRN    potassium chloride (KLOR-CON) packet for solution 20 mEq 20 mEq Oral BID WITH MEALS    piperacillin-tazobactam (ZOSYN) 4.5 g in 0.9% sodium chloride (MBP/ADV) 100 mL  4.5 g IntraVENous Q8H    methylPREDNISolone (PF) (SOLU-MEDROL) injection 40 mg  40 mg IntraVENous Q8H    fluticasone (FLOVENT HFA) 110 mcg inhaler (Patient Supplied)  2 Puff Inhalation Q12H    famotidine (PF) (PEPCID) 20 mg in sodium chloride 0.9% 10 mL injection  20 mg IntraVENous Q12H    carvedilol (COREG) tablet 3.125 mg  3.125 mg Oral BID WITH MEALS    diphenoxylate-atropine (LOMOTIL) tablet 1 Tab  1 Tab Oral QID PRN    lisinopril (PRINIVIL, ZESTRIL) tablet 10 mg  10 mg Oral DAILY    magic mouthwash (SIMON) oral suspension 5 mL  5 mL Oral Q4H PRN    oxyCODONE IR (ROXICODONE) tablet 5 mg  5 mg Oral Q6H PRN    guaiFENesin-dextromethorphan (ROBITUSSIN DM) 100-10 mg/5 mL syrup 5 mL  5 mL Oral Q6H PRN    benzonatate (TESSALON) capsule 100 mg  100 mg Oral TID PRN    LORazepam (ATIVAN) injection 1 mg  1 mg IntraVENous Q4H PRN    central line flush (saline) syringe 10 mL  10 mL InterCATHeter PRN    prochlorperazine (COMPAZINE) injection 10 mg  10 mg IntraVENous Q6H PRN    morphine injection 2 mg  2 mg IntraVENous Q4H PRN    enoxaparin (LOVENOX) injection 100 mg  100 mg SubCUTAneous Q24H         Objective:     Vitals:    07/30/19 1305 07/30/19 1320 07/30/19 1323 07/30/19 1334   BP: (!) 154/96 (!) 159/95  164/88   Pulse: 98 98  (!) 102   Resp: 20 18  18   Temp:       SpO2: 100% 97% 98% 95%   Weight:         Intake and Output:   07/28 1901 - 07/30 0700  In: 9423 [I.V.:251]  Out: 975 [Urine:600]  07/30 0701 - 07/30 1900  In: 90 [I.V.:90]  Out: -     Physical Exam:   Constitutional:  the patient is thin, well developed and in mild acute distress  HEENT:  Sclera clear, pupils equal, oral mucosa moist  Lungs: unproductive cough. Noted dyspnea even with rest. Breath sounds equal and clear.  Wearing 4 liter cannula  Cardiovascular:  RRR without M,G,R  Abd/GI: soft and non-tender; with positive bowel sounds. Ext: warm without cyanosis. There is no lower leg edema. Musculoskeletal: moves all four extremities with equal strength  Skin:  no jaundice or rashes, no wounds   Neuro: awake and alert but rambling conversation, confused   Musculoskeletal: can't ambulate - too unstable. No deformity  Psychiatric: cannot assess with confusion    ROS:  Cannot assess with confusion  Lines: port right chest    CHEST XRAY:        LAB  Recent Labs     07/30/19  0407 07/29/19  0306 07/28/19  0519   WBC 41.5* 37.7* 27.6*   HGB 10.1* 10.7* 10.0*   HCT 32.5* 34.3* 32.0*    468* 462*     Recent Labs     07/30/19 0407 07/29/19  0306 07/28/19  0519    141 141   K 4.3 4.2 3.7    103 103   CO2 31 29 27   * 148* 137*   BUN 24* 26* 19   CREA 0.94 1.08 1.13   MG 2.9* 2.9* 2.4   ALB 2.4* 2.3* 2.1*   SGOT 25 22 17       Assessment:  (Medical Decision Making)     Hospital Problems  Date Reviewed: 7/26/2019          Codes Class Noted POA    Hydroureteronephrosis ICD-10-CM: N13.30  ICD-9-CM: 082  7/30/2019 Clinically Undetermined    S/p stent/bx    Hx pulmonary embolism (Chronic) ICD-10-CM: Z86.711  ICD-9-CM: V12.55  7/30/2019 Yes    On lovenox. May 2019    History of DVT of lower extremity (Chronic) ICD-10-CM: Z86.718  ICD-9-CM: V12.51  7/30/2019 Yes    As above    Hypoxia ICD-10-CM: R09.02  ICD-9-CM: 799.02  7/30/2019 No    Currently on cannula - 4 liters    Metastatic cancer to lung [facility]) (Chronic) ICD-10-CM: C78.00  ICD-9-CM: 197.0  7/26/2019 Yes    Overview Signed 7/26/2019 11:06 AM by Jett Eastman NP     Per EBUS in June 2019         Chemo/XRT per oncology    Acute respiratory failure ([facility] [address] ICD-10-CM: J96.00  ICD-9-CM: 518.81  7/26/2019 Yes    Looks more short of breath but patient denies dyspnea    Aspiration into airway ICD-10-CM: T17.908A  ICD-9-CM: 934.9  7/26/2019 Yes    Esophageal compression. FT out.  Modified diet per ST    Dysphagia ICD-10-CM: R13.10  ICD-9-CM: 787.20  7/25/2019 Yes    As above Intractable vomiting ICD-10-CM: R11.10  ICD-9-CM: 536.2  7/25/2019 Yes    None recently    Rectal adenocarcinoma (HCC) (Chronic) ICD-10-CM: C20  ICD-9-CM: 154.1  7/6/2018 Yes    Chemo/XRT per oncology          Plan:  (Medical Decision Making)   1. Seen post op in PACU - increased work of breathing. ABG pending. CXR with bilateral infiltrates. On Zosyn for treatment of aspiration pneumonia - day 7 - will continue for now. WBC up more today - ? Secondary to ureteral obstruction - now post stent or could be secondary to steroids. Cough unproductive. Has been seen by ST and on modified diet -  chopped mechanical soft diet with ground meats. No mixed consistencies. Thicken all liquids to nectar consistency. Give meds whole or crushed in puree as tolerated. Patient should be fully upright for all PO and remain upright for 20-30 min following PO. Will hold on FT placement for today due to respiratory status - place in AM if stable. Too confused to comply with modified diet for now. Continue IV steroids, add bronchodilators. Will need to monitor intake - may need PEG (have to wait 28 days post Avastin to place per GI)  2. Biopsies from today pending  3. Oncology following for XRT/chemo. 4. Lovenox for hx DVT/PE  5. Apresoline prn hypertension - can't take blood pressure meds for now with FT out and aspiration risk     Annelise Cantu, NP   Lungs:  Few end exp wheeezes, uses accessory muscles of resp. Heart:  RRR with no Murmur/Rubs/Gallops    Additional Comments:  Move to icu , may need bipap over night, abgs ok and cxr is unchanged    I have spoken with and examined the patient. I agree with the above assessment and plan as documented. Vincent West MD      More than 50% of time documented was spent face-to-face contact with the patient and in the care of the patient on the floor/unit where the patient is located.

## 2019-07-30 NOTE — ANESTHESIA POSTPROCEDURE EVALUATION
Procedure(s):  CYSTOSCOPY WITH RIGHT RETROGRAD PYELOGRAM/ RIGHT DIAGNOSTIC URETEROSCOPY/ RIGHT URETERAL STENT AND RIGHT URETERAL BIOPSIES. general    Anesthesia Post Evaluation      Multimodal analgesia: multimodal analgesia not used between 6 hours prior to anesthesia start to PACU discharge  Patient location during evaluation: PACU  Patient participation: complete - patient participated  Level of consciousness: awake and alert  Pain management: adequate  Airway patency: patent  Anesthetic complications: no  Cardiovascular status: hemodynamically stable  Respiratory status: acceptable  Hydration status: acceptable  Comments: Tachypnea and retraction when breathing in PACU. I asked Pulmonologist to see him and decide where he should go. Vitals Value Taken Time   /90 7/30/2019  2:20 PM   Temp 36.8 °C (98.2 °F) 7/30/2019 12:40 PM   Pulse 93 7/30/2019  2:22 PM   Resp 24 7/30/2019  2:05 PM   SpO2 98 % 7/30/2019  2:22 PM   Vitals shown include unvalidated device data.

## 2019-07-30 NOTE — PROGRESS NOTES
Date of Outreach Update:  Rubén Hall was seen and assessed. Patient is drowsy, oriented to person and time, disoriented to place and situation- saying \" I'm here I guess because I swallowed a bug.\" Follows commands. Plans for cytoscopy and stent placement tomorrow, as well as PEG in future for problems with swallowing related to compression of esophagus. O2 sat 97% on 3L NC, RR up to 30s. WBC 37.7 today, receiving zosyn. BC & UA from 7/24 negative. 7/28 c diff negative. Will continue to follow. MEWS Score: 2 (07/29/19 1940)  Vitals:    07/29/19 1532 07/29/19 1736 07/29/19 1940 07/29/19 2114   BP: (!) 149/97 (!) 146/93 135/90    Pulse: 77 84 87    Resp: 25  22    Temp: 98 °F (36.7 °C)  97.5 °F (36.4 °C)    SpO2: 99%  98% 97%   Weight:             Pain Assessment  Pain Intensity 1: 0 (07/29/19 1957)  Pain Location 1: Back  Pain Intervention(s) 1: Medication (see MAR)  Patient Stated Pain Goal: 0      Previous Outreach assessment has been reviewed. There have been no significant clinical changes since the completion of the last dated Outreach assessment. Will continue to follow up per outreach protocol.     Signed By:   Em Hutchinson RN    July 29, 2019 8:52 PM

## 2019-07-30 NOTE — ROUTINE PROCESS
TRANSFER - IN REPORT:    Verbal report received from [address] (name) on Jaiden Gum  being received from 5th floor(unit) for ordered procedure      Report consisted of patients Situation, Background, Assessment and   Recommendations(SBAR). Information from the following report(s) Kardex, Procedure Summary, Intake/Output, MAR, Recent Results, Med Rec Status and Pre Procedure Checklist was reviewed with the receiving nurse. Opportunity for questions and clarification was provided. Assessment completed upon patients arrival to unit and care assumed.

## 2019-07-30 NOTE — PROGRESS NOTES
[facility] Progress Note         Admit Date:  7/24/2019    Today's Date:  7/30/2019    CC:  Dysphagia    Subjective:     Patient S/P urology procedure this morning with some increased work of breathing following procedure. Admitted to the ICU for close monitoring. Awake and agreeable to PEG placement tomorrow if respiratory status improves overnight.     Medications:   Current Facility-Administered Medications   Medication Dose Route Frequency    HYDROcodone-acetaminophen (HYCET) 0.5-21.7 mg/mL oral solution 5 mg  5 mg Oral Q4H PRN    lactated Ringers infusion  75 mL/hr IntraVENous CONTINUOUS    albuterol (PROVENTIL VENTOLIN) nebulizer solution 2.5 mg  2.5 mg Nebulization QID RT    hydrALAZINE (APRESOLINE) 20 mg/mL injection 10 mg  10 mg IntraVENous Q6H PRN    albuterol (PROVENTIL VENTOLIN) nebulizer solution 2.5 mg  2.5 mg Nebulization Q6H PRN    NUTRITIONAL SUPPORT ELECTROLYTE PRN ORDERS   Does Not Apply PRN    potassium chloride (KLOR-CON) packet for solution 20 mEq  20 mEq Oral BID WITH MEALS    piperacillin-tazobactam (ZOSYN) 4.5 g in 0.9% sodium chloride (MBP/ADV) 100 mL  4.5 g IntraVENous Q8H    methylPREDNISolone (PF) (SOLU-MEDROL) injection 40 mg  40 mg IntraVENous Q8H    famotidine (PF) (PEPCID) 20 mg in sodium chloride 0.9% 10 mL injection  20 mg IntraVENous Q12H    carvedilol (COREG) tablet 3.125 mg  3.125 mg Oral BID WITH MEALS    diphenoxylate-atropine (LOMOTIL) tablet 1 Tab  1 Tab Oral QID PRN    lisinopril (PRINIVIL, ZESTRIL) tablet 10 mg  10 mg Oral DAILY    magic mouthwash (SIMON) oral suspension 5 mL  5 mL Oral Q4H PRN    oxyCODONE IR (ROXICODONE) tablet 5 mg  5 mg Oral Q6H PRN    guaiFENesin-dextromethorphan (ROBITUSSIN DM) 100-10 mg/5 mL syrup 5 mL  5 mL Oral Q6H PRN    benzonatate (TESSALON) capsule 100 mg  100 mg Oral TID PRN    LORazepam (ATIVAN) injection 1 mg  1 mg IntraVENous Q4H PRN    central line flush (saline) syringe 10 mL  10 mL InterCATHeter PRN  prochlorperazine (COMPAZINE) injection 10 mg  10 mg IntraVENous Q6H PRN    morphine injection 2 mg  2 mg IntraVENous Q4H PRN    enoxaparin (LOVENOX) injection 100 mg  100 mg SubCUTAneous Q24H       Review of Systems:  ROS was obtained, with pertinent positives as listed above. No chest pain or SOB. Diet:  npo    Objective:   Vitals:  Visit Vitals  /86   Pulse 94   Temp 98.8 °F (37.1 °C)   Resp 24   Wt 60.7 kg (133 lb 13.1 oz)   SpO2 97%   BMI 19.48 kg/m²     Intake/Output:  07/30 0701 - 07/30 1900  In: 335 [I.V.:690]  Out: 150 [Urine:150]  07/28 1901 - 07/30 0700  In: 0954 [I.V.:251]  Out: 975 [Urine:600]  Exam:  General appearance: alert, cooperative, no distress  Lungs: clear to auscultation bilaterally anteriorly;O2 per nasal cannula 4L  Heart: regular rate and rhythm  Abdomen: soft, non-tender. Bowel sounds normal. No masses, no organomegaly  Neuro:  alert and oriented    Data Review (Labs):    Recent Labs     07/30/19  0407 07/29/19  0306 07/28/19  0519   WBC 41.5* 37.7* 27.6*   HGB 10.1* 10.7* 10.0*   HCT 32.5* 34.3* 32.0*    468* 462*   MCV 93.4 94.0 93.8    141 141   K 4.3 4.2 3.7    103 103   CO2 31 29 27   BUN 24* 26* 19   CREA 0.94 1.08 1.13   CA 8.6 8.9 8.9   MG 2.9* 2.9* 2.4   * 148* 137*   AP 78 101 80   SGOT 25 22 17   ALT 8* 13 9*   TBILI 0.3 0.2 0.2   ALB 2.4* 2.3* 2.1*   TP 6.3 6.7 6.5         CT OF THE CHEST WITH INTRAVENOUS CONTRAST. 7/24/19   INDICATION: Shortness of breath. Joni Gerold adenocarcinoma with metastatic  disease.    COMPARISON: None.   TECHNIQUE:   2.5 mm axial scans from above the aortic arch to the lung bases  with 100 cc nonionic intravenous contrast without acute complication. Intravenous contrast was given to evaluate for pulmonary embolism.   FINDINGS:  The degree of opacification of the pulmonary arteries is adequate.    No intraluminal filling defects within the pulmonary arterial tree to suggest  pulmonary embolism. Justyna Doles is normal caliber with a uniform lumen without  evidence of dissection. Lungs demonstrate multiple nodules, groundglass  opacities and small pneumonic consolidation in the bases. There is extensive  mediastinal adenopathy which has increased a great deal from the prior exam..   Included portion of the upper abdomen are unremarkable. No gross bony lesions. .   IMPRESSION  IMPRESSION:  Mediastinal adenopathy is increased great deal from the prior exam.  There is patchy airspace disease in both lung bases. Pulmonary metastases. Negative for pulmonary embolism. Assessment:     Principal Problem:    Rectal adenocarcinoma ([address] (7/6/2018)    Active Problems:    Dysphagia (7/25/2019)      Intractable vomiting (7/25/2019)      Metastatic cancer to lung ([address] (7/26/2019)      Overview: Per EBUS in June 2019      Acute respiratory failure ([address] (7/26/2019)      Aspiration into airway (7/26/2019)      Hydroureteronephrosis (7/30/2019)      Hx pulmonary embolism (7/30/2019)      History of DVT of lower extremity (7/30/2019)      Hypoxia (7/30/2019)    48 y. o. male with PMH of daily etoh use, GERD, rectal cancer s/p diverting colostomy (Stage 4-locally advanced unresectable, LAD abd and mediastinum), hx of PE 5/8/19, RLE DVT 5/29/19, on Lovenox, renal mass, RT sided ureteral obstruction with likely malignancy, seen in consultation at the request of Carlos Manuel Ford NP, for PEG placement evaluation. He was admitted 7/24/19 with fatigue and inability to tolerate PO intake. He reported dysphagia with immediate regurgitation. CT chest with findings as above. BS w extensive compression of esophagus. He last received AA AvastinFOLFIRI 7/9/19. He was due for next round, but this was delayed due to current symptoms. Seen by urology for right hydroureteronephrosis. Had cystoscopy today with stent placement/biopsy on 7/30 with development of shortness of breath in recovery.   Admitted to ICU for post-op observation and care.       Plan: 1. NPO  2. Re-asess in am and if respiratory status has returned to baseline, we will plan for PEG placement on Wednesday  3. Receiving Zosyn q8 hours which will serve as procedure antibx prophylaxis  4. Patient agrees to plan of care    Patient is seen and examined in collaboration with Dr. Mick Noe. Assessment and plan as per Dr. Mick Noe.   Juan Diego Aguilar NP

## 2019-07-30 NOTE — PROGRESS NOTES
Urology Progress Note    Admit Date: 7/24/2019    Subjective:     Patient has no new complaints this AM.  Admitted for failure to thrive/nausea/vomiting which has now improved. On IV zosyn for possible aspiration pneumonia. Urine culture negative. Cleared by pulm and hematology for urologic surgery today. NPO for surgery. No flank pain. Last CT scan showed R hydro-ureteronephrosis with delayed uptake of R kidney concerning for ureteral obstruction secondary to metastatic colon cancer. Objective:     Patient Vitals for the past 8 hrs:   BP Temp Pulse Resp SpO2   07/30/19 0519     98 %   07/30/19 0423 139/63 97.7 °F (36.5 °C) 63 24 99 %     No intake/output data recorded.   07/28 1901 - 07/30 0700  In: 1375 [I.V.:251]  Out: 975 [Urine:600]    Physical Exam:   Visit Vitals  /63 (BP 1 Location: Left arm, BP Patient Position: At rest)   Pulse 63   Temp 97.7 °F (36.5 °C)   Resp 24   Wt 133 lb 13.1 oz (60.7 kg)   SpO2 98%   BMI 19.48 kg/m²        GENERAL: No acute distress, Awake, Alert, Oriented X 3, SLeeping in bed  CARDIAC: regular rate and rhythm  CHEST AND LUNG: Easy work of breathing, 3 L NC in place  ABDOMEN: soft, non tender, non-distended, positive bowel sounds, no organomegaly, no palpable masses, no guarding, no rebound tenderness  SKIN: No rash, no erythema, no lacerations or abrasions, no ecchymosis  NEUROLOGIC: cranial nerves 2-12 grossly intact         Data Review   Recent Results (from the past 24 hour(s))   CBC WITH AUTOMATED DIFF    Collection Time: 07/30/19  4:07 AM   Result Value Ref Range    WBC 41.5 (H) 4.3 - 11.1 K/uL    RBC 3.48 (L) 4.23 - 5.6 M/uL    HGB 10.1 (L) 13.6 - 17.2 g/dL    HCT 32.5 (L) 41.1 - 50.3 %    MCV 93.4 79.6 - 97.8 FL    MCH 29.0 26.1 - 32.9 PG    MCHC 31.1 (L) 31.4 - 35.0 g/dL    RDW 16.0 (H) 11.9 - 14.6 %    PLATELET 663 009 - 649 K/uL    MPV 9.6 9.4 - 12.3 FL    ABSOLUTE NRBC 0.27 (H) 0.0 - 0.2 K/uL    NEUTROPHILS 56 43 - 78 %    LYMPHOCYTES 2 (L) 13 - 44 % MONOCYTES 5 4.0 - 12.0 %    EOSINOPHILS 0 (L) 0.5 - 7.8 %    BASOPHILS 0 0.0 - 2.0 %    IMMATURE GRANULOCYTES 37 (H) 0.0 - 5.0 %    ABS. NEUTROPHILS 23.2 (H) 1.7 - 8.2 K/UL    ABS. LYMPHOCYTES 0.8 0.5 - 4.6 K/UL    ABS. MONOCYTES 2.1 (H) 0.1 - 1.3 K/UL    ABS. EOSINOPHILS 0.0 0.0 - 0.8 K/UL    ABS. BASOPHILS 0.0 0.0 - 0.2 K/UL    ABS. IMM. GRANS. 15.4 (H) 0.0 - 0.5 K/UL    RBC COMMENTS SLIGHT  ANISOCYTOSIS + POIKILOCYTOSIS        WBC COMMENTS Result Confirmed By Smear      PLATELET COMMENTS SLIGHT      DF AUTOMATED     MAGNESIUM    Collection Time: 07/30/19  4:07 AM   Result Value Ref Range    Magnesium 2.9 (H) 1.8 - 2.4 mg/dL   METABOLIC PANEL, COMPREHENSIVE    Collection Time: 07/30/19  4:07 AM   Result Value Ref Range    Sodium 139 136 - 145 mmol/L    Potassium 4.3 3.5 - 5.1 mmol/L    Chloride 101 98 - 107 mmol/L    CO2 31 21 - 32 mmol/L    Anion gap 7 7 - 16 mmol/L    Glucose 124 (H) 65 - 100 mg/dL    BUN 24 (H) 6 - 23 MG/DL    Creatinine 0.94 0.8 - 1.5 MG/DL    GFR est AA >60 >60 ml/min/1.73m2    GFR est non-AA >60 >60 ml/min/1.73m2    Calcium 8.6 8.3 - 10.4 MG/DL    Bilirubin, total 0.3 0.2 - 1.1 MG/DL    ALT (SGPT) 8 (L) 12 - 65 U/L    AST (SGOT) 25 15 - 37 U/L    Alk. phosphatase 78 50 - 136 U/L    Protein, total 6.3 6.3 - 8.2 g/dL    Albumin 2.4 (L) 3.5 - 5.0 g/dL    Globulin 3.9 (H) 2.3 - 3.5 g/dL    A-G Ratio 0.6 (L) 1.2 - 3.5             Assessment:     Active Problems:    Rectal adenocarcinoma (HCC) (7/6/2018)      Dysphagia (7/25/2019)      Intractable vomiting (7/25/2019)      Metastatic cancer to lung ([address] (7/26/2019)      Overview: Per EBUS in June 2019      Acute respiratory failure ([address] (7/26/2019)      Aspiration into airway (7/26/2019)      48year old male with metastatic colon cancer and concern for R ureteral obstruction on CT scan.       Plan:     -To OR today for cystoscopy, Right retrograde pyelogram, Right diagnostic ureteroscopy with ureteral biopsies and Right ureteral stent placement. Risks/benefits reviewed in detail and he wants to proceed. He understands that this is palliative and an attempt to optimize his kidney function, but will not treat his cancer or change his overall cancer prognosis. -Consented  -NPO  -Abx on call to [phone]. Ghassan Brown M.D.     [facility]  [facility]  [address] [place] [address] [address]  Phone: [phone]  Fax: [phone]

## 2019-07-30 NOTE — PROGRESS NOTES
A follow up visit was made to the patient and two family members. Emotional support and spiritual presence     were provided.       [employer]

## 2019-07-30 NOTE — PERIOP NOTES
TRANSFER - OUT REPORT:    Verbal report given to Argenis Paiz on Diane Meier  being transferred to room  for observation. Report consisted of patients Situation, Background, Assessment and   Recommendations(SBAR). Information from the following report(s) SBAR, Kardex, OR Summary, Procedure Summary, Intake/Output, MAR, Recent Results and Cardiac Rhythm NSR was reviewed with the receiving nurse. Lines:   Venous Access Device 8Fr Angiodynaics Right Upper Chest Port 10/29/18 Upper chest (subclavicular area, right (Active)   Central Line Being Utilized Yes 7/30/2019 12:23 PM   Criteria for Appropriate Use Long term IV/antibiotic administration 7/30/2019 12:23 PM   Site Assessment Clean, dry, & intact 7/30/2019 12:23 PM   Date of Last Dressing Change 07/24/19 7/30/2019 12:23 PM   Dressing Status Clean, dry, & intact 7/30/2019 12:23 PM   Dressing Type Disk with Chlorhexadine gluconate (CHG) 7/30/2019 12:23 PM   Action Taken Blood drawn 7/30/2019  4:23 AM   Date Accessed (Medial Site) 07/24/19 7/30/2019  8:00 AM   Access Time (Medial Site) 1710 7/17/2019  5:10 PM   Access Needle Size (Site #1) 20 G 7/26/2019 12:18 PM   Access Needle Length (Medial Site) 0.75 inches 7/26/2019 12:18 PM   Positive Blood Return (Medial Site) Yes 7/30/2019  9:00 AM   Action Taken (Medial Site) Flushed; Infusing 7/30/2019  9:00 AM   Date Needle Changed (Medial Site) 07/24/19 7/30/2019  8:00 AM   Positive Blood Return (Lateral Site) Yes 7/26/2019 12:18 PM   Action Taken (Lateral Site) Infusing 7/26/2019 12:18 PM   Alcohol Cap Used No 7/30/2019  8:00 AM        Opportunity for questions and clarification was provided. Patient transported with:  Port, chart, SCD, colostomy, oxygen, wife. VTE prophylaxis orders have been written for Diane Meier.    Patient and family given floor number and nurses name. Family updated re: pt status after security code verified.

## 2019-07-30 NOTE — PROGRESS NOTES
TRANSFER - OUT REPORT:    Verbal report given to Faina Perez RN (name) on Eddy Monroy  being transferred to pre-op (unit) for ordered procedure       Report consisted of patients Situation, Background, Assessment and   Recommendations(SBAR). Information from the following report(s) SBAR, MAR and Recent Results was reviewed with the receiving nurse. Lines:   Venous Access Device 8Fr Angiodynaics Right Upper Chest Port 10/29/18 Upper chest (subclavicular area, right (Active)   Central Line Being Utilized Yes 7/30/2019  8:00 AM   Criteria for Appropriate Use Long term IV/antibiotic administration 7/30/2019  8:00 AM   Site Assessment Clean, dry, & intact 7/30/2019  8:00 AM   Date of Last Dressing Change 07/24/19 7/30/2019  8:00 AM   Dressing Status Clean, dry, & intact 7/30/2019  8:00 AM   Dressing Type Disk with Chlorhexadine gluconate (CHG); Transparent 7/30/2019  8:00 AM   Action Taken Blood drawn 7/30/2019  4:23 AM   Date Accessed (Medial Site) 07/24/19 7/30/2019  8:00 AM   Access Time (Medial Site) 1710 7/17/2019  5:10 PM   Access Needle Size (Site #1) 20 G 7/26/2019 12:18 PM   Access Needle Length (Medial Site) 0.75 inches 7/26/2019 12:18 PM   Positive Blood Return (Medial Site) Yes 7/30/2019  8:00 AM   Action Taken (Medial Site) Flushed; Infusing 7/30/2019  8:00 AM   Date Needle Changed (Medial Site) 07/24/19 7/30/2019  8:00 AM   Positive Blood Return (Lateral Site) Yes 7/26/2019 12:18 PM   Action Taken (Lateral Site) Infusing 7/26/2019 12:18 PM   Alcohol Cap Used No 7/30/2019  8:00 AM        Opportunity for questions and clarification was provided.       Patient transported with:   7L O2, transport tech

## 2019-07-30 NOTE — PROGRESS NOTES
Date of Outreach Update:  Maryjo Venegas was seen and assessed. Patient appears more SOB at this time, states \"feeling funny\" and \"hyper,\" but cannot describe what he is feeling. Appears anxious. O2 sat 98%, HR 80. RUL lung sounds very coarse with expiratory wheezing, left coarse/diminished, congested cough noted. RT to give PRN breathing treatment. Primary RN to give ativan for anxiety. Morning labs and CXR pending. May benefit from robitussin or hycet for cough and congestion. MEWS Score: 2 (07/30/19 0423)  Vitals:    07/29/19 1940 07/29/19 2114 07/29/19 2245 07/30/19 0423   BP: 135/90  (!) 153/95 139/63   Pulse: 87  74 63   Resp: 22  18 24   Temp: 97.5 °F (36.4 °C)  98.2 °F (36.8 °C) 97.7 °F (36.5 °C)   SpO2: 98% 97% 98% 99%   Weight:             Pain Assessment  Pain Intensity 1: 0 (07/30/19 0423)  Pain Location 1: Back  Pain Intervention(s) 1: Medication (see MAR)  Patient Stated Pain Goal: 0      Previous Outreach assessment has been reviewed. There have been no significant clinical changes since the completion of the last dated Outreach assessment. Will continue to follow up per outreach protocol.     Signed By:   Cynthia Jung RN    July 30, 2019 5:26 AM

## 2019-07-30 NOTE — PROGRESS NOTES
Bedside shift report with William Dinh RN  Updated on pt condition   Pt alert and following commands  Family at at bedside   Pt states no needs at this time   Will continue to monitor

## 2019-07-30 NOTE — PROGRESS NOTES
Chart reviewed after tx back to ICU s/p cystoscopy today with stent placement/biopsy by urology for SOB in recovery and observation. GI following for possible PEG tomorrow. CM will continue to follow for assist with d/c needs/POC.

## 2019-07-30 NOTE — PROGRESS NOTES
[facility]emat[facility] & Oncology        Inpatient Hematology / Oncology Progress Note      Admission Date: 7/24/2019  3:18 PM  Reason for Admission/Hospital Course: Dysphagia [R13.10]  Intractable vomiting [R11.10]      24 Hour Events:  Afebrile, VSS  For stent placement for hydronephrosis today (previously planned outpatient procedure)  To ICU following procedure for tachypnea  S/p simulation with rad onc yesterday  Still drowsy  GI considering PEG tomorrow   Continues on zosyn - WBC still significantly elevated  Cdiff negative    ROS:  Constitutional: Positive for fatigue, weakness, lethargic; negative for fever, chills  CV:  negative for chest pain, palpitations, edema. Respiratory: Dyspnea better   GI:  No abdominal pain, no vomiting (has been NPO)    10 point review of systems is otherwise negative with the exception of the elements mentioned above in the HPI.      Allergies   Allergen Reactions    Zithromax [Azithromycin] Rash       OBJECTIVE:  Patient Vitals for the past 8 hrs:   BP Temp Pulse Resp SpO2   07/30/19 1601 123/67  78 29 93 %   07/30/19 1544 146/88  80 26 95 %   07/30/19 1529 153/86  97 26 98 %   07/30/19 1523     100 %   07/30/19 1515 152/85  65  100 %   07/30/19 1500 145/90 98.8 °F (37.1 °C) 82 28 100 %   07/30/19 1446 (!) 156/93  85  100 %   07/30/19 1444  98.8 °F (37.1 °C)      07/30/19 1405 158/86  94 24 97 %   07/30/19 1349 (!) 166/97  (!) 102 22 99 %   07/30/19 1334 164/88  (!) 102 18 95 %   07/30/19 1323     98 %   07/30/19 1320 (!) 159/95  98 18 97 %   07/30/19 1305 (!) 154/96  98 20 100 %   07/30/19 1257 145/86  (!) 108 24 98 %   07/30/19 1250 149/75  (!) 108 22 97 %   07/30/19 1245 141/76  (!) 116 25 90 %   07/30/19 1240 158/83 98.2 °F (36.8 °C) (!) 110 22 97 %   07/30/19 1237 156/89  (!) 110 24 94 %   07/30/19 1230 155/78  96 24 100 %   07/30/19 1225 181/84  100 20 98 %   07/30/19 1223 124/87 98.4 °F (36.9 °C) 94 (!) 34 98 %   07/30/19 1221 175/77  (!) 105 27    19 1004 141/90  85 28 96 %   19 0909 168/89 97.5 °F (36.4 °C) 90 26 98 %     Temp (24hrs), Av.1 °F (36.7 °C), Min:97.5 °F (36.4 °C), Max:98.8 °F (37.1 °C)     0701 -  1900  In: 690 [I.V.:690]  Out: 150 [Urine:150]    Physical Exam:  Constitutional: Acutely ill male in respiratory distress, lying comfortably in the hospital bed. Family at bedside. HEENT: Normocephalic and atraumatic. Oropharynx is clear, mucous membranes are moist. Neck supple     Lymph node   Deferred   Skin Warm and dry. No bruising and no rash noted. No erythema. No pallor. Respiratory Lungs clear, wheeze to right lung. No accessory muscle use. On supplemental O2   CVS Mildly tachycardic rate, regular rhythm and normal S1 and S2. No murmurs, gallops, or rubs. Abdomen Soft, nontender and nondistended, normoactive bowel sounds. No palpable mass. No hepatosplenomegaly. Neuro Drowsy following surgery. Grossly nonfocal with no obvious sensory or motor deficits. MSK Normal range of motion in general.  No edema and no tenderness.    Psych Calm       Labs:      Recent Labs     19  0407 19  0306 19  0519   WBC 41.5* 37.7* 27.6*   RBC 3.48* 3.65* 3.41*   HGB 10.1* 10.7* 10.0*   HCT 32.5* 34.3* 32.0*   MCV 93.4 94.0 93.8   MCH 29.0 29.3 29.3   MCHC 31.1* 31.2* 31.3*   RDW 16.0* 15.9* 15.8*    468* 462*   GRANS 56 63 69   LYMPH 2* 2* 2*   MONOS 5 6 7   EOS 0* 0* 0*   BASOS 0 0 0   IG 37* 29* 22*   DF AUTOMATED AUTOMATED AUTOMATED   ANEU 23.2* 23.7* 19.0*   ABL 0.8 0.8 0.6   ABM 2.1* 2.3* 1.9*   YESI 0.0 0.0 0.0   ABB 0.0 0.0 0.0   AIG 15.4* 10.9* 6.1*        Recent Labs     19  0407 19  0306 19  0519    141 141   K 4.3 4.2 3.7    103 103   CO2 31 29 27   AGAP 7 9 11   * 148* 137*   BUN 24* 26* 19   CREA 0.94 1.08 1.13   GFRAA >60 >60 >60   GFRNA >60 >60 >60   CA 8.6 8.9 8.9   SGOT 25 22 17   AP 78 101 80   TP 6.3 6.7 6.5   ALB 2.4* 2.3* 2.1* GLOB 3.9* 4.4* 4.4*   AGRAT 0.6* 0.5* 0.5*   MG 2.9* 2.9* 2.4         Imaging:  XR CHEST Reagan Peralta [[id number]] Collected: 07/26/19 1159   Order Status: Completed Updated: 07/26/19 1202   Narrative:     Portable chest x-ray    CLINICAL INDICATION: Shortness of breath    FINDINGS: Single AP view the chest compared to a similar exam dated 7/11/2019  shows new reticular nodular interstitial densities throughout both lungs with  more patchy airspace opacity in the right upper lobe. A right-sided chest port  is in place. The cardiac silhouette and mediastinum are stable. Impression:     IMPRESSION: New, diffuse reticular nodular densities throughout the lungs with  patchy opacity in the right upper lobe. Atypical pneumonia or pulmonary edema  should be considered. XR Delma Moran [[id number]] Collected: 07/26/19 1963   Order Status: Completed Updated: 07/26/19 1026   Narrative:     History: Dysphasia. Stage IV rectal cancer. EXAM: Barium swallow    TECHNIQUE: 1.3 minutes fluoroscopy time is utilized. The patient ingests  effervescent granules followed by thick and thin consistencies of barium. 18  fluoroscopic images are available for review. No comparison    FINDINGS: The exam is markedly limited. The patient is unable to perform the  exam the upright position, and has very limited mobility. There is aspiration of  the thin contrast. Contrast flows through the esophagus into the stomach without  difficulty. There is a hiatal hernia present. Just above the hiatal hernia,  there is a filling defect of the distal esophagus, corresponding to a soft  tissue mass adjacent to the esophagus on the recent CT chest. No definite  gastroesophageal reflux, or ulceration.    Impression:     IMPRESSION:  1. Just above the hiatal hernia, there is an extrinsic soft tissue mass creating  a filling defect within the distal esophagus, corresponding to a soft tissue  mass seen on the recent CT chest.  2. Aspiration of oral contrast. Correlation with modified barium swallow  recommended. CT CHEST W CONT [[id number]] Collected: 07/24/19 2133   Order Status: Completed Updated: 07/24/19 2138   Narrative:     CT OF THE CHEST WITH INTRAVENOUS CONTRAST. INDICATION: Shortness of breath. Shivani Kusum adenocarcinoma with metastatic  disease. COMPARISON: None. TECHNIQUE:   2.5 mm axial scans from above the aortic arch to the lung bases  with 100 cc nonionic intravenous contrast without acute complication. Intravenous contrast was given to evaluate for pulmonary embolism. FINDINGS:  The degree of opacification of the pulmonary arteries is adequate. No intraluminal filling defects within the pulmonary arterial tree to suggest  pulmonary embolism. Marget Kirks is normal caliber with a uniform lumen without  evidence of dissection. Lungs demonstrate multiple nodules, groundglass  opacities and small pneumonic consolidation in the bases. There is extensive  mediastinal adenopathy which has increased a great deal from the prior exam..   Included portion of the upper abdomen are unremarkable. No gross bony lesions. .   Impression:     IMPRESSION:  Mediastinal adenopathy is increased great deal from the prior exam.  There is patchy airspace disease in both lung bases. Pulmonary metastases. Negative for pulmonary embolism.           Medications:  Current Facility-Administered Medications   Medication Dose Route Frequency    HYDROcodone-acetaminophen (HYCET) 0.5-21.7 mg/mL oral solution 5 mg  5 mg Oral Q4H PRN    lactated Ringers infusion  75 mL/hr IntraVENous CONTINUOUS    albuterol (PROVENTIL VENTOLIN) nebulizer solution 2.5 mg  2.5 mg Nebulization QID RT    hydrALAZINE (APRESOLINE) 20 mg/mL injection 10 mg  10 mg IntraVENous Q6H PRN    albuterol (PROVENTIL VENTOLIN) nebulizer solution 2.5 mg  2.5 mg Nebulization Q6H PRN    NUTRITIONAL SUPPORT ELECTROLYTE PRN ORDERS   Does Not Apply PRN    potassium chloride (KLOR-CON) packet for solution 20 mEq  20 mEq Oral BID WITH MEALS    piperacillin-tazobactam (ZOSYN) 4.5 g in 0.9% sodium chloride (MBP/ADV) 100 mL  4.5 g IntraVENous Q8H    methylPREDNISolone (PF) (SOLU-MEDROL) injection 40 mg  40 mg IntraVENous Q8H    famotidine (PF) (PEPCID) 20 mg in sodium chloride 0.9% 10 mL injection  20 mg IntraVENous Q12H    carvedilol (COREG) tablet 3.125 mg  3.125 mg Oral BID WITH MEALS    diphenoxylate-atropine (LOMOTIL) tablet 1 Tab  1 Tab Oral QID PRN    lisinopril (PRINIVIL, ZESTRIL) tablet 10 mg  10 mg Oral DAILY    magic mouthwash (SIMON) oral suspension 5 mL  5 mL Oral Q4H PRN    oxyCODONE IR (ROXICODONE) tablet 5 mg  5 mg Oral Q6H PRN    guaiFENesin-dextromethorphan (ROBITUSSIN DM) 100-10 mg/5 mL syrup 5 mL  5 mL Oral Q6H PRN    benzonatate (TESSALON) capsule 100 mg  100 mg Oral TID PRN    LORazepam (ATIVAN) injection 1 mg  1 mg IntraVENous Q4H PRN    central line flush (saline) syringe 10 mL  10 mL InterCATHeter PRN    prochlorperazine (COMPAZINE) injection 10 mg  10 mg IntraVENous Q6H PRN    morphine injection 2 mg  2 mg IntraVENous Q4H PRN    enoxaparin (LOVENOX) injection 100 mg  100 mg SubCUTAneous Q24H         ASSESSMENT:    Problem List  Date Reviewed: 7/26/2019          Codes Class Noted    Hydroureteronephrosis ICD-10-CM: N13.30  ICD-9-CM: 110  7/30/2019        Hx pulmonary embolism (Chronic) ICD-10-CM: Z86.711  ICD-9-CM: V12.55  7/30/2019        History of DVT of lower extremity (Chronic) ICD-10-CM: K30.089  ICD-9-CM: V12.51  7/30/2019        Hypoxia ICD-10-CM: R09.02  ICD-9-CM: 799.02  7/30/2019        Metastatic cancer to lung [facility]) (Chronic) ICD-10-CM: C78.00  ICD-9-CM: 197.0  7/26/2019    Overview Signed 7/26/2019 11:06 AM by Remo Gowers, NP     Per EBUS in June 2019             Acute respiratory failure ([facility] ICD-10-CM: J96.00  ICD-9-CM: 518.81  7/26/2019        Aspiration into airway ICD-10-CM: P55.290L  ICD-9-CM: 934.9  7/26/2019        Dysphagia ICD-10-CM: R13.10  ICD-9-CM: 787.20  7/25/2019        Intractable vomiting ICD-10-CM: R11.10  ICD-9-CM: 536.2  7/25/2019        Mediastinal lymphadenopathy ICD-10-CM: R59.0  ICD-9-CM: 785.6  6/11/2019        Pulmonary nodules ICD-10-CM: R91.8  ICD-9-CM: 793.19  6/11/2019        Dehydration ICD-10-CM: E86.0  ICD-9-CM: 276.51  4/2/2019        S/P radiation therapy < 4 weeks ago ICD-10-CM: Z92.3  ICD-9-CM: V15.3  10/1/2018        * (Principal) Rectal adenocarcinoma ([facility] (Chronic) ICD-10-CM: C20  ICD-9-CM: 154.1  7/6/2018        Colostomy status (HCC) (Chronic) ICD-10-CM: Z93.3  ICD-9-CM: V44.3  6/29/2018        Erectile dysfunction due to arterial insufficiency (Chronic) ICD-10-CM: N52.01  ICD-9-CM: 607.84  5/31/2018        Benign essential HTN (Chronic) ICD-10-CM: I10  ICD-9-CM: 401.1  5/31/2018        Hypercholesterolemia (Chronic) ICD-10-CM: E78.00  ICD-9-CM: 272.0  5/30/2018    Overview Signed 6/4/2018  2:20 PM by Shasta POPE     ASCVD 10 year risk is 16.0%. Mod to high dose statin indicated. (based on b/p 180/90, 6/4/18)                 Screening PSA (prostate specific antigen) (Chronic) ICD-10-CM: Z12.5  ICD-9-CM: V76.44  5/30/2018        History of tobacco abuse (Chronic) ICD-10-CM: G55.709  ICD-9-CM: V15.82  8/18/2016              PLAN:  Dysphagia/vomiting  - GI evaluation. Planning EGD tomorrow (cannot do dilation due to recent avastin). Started pepcid BID  - Continue IV fluids  7/26 Barium swallow today with aspiration and extrinsic soft tissue mass creating filling defect in distal esophagus  7/29 Spoke with Ignacia Downing (out of town) and Yue Goff. Needs palliative radiation to mediastinal adenopathy that is compressing esophagus. Planned for consult this afternoon. Pt agreeable to PEG given length of time it may take for radiation to improve his ability to swallow. GI reconsulted for placement  7/30 S/p simulation with rad onc yesterday - planning to start radiation (today but held due to procedure).  PEG placement possibly tomorrow    Aspiration pneumonia  - Continue levaquin. BCx pending. UA negative. CT chest with no acute findings  7/26 D3 LVQ  7/29 Continues zosyn. Noted leukocytosis. Met rectal ca  - S/p FOLFIRI-Avastin, last given 7/9 7/29 Suspicion for progression on recent CT. Family is aware. Further treatment TBD outpatient with Dr. Romero Beaver  - Secondary to chemotherapy. Granix x 1 today  7/26 WBC up to 4.5  7/29 Continues with elevated counts  7/30 Check smear    Hx hydronephrosis  - Planned for cystoscopy and stent placement tomorrow with urology (was scheduled for outpatient). Ok from [employer]. Discussed with pulmonary and no contraindication from their perspective. Discussed with Uziel Rubio NP  7/30 S/p cystoscopy with right ureteral stent placed and right ureteral biopsy. To ICU following for tachypnea    Lovenox: on hold prior to procedures    Home pending findings/work up by GI    Disposition: TBD.  Needs to start radiation and have PEG placed prior to discharge                  Beulah Hawk NP   [employer] [facility]  [address] [address]  Office : [phone]  Fax : [phone]

## 2019-07-30 NOTE — PROGRESS NOTES
Date of Outreach Update:  Jerald Brown off floor for surgery this am.  Will continue to follow up per outreach protocol.     Signed By:   Janae Galeano RN    July 30, 2019

## 2019-07-30 NOTE — PROGRESS NOTES
TRANSFER - IN REPORT:    Verbal report received from Rhonda Phillip RN(name) on Antelmo Da Silva  being received from PACU(unit) for routine post - op      Report consisted of patients Situation, Background, Assessment and   Recommendations(SBAR). Information from the following report(s) Kardex, Procedure Summary, Intake/Output, MAR, Recent Results, Med Rec Status and Cardiac Rhythm NSR was reviewed with the receiving nurse. Opportunity for questions and clarification was provided. Assessment completed upon patients arrival to unit and care assumed. Dual skin assessment completed with Owen Fulton RN. Patient skin is warm, dry and intact. Sacrum is pink/blanchable, allevyn applied. Scattered bruising and old scars.

## 2019-07-30 NOTE — BRIEF OP NOTE
BRIEF OPERATIVE NOTE    Date of Procedure: 7/30/2019   Preoperative Diagnosis: Kidney lesion [N28.9]  Hydronephrosis, unspecified hydronephrosis type [N13.30]  Ureteral mass [N28.9]  Postoperative Diagnosis: Kidney lesion [N28.9]  Hydronephrosis, unspecified hydronephrosis type [N13.30]  Ureteral mass [N28.9]    Procedure(s):  CYSTOSCOPY WITH RIGHT RETROGRAD PYELOGRAM/ RIGHT DIAGNOSTIC URETEROSCOPY/ RIGHT URETERAL STENT AND RIGHT URETERAL BIOPSIES  Surgeon(s) and Role:     * Larry Encarnacion MD - Primary         Surgical Assistant: None    Surgical Staff:  Circ-1: Lito Tavera RN  Circ-Relief: Heather Vora RN  Event Time In Time Out   Incision Start 07/30/2019 1114    Incision Close 07/30/2019 1202      Anesthesia: General   Estimated Blood Loss: 5 cc  Specimens:   ID Type Source Tests Collected by Time Destination   1 : RIGHT URETERAL BIOPSY    Larry Encarnacion MD 7/30/2019 1144 Pathology      Findings: 4-5 cm of significant narrowing/filling defect on retrograde pyelogram in the R mid-ureter starting at pelvic brim and extending superiorly concerning for ureteral mass. Right ureteral mass seen on diagnostic ureteroscopy biopsied X 4. Placement of ureteral stent      Complications: None    Implants:   Implant Name Type Inv.  Item Serial No.  Lot No. LRB No. Used Action   STENT URET 6F 26CM -- [facility] [id number] - [id number]  STENT URET 6F 26CM -- [address] [place] [address] [id number]  [facility] UROLOGY-[facility] [id number] Right 1 Implanted

## 2019-07-31 NOTE — CONSULTS
Palliative Care Patient: Katharina Whitten MRN: [id number]  SSN: [id number] YOB: 1965  Age: 48 y.o. Sex: male Date of Request: 7/31/2019 Date of Consult:  7/31/2019 Reason for Consult:  goals of care Requesting Physician: Eduarda Valenzuela NP Assessment/Plan:  
 
Principal Diagnosis: Dyspnea  R06.00 Additional Diagnoses: · Acute Respiratory Failure, Unspecified  J96.00 
· Advance Care Planning Counseling Z71.89 
· Anxiety  F41.1 · Debility, Unspecified  R53.81 · Dysphagia  R13.10 · Fatigue, Lethargy  R53.83 
· Frailty  R54 
· Hypersecretions, Respiratory  J39.9 · Pain, general  R52 · Encounter for Palliative Care  Z51.5 Palliative Performance Scale (PPS): PPS: 30 Medical Decision Making:  
Reviewed and summarized notes from admission to present, as well as OP PC notes Discussed case with appropriate providers- ICU IDT; Charmayne Mam, RN Reviewed laboratory and x-ray data from admission to present Pt in bed, mildly tachypnic at present. Wife and sister at bedside. Pt [facility] from the [facility], and was seen the day of his admission. We reviewed events of his hospitalization. When asked what his goals were, he stated \"To eat and to live. \"  Pt expressed fear and worry that he is going to die. He is worried about leaving his family. He admits to suffering, but states it is worth it if he lives. He is worried that his family would be disappointed in him if he decided he didn't want to do further treatment- wife reassured him this was not true. Pt's started become more short of breath and anxious, and visit was ended. Reassured them of our ongoing care. NATHANAEL Hedrick, to medicate for dyspnea and anxiety. Per ACP note from Cleo Aleman NP, the day of admission, pt desires full code status but does not want long term life support. Will attempt to readdress with him, given the progression of his cancer and his tenuous status. Will continue to follow. Will discuss findings with members of the interdisciplinary team.   
 
Thank you for this referral.    
 
  
. 
 
Subjective:  
 
History obtained from:  Patient, Family, Care Provider and Chart Chief Complaint: Dysphagia, metastatic rectal cancer History of Present Illness:  Mr Desirae Pendleton is a 47 yo  male with PMH of metastatic rectal cancer s/p 5FU-A and FOLFIRI-A, DM, GERD, HTN, and other conditions listed below, who was directly admitted from [facility] on 7/24/2019 for work up and management of severe fatigue, dysphagia, poor oral intake, and green phlegm. CT scan revealed progression of mediastinal adenopathy, and MBS revealed aspiration and a soft tissue mass creating a filling defect in the distal esophagus. He underwent a cystoscopy with right retrograde pyelogram, right diagnostic ureteroscopy, right ureteral mass biopsies x4, and right ureteral stent placement on 7/30/2019. He required BIPAP after the procedure. Pt was scheduled for a PEG tube today, however, decompensated prior to the procedure and it was cancelled. He has intermittently required BIPAP. Advance Directive: Yes Code Status:  Full Code Health Care Power of : Yes - Copy of [address] on file. Past Medical History:  
Diagnosis Date  Acute respiratory failure with hypoxia ([address] 7/30/2019  Chest pain 8/18/2016  Colon cancer ([address]  Diabetes ([address]  GERD (gastroesophageal reflux disease)   
 resolved per pt-- prn OTC meds  Hypertension   
 no current treatment, states Coreg dropped BP too much and is not taking  IGT (impaired glucose tolerance) 5/30/2018  Iron deficiency anemia due to chronic blood loss 07/26/2018  
 denies hx of blood transfusion  Plantar fasciitis, right 5/31/2018  PONV (postoperative nausea and vomiting)  Rectal cancer ([address]  Rectal obstruction 6/27/2018  Thyroid disease  Tobacco abuse 08/18/2016 Quit 18--- 1 ppd x 30 yrs Past Surgical History:  
Procedure Laterality Date  FLEXIBLE SIGMOIDOSCOPY N/A 2018 SIGMOIDOSCOPY FLEXIBLE performed by Isaias Brandt MD at [address] [facility]  2003  HX VASCULAR ACCESS  VASCULAR SURGERY PROCEDURE UNLIST Right   
 artery tied off after accident Family History Problem Relation Age of Onset  Hypertension Mother  Heart Disease Mother Social History Tobacco Use  Smoking status: Former Smoker Packs/day: 1.00 Years: 30.00 Pack years: 30.00 Types: Cigarettes Start date: 1988 Last attempt to quit: 2018 Years since quittin.0  Smokeless tobacco: Never Used Substance Use Topics  Alcohol use: Yes Alcohol/week: 14.0 standard drinks Types: 14 Cans of beer per week Prior to Admission medications Medication Sig Start Date End Date Taking? Authorizing Provider  
albuterol (PROVENTIL HFA, VENTOLIN HFA, PROAIR HFA) 90 mcg/actuation inhaler Take 1 Puff by inhalation every four (4) hours as needed for Wheezing or Shortness of Breath. 19  Yes Mago Alicea NP  
carvedilol (COREG) 3.125 mg tablet Take 1 Tab by mouth two (2) times daily (with meals). 19  Yes Earney Hammans, MD  
cimetidine (TAGAMET) 300 mg tab Take 1 Tab by mouth two (2) times a day. 19  Yes Mago Alicea NP  
magic mouthwash solution Magic mouth wash Maalox Lidocaine 2% viscous Diphenhydramine oral solution Pharmacy to mix equal portions of ingredients to a total volume as indicated in the dispense amount. 19   Earney Hammans, MD  
magic mouthwash solution Magic mouth wash Maalox Lidocaine 2% viscous Diphenhydramine oral solution Pharmacy to mix equal portions of ingredients to a total volume as indicated in the dispense amount.  19   Earney Hammans, MD  
fluticasone propionate (FLOVENT HFA) 110 mcg/actuation inhaler Take 1 Puff by inhalation every twelve (12) hours. 7/8/19   Miguel Ángel Ricardo NP  
enoxaparin (LOVENOX) 100 mg/mL 100 mg by SubCUTAneous route daily. Indications: blood clot in a deep vein of the extremities 6/25/19   Sandy Woods MD  
ondansetron hcl [facility]) 8 mg tablet Take 1 Tab by mouth every eight (8) hours as needed for Nausea. Indications: Prevent Nausea and Vomiting from Cancer Chemotherapy 6/19/19   Sandy Woods MD  
prochlorperazine (COMPAZINE) 10 mg tablet Take 1 Tab by mouth every six (6) hours as needed for Nausea. Indications: Prevent Nausea and Vomiting from Cancer Chemotherapy 6/19/19   Sandy Woods MD  
ibuprofen (MOTRIN IB) 200 mg tablet Take 400 mg by mouth. Provider, Historical  
lidocaine-prilocaine (EMLA) topical cream Apply  to affected area as needed for Pain. Apply to port 30-45 min prior to port needle stick 6/5/19   Miguel Ángel Ricardo NP  
oxyCODONE IR (ROXICODONE) 5 mg immediate release tablet Take 1 Tab by mouth every six (6) hours as needed for Pain for up to 30 days. Max Daily Amount: 20 mg. Take 1-2 tabs every 6 hrs for pain as needed 5/22/19 7/8/19  Buzz Ragland NP  
lisinopril (PRINIVIL, ZESTRIL) 10 mg tablet Take 1 Tab by mouth daily. 5/22/19   Sandy Woods MD  
DISABLED PLACARD ([address] [place] [address]) DMV Dx. Colon Cancer An Inability to walk 100 feet nonstop without aggravating existing medical condition 4/10/19   Sandy Woods MD  
diphenoxylate-atropine (LOMOTIL) 2.5-0.025 mg per tablet One tab 4 times a day as needed for diarrhea 4/2/19   Miguel Ángel Ricardo NP Allergies Allergen Reactions  Zithromax [Azithromycin] Rash Review of Systems: A comprehensive review of systems was negative except for: Constitutional: Positive for fatigue. Respiratory: Positive for dyspnea, cough, secretions Musculoskeletal: Positive for generalized pain Behavioral/Psychiatric: Positive for anxiety Objective:  
 
Visit Vitals BP (!) 155/96 Pulse 83 Temp 97.1 °F (36.2 °C) Resp 25 Wt 130 lb (59 kg) SpO2 100% BMI 18.93 kg/m² Physical Exam: 
 
General:  Debilitated and frail. Eyes:  Conjunctivae/corneas clear Nose: Nares normal. Septum midline. Neck: Supple, symmetrical, trachea midline Lungs:   Wheezes bilaterally, mildly labored Heart:  Regular rate and rhythm Abdomen:   Soft, non-tender, non-distended. Colostomy Extremities: Normal, atraumatic, no cyanosis or edema Skin: Skin color, texture, turgor normal.   
Neurologic: Nonfocal  
Psych: Alert and oriented. Anxious Assessment:  
 
Hospital Problems  Date Reviewed: 7/26/2019 Codes Class Noted POA Hydroureteronephrosis ICD-10-CM: N13.30 ICD-9-CM: 896  7/30/2019 Clinically Undetermined Hx pulmonary embolism (Chronic) ICD-10-CM: S67.004 ICD-9-CM: V12.55  7/30/2019 Yes History of DVT of lower extremity (Chronic) ICD-10-CM: G43.533 ICD-9-CM: V12.51  7/30/2019 Yes Hypoxia ICD-10-CM: R09.02 
ICD-9-CM: 799.02  7/30/2019 No  
   
 Metastatic cancer to lung [facility]) (Chronic) ICD-10-CM: C78.00 ICD-9-CM: 197.0  7/26/2019 Yes Overview Signed 7/26/2019 11:06 AM by Rigoberto Ware NP Per EBUS in June 2019 Acute respiratory failure ([facility] ICD-10-CM: J96.00 
ICD-9-CM: 518.81  7/26/2019 Yes Aspiration into airway ICD-10-CM: T17.908A ICD-9-CM: 934.9  7/26/2019 Yes Dysphagia ICD-10-CM: R13.10 ICD-9-CM: 787.20  7/25/2019 Yes Intractable vomiting ICD-10-CM: R11.10 ICD-9-CM: 536.2  7/25/2019 Yes * (Principal) Rectal adenocarcinoma ([address] (Chronic) ICD-10-CM: C20 
ICD-9-CM: 154.1  7/6/2018 Yes Signed By: Marylou Sahu NP   
 July 31, 2019

## 2019-07-31 NOTE — PROGRESS NOTES
A follow up visit was made to the patient's wife, Mable Argueta. She was taking a break and eating lunch in the ICU waiting room. Emotional support, spiritual presence and  
prayer were provided to his wife as we had discussions about his condition, and how she and her family were doing with his illness. She was tearful during the visit. She remarked that the doctor communicated that his cancer was spreading to more parts of his body. Glendon Hashimoto Chaplain

## 2019-07-31 NOTE — ANESTHESIA PREPROCEDURE EVALUATION
Anesthetic History No history of anesthetic complications Review of Systems / Medical History Patient summary reviewed and pertinent labs reviewed Pulmonary Shortness of breath (Worsening SOB) and smoker Neuro/Psych Within defined limits Cardiovascular Hypertension Hyperlipidemia Exercise tolerance: >4 METS 
  
GI/Hepatic/Renal 
  
GERD: well controlled Endo/Other Cancer (colon/rectal) and anemia Other Findings Comments: Mediastina adenopathy compressing esophagus - consult for PEG 
 
7/31 RA sat ~92% however tachypneic to respirations 30-40/min. Wheezes bilaterally. Discussed with Dr. Eliseo Galarza who does not expect him to make large respiratory strides in the near future. Reports this is how he looked after surgery largely, and that he apparently looked similar prior to urologic procedure. Given the need for enteral access to advance care, I believe it no ideal but reasonable to proceed with the understanding he is at risk of pulmonary complications including post-op ventilatory support requirements after surgery. Physical Exam 
 
Airway Mallampati: II 
TM Distance: 4 - 6 cm Neck ROM: normal range of motion Mouth opening: Normal 
 
 Cardiovascular Regular rate and rhythm,  S1 and S2 normal,  no murmur, click, rub, or gallop Rhythm: regular Pertinent negatives: No peripheral edema Dental 
No notable dental hx Pulmonary Wheezes:bilateral 
 
 
 
Comments: tachypneic Abdominal 
GI exam deferred Other Findings Anesthetic Plan ASA: 4 Anesthesia type: total IV anesthesia Induction: Intravenous Anesthetic plan and risks discussed with: Patient and Spouse SOB stable. Described in Med Hx. Mediastinal mass from lymphadenopathy. Mr. Enoc Sharp states that breathing NOT worse when supine but his breathing remains labored overall as I talk with him.

## 2019-07-31 NOTE — PROGRESS NOTES
SPEECH PATHOLOGY NOTE: 
 
Patient has been followed by speech therapy this admission. Modified barium swallow study completed on 7/29/19 with recommendations for mechanical soft diet/nectar thick liquids. Patient has experienced a decline in status since that time requiring transfer to ICU. Per chart review, PEG planned when medically stable. Speech to sign off. Goals were not met. Please consider re-consult when appropriate to resume treatment.   
 
[facility] [place], [facility] MEDICO DEL [facility], [facility] FREDDIE EPPS, ANGEL-SLP

## 2019-07-31 NOTE — PROGRESS NOTES
Admit Date: 7/24/2019 Subjective:  
 
Luigi Cristina is currently in CCU. He was transferred here post-operatively due to SOB. Recently placed on BIPAP. He is voiding. Objective:  
 
Patient Vitals for the past 8 hrs: 
 BP Temp Pulse Resp SpO2  
07/31/19 1044 136/86  92 22 99 % 07/31/19 1015 (!) 154/99  98 (!) 32 100 % 07/31/19 0959 (!) 147/96  87 26 100 % 07/31/19 0944 140/90  91 25 100 % 07/31/19 0930 126/88  97 29 100 % 07/31/19 0922     98 % 07/31/19 0915 145/76  (!) 101 (!) 48 98 % 07/31/19 0844 (!) 174/91  (!) 122 (!) 44 (!) 81 %  
07/31/19 0822     94 % 07/31/19 0815 (!) 156/92  87 (!) 34 97 % 07/31/19 0800     97 % 07/31/19 0759 147/80 97.1 °F (36.2 °C) 66  97 % 07/31/19 0744 181/84  67 26 95 % 07/31/19 0730 161/84  64 24 96 % 07/31/19 0715 160/84  81 (!) 41 97 % 07/31/19 0659 166/85  64 28 98 % 07/31/19 0630 191/84  71 29 97 % 07/31/19 0615 137/83  95 27 96 % 07/31/19 0600   94    
07/31/19 0545   77    
07/31/19 0530 143/80  81    
07/31/19 0515 136/80  86    
07/31/19 0500   (!) 138    
07/31/19 0445   84 29   
07/31/19 0430 144/82  91  98 % 07/31/19 0425     98 % 07/31/19 0415   97    
07/31/19 0400   (!) 58 25 96 % 07/31/19 0345   63 26 96 % 07/31/19 0330 153/87  (!) 58 22 97 % 07/31 0701 - 07/31 1900 In: -  
Out: 600 [Urine:400] 07/29 1901 - 07/31 0700 In: 4321 [I.V.:1580] Out: 940 [Urine:940] Physical Exam: 
GENERAL: sleeping, on bipap machine ABDOMEN: soft, non-tender : voiding Data Review Recent Results (from the past 24 hour(s)) POC G3 Collection Time: 07/30/19  1:44 PM  
Result Value Ref Range Device: NASAL CANNULA pH (POC) 7.470 (H) 7.35 - 7.45    
 pCO2 (POC) 37.5 35 - 45 MMHG  
 pO2 (POC) 77 75 - 100 MMHG  
 HCO3 (POC) 27.3 (H) 22 - 26 MMOL/L  
 sO2 (POC) 96 95 - 98 % Base excess (POC) 4 mmol/L  Allens test (POC) YES    
 Site RIGHT RADIAL Patient temp. 98.6 Specimen type (POC) ARTERIAL Performed by [facility]   
 CO2, POC 28 MMOL/L Flow rate (POC) 7.000 L/min Respiratory comment: PhysicianNotified COLLECT TIME 1,342 CBC WITH AUTOMATED DIFF Collection Time: 07/31/19  4:00 AM  
Result Value Ref Range WBC 38.9 (H) 4.3 - 11.1 K/uL  
 RBC 3.59 (L) 4.23 - 5.6 M/uL  
 HGB 10.3 (L) 13.6 - 17.2 g/dL HCT 33.8 (L) 41.1 - 50.3 % MCV 94.2 79.6 - 97.8 FL  
 MCH 28.7 26.1 - 32.9 PG  
 MCHC 30.5 (L) 31.4 - 35.0 g/dL  
 RDW 15.9 (H) 11.9 - 14.6 % PLATELET 350 679 - 295 K/uL MPV 9.4 9.4 - 12.3 FL ABSOLUTE NRBC 0.29 (H) 0.0 - 0.2 K/uL NEUTROPHILS 50 43 - 78 % LYMPHOCYTES 2 (L) 13 - 44 % MONOCYTES 6 4.0 - 12.0 % EOSINOPHILS 0 (L) 0.5 - 7.8 % BASOPHILS 0 0.0 - 2.0 % IMMATURE GRANULOCYTES 42 (H) 0.0 - 5.0 %  
 ABS. NEUTROPHILS 19.5 (H) 1.7 - 8.2 K/UL  
 ABS. LYMPHOCYTES 0.8 0.5 - 4.6 K/UL  
 ABS. MONOCYTES 2.3 (H) 0.1 - 1.3 K/UL  
 ABS. EOSINOPHILS 0.0 0.0 - 0.8 K/UL  
 ABS. BASOPHILS 0.0 0.0 - 0.2 K/UL  
 ABS. IMM. GRANS. 16.3 (H) 0.0 - 0.5 K/UL  
 RBC COMMENTS SLIGHT 
ANISOCYTOSIS + POIKILOCYTOSIS 
    
 WBC COMMENTS Result Confirmed By Smear PLATELET COMMENTS ADEQUATE    
 DF AUTOMATED MAGNESIUM Collection Time: 07/31/19  4:00 AM  
Result Value Ref Range Magnesium 2.9 (H) 1.8 - 2.4 mg/dL METABOLIC PANEL, COMPREHENSIVE Collection Time: 07/31/19  4:00 AM  
Result Value Ref Range Sodium 139 136 - 145 mmol/L Potassium 4.4 3.5 - 5.1 mmol/L Chloride 101 98 - 107 mmol/L  
 CO2 31 21 - 32 mmol/L Anion gap 7 7 - 16 mmol/L Glucose 130 (H) 65 - 100 mg/dL BUN 25 (H) 6 - 23 MG/DL Creatinine 1.00 0.8 - 1.5 MG/DL  
 GFR est AA >60 >60 ml/min/1.73m2 GFR est non-AA >60 >60 ml/min/1.73m2 Calcium 8.5 8.3 - 10.4 MG/DL Bilirubin, total 0.3 0.2 - 1.1 MG/DL  
 ALT (SGPT) 13 12 - 65 U/L  
 AST (SGOT) 20 15 - 37 U/L Alk.  phosphatase 71 50 - 136 U/L  
 Protein, total 6.4 6.3 - 8.2 g/dL Albumin 2.5 (L) 3.5 - 5.0 g/dL Globulin 3.9 (H) 2.3 - 3.5 g/dL A-G Ratio 0.6 (L) 1.2 - 3.5 POC G3 Collection Time: 07/31/19  9:09 AM  
Result Value Ref Range Device: High Flow Nasal Cannula    
 pH (POC) 7.359 7.35 - 7.45    
 pCO2 (POC) 52.1 (H) 35 - 45 MMHG  
 pO2 (POC) 83 75 - 100 MMHG  
 HCO3 (POC) 29.4 (H) 22 - 26 MMOL/L  
 sO2 (POC) 95 95 - 98 % Base excess (POC) 3 mmol/L Allens test (POC) YES Site RIGHT RADIAL Patient temp. 98.6 Specimen type (POC) ARTERIAL Performed by [facility]er[facility]   
 CO2, POC 31 MMOL/L Flow rate (POC) 6.000 L/min Respiratory comment: NurseNotified COLLECT TIME 909 Assessment:  
 
Principal Problem: 
  Rectal adenocarcinoma ([address] (7/6/2018) Active Problems: Dysphagia (7/25/2019) Intractable vomiting (7/25/2019) Metastatic cancer to lung ([address] (7/26/2019) Overview: Per EBUS in June 2019 Acute respiratory failure ([address] (7/26/2019) Aspiration into airway (7/26/2019) Hydroureteronephrosis (7/30/2019) Hx pulmonary embolism (7/30/2019) History of DVT of lower extremity (7/30/2019) Hypoxia (7/30/2019) POD 1: 
 
POSTOPERATIVE DIAGNOSES:  Right ureteral mass and right hydroureteronephrosis. 
  
OPERATIONS AND PROCEDURES:  Cystoscopy with right retrograde pyelogram, right diagnostic ureteroscopy, right ureteral mass biopsies x4, right ureteral stent placement. Cn 1.0 Urine culture/ BC x 2 (7/24)- NGTD Ureteral Biopsies: hemorrhagic connective tissue Plan: Pt will continue R ureteral stent. Path report to be reviewed with pt and his wife. Adan Chacon NP [facility] Urology I have reviewed the above note and examined the patient. I agree with the exam, assessment and plan.   My office all call patient to arrange follow up in 1 month with renal ultrasound prior to appointment to ensure adequate stent function. WIll sign off. Call with questions. Celso Meyer M.D. [facility] [address], [address] Phone: [phone] Fax: [phone]

## 2019-07-31 NOTE — PROGRESS NOTES
TRANSFER - IN REPORT: 
 
Verbal report received from Floridalma RN(name) on Diane Meier  being received from 3109(unit) for ordered procedure Report consisted of patients Situation, Background, Assessment and  
Recommendations(SBAR). Information from the following report(s) SBAR, MAR and Recent Results was reviewed with the receiving nurse. Opportunity for questions and clarification was provided.

## 2019-07-31 NOTE — CONSULTS
Critical Care Daily Progress Note: 7/31/2019 [place]  
Admission Date: 7/24/2019 The patient's chart is reviewed and the patient is discussed with the staff. . 48 y.o.  male, PMH Colon cancer, DM, HTN, rectal cancer, MYRON, former tobacco abuse, and GERD seen and evaluated at the request of Dr. Becky Munoz for acute respiratory distress.  The patient was admitted on 7/24/2019 by Oncology for dehydration and rectal adenocarcinoma confirmed to be Stage IV per biopsy. Romi Dwyer was initiated on 5FU-A and recently switched to [address].  XRT planned for mediastinal node. Barium swallow revealed aspiration of contrast as well as extrinsic soft tissue mass creating filling defect in distal esophagus. Mechanical soft diet with chopped/ground meats and nectar thick liquids recommended per speech therapy. On abx for suspected aspiration pneumonia. Has been hypoxic and requiring oxygen support. Seen by urology for right hydroureteronephrosis. Had cystoscopy with stent placement/biopsy on 7/30 - mass Blocking ureter. bxed yesterday Subjective:  
Watched in icu overnight due to tachypnea and increased wob. Looks about the same this am on 3 L. Denies sob- plan for peg Current Facility-Administered Medications Medication Dose Route Frequency  HYDROcodone-acetaminophen (HYCET) 0.5-21.7 mg/mL oral solution 5 mg  5 mg Oral Q4H PRN  
 lactated Ringers infusion  75 mL/hr IntraVENous CONTINUOUS  
 albuterol (PROVENTIL VENTOLIN) nebulizer solution 2.5 mg  2.5 mg Nebulization QID RT  
 hydrALAZINE (APRESOLINE) 20 mg/mL injection 10 mg  10 mg IntraVENous Q6H PRN  
 iothalamate meglumine (CONRAY 60) 60 % contrast solution    PRN  
 albuterol (PROVENTIL VENTOLIN) nebulizer solution 2.5 mg  2.5 mg Nebulization Q6H PRN  
 NUTRITIONAL SUPPORT ELECTROLYTE PRN ORDERS   Does Not Apply PRN  potassium chloride (KLOR-CON) packet for solution 20 mEq  20 mEq Oral BID WITH MEALS  
  piperacillin-tazobactam (ZOSYN) 4.5 g in 0.9% sodium chloride (MBP/ADV) 100 mL  4.5 g IntraVENous Q8H  
 methylPREDNISolone (PF) (SOLU-MEDROL) injection 40 mg  40 mg IntraVENous Q8H  
 famotidine (PF) (PEPCID) 20 mg in sodium chloride 0.9% 10 mL injection  20 mg IntraVENous Q12H  carvedilol (COREG) tablet 3.125 mg  3.125 mg Oral BID WITH MEALS  diphenoxylate-atropine (LOMOTIL) tablet 1 Tab  1 Tab Oral QID PRN  
 lisinopril (PRINIVIL, ZESTRIL) tablet 10 mg  10 mg Oral DAILY  magic mouthwash (SIMON) oral suspension 5 mL  5 mL Oral Q4H PRN  
 oxyCODONE IR (ROXICODONE) tablet 5 mg  5 mg Oral Q6H PRN  
 guaiFENesin-dextromethorphan (ROBITUSSIN DM) 100-10 mg/5 mL syrup 5 mL  5 mL Oral Q6H PRN  
 benzonatate (TESSALON) capsule 100 mg  100 mg Oral TID PRN  
 LORazepam (ATIVAN) injection 1 mg  1 mg IntraVENous Q4H PRN  
 central line flush (saline) syringe 10 mL  10 mL InterCATHeter PRN  prochlorperazine (COMPAZINE) injection 10 mg  10 mg IntraVENous Q6H PRN  
 morphine injection 2 mg  2 mg IntraVENous Q4H PRN  
 enoxaparin (LOVENOX) injection 100 mg  100 mg SubCUTAneous Q24H Review of Systems Constitutional:  negative for fever, chills, sweats Cardiovascular:  negative for chest pain, palpitations, syncope, edema Gastrointestinal:  negative for dysphagia, reflux, vomiting, diarrhea, abdominal pain, or melena Neurologic:  negative for focal weakness, numbness, headache Objective:  
 
Vitals:  
 07/31/19 0630 07/31/19 0659 07/31/19 0715 07/31/19 0730 BP: 191/84 166/85 160/84 161/84 Pulse: 71 64 81 64 Resp: 29 28 (!) 41 24 Temp:      
SpO2: 97% 98% 97% 96% Weight:      
 
 
Intake and Output:  
07/29 1901 - 07/31 0700 In: 8864 [I.V.:1580] Out: 940 [Urine:940] No intake/output data recorded. Physical Exam:         
Constitutional:  the patient is well developed and in no acute distress EENMT:  Sclera clear, pupils equal, oral mucosa moist 
 Respiratory: few rhonchi and wheezes Cardiovascular:  RRR without M,G,R 
Gastrointestinal: soft and non-tender; with positive bowel sounds. colostomy Musculoskeletal: warm without cyanosis. There is no lower extremity edema. Skin:  no jaundice or rashes, no wounds Neurologic: no gross neuro deficits Psychiatric:  alert and oriented x 3 LINES: 
port DRIPS: 
none CXR:  Some increased opacity in R base LAB No results for input(s): GLUCPOC in the last 72 hours. No lab exists for component: [place] Recent Labs  
  07/31/19 0400 07/30/19 0407 07/29/19 
2245 WBC 38.9* 41.5* 37.7* HGB 10.3* 10.1* 10.7* HCT 33.8* 32.5* 34.3*  
 416 468* Recent Labs  
  07/31/19 0400 07/30/19 0407 07/29/19 
0513  139 141  
K 4.4 4.3 4.2  101 103 CO2 31 31 29 * 124* 148* BUN 25* 24* 26* CREA 1.00 0.94 1.08  
MG 2.9* 2.9* 2.9*  
CA 8.5 8.6 8.9 ALB 2.5* 2.4* 2.3* TBILI 0.3 0.3 0.2 ALT 13 8* 13 SGOT 20 25 22 Recent Labs  
  07/30/19 
1344 PHI 7.470* PCO2I 37.5 PO2I 77 HCO3I 27.3* No results for input(s): LCAD, LAC in the last 72 hours. No results for input(s): PH, PCO2, PO2, HCO3 in the last 72 hours. Assessment:  (Medical Decision Making) Hospital Problems  Date Reviewed: 7/26/2019 Codes Class Noted POA Hydroureteronephrosis ICD-10-CM: N13.30 ICD-9-CM: 956  7/30/2019 Clinically Undetermined Hx pulmonary embolism (Chronic) ICD-10-CM: F57.378 ICD-9-CM: V12.55  7/30/2019 Yes History of DVT of lower extremity (Chronic) ICD-10-CM: H50.463 ICD-9-CM: V12.51  7/30/2019 Yes Hypoxia ICD-10-CM: R09.02 
ICD-9-CM: 799.02  7/30/2019 No  
   
 Metastatic cancer to lung [facility]) (Chronic) ICD-10-CM: C78.00 ICD-9-CM: 197.0  7/26/2019 Yes Overview Signed 7/26/2019 11:06 AM by Eladio Howell NP Per EBUS in June 2019 [facility] ICD-10-CM: J96.00 
ICD-9-CM: 518.81  7/26/2019 Yes On nc but increased work of breathing Aspiration into airway ICD-10-CM: T17.908A ICD-9-CM: 934.9  7/26/2019 Yes On zosyn Dysphagia ICD-10-CM: R13.10 ICD-9-CM: 787.20  7/25/2019 Yes Intractable vomiting ICD-10-CM: R11.10 ICD-9-CM: 536.2  7/25/2019 Yes * (Principal) Rectal adenocarcinoma ([facility] (Chronic) ICD-10-CM: C20 
ICD-9-CM: 154.1  7/6/2018 Yes Plan:  (Medical Decision Making) 1   Zosyn day 6 
2   Albuterol nebs 3   Solumedrol- will decrease dose 4   For peg today 
5   May need bipap post op 
-- 
 
More than 50% of the time documented was spent in face-to-face contact with the patient and in the care of the patient on the floor/unit where the patient is located.  
 
Sissy Gutiérrez MD

## 2019-07-31 NOTE — PROGRESS NOTES
Nutrition F/U: 
Assessment: 
Weight 59 kg (ICU bed 7/30/19), edema - trace. The patient was transferred to the ICU yesterday with a decline in status. PEG placement is being delayed until he is more stable. He is currently requiring BIPAP. IV Access: 
 Right chest port - LR @ 75 ml/hr. Macronutrient Needs (59.7 kg): EER:  3044-1697 kcal /day (25-30 kcal/kg) EPR:  60-72 grams protein/day (1-1.2 grams/kg) Max CHO:  225 grams/day (50% kcal) Fluid:  1ml/kcal 
Intake/Comparative Standards: The patient is now NPO which does not meet his daily calorie and protein needs. Intervention:  
Meals and Snacks: NPO. Enteral Nutrition: PEG placement pending. Parenteral Nutrition/IV Fluid: Should consider IV nutrition support until PEG is placed or the patient is off BIPAP and can have a NGT placed. Coordination of Nutrition Care by a Nutrition Professional: AM ICU rounds, collaboration with Milton Reeves RN. Nutrition Discharge Plan: Too soon to determine. Romero Gayle. Angela Dye 
[phone]

## 2019-07-31 NOTE — PROGRESS NOTES
Patient desated in the high 70's, low 80s and . Patient requesting PRN IV ativan for anxiety attack. Ativan given and O2 increased to 6 L NC. O2 sat back to 95 but patient still having increased work of breathing. Dr. Geovanna Murphy at bedside and requested ABG. ABG worse than yesterday so patient to go on BIPAP for now. Spoke with Dr. Thai Niño and updated on patients condition, he is going to postpone PEG placement until patient more stable.

## 2019-07-31 NOTE — OP NOTES
[facility]  OPERATIVE REPORT    Name:  Lennie Ortez  MR#:  [id number]  :  1965  ACCOUNT #:  [de-identified]  DATE OF SERVICE:  2019      PREOPERATIVE DIAGNOSES:  Right ureteral mass and right hydroureteronephrosis. POSTOPERATIVE DIAGNOSES:  Right ureteral mass and right hydroureteronephrosis. OPERATIONS AND PROCEDURES:  Cystoscopy with right retrograde pyelogram, right diagnostic ureteroscopy, right ureteral mass biopsies x4, right ureteral stent placement. SURGEON:  Kei Abad MD    SURGICAL ASSISTANT:  None. ANESTHESIA:  General.    ESTIMATED BLOOD LOSS:  5 mL. SPECIMENS REMOVED:  Right ureteral biopsies. FINDINGS:  A 4-5 cm area of significant narrowing and filling defect on retrograde pyelogram in the right mid-ureter, starting at the level of the pelvic brim and extending superiorly that is concerning for a large ureteral mass. The right ureteral mass was also visualized on diagnostic ureteroscopy and obstructing nearly 2/3 of the lumen of the right ureter. The mass was biopsied x4 and the biopsies were sent off. Placement of a right ureteral stent. COMPLICATIONS:  None. IMPLANTS:  A 6 x 26 double-J right ureteral stent with strings. INDICATIONS FOR OPERATIVE PROCEDURE:  The patient is a 61-year-old gentleman with metastatic colon cancer currently undergoing treatment who was found to have worsening right hydroureteronephrosis, worsening creatinine and concern for an invasive right ureteral mass on recent surveillance imaging. Urology was consulted for further evaluation and he was taken to the operating room today for biopsies and possible stent placement. DESCRIPTION OF OPERATIVE PROCEDURE:  After informed consent was obtained and the correct patient was identified in the preoperative holding area, he was taken back to the operating room suite and placed on the table in the supine position.   Time-out was performed confirming the correct patient and planned procedure. He received 2 g of IV Ancef prior to the smooth induction of general endotracheal anesthesia. He was moved into the dorsal lithotomy position, prepped and draped in the usual sterile fashion. I began the case by inserting a 22-Niuean rigid cystoscope with a 30-degree lens in the urethra and advanced it into the patient's bladder. Pancystoscopy was performed which was unremarkable. Attention was turned to the right ureteral orifice which was cannulated with a 5-Niuean open-ended ureteral catheter. I injected 20 mL of Conray through the open-ended ureteral catheter to perform a retrograde pyelogram which revealed hydroureteronephrosis down to the level of the mid ureter and there was significant narrowing and filling defect for about a 4-5 cm segment of the mid-ureter down to the level of the pelvic brim. After the pelvic brim, the ureter became widely patent again down to the level of the bladder. This was concerning for a large obstructing ureteral mass on retrograde pyelogram.  I then used a Sensor wire and cannulated it through the open-ended ureteral catheter under fluoroscopy to see if I could pass the Sensor wire into the right renal pelvis. Unfortunately, the Sensor wire did not pass the ureteral mass and became coiled in the mid-ureter. At this point, I left the Sensor wire in place and decided to switch to a semi-rigid ureteroscope and switched off of regular saline onto the pressure bag. With the semi-rigid ureteroscope and a second Sensor wire, I was able to navigate into the ureter up to the level of the pelvic brim. At this point, I encountered a very large obstructing ureteral mass that was visualized blocking nearly two-thirds of the patient's ureter. There was a small an opening on the far right lateral side of the mass.   I was able to advance the Sensor wire under direct vision using the semi-rigid ureteroscope through this opening and with some manipulation, I was able to navigate the Sensor wire into the right renal pelvis. Positioning was confirmed on fluoroscopy. The second wire which was coiled on the mass was removed and I then backloaded the scope off the Sensor wire leaving the Sensor wire in place as a safety wire to provide access to the kidney. At this point, I reinserted the semi-rigid ureteroscope and attempted to advance it further to reach the mass and obtained biopsies. Unfortunately, the ureter was too narrow and could not accommodate the semi-rigid ureteroscope at this point. I then switched the flexible ureteroscope which had more flexibility and I was able to advance it further up to the level of the mass. I used the ureteral biopsy forceps to take samples of the right ureteral mass. I sent these off to Pathology for analysis. After the biopsies were obtained, I then backed the ureteroscope down the patient's ureter. I could not advance the ureteroscope past the level of the mass and therefore, I could not investigate the proximal portions of the patient's ureter or renal pelvis. At this point, I decided to leave a ureteral stent and therefore, I removed the flexible ureteroscope. I loaded the Sensor wire onto the rigid cystoscope and passed a 6 x 26 double-J right ureteral stent over the wire under fluoroscopic guidance into the right renal pelvis. Once the stent was in position in the renal pelvis on fluoro, I pulled the wire noting a good curl in the renal pelvis as well as the patient's bladder. I then drained the patient's bladder completely. The patient was then awoken from anesthesia and transferred to the PACU in stable condition. He tolerated the procedure well. There were no complications. All counts were correct at the end of procedure.       Edda Pond MD      [id number]  D:  07/30/2019 12:27  T:  07/30/2019 12:32  JOB #:  [id number]

## 2019-07-31 NOTE — PROGRESS NOTES
A follow up visit was made to the patient. Emotional support, spiritual presence and  
prayer were provided. His nurse said that he was resting now and he received some pain relieve medicine because he was hurting. Jono Diego

## 2019-07-31 NOTE — PROGRESS NOTES
[facility] Progress Note Admit Date:  7/24/2019 Today's Date:  7/31/2019 CC:  Dysphagia Subjective:  
 
Patient remains in ICU, still with tachypnea but feels improved after respiratory inhalation treatment earlier this morning. Family at bedside. Medications:  
Current Facility-Administered Medications Medication Dose Route Frequency  HYDROcodone-acetaminophen (HYCET) 0.5-21.7 mg/mL oral solution 5 mg  5 mg Oral Q4H PRN  
 lactated Ringers infusion  75 mL/hr IntraVENous CONTINUOUS  
 albuterol (PROVENTIL VENTOLIN) nebulizer solution 2.5 mg  2.5 mg Nebulization QID RT  
 hydrALAZINE (APRESOLINE) 20 mg/mL injection 10 mg  10 mg IntraVENous Q6H PRN  
 iothalamate meglumine (CONRAY 60) 60 % contrast solution    PRN  
 albuterol (PROVENTIL VENTOLIN) nebulizer solution 2.5 mg  2.5 mg Nebulization Q6H PRN  
 NUTRITIONAL SUPPORT ELECTROLYTE PRN ORDERS   Does Not Apply PRN  potassium chloride (KLOR-CON) packet for solution 20 mEq  20 mEq Oral BID WITH MEALS  piperacillin-tazobactam (ZOSYN) 4.5 g in 0.9% sodium chloride (MBP/ADV) 100 mL  4.5 g IntraVENous Q8H  
 methylPREDNISolone (PF) (SOLU-MEDROL) injection 40 mg  40 mg IntraVENous Q8H  
 famotidine (PF) (PEPCID) 20 mg in sodium chloride 0.9% 10 mL injection  20 mg IntraVENous Q12H  carvedilol (COREG) tablet 3.125 mg  3.125 mg Oral BID WITH MEALS  diphenoxylate-atropine (LOMOTIL) tablet 1 Tab  1 Tab Oral QID PRN  
 lisinopril (PRINIVIL, ZESTRIL) tablet 10 mg  10 mg Oral DAILY  magic mouthwash (SIMON) oral suspension 5 mL  5 mL Oral Q4H PRN  
 oxyCODONE IR (ROXICODONE) tablet 5 mg  5 mg Oral Q6H PRN  
 guaiFENesin-dextromethorphan (ROBITUSSIN DM) 100-10 mg/5 mL syrup 5 mL  5 mL Oral Q6H PRN  
 benzonatate (TESSALON) capsule 100 mg  100 mg Oral TID PRN  
 LORazepam (ATIVAN) injection 1 mg  1 mg IntraVENous Q4H PRN  
 central line flush (saline) syringe 10 mL  10 mL InterCATHeter PRN  
  prochlorperazine (COMPAZINE) injection 10 mg  10 mg IntraVENous Q6H PRN  
 morphine injection 2 mg  2 mg IntraVENous Q4H PRN  
 enoxaparin (LOVENOX) injection 100 mg  100 mg SubCUTAneous Q24H Review of Systems: ROS was obtained, with pertinent positives as listed above. No chest pain or SOB. Diet:  npo Objective:  
Vitals: 
Visit Vitals /84 Pulse 64 Temp 97.8 °F (36.6 °C) Resp 24 Wt 59 kg (130 lb) SpO2 96% BMI 18.93 kg/m² Intake/Output: 
No intake/output data recorded. 07/29 1901 - 07/31 0700 In: 4001 [I.V.:1580] Out: 940 [Urine:940] Exam: 
General appearance: alert, cooperative, no distress Lungs: tachypneic in mid 20's, slightly coarse to right upper lobes, otherwise clear to auscultation bilaterally anteriorly; O2 per nasal cannula Heart: regular rate and rhythm Abdomen: soft, non-tender. Bowel sounds normal. No masses, no organomegaly Extremities: extremities normal, atraumatic, no cyanosis or edema Neuro:  alert and oriented Data Review (Labs):   
Recent Labs  
  07/31/19 
0400 07/30/19 
0407 07/29/19 
7368 WBC 38.9* 41.5* 37.7* HGB 10.3* 10.1* 10.7* HCT 33.8* 32.5* 34.3*  
 416 468* MCV 94.2 93.4 94.0  139 141  
K 4.4 4.3 4.2  101 103 CO2 31 31 29 BUN 25* 24* 26* CREA 1.00 0.94 1.08  
CA 8.5 8.6 8.9 MG 2.9* 2.9* 2.9*  
* 124* 148* AP 71 78 101 SGOT 20 25 22 ALT 13 8* 13  
TBILI 0.3 0.3 0.2 ALB 2.5* 2.4* 2.3* TP 6.4 6.3 6.7 CT OF THE CHEST WITH INTRAVENOUS CONTRAST. 7/24/19  
INDICATION: Shortness of breath. Colby Mikes adenocarcinoma with metastatic 
disease.   
COMPARISON: None.  
TECHNIQUE:   2.5 mm axial scans from above the aortic arch to the lung bases 
with 100 cc nonionic intravenous contrast without acute complication. Intravenous contrast was given to evaluate for pulmonary embolism.  
FINDINGS:  The degree of opacification of the pulmonary arteries is adequate. No intraluminal filling defects within the pulmonary arterial tree to suggest 
pulmonary embolism. Aundra Heaps is normal caliber with a uniform lumen without 
evidence of dissection. Lungs demonstrate multiple nodules, groundglass 
opacities and small pneumonic consolidation in the bases. There is extensive 
mediastinal adenopathy which has increased a great deal from the prior exam.. Included portion of the upper abdomen are unremarkable. No gross bony lesions. .  IMPRESSION IMPRESSION:  Mediastinal adenopathy is increased great deal from the prior exam. 
There is patchy airspace disease in both lung bases. Pulmonary metastases. Negative for pulmonary embolism.  
 
XR CHEST PORT 7/30/19  
INDICATION: post op hypoxia  
COMPARISON: 7/30/2019  
FINDINGS: A portable AP radiograph of the chest was obtained at 1254 hours. The 
patient is on a cardiac monitor. Right upper and right lower lobe airspace 
disease unchanged. . The cardiac and mediastinal contours and pulmonary 
vascularity are normal.  The bones and soft tissues are grossly within normal 
limits.   
IMPRESSION IMPRESSION: Right upper and right lower lobe atelectasis or pneumonia unchanged. Assessment:  
 
Principal Problem: 
  Rectal adenocarcinoma ([address] (7/6/2018) Active Problems: Dysphagia (7/25/2019) Intractable vomiting (7/25/2019) Metastatic cancer to lung ([address] (7/26/2019) Overview: Per EBUS in June 2019 Acute respiratory failure ([address] (7/26/2019) Aspiration into airway (7/26/2019) Hydroureteronephrosis (7/30/2019) Hx pulmonary embolism (7/30/2019) History of DVT of lower extremity (7/30/2019) Hypoxia (7/30/2019) 48 y. o. male with PMH of daily etoh use, GERD, rectal cancer s/p diverting colostomy (Stage 4-locally advanced unresectable, LAD abd and mediastinum), hx of PE 5/8/19, RLE DVT 5/29/19, on Lovenox, renal mass, RT sided ureteral obstruction with likely malignancy, seen in consultation at the request of Shelbi Meneses NP, for PEG placement evaluation. He was admitted 7/24/19 with fatigue and inability to tolerate PO intake. He reported dysphagia with immediate regurgitation. CT chest with findings as above. BS w extensive compression of esophagus.  He last received AA AvastinFOLFIRI 7/9/19.  He was due for next round, but this was delayed due to current symptoms. Seen by urology for right hydroureteronephrosis. Had cystoscopy today with stent placement/biopsy on 7/30 with development of shortness of breath in recovery. Admitted to ICU for post-op observation and care.   Increased work of breathing overnight. Plan: 1. NPO 
2. Continue Zosyn as ordered per admitting service 3. May defer PEG placement for today pending pulmonary's evaluation and recommendations Patient is seen and examined in collaboration with Dr. Ravindra Cevallos. Assessment and plan as per Dr. Ravindra Cevallos.  
Joshua Carmona NP

## 2019-07-31 NOTE — PROGRESS NOTES
[facility] Inpatient Hematology / Oncology Progress Note Admission Date: 2019  3:18 PM 
Reason for Admission/Hospital Course: Dysphagia [R13.10] Intractable vomiting [R11.10] 24 Hour Events: 
Afebrile, VSS Tachypnea after stent placement for hydronephrosis so moved to ICU Decompensated prior to PEG placement this am so procedure canceled Family at bedside ROS: 
Constitutional: Positive for fatigue, weakness, lethargic; negative for fever, chills CV:  negative for chest pain, palpitations, edema. Respiratory: Ongoing SOB on bipap GI:  No abdominal pain, no vomiting (has been NPO) 10 point review of systems is otherwise negative with the exception of the elements mentioned above in the HPI. Allergies Allergen Reactions  Zithromax [Azithromycin] Rash OBJECTIVE: 
Patient Vitals for the past 8 hrs: 
 BP Temp Pulse Resp SpO2  
19 1137     98 % 19 1115 (!) 130/92  88 22 99 % 19 1059 157/90  91 22 100 % 19 1044 136/86  92 22 99 % 19 1015 (!) 154/99  98 (!) 32 100 % 19 0959 (!) 147/96  87 26 100 % 19 0944 140/90  91 25 100 % 19 0930 126/88  97 29 100 % 19 0922     98 % 19 0915 145/76  (!) 101 (!) 48 98 % 19 0844 (!) 174/91  (!) 122 (!) 44 (!) 81 %  
19 0822     94 % 19 0815 (!) 156/92  87 (!) 34 97 % 19 0800     97 % 19 0759 147/80 97.1 °F (36.2 °C) 66  97 % 19 0744 181/84  67 26 95 % 19 0730 161/84  64 24 96 % 19 0715 160/84  81 (!) 41 97 % 19 0659 166/85  64 28 98 % 19 0630 191/84  71 29 97 % 19 0615 137/83  95 27 96 % 19 0600   94    
19 0545   77    
19 0530 143/80  81    
19 0515 136/80  86    
19 0500   (!) 138   Temp (24hrs), Av °F (36.7 °C), Min:97.1 °F (36.2 °C), Max:98.8 °F (37.1 °C) 07/31 0701 - 07/31 1900 In: -  
Out: 600 [Urine:400] Physical Exam: 
Constitutional: Acutely ill male in respiratory distress, lying in the hospital bed in ICU Family at bedside. HEENT: Normocephalic and atraumatic. Oropharynx is clear, mucous membranes are moist. Neck supple Lymph node Deferred Skin Warm and dry. No bruising and no rash noted. No erythema. No pallor. Respiratory Bilateral rhonchi and wheezes. On supplemental O2 on Bipap CVS Mildly tachycardic rate, regular rhythm and normal S1 and S2. No murmurs, gallops, or rubs. Abdomen Soft, nontender and nondistended, normoactive bowel sounds. No palpable mass. No hepatosplenomegaly. Neuro Groggy. No interactive. Wakes to stimulus MSK Normal range of motion in general.  No edema and no tenderness. Psych Sleeping Labs: 
   
Recent Labs  
  07/31/19 0400 07/30/19 0407 07/29/19 
2655 WBC 38.9* 41.5* 37.7*  
RBC 3.59* 3.48* 3.65* HGB 10.3* 10.1* 10.7* HCT 33.8* 32.5* 34.3*  
MCV 94.2 93.4 94.0 MCH 28.7 29.0 29.3 MCHC 30.5* 31.1* 31.2*  
RDW 15.9* 16.0* 15.9*  
 416 468* GRANS 50 56 63 LYMPH 2* 2* 2*  
MONOS 6 5 6 EOS 0* 0* 0*  
BASOS 0 0 0 IG 42* 37* 29* DF AUTOMATED AUTOMATED AUTOMATED ANEU 19.5* 23.2* 23.7* ABL 0.8 0.8 0.8 ABM 2.3* 2.1* 2.3* YESI 0.0 0.0 0.0 ABB 0.0 0.0 0.0 AIG 16.3* 15.4* 10.9* Recent Labs  
  07/31/19 0400 07/30/19 0407 07/29/19 
8518  139 141  
K 4.4 4.3 4.2  101 103 CO2 31 31 29 AGAP 7 7 9 * 124* 148* BUN 25* 24* 26* CREA 1.00 0.94 1.08  
GFRAA >60 >60 >60 GFRNA >60 >60 >60  
CA 8.5 8.6 8.9 SGOT 20 25 22 AP 71 78 101  
TP 6.4 6.3 6.7 ALB 2.5* 2.4* 2.3*  
GLOB 3.9* 3.9* 4.4* AGRAT 0.6* 0.6* 0.5* MG 2.9* 2.9* 2.9* Imaging: XR CHEST SNGL V [[id number]] Collected: 07/26/19 1159 Order Status: Completed Updated: 07/26/19 1202 Narrative:    
Portable chest x-ray CLINICAL INDICATION: Shortness of breath FINDINGS: Single AP view the chest compared to a similar exam dated 7/11/2019 
shows new reticular nodular interstitial densities throughout both lungs with 
more patchy airspace opacity in the right upper lobe. A right-sided chest port 
is in place. The cardiac silhouette and mediastinum are stable. Impression:    
IMPRESSION: New, diffuse reticular nodular densities throughout the lungs with 
patchy opacity in the right upper lobe. Atypical pneumonia or pulmonary edema 
should be considered. XR BA SWALLOW ESOPHOGRAM [[id number]] Collected: 07/26/19 2781 Order Status: Completed Updated: 07/26/19 1026 Narrative:    
History: Dysphasia. Stage IV rectal cancer. EXAM: Barium swallow TECHNIQUE: 1.3 minutes fluoroscopy time is utilized. The patient ingests 
effervescent granules followed by thick and thin consistencies of barium. 18 
fluoroscopic images are available for review. No comparison FINDINGS: The exam is markedly limited. The patient is unable to perform the 
exam the upright position, and has very limited mobility. There is aspiration of 
the thin contrast. Contrast flows through the esophagus into the stomach without 
difficulty. There is a hiatal hernia present. Just above the hiatal hernia, 
there is a filling defect of the distal esophagus, corresponding to a soft 
tissue mass adjacent to the esophagus on the recent CT chest. No definite 
gastroesophageal reflux, or ulceration. Impression:    
IMPRESSION: 
1. Just above the hiatal hernia, there is an extrinsic soft tissue mass creating 
a filling defect within the distal esophagus, corresponding to a soft tissue 
mass seen on the recent CT chest. 
2. Aspiration of oral contrast. Correlation with modified barium swallow 
recommended. CT CHEST W CONT [[id number]] Collected: 07/24/19 2133 Order Status: Completed Updated: 07/24/19 2138 Narrative:    
CT OF THE CHEST WITH INTRAVENOUS CONTRAST. INDICATION: Shortness of breath. Soraya Fey adenocarcinoma with metastatic 
disease. COMPARISON: None. TECHNIQUE:   2.5 mm axial scans from above the aortic arch to the lung bases 
with 100 cc nonionic intravenous contrast without acute complication. Intravenous contrast was given to evaluate for pulmonary embolism. FINDINGS:  The degree of opacification of the pulmonary arteries is adequate. No intraluminal filling defects within the pulmonary arterial tree to suggest 
pulmonary embolism. Levada Pronto is normal caliber with a uniform lumen without 
evidence of dissection. Lungs demonstrate multiple nodules, groundglass 
opacities and small pneumonic consolidation in the bases. There is extensive 
mediastinal adenopathy which has increased a great deal from the prior exam.. Included portion of the upper abdomen are unremarkable. No gross bony lesions. Teresa Torres Impression:    
IMPRESSION:  Mediastinal adenopathy is increased great deal from the prior exam. 
There is patchy airspace disease in both lung bases. Pulmonary metastases. Negative for pulmonary embolism. Medications: 
Current Facility-Administered Medications Medication Dose Route Frequency  methylPREDNISolone (PF) (SOLU-MEDROL) injection 40 mg  40 mg IntraVENous Q12H  
 HYDROcodone-acetaminophen (HYCET) 0.5-21.7 mg/mL oral solution 5 mg  5 mg Oral Q4H PRN  
 lactated Ringers infusion  75 mL/hr IntraVENous CONTINUOUS  
 albuterol (PROVENTIL VENTOLIN) nebulizer solution 2.5 mg  2.5 mg Nebulization QID RT  
 hydrALAZINE (APRESOLINE) 20 mg/mL injection 10 mg  10 mg IntraVENous Q6H PRN  
 iothalamate meglumine (CONRAY 60) 60 % contrast solution    PRN  
 albuterol (PROVENTIL VENTOLIN) nebulizer solution 2.5 mg  2.5 mg Nebulization Q6H PRN  
 NUTRITIONAL SUPPORT ELECTROLYTE PRN ORDERS   Does Not Apply PRN  potassium chloride (KLOR-CON) packet for solution 20 mEq  20 mEq Oral BID WITH MEALS  
  piperacillin-tazobactam (ZOSYN) 4.5 g in 0.9% sodium chloride (MBP/ADV) 100 mL  4.5 g IntraVENous Q8H  
 famotidine (PF) (PEPCID) 20 mg in sodium chloride 0.9% 10 mL injection  20 mg IntraVENous Q12H  carvedilol (COREG) tablet 3.125 mg  3.125 mg Oral BID WITH MEALS  diphenoxylate-atropine (LOMOTIL) tablet 1 Tab  1 Tab Oral QID PRN  
 lisinopril (PRINIVIL, ZESTRIL) tablet 10 mg  10 mg Oral DAILY  magic mouthwash (SIMON) oral suspension 5 mL  5 mL Oral Q4H PRN  
 oxyCODONE IR (ROXICODONE) tablet 5 mg  5 mg Oral Q6H PRN  
 guaiFENesin-dextromethorphan (ROBITUSSIN DM) 100-10 mg/5 mL syrup 5 mL  5 mL Oral Q6H PRN  
 benzonatate (TESSALON) capsule 100 mg  100 mg Oral TID PRN  
 LORazepam (ATIVAN) injection 1 mg  1 mg IntraVENous Q4H PRN  
 central line flush (saline) syringe 10 mL  10 mL InterCATHeter PRN  prochlorperazine (COMPAZINE) injection 10 mg  10 mg IntraVENous Q6H PRN  
 morphine injection 2 mg  2 mg IntraVENous Q4H PRN  
 enoxaparin (LOVENOX) injection 100 mg  100 mg SubCUTAneous Q24H  
 
 
 
ASSESSMENT: 
 
Problem List  Date Reviewed: 7/26/2019 Codes Class Noted Hydroureteronephrosis ICD-10-CM: N13.30 ICD-9-CM: 546  7/30/2019 Hx pulmonary embolism (Chronic) ICD-10-CM: L62.271 ICD-9-CM: V12.55  7/30/2019 History of DVT of lower extremity (Chronic) ICD-10-CM: F28.111 ICD-9-CM: V12.51  7/30/2019 Hypoxia ICD-10-CM: R09.02 
ICD-9-CM: 799.02  7/30/2019 Metastatic cancer to lung [facility]) (Chronic) ICD-10-CM: C78.00 ICD-9-CM: 197.0  7/26/2019 Overview Signed 7/26/2019 11:06 AM by Elvia Vargas NP Per EBUS in June 2019 Acute respiratory failure ([facility] [address] ICD-10-CM: J96.00 
ICD-9-CM: 518.81  7/26/2019 Aspiration into airway ICD-10-CM: T17.908A ICD-9-CM: 934.9  7/26/2019 Dysphagia ICD-10-CM: R13.10 ICD-9-CM: 787.20  7/25/2019 Intractable vomiting ICD-10-CM: R11.10 ICD-9-CM: 536.2  7/25/2019 Mediastinal lymphadenopathy ICD-10-CM: R59.0 ICD-9-CM: 785.6  6/11/2019 Pulmonary nodules ICD-10-CM: R91.8 ICD-9-CM: 793.19  6/11/2019 Dehydration ICD-10-CM: E86.0 ICD-9-CM: 276.51  4/2/2019 S/P radiation therapy < 4 weeks ago ICD-10-CM: Z92.3 ICD-9-CM: V15.3  10/1/2018 * (Principal) Rectal adenocarcinoma ([facility] [address] (Chronic) ICD-10-CM: C20 
ICD-9-CM: 154.1  7/6/2018 Colostomy status (HCC) (Chronic) ICD-10-CM: Z93.3 ICD-9-CM: V44.3  6/29/2018 Erectile dysfunction due to arterial insufficiency (Chronic) ICD-10-CM: N52.01 
ICD-9-CM: 607.84  5/31/2018 Benign essential HTN (Chronic) ICD-10-CM: I10 
ICD-9-CM: 401.1  5/31/2018 Hypercholesterolemia (Chronic) ICD-10-CM: E78.00 ICD-9-CM: 272.0  5/30/2018 Overview Signed 6/4/2018  2:20 PM by Edwina Abdullahi  
  ASCVD 10 year risk is 16.0%. Mod to high dose statin indicated. (based on b/p 180/90, 6/4/18) Screening PSA (prostate specific antigen) (Chronic) ICD-10-CM: Z12.5 ICD-9-CM: V76.44  5/30/2018 History of tobacco abuse (Chronic) ICD-10-CM: D37.396 ICD-9-CM: V15.82  8/18/2016 PLAN: 
Dysphagia/vomiting 
- GI evaluation. Planning EGD tomorrow (cannot do dilation due to recent avastin). Started pepcid BID 
- Continue IV fluids 7/26 Barium swallow today with aspiration and extrinsic soft tissue mass creating filling defect in distal esophagus 7/29 Spoke with Ignacia Hall (out of town) and [facility]. Needs palliative radiation to mediastinal adenopathy that is compressing esophagus. Planned for consult this afternoon. Pt agreeable to PEG given length of time it may take for radiation to improve his ability to swallow. GI reconsulted for placement 7/30 S/p simulation with rad onc yesterday - planning to start radiation (today but held due to procedure). PEG placement possibly tomorrow Aspiration pneumonia 
- Continue levaquin. BCx pending. UA negative.  CT chest with no acute findings 7/26 D3 LVQ 
7/29 Continues zosyn. Noted leukocytosis. Met rectal ca - S/p FOLFIRI-Avastin, last given 7/9 7/29 Suspicion for progression on recent CT. Family is aware. Further treatment TBD outpatient with Dr. Edilberto Choi Leukopenia/anemia 
- Secondary to chemotherapy. Granix x 1 today 7/26 WBC up to 4.5 
7/29 Continues with elevated counts Hx hydronephrosis - Planned for cystoscopy and stent placement tomorrow with urology (was scheduled for outpatient). Ok from [employer]. Discussed with pulmonary and no contraindication from their perspective. Discussed with Yecenia Neely NP 
7/30 S/p cystoscopy with right ureteral stent placed and right ureteral biopsy. To ICU following for tachypnea Lovenox: on hold prior to procedures Plan: Patient moved to unit yesterday after ureter stent placement due to tachypnea. He decompensated prior to PEG procedure so PEG canceled. He is currently on BIPAP with minimal response. We encourage family to discuss goals with patient including code status. We will continue conversation and ask PC to see. Harshil Lay, BARB [address] [place] [address] Hematology & Oncology [address] [place] [address] Office : [phone] Fax : [phone] I personally saw, exammed and counselled the patient, and discussed with NP, agree with above history/assessment/plan. 48 y.o.male rectal cancer s/p chemorad in 2018 but refractory, developed mediastinal LAD, received FOLFOX/avastin and progressed, change to FOLFIRI/avastin but admitted with esophageal dysphagia, had aspiration in barium swallow and had to be care in ICU, now respiratory status stable, pt wants to excalate to \" out-of-box rx\" and we discussed his disease had shown great resistance to major regimens, there are oral options but he has to solve the dysphagia problem for both rx and feeding, agreed to pursue palliative XRT of mediastinal LN. He decompensated after exchanging stent and moved to ICU, tried dobhoff but pulled out and PEG pending respiratory status stable, XRT simulated and pending clinically stable, discussed with pt and family as much as we understand he still wants aggressive rx, his tolerance appears poor. 
  
 
Rectal cancer Mini Moreno M.D. [address] Office : [phone] Fax : [phone]

## 2019-08-01 NOTE — PROGRESS NOTES
A follow up visit was made to the patient. Emotional support, spiritual presence and  
prayer were provided.  consulted with his nurse before the visit.  had a good visit with his daughter, Tatiana Manriquez, She is a health care worker. She has worked as a CNA and a manager in a nursing home. Cristofer Diego

## 2019-08-01 NOTE — PROGRESS NOTES
[facility] Progress Note Admit Date:  7/24/2019 Today's Date:  8/1/2019 CC:  Dysphagia Subjective:  
 
Patient remains in ICU, now on BIPAP. PEG placement canceled for worsening status overall. Medications:  
Current Facility-Administered Medications Medication Dose Route Frequency  methylPREDNISolone (PF) (SOLU-MEDROL) injection 40 mg  40 mg IntraVENous Q12H  
 morphine injection 2 mg  2 mg IntraVENous Q4H PRN  
 HYDROcodone-acetaminophen (HYCET) 0.5-21.7 mg/mL oral solution 5 mg  5 mg Oral Q4H PRN  
 lactated Ringers infusion  75 mL/hr IntraVENous CONTINUOUS  
 albuterol (PROVENTIL VENTOLIN) nebulizer solution 2.5 mg  2.5 mg Nebulization QID RT  
 hydrALAZINE (APRESOLINE) 20 mg/mL injection 10 mg  10 mg IntraVENous Q6H PRN  
 iothalamate meglumine (CONRAY 60) 60 % contrast solution    PRN  
 albuterol (PROVENTIL VENTOLIN) nebulizer solution 2.5 mg  2.5 mg Nebulization Q6H PRN  
 NUTRITIONAL SUPPORT ELECTROLYTE PRN ORDERS   Does Not Apply PRN  potassium chloride (KLOR-CON) packet for solution 20 mEq  20 mEq Oral BID WITH MEALS  piperacillin-tazobactam (ZOSYN) 4.5 g in 0.9% sodium chloride (MBP/ADV) 100 mL  4.5 g IntraVENous Q8H  
 famotidine (PF) (PEPCID) 20 mg in sodium chloride 0.9% 10 mL injection  20 mg IntraVENous Q12H  carvedilol (COREG) tablet 3.125 mg  3.125 mg Oral BID WITH MEALS  diphenoxylate-atropine (LOMOTIL) tablet 1 Tab  1 Tab Oral QID PRN  
 lisinopril (PRINIVIL, ZESTRIL) tablet 10 mg  10 mg Oral DAILY  magic mouthwash (SIMON) oral suspension 5 mL  5 mL Oral Q4H PRN  
 oxyCODONE IR (ROXICODONE) tablet 5 mg  5 mg Oral Q6H PRN  
 guaiFENesin-dextromethorphan (ROBITUSSIN DM) 100-10 mg/5 mL syrup 5 mL  5 mL Oral Q6H PRN  
 benzonatate (TESSALON) capsule 100 mg  100 mg Oral TID PRN  
 LORazepam (ATIVAN) injection 1 mg  1 mg IntraVENous Q4H PRN  
 central line flush (saline) syringe 10 mL  10 mL InterCATHeter PRN  
  prochlorperazine (COMPAZINE) injection 10 mg  10 mg IntraVENous Q6H PRN  
 enoxaparin (LOVENOX) injection 100 mg  100 mg SubCUTAneous Q24H Review of Systems: ROS was obtained, with pertinent positives as listed above. No chest pain or SOB. Diet:  npo Objective:  
Vitals: 
Visit Vitals /83 Pulse 89 Temp 97.6 °F (36.4 °C) Resp 23 Wt 59 kg (130 lb) SpO2 100% BMI 18.93 kg/m² Intake/Output: 
No intake/output data recorded.  190 - 700 In: 0503 [I.V.:3050] Out: 2690 [JZYT] Exam: 
General appearance: sleeping with sedation Lungs: on BIPAPclear to auscultation bilaterally anteriorly Heart: regular rate and rhythm per monitor Data Review (Labs):   
Recent Labs 19 
4153 19 
0400 19 
0407 WBC 40.0* 38.9* 41.5* HGB 10.4* 10.3* 10.1* HCT 34.7* 33.8* 32.5*  
 341 416 MCV 95.9 94.2 93.4  139 139  
K 4.7 4.4 4.3  101 101 CO2 33* 31 31 BUN 23 25* 24* CREA 0.86 1.00 0.94  
CA 8.7 8.5 8.6 MG 2.7* 2.9* 2.9*  
* 130* 124* AP 69 71 78 SGOT 23 20 25 ALT 13 13 8*  
TBILI 0.3 0.3 0.3 ALB 2.5* 2.5* 2.4*  
TP 6.4 6.4 6.3 CT OF THE CHEST WITH INTRAVENOUS CONTRAST. 19  
INDICATION: Shortness of breath. Joni Gerold adenocarcinoma with metastatic 
disease.   
COMPARISON: None.  
TECHNIQUE:   2.5 mm axial scans from above the aortic arch to the lung bases 
with 100 cc nonionic intravenous contrast without acute complication. Intravenous contrast was given to evaluate for pulmonary embolism.  
FINDINGS:  The degree of opacification of the pulmonary arteries is adequate. No intraluminal filling defects within the pulmonary arterial tree to suggest 
pulmonary embolism. Justyna Doles is normal caliber with a uniform lumen without 
evidence of dissection. Lungs demonstrate multiple nodules, groundglass 
opacities and small pneumonic consolidation in the bases. There is extensive mediastinal adenopathy which has increased a great deal from the prior exam.. Included portion of the upper abdomen are unremarkable. No gross bony lesions. .  IMPRESSION IMPRESSION:  Mediastinal adenopathy is increased great deal from the prior exam. 
There is patchy airspace disease in both lung bases. Pulmonary metastases. Negative for pulmonary embolism.  
  
XR CHEST PORT 7/30/19  
INDICATION: post op hypoxia  
COMPARISON: 7/30/2019  
FINDINGS: A portable AP radiograph of the chest was obtained at 1254 hours. The 
patient is on a cardiac monitor. Right upper and right lower lobe airspace 
disease unchanged. . The cardiac and mediastinal contours and pulmonary 
vascularity are normal.  The bones and soft tissues are grossly within normal 
limits.   
IMPRESSION IMPRESSION: Right upper and right lower lobe atelectasis or pneumonia unchanged. Assessment:  
 
Principal Problem: 
  Rectal adenocarcinoma ([address] (7/6/2018) Active Problems: Dysphagia (7/25/2019) Intractable vomiting (7/25/2019) Metastatic cancer to lung ([address] (7/26/2019) Overview: Per EBUS in June 2019 Acute respiratory failure ([address] (7/26/2019) Aspiration into airway (7/26/2019) Hydroureteronephrosis (7/30/2019) Hx pulmonary embolism (7/30/2019) History of DVT of lower extremity (7/30/2019) Hypoxia (7/30/2019) 48 y. o. male with PMH of daily etoh use, GERD, rectal cancer s/p diverting colostomy (Stage 4-locally advanced unresectable, LAD abd and mediastinum), hx of PE 5/8/19, RLE DVT 5/29/19, on Lovenox, renal mass, RT sided ureteral obstruction with likely malignancy, seen in consultation at the request of Anya Wu NP, for PEG placement evaluation. He was admitted 7/24/19 with fatigue and inability to tolerate PO intake.  He reported dysphagia with immediate regurgitation. CT chest with findings as above. BS w extensive compression of esophagus.  He last received AA Jong Tay was due for next round, but this was delayed due to current symptoms.  Seen by urology for right hydroureteronephrosis. Had cystoscopy with stent placement/biopsy on 7/30 with development of shortness of breath in recovery.  Admitted to ICU for post-op observation and care.  Now on BIPAP. Palliative care consult reviewed. Plan: 1. Defer PEG placement for overall instability; can readdress pending improvement in status 2. Ok to reinsert NG tube for meds, feedings 3. Following Patient is seen and examined in collaboration with Dr. Yajaira Reyez. Assessment and plan as per Dr. Yajaira Reyez.  
Myla Hagen NP

## 2019-08-01 NOTE — PROGRESS NOTES
Palliative Care Progress Note Patient: Mallorie uMnoz MRN: [id number]  SSN: [id number] YOB: 1965  Age: 48 y.o. Sex: male Assessment/Plan: Chief Complaint/Interval History: somnolent. Currently on BIPAP Principal Diagnosis: · Dyspnea  R06.00 Additional Diagnoses: · Acute Respiratory Failure, Unspecified  J96.00 
· Advance Care Planning Counseling Z71.89 
· Anxiety  F41.1 · Debility, Unspecified  R53.81 · Dysphagia  R13.10 · Fatigue, Lethargy  R53.83 
· Frailty  R54 · Counseling, Encounter for Medical Advice  Z71.9 
· Encounter for Palliative Care  Z51.5 Palliative Performance Scale (PPS) PPS: 30 Medical Decision Making:  
Reviewed and summarized notes over last 24 hours Discussed case with appropriate providers- ICU IDT; Dr Nataly Tomas; Trae Hui RN Reviewed laboratory and x-ray data- CBC, CMP Pt somnolent, on BIPAP at present. Wife at bedside, tearful after discussion with Dr Nataly Tomas. Wife had to leave for a doctor's appt, but will return this afternoon. Advised we would speak then. Overall, pt appears to be declining. PEG tube has been cancelled due to instability, and would not change his outcome regardless. Family is considering NG tube placement for enteral feedings, however, this will likely be difficult to place due to compression of his esophagus. The burden of NG tube placement outweighs the benefit at this point. Radiation potentially could shrink the tumor to allow him to eat, but he is too unstable for radiation, and likely does not have weeks to assess for benefit of radiation. Will address these issues with wife when she returns. Discussed with Dr Nataly Tomas and Trae Hui, [address] [place] [address]. Will continue to follow. Will discuss findings with members of the interdisciplinary team.   
 
  
More than 50% of this 15 minute visit was spent counseling and coordination of care as outlined above. Subjective:  
 
Review of Systems: Review of systems not obtained due to patient factors- lethargic Objective:  
 
Visit Vitals /83 Pulse 89 Temp 97.6 °F (36.4 °C) Resp 23 Wt 130 lb (59 kg) SpO2 100% BMI 18.93 kg/m² Physical Exam: 
 
General:  Somnolent. No acute distress. Eyes:  Conjunctivae/corneas clear Nose: Nares normal. Septum midline. BIPAP mask Neck: Supple, symmetrical, trachea midline Lungs:   Clear to auscultation bilaterally, unlabored Heart:  Regular rate and rhythm Abdomen:   Soft, non-tender, non-distended Extremities: Normal, atraumatic, no cyanosis or edema Skin: Skin color, texture, turgor normal.   
Neurologic: Nonfocal  
Psych: Somnolent Signed By: Jose Hopkins NP August 1, 2019

## 2019-08-01 NOTE — PROGRESS NOTES
Dr. Gigi Hui at bedside to discuss with family regarding intubation. Pt intubated by Dr. Gigi Hui. 40 of Etomidate, 50 of Fentanyl given for procedure.

## 2019-08-01 NOTE — PROGRESS NOTES
Pt agitated, anxious with increased WOB and SOB. Gave 2 mg morphine - pt with continued agitation and anxiety. PRN Ativan only q 4 hrs. Spoke to Dr. Roseline Hahn - will start Precedex gtt.

## 2019-08-01 NOTE — PROGRESS NOTES
Critical Care Daily Progress Note: 8/1/2019 Eddy Monroy  
Admission Date: 7/24/2019 The patient's chart is reviewed and the patient is discussed with the staff. . 46 y.o.  male, PMH Colon cancer, DM, HTN, rectal cancer, MYRON, former tobacco abuse, and GERD seen and evaluated at the request of Dr. Oscar Wells for acute respiratory distress.  The patient was admitted on 7/24/2019 by Oncology for dehydration and rectal adenocarcinoma confirmed to be Stage IV per biopsy. Araceli Augustine was initiated on 5FU-A and recently switched to FOLFIRI-A.  XRT planned for mediastinal node. Barium swallow revealed aspiration of contrast as well as extrinsic soft tissue mass creating filling defect in distal esophagus. Mechanical soft diet with chopped/ground meats and nectar thick liquids recommended per speech therapy.   
  On abx for suspected aspiration pneumonia. Has been hypoxic and requiring oxygen support.  
  Seen by urology for right hydroureteronephrosis. Had cystoscopy with stent placement/biopsy on 7/30 - mass      Blocking ureter. bxed yesterday Subjective:  
 
Now on BIPAP again, has been on and off through the night, 
Family at the bedsite PEG cancelled today Current Facility-Administered Medications Medication Dose Route Frequency  methylPREDNISolone (PF) (SOLU-MEDROL) injection 40 mg  40 mg IntraVENous Q12H  
 morphine injection 2 mg  2 mg IntraVENous Q4H PRN  
 HYDROcodone-acetaminophen (HYCET) 0.5-21.7 mg/mL oral solution 5 mg  5 mg Oral Q4H PRN  
 lactated Ringers infusion  75 mL/hr IntraVENous CONTINUOUS  
 albuterol (PROVENTIL VENTOLIN) nebulizer solution 2.5 mg  2.5 mg Nebulization QID RT  
 hydrALAZINE (APRESOLINE) 20 mg/mL injection 10 mg  10 mg IntraVENous Q6H PRN  
 iothalamate meglumine (CONRAY 60) 60 % contrast solution    PRN  
 albuterol (PROVENTIL VENTOLIN) nebulizer solution 2.5 mg  2.5 mg Nebulization Q6H PRN  
  NUTRITIONAL SUPPORT ELECTROLYTE PRN ORDERS   Does Not Apply PRN  potassium chloride (KLOR-CON) packet for solution 20 mEq  20 mEq Oral BID WITH MEALS  piperacillin-tazobactam (ZOSYN) 4.5 g in 0.9% sodium chloride (MBP/ADV) 100 mL  4.5 g IntraVENous Q8H  
 famotidine (PF) (PEPCID) 20 mg in sodium chloride 0.9% 10 mL injection  20 mg IntraVENous Q12H  carvedilol (COREG) tablet 3.125 mg  3.125 mg Oral BID WITH MEALS  diphenoxylate-atropine (LOMOTIL) tablet 1 Tab  1 Tab Oral QID PRN  
 lisinopril (PRINIVIL, ZESTRIL) tablet 10 mg  10 mg Oral DAILY  magic mouthwash (SIMON) oral suspension 5 mL  5 mL Oral Q4H PRN  
 oxyCODONE IR (ROXICODONE) tablet 5 mg  5 mg Oral Q6H PRN  
 guaiFENesin-dextromethorphan (ROBITUSSIN DM) 100-10 mg/5 mL syrup 5 mL  5 mL Oral Q6H PRN  
 benzonatate (TESSALON) capsule 100 mg  100 mg Oral TID PRN  
 LORazepam (ATIVAN) injection 1 mg  1 mg IntraVENous Q4H PRN  
 central line flush (saline) syringe 10 mL  10 mL InterCATHeter PRN  prochlorperazine (COMPAZINE) injection 10 mg  10 mg IntraVENous Q6H PRN  
 enoxaparin (LOVENOX) injection 100 mg  100 mg SubCUTAneous Q24H Review of Systems Unobtainable due to patient status. Objective:  
 
Vitals:  
 08/01/19 0735 08/01/19 0745 08/01/19 0759 08/01/19 0830 BP:   127/86 133/83 Pulse:   96 89 Resp:   19 23 Temp:      
SpO2: 100% 99% 100% 100% Weight:      
 
 
Intake and Output:  
07/30 1901 - 08/01 0700 In: 0410 [I.V.:3050] Out: 2690 [TRUXF:0865] No intake/output data recorded. Physical Exam:         
Constitutional:  the patient is well developed and in no acute distress EENMT:  Sclera clear, pupils equal, oral mucosa moist 
Respiratory: rhonchi 
Cardiovascular:  RRR without M,G,R 
Gastrointestinal: soft and non-tender; with positive bowel sounds. Colostomy Musculoskeletal: warm without cyanosis. There is no lower extremity edema. Skin:  no jaundice or rashes, no wounds Neurologic: no gross neuro deficits Psychiatric:  Lethargic LINES: 
Port DRIPS: 
 
 
CXR: no CXR today Recent Labs 08/01/19 
7375 07/31/19 
0400 07/30/19 
0407 WBC 40.0* 38.9* 41.5* HGB 10.4* 10.3* 10.1* HCT 34.7* 33.8* 32.5*  
 341 416 Recent Labs 08/01/19 
0212 07/31/19 
0400 07/30/19 
0407  139 139  
K 4.7 4.4 4.3  101 101 CO2 33* 31 31 * 130* 124* BUN 23 25* 24* CREA 0.86 1.00 0.94  
MG 2.7* 2.9* 2.9*  
CA 8.7 8.5 8.6 ALB 2.5* 2.5* 2.4* TBILI 0.3 0.3 0.3 ALT 13 13 8* SGOT 23 20 25 Recent Labs  
  07/31/19 
0909 07/30/19 
1344 PHI 7.359 7.470* PCO2I 52.1* 37.5 PO2I 83 77 HCO3I 29.4* 27.3* No results for input(s): LCAD, LAC in the last 72 hours. No results for input(s): PH, PCO2, PO2, HCO3 in the last 72 hours. Assessment:  (Medical Decision Making) Hospital Problems  Date Reviewed: 7/26/2019 Codes Class Noted POA Hydroureteronephrosis ICD-10-CM: N13.30 ICD-9-CM: 773  7/30/2019 Clinically Undetermined Hx pulmonary embolism (Chronic) ICD-10-CM: Y83.178 ICD-9-CM: V12.55  7/30/2019 Yes History of DVT of lower extremity (Chronic) ICD-10-CM: R12.805 ICD-9-CM: V12.51  7/30/2019 Yes Hypoxia ICD-10-CM: R09.02 
ICD-9-CM: 799.02  7/30/2019 No  
   
 Metastatic cancer to lung [facility]) (Chronic) ICD-10-CM: C78.00 ICD-9-CM: 197.0  7/26/2019 Yes Overview Signed 7/26/2019 11:06 AM by Theron Cartwright NP Per EBUS in June 2019 Acute respiratory failure ([facility] [address] 
 on BIPAP ICD-10-CM: J96.00 
ICD-9-CM: 518.81  7/26/2019 Yes Aspiration into airway 
 on zosyn D 7  ICD-10-CM: X22.206R ICD-9-CM: 934.9  7/26/2019 Yes Dysphagia 
 need PEG  ICD-10-CM: R13.10 ICD-9-CM: 787.20  7/25/2019 Yes Intractable vomiting ICD-10-CM: R11.10 ICD-9-CM: 536.2  7/25/2019 Yes  * (Principal) Rectal adenocarcinoma ([facility] [address] (Chronic) per oncology  ICD-10-CM: S94 
 ICD-9-CM: 154.1  7/6/2018 Yes Plan:  (Medical Decision Making)  
-Zosyn D 7  
-- PEG cancelled today, will start TF with NG placement  
- wean BIPAP off as tolerated 
- discussed with his family, wish for continued care and even intubation and life support if needed 
- remains critically ill, spent 35 min ,dicsussed with PC More than 50% of the time documented was spent in face-to-face contact with the patient and in the care of the patient on the floor/unit where the patient is located.  
 
Abdirashid Sweeney MD

## 2019-08-01 NOTE — PROGRESS NOTES
Bedside shift change report given to [phone] (oncoming nurse) by Mary Nevarez RN (offgoing nurse). Report included the following information SBAR, Kardex, ED Summary, Procedure Summary, Intake/Output, MAR, Accordion, Recent Results, Med Rec Status, Cardiac Rhythm NSR/ST and Alarm Parameters .

## 2019-08-01 NOTE — PROGRESS NOTES
Nutrition F/U: 
Nutrition Consult for TPN Management. (Dr. Grayson Krabbe) Assessment: 
Weight 59 kg (ICU bed 8/1/19), edema - trace BLEs. The patient was unable to have a PEG placed yesterday due to declining status. He is now requiring BIPAP continuously. Labs are remarkable for potassium 4.7 and magnesium 2.7. IV Access: 
 Single port. Macronutrient Needs (59.7 kg): EER:  3361-4265 kcal /day (25-30 kcal/kg) EPR:  72-90 grams protein/day (1.2-1.5 grams/kg) Max CHO:  225 grams/day (50% kcal) Fluid:  1ml/kcal 
Intake/Comparative Standards: The patient is currently NPO which does not meet his daily nutrition needs. Intervention:  
Meals and Snacks: NPO. Parenteral Nutrition/IV Fluid: 1) Start TPN this evening with 1 liter 5%AA/15%DEX - 710 calories/day (39% calorie goal), 50 grams protein/day (56% protein goal), 150 grams CHO/day (does not exceed max CHO limit) and 1000 ml water/day (56% fluid goal). 2) Additives/liter: 100 sodium (chloride), 20 potassium (phosphate), 4.5 calcium, 5 magnesium, 20 phosphorus (potassium). 3) MVI, MTE and Pepcid daily. 4) IVF rate to decrease to 31 ml/hr this evening when TPN starts. 5) Daily weights and TPN labs have been ordered. Mineral Supplement Therapy: Nutrition Support Orders/Electrolyte Management Replacement Protocols are activated and placed on the MAR. Coordination of Nutrition Care by a Nutrition Professional: AM ICU rounds, collaboration with Leon Erickson RN. Nutrition Discharge Plan: Too soon to determine. Waqas Rivas. Shira Qureshi 
[phone]

## 2019-08-01 NOTE — PROGRESS NOTES
[facility] Inpatient Hematology / Oncology Progress Note Admission Date: 2019  3:18 PM 
Reason for Admission/Hospital Course: Dysphagia [R13.10] Intractable vomiting [R11.10] 24 Hour Events: 
Afebrile, VSS Tachypnea after stent placement for hydronephrosis so moved to ICU Decompensated prior to PEG placement  so procedure canceled Family is concerned about nutrition so we will start TPN Family at bedside ROS: 
Constitutional: Positive for fatigue, weakness, lethargic; negative for fever, chills CV:  negative for chest pain, palpitations, edema. Respiratory: Ongoing SOB on bipap GI:  No abdominal pain, no vomiting (has been NPO) 10 point review of systems is otherwise negative with the exception of the elements mentioned above in the HPI. Allergies Allergen Reactions  Zithromax [Azithromycin] Rash OBJECTIVE: 
Patient Vitals for the past 8 hrs: 
 BP Temp Pulse Resp SpO2 Weight 19 1200 131/84 97.9 °F (36.6 °C) 84 18 100 %   
19 1129 127/79  93 24 100 %   
19 1108     99 %   
19 1100 152/89  (!) 104 (!) 34 98 %   
19 1029 (!) 135/91  76 20 100 %   
19 0959 138/90  82 20 100 %   
19 0929 134/90  79 19 100 %   
19 0859 132/83  85 22 100 %   
19 0830 133/83  89 23 100 %   
19 0759 127/86  96 19 100 %   
19 0745     99 %   
19 0735     100 %   
19 0730 (!) 166/104  89 (!) 56 100 %   
19 0717      130 lb (59 kg) 19 0659 151/89 97.6 °F (36.4 °C) 74 26 100 %   
19 0630 137/84  77 25 100 %  Temp (24hrs), Av.6 °F (36.4 °C), Min:96.3 °F (35.7 °C), Max:98.1 °F (36.7 °C) 
 
 07 - 08/01 1900 In: -  
Out: 225 [Urine:225] Physical Exam: 
Constitutional: Acutely ill male in respiratory distress, lying in the hospital bed in ICU Family at bedside. HEENT: Normocephalic and atraumatic. Oropharynx is clear, mucous membranes are moist. Neck supple Lymph node Deferred Skin Warm and dry. No bruising and no rash noted. No erythema. No pallor. Respiratory Bilateral rhonchi and wheezes. On supplemental O2 on Bipap CVS Mildly tachycardic rate, regular rhythm and normal S1 and S2. No murmurs, gallops, or rubs. Abdomen Soft, nontender and nondistended, normoactive bowel sounds. Neuro Groggy. Wakes to stimulus MSK Normal range of motion in general.  No edema and no tenderness. Psych Sleeping Labs: 
   
Recent Labs 08/01/19 
7408 07/31/19 
0400 07/30/19 
0407 WBC 40.0* 38.9* 41.5*  
RBC 3.62* 3.59* 3.48* HGB 10.4* 10.3* 10.1* HCT 34.7* 33.8* 32.5* MCV 95.9 94.2 93.4 MCH 28.7 28.7 29.0 MCHC 30.0* 30.5* 31.1*  
RDW 16.6* 15.9* 16.0*  
 341 416 GRANS 52 50 56 LYMPH 1* 2* 2*  
MONOS 7 6 5 EOS 0* 0* 0*  
BASOS 0 0 0 IG 40* 42* 37* DF AUTOMATED AUTOMATED AUTOMATED ANEU 20.8* 19.5* 23.2* ABL 0.4* 0.8 0.8 ABM 2.8* 2.3* 2.1* YESI 0.0 0.0 0.0 ABB 0.0 0.0 0.0 AIG 16.0* 16.3* 15.4* Recent Labs 08/01/19 
9657 07/31/19 
0400 07/30/19 
0407  139 139  
K 4.7 4.4 4.3  101 101 CO2 33* 31 31 AGAP 6* 7 7 * 130* 124* BUN 23 25* 24* CREA 0.86 1.00 0.94 GFRAA >60 >60 >60 GFRNA >60 >60 >60  
CA 8.7 8.5 8.6 SGOT 23 20 25 AP 69 71 78 TP 6.4 6.4 6.3 ALB 2.5* 2.5* 2.4*  
GLOB 3.9* 3.9* 3.9* AGRAT 0.6* 0.6* 0.6* MG 2.7* 2.9* 2.9* Imaging: XR CHEST SNGL V [[id number]] Collected: 07/26/19 1159 Order Status: Completed Updated: 07/26/19 1202 Narrative:    
Portable chest x-ray CLINICAL INDICATION: Shortness of breath FINDINGS: Single AP view the chest compared to a similar exam dated 7/11/2019 
shows new reticular nodular interstitial densities throughout both lungs with 
more patchy airspace opacity in the right upper lobe. A right-sided chest port is in place. The cardiac silhouette and mediastinum are stable. Impression:    
IMPRESSION: New, diffuse reticular nodular densities throughout the lungs with 
patchy opacity in the right upper lobe. Atypical pneumonia or pulmonary edema 
should be considered. XR BA SWALLOW ESOPHOGRAM [[id number]] Collected: 07/26/19 2722 Order Status: Completed Updated: 07/26/19 1026 Narrative:    
History: Dysphasia. Stage IV rectal cancer. EXAM: Barium swallow TECHNIQUE: 1.3 minutes fluoroscopy time is utilized. The patient ingests 
effervescent granules followed by thick and thin consistencies of barium. 18 
fluoroscopic images are available for review. No comparison FINDINGS: The exam is markedly limited. The patient is unable to perform the 
exam the upright position, and has very limited mobility. There is aspiration of 
the thin contrast. Contrast flows through the esophagus into the stomach without 
difficulty. There is a hiatal hernia present. Just above the hiatal hernia, 
there is a filling defect of the distal esophagus, corresponding to a soft 
tissue mass adjacent to the esophagus on the recent CT chest. No definite 
gastroesophageal reflux, or ulceration. Impression:    
IMPRESSION: 
1. Just above the hiatal hernia, there is an extrinsic soft tissue mass creating 
a filling defect within the distal esophagus, corresponding to a soft tissue 
mass seen on the recent CT chest. 
2. Aspiration of oral contrast. Correlation with modified barium swallow 
recommended. CT CHEST W CONT [[id number]] Collected: 07/24/19 2133 Order Status: Completed Updated: 07/24/19 2138 Narrative:    
CT OF THE CHEST WITH INTRAVENOUS CONTRAST. INDICATION: Shortness of breath. Donnamae Sell adenocarcinoma with metastatic 
disease. COMPARISON: None. TECHNIQUE:   2.5 mm axial scans from above the aortic arch to the lung bases 
with 100 cc nonionic intravenous contrast without acute complication. Intravenous contrast was given to evaluate for pulmonary embolism. FINDINGS:  The degree of opacification of the pulmonary arteries is adequate. No intraluminal filling defects within the pulmonary arterial tree to suggest 
pulmonary embolism. Raynaldo Chin is normal caliber with a uniform lumen without 
evidence of dissection. Lungs demonstrate multiple nodules, groundglass 
opacities and small pneumonic consolidation in the bases. There is extensive 
mediastinal adenopathy which has increased a great deal from the prior exam.. Included portion of the upper abdomen are unremarkable. No gross bony lesions. Yuliana Tucker Impression:    
IMPRESSION:  Mediastinal adenopathy is increased great deal from the prior exam. 
There is patchy airspace disease in both lung bases. Pulmonary metastases. Negative for pulmonary embolism. Medications: 
Current Facility-Administered Medications Medication Dose Route Frequency  TPN ADULT-CENTRAL - dextrose 15% amino acid 5%   IntraVENous QPM  
 methylPREDNISolone (PF) (SOLU-MEDROL) injection 40 mg  40 mg IntraVENous Q12H  
 morphine injection 2 mg  2 mg IntraVENous Q4H PRN  
 HYDROcodone-acetaminophen (HYCET) 0.5-21.7 mg/mL oral solution 5 mg  5 mg Oral Q4H PRN  
 lactated Ringers infusion  31 mL/hr IntraVENous CONTINUOUS  
 albuterol (PROVENTIL VENTOLIN) nebulizer solution 2.5 mg  2.5 mg Nebulization QID RT  
 hydrALAZINE (APRESOLINE) 20 mg/mL injection 10 mg  10 mg IntraVENous Q6H PRN  
 iothalamate meglumine (CONRAY 60) 60 % contrast solution    PRN  
 albuterol (PROVENTIL VENTOLIN) nebulizer solution 2.5 mg  2.5 mg Nebulization Q6H PRN  
 NUTRITIONAL SUPPORT ELECTROLYTE PRN ORDERS   Does Not Apply PRN  potassium chloride (KLOR-CON) packet for solution 20 mEq  20 mEq Oral BID WITH MEALS  piperacillin-tazobactam (ZOSYN) 4.5 g in 0.9% sodium chloride (MBP/ADV) 100 mL  4.5 g IntraVENous Q8H  
 carvedilol (COREG) tablet 3.125 mg  3.125 mg Oral BID WITH MEALS  
  diphenoxylate-atropine (LOMOTIL) tablet 1 Tab  1 Tab Oral QID PRN  
 lisinopril (PRINIVIL, ZESTRIL) tablet 10 mg  10 mg Oral DAILY  magic mouthwash (SIMON) oral suspension 5 mL  5 mL Oral Q4H PRN  
 oxyCODONE IR (ROXICODONE) tablet 5 mg  5 mg Oral Q6H PRN  
 guaiFENesin-dextromethorphan (ROBITUSSIN DM) 100-10 mg/5 mL syrup 5 mL  5 mL Oral Q6H PRN  
 benzonatate (TESSALON) capsule 100 mg  100 mg Oral TID PRN  
 LORazepam (ATIVAN) injection 1 mg  1 mg IntraVENous Q4H PRN  
 central line flush (saline) syringe 10 mL  10 mL InterCATHeter PRN  prochlorperazine (COMPAZINE) injection 10 mg  10 mg IntraVENous Q6H PRN  
 enoxaparin (LOVENOX) injection 100 mg  100 mg SubCUTAneous Q24H  
 
 
 
ASSESSMENT: 
 
Problem List  Date Reviewed: 7/26/2019 Codes Class Noted Hydroureteronephrosis ICD-10-CM: N13.30 ICD-9-CM: 343  7/30/2019 Hx pulmonary embolism (Chronic) ICD-10-CM: W20.272 ICD-9-CM: V12.55  7/30/2019 History of DVT of lower extremity (Chronic) ICD-10-CM: U91.383 ICD-9-CM: V12.51  7/30/2019 Hypoxia ICD-10-CM: R09.02 
ICD-9-CM: 799.02  7/30/2019 Metastatic cancer to lung [facility]) (Chronic) ICD-10-CM: C78.00 ICD-9-CM: 197.0  7/26/2019 Overview Signed 7/26/2019 11:06 AM by Katarina Sanchez, NP Per EBUS in June 2019 Acute respiratory failure ([facility] [address] ICD-10-CM: J96.00 
ICD-9-CM: 518.81  7/26/2019 Aspiration into airway ICD-10-CM: T17.908A ICD-9-CM: 934.9  7/26/2019 Dysphagia ICD-10-CM: R13.10 ICD-9-CM: 787.20  7/25/2019 Intractable vomiting ICD-10-CM: R11.10 ICD-9-CM: 536.2  7/25/2019 Mediastinal lymphadenopathy ICD-10-CM: R59.0 ICD-9-CM: 785.6  6/11/2019 Pulmonary nodules ICD-10-CM: R91.8 ICD-9-CM: 793.19  6/11/2019 Dehydration ICD-10-CM: E86.0 ICD-9-CM: 276.51  4/2/2019 S/P radiation therapy < 4 weeks ago ICD-10-CM: Z92.3 ICD-9-CM: V15.3  10/1/2018 * (Principal) Rectal adenocarcinoma ([facility] [address] (Chronic) ICD-10-CM: C20 
ICD-9-CM: 154.1  7/6/2018 Colostomy status (HCC) (Chronic) ICD-10-CM: Z93.3 ICD-9-CM: V44.3  6/29/2018 Erectile dysfunction due to arterial insufficiency (Chronic) ICD-10-CM: N52.01 
ICD-9-CM: 607.84  5/31/2018 Benign essential HTN (Chronic) ICD-10-CM: I10 
ICD-9-CM: 401.1  5/31/2018 Hypercholesterolemia (Chronic) ICD-10-CM: E78.00 ICD-9-CM: 272.0  5/30/2018 Overview Signed 6/4/2018  2:20 PM by Keenan Morton  
  ASCVD 10 year risk is 16.0%. Mod to high dose statin indicated. (based on b/p 180/90, 6/4/18) Screening PSA (prostate specific antigen) (Chronic) ICD-10-CM: Z12.5 ICD-9-CM: V76.44  5/30/2018 History of tobacco abuse (Chronic) ICD-10-CM: V08.546 ICD-9-CM: V15.82  8/18/2016 PLAN: 
Dysphagia/vomiting 
- GI evaluation. Planning EGD tomorrow (cannot do dilation due to recent avastin). Started pepcid BID 
- Continue IV fluids 7/26 Barium swallow today with aspiration and extrinsic soft tissue mass creating filling defect in distal esophagus 7/29 Spoke with Ignacia Bingham (out of town) and Chastity Martinez. Needs palliative radiation to mediastinal adenopathy that is compressing esophagus. Planned for consult this afternoon. Pt agreeable to PEG given length of time it may take for radiation to improve his ability to swallow. GI reconsulted for placement 7/30 S/p simulation with rad onc yesterday - planning to start radiation (today but held due to procedure). PEG placement possibly tomorrow 8/1 PEG is no longer an option. TPN starting today. Aspiration pneumonia 
- Continue levaquin. BCx pending. UA negative. CT chest with no acute findings 7/26 D3 LVQ 
7/29 Continues zosyn. Noted leukocytosis. Met rectal ca - S/p FOLFIRI-Avastin, last given 7/9 7/29 Suspicion for progression on recent CT. Family is aware. Further treatment TBD outpatient with Dr. Sophia Nuñez Leukopenia/anemia 
- Secondary to chemotherapy. Granix x 1 today 7/26 WBC up to 4.5 
7/29 Continues with elevated counts Hx hydronephrosis - Planned for cystoscopy and stent placement tomorrow with urology (was scheduled for outpatient). Ok from [employer]. Discussed with pulmonary and no contraindication from their perspective. Discussed with Arlin Pruitt NP 
7/30 S/p cystoscopy with right ureteral stent placed and right ureteral biopsy. To ICU following for tachypnea Lovenox: on hold prior to procedures 8/1/2019 Patient becomes agitated at times and removes bipap and then drops his saturation. Morphine and ativan being used during these times. Family is concerned that he is getting too much sedation. We will leave these discussion for ICU staff. Susan Mendoza NP [facility] [address] [place] [address] Office : [phone] Fax : [phone] I personally saw, exammed and counselled the patient, and discussed with NP, agree with above history/assessment/plan. 53 y.o.male rectal cancer s/p chemorad in 2018 but refractory, developed mediastinal LAD, received FOLFOX/avastin and progressed, change to FOLFIRI/avastin but admitted with esophageal dysphagia, had aspiration in barium swallow and had to be care in ICU, now respiratory status stable, pt wants to excalate to \" out-of-box rx\" and we discussed his disease had shown great resistance to major regimens, there are oral options but he has to solve the dysphagia problem for both rx and feeding, agreed to pursue palliative XRT of mediastinal LN. He decompensated after exchanging stent and moved to ICU, tried dobhoff but pulled out and PEG pending respiratory status stable, XRT simulated and pending clinically stable, discussed with pt and family as much as we understand he still wants aggressive rx, his tolerance appears poor.  He had been difficult to manage the BiPAP for agitation and address different family members today the poor prognosis and the challenging situation, start TPN temporarily bridge to PEG.   
 
Rectal cancer Randell Chen M.D. [address], [address] [place] [address] Office : [phone] Fax : [phone]

## 2019-08-01 NOTE — PROGRESS NOTES
Head to toe assessment completed on pt this AM. Neuro: Pt eyes open to voice - anxious when stimulated (tachycardia, tachypnea). Unable to verbalize orientation. PERRLA - 3 mm and brisk to react. SOTELO spontaneously / nonpurposefully - decreased command following. Cardiac: NSR to ST. Adequate BP. Resp: 6 L NC / BiPAP PRN. Coarse upper / expiratory wheezing. GI: NPO. Hypoactive bowel sounds. Ostomy patent - not draining currently. : Condom cath - patent and draining. Skin: CDI - Allevyn intact.

## 2019-08-02 NOTE — PROGRESS NOTES
[facility] On[facility] Inpatient Hematology / Oncology Progress Note Admission Date: 2019  3:18 PM 
Reason for Admission/Hospital Course: Dysphagia [R13.10] Intractable vomiting [R11.10] 24 Hour Events: 
Intubated  Family at bedside still hoping that patient will get better Pulmonary now primary ROS: Unable to obtain. Patient sedated on vent 10 point review of systems is otherwise negative with the exception of the elements mentioned above in the HPI. Allergies Allergen Reactions  Zithromax [Azithromycin] Rash OBJECTIVE: 
Patient Vitals for the past 8 hrs: 
 BP Temp Pulse Resp SpO2  
19 0740   61 19 100 % 19 0700 (!) 85/58  64 20 100 % 19 0644 (!) 85/57  64 (!) 31 100 % 19 0629 (!) 87/59  65 24 100 % 19 0614 (!) 85/50  66 22 100 % 19 0600 93/55  63 25 99 % 19 0546   64 22 97 % 19 0529 (!) 83/52  66 20 97 % 19 0514 (!) 88/57  67 20 97 % 19 0501 104/64  67 22 96 % 19 0445 138/86  62 22 96 % 19 0430   62 13 98 % 19 0414 (!) 89/58  62 21 98 % 19 0401 (!) 88/62 97.4 °F (36.3 °C) 64 20 98 % 19 0347 (!) 89/56  65 28 97 % 19 0329 (!) 83/62  65 22 98 % 19 0314 (!) 87/63  66 26 98 % 19 0301 (!) 85/64  64 24 98 % 19 0252   62 26 98 % 19 0245 95/51    98 % 19 0244   (!) 53 (!) 38 97 % 19 0229 91/63    99 % 19 0219   64 24 98 % 19 0214 94/64    98 % 19 0200 (!) 87/60  63 (!) 31 98 % 19 0145 (!) 86/60  (!) 58 29 97 % 19 0129 (!) 88/57  70 18 98 % Temp (24hrs), Av.7 °F (36.5 °C), Min:97.4 °F (36.3 °C), Max:98.3 °F (36.8 °C) No intake/output data recorded. Physical Exam: 
Constitutional: Acutely ill male in respiratory distress, lying in the hospital bed in ICU Family at bedside. HEENT: Normocephalic and atraumatic. Intubated. Lymph node Deferred Skin Warm and dry. No bruising and no rash noted. No erythema. No pallor. Respiratory Expiratory wheezes on vent CVS Reg rate, regular rhythm and normal S1 and S2. No murmurs, gallops, or rubs. Abdomen Soft, nontender and nondistended, hypoactive bowel sounds. Neuro Sedated Psych Sedated Labs: 
   
Recent Labs 08/02/19 
0305 08/01/19 
0337 07/31/19 
0400 WBC 28.3* 40.0* 38.9*  
RBC 3.39* 3.62* 3.59* HGB 9.8* 10.4* 10.3* HCT 32.2* 34.7* 33.8* MCV 95.0 95.9 94.2 MCH 28.9 28.7 28.7 MCHC 30.4* 30.0* 30.5* RDW 16.6* 16.6* 15.9*  
 273 341 GRANS 58 52 50 LYMPH 1* 1* 2*  
MONOS 6 7 6 EOS 0* 0* 0*  
BASOS 1 0 0 IG 34* 40* 42* DF AUTOMATED AUTOMATED AUTOMATED ANEU 16.4* 20.8* 19.5* ABL 0.3* 0.4* 0.8 ABM 1.7* 2.8* 2.3* YESI 0.0 0.0 0.0 ABB 0.3* 0.0 0.0 AIG 9.6* 16.0* 16.3* Recent Labs 08/02/19 
0305 08/01/19 
0337 07/31/19 
0400 * 140 139  
K 4.1 4.7 4.4 CL 99 101 101 CO2 31 33* 31 AGAP 5* 6* 7 * 133* 130* BUN 28* 23 25* CREA 0.92 0.86 1.00 GFRAA >60 >60 >60 GFRNA >60 >60 >60  
CA 8.4 8.7 8.5 SGOT 16 23 20 AP 58 69 71  
TP 6.1* 6.4 6.4 ALB 2.5* 2.5* 2.5*  
GLOB 3.6* 3.9* 3.9* AGRAT 0.7* 0.6* 0.6* MG 2.7* 2.7* 2.9* Imaging: XR CHEST SNGL V [[id number]] Collected: 07/26/19 1159 Order Status: Completed Updated: 07/26/19 1202 Narrative:    
Portable chest x-ray CLINICAL INDICATION: Shortness of breath FINDINGS: Single AP view the chest compared to a similar exam dated 7/11/2019 
shows new reticular nodular interstitial densities throughout both lungs with 
more patchy airspace opacity in the right upper lobe. A right-sided chest port 
is in place. The cardiac silhouette and mediastinum are stable. Impression:    
IMPRESSION: New, diffuse reticular nodular densities throughout the lungs with patchy opacity in the right upper lobe. Atypical pneumonia or pulmonary edema 
should be considered. XR BA SWALLOW ESOPHOGRAM [[id number]] Collected: 07/26/19 8796 Order Status: Completed Updated: 07/26/19 1026 Narrative:    
History: Dysphasia. Stage IV rectal cancer. EXAM: Barium swallow TECHNIQUE: 1.3 minutes fluoroscopy time is utilized. The patient ingests 
effervescent granules followed by thick and thin consistencies of barium. 18 
fluoroscopic images are available for review. No comparison FINDINGS: The exam is markedly limited. The patient is unable to perform the 
exam the upright position, and has very limited mobility. There is aspiration of 
the thin contrast. Contrast flows through the esophagus into the stomach without 
difficulty. There is a hiatal hernia present. Just above the hiatal hernia, 
there is a filling defect of the distal esophagus, corresponding to a soft 
tissue mass adjacent to the esophagus on the recent CT chest. No definite 
gastroesophageal reflux, or ulceration. Impression:    
IMPRESSION: 
1. Just above the hiatal hernia, there is an extrinsic soft tissue mass creating 
a filling defect within the distal esophagus, corresponding to a soft tissue 
mass seen on the recent CT chest. 
2. Aspiration of oral contrast. Correlation with modified barium swallow 
recommended. CT CHEST W CONT [[id number]] Collected: 07/24/19 2133 Order Status: Completed Updated: 07/24/19 2138 Narrative:    
CT OF THE CHEST WITH INTRAVENOUS CONTRAST. INDICATION: Shortness of breath. Sujatha Shoe adenocarcinoma with metastatic 
disease. COMPARISON: None. TECHNIQUE:   2.5 mm axial scans from above the aortic arch to the lung bases 
with 100 cc nonionic intravenous contrast without acute complication. Intravenous contrast was given to evaluate for pulmonary embolism. FINDINGS:  The degree of opacification of the pulmonary arteries is adequate. No intraluminal filling defects within the pulmonary arterial tree to suggest 
pulmonary embolism. Mayes Hernandez is normal caliber with a uniform lumen without 
evidence of dissection. Lungs demonstrate multiple nodules, groundglass 
opacities and small pneumonic consolidation in the bases. There is extensive 
mediastinal adenopathy which has increased a great deal from the prior exam.. Included portion of the upper abdomen are unremarkable. No gross bony lesions. Harpreet Zelaya Impression:    
IMPRESSION:  Mediastinal adenopathy is increased great deal from the prior exam. 
There is patchy airspace disease in both lung bases. Pulmonary metastases. Negative for pulmonary embolism. Medications: 
Current Facility-Administered Medications Medication Dose Route Frequency  insulin regular (NOVOLIN R, HUMULIN R) injection   SubCUTAneous Q6H  
 LORazepam (ATIVAN) injection 2 mg  2 mg IntraVENous Q2H PRN  
 TPN ADULT-CENTRAL - dextrose 15% amino acid 5%   IntraVENous QPM  
 propofol (DIPRIVAN) infusion  0-50 mcg/kg/min IntraVENous TITRATE  fentaNYL in normal saline (pf) 25 mcg/mL infusion   mcg/hr IntraVENous TITRATE  methylPREDNISolone (PF) (SOLU-MEDROL) injection 40 mg  40 mg IntraVENous Q12H  
 morphine injection 2 mg  2 mg IntraVENous Q4H PRN  
 albuterol (PROVENTIL VENTOLIN) nebulizer solution 2.5 mg  2.5 mg Nebulization QID RT  
 hydrALAZINE (APRESOLINE) 20 mg/mL injection 10 mg  10 mg IntraVENous Q6H PRN  
 iothalamate meglumine (CONRAY 60) 60 % contrast solution    PRN  
 albuterol (PROVENTIL VENTOLIN) nebulizer solution 2.5 mg  2.5 mg Nebulization Q6H PRN  
 NUTRITIONAL SUPPORT ELECTROLYTE PRN ORDERS   Does Not Apply PRN  piperacillin-tazobactam (ZOSYN) 4.5 g in 0.9% sodium chloride (MBP/ADV) 100 mL  4.5 g IntraVENous Q8H  
 magic mouthwash (SIMON) oral suspension 5 mL  5 mL Oral Q4H PRN  
 central line flush (saline) syringe 10 mL  10 mL InterCATHeter PRN  
  prochlorperazine (COMPAZINE) injection 10 mg  10 mg IntraVENous Q6H PRN  
 enoxaparin (LOVENOX) injection 100 mg  100 mg SubCUTAneous Q24H  
 
 
 
ASSESSMENT: 
 
Problem List  Date Reviewed: 8/2/2019 Codes Class Noted Hydroureteronephrosis ICD-10-CM: N13.30 ICD-9-CM: 912  7/30/2019 Hx pulmonary embolism (Chronic) ICD-10-CM: A28.446 ICD-9-CM: V12.55  7/30/2019 History of DVT of lower extremity (Chronic) ICD-10-CM: S70.376 ICD-9-CM: V12.51  7/30/2019 Hypoxia ICD-10-CM: R09.02 
ICD-9-CM: 799.02  7/30/2019 Metastatic cancer to lung [facility]) (Chronic) ICD-10-CM: C78.00 ICD-9-CM: 197.0  7/26/2019 Overview Signed 7/26/2019 11:06 AM by Kyler Mcleod NP Per EBUS in June 2019 Acute respiratory failure ([facility] ICD-10-CM: J96.00 
ICD-9-CM: 518.81  7/26/2019 Aspiration into airway ICD-10-CM: T17.908A ICD-9-CM: 934.9  7/26/2019 Dysphagia ICD-10-CM: R13.10 ICD-9-CM: 787.20  7/25/2019 Intractable vomiting ICD-10-CM: R11.10 ICD-9-CM: 536.2  7/25/2019 Mediastinal lymphadenopathy ICD-10-CM: R59.0 ICD-9-CM: 785.6  6/11/2019 Pulmonary nodules ICD-10-CM: R91.8 ICD-9-CM: 793.19  6/11/2019 Dehydration ICD-10-CM: E86.0 ICD-9-CM: 276.51  4/2/2019 S/P radiation therapy < 4 weeks ago ICD-10-CM: Z92.3 ICD-9-CM: V15.3  10/1/2018 * (Principal) Rectal adenocarcinoma ([facility] (Chronic) ICD-10-CM: C20 
ICD-9-CM: 154.1  7/6/2018 Colostomy status (HCC) (Chronic) ICD-10-CM: Z93.3 ICD-9-CM: V44.3  6/29/2018 Erectile dysfunction due to arterial insufficiency (Chronic) ICD-10-CM: N52.01 
ICD-9-CM: 607.84  5/31/2018 Benign essential HTN (Chronic) ICD-10-CM: I10 
ICD-9-CM: 401.1  5/31/2018 Hypercholesterolemia (Chronic) ICD-10-CM: E78.00 ICD-9-CM: 272.0  5/30/2018 Overview Signed 6/4/2018  2:20 PM by Sole German  
  ASCVD 10 year risk is 16.0%. Mod to high dose statin indicated.  (based on b/p 180/90, 6/4/18) Screening PSA (prostate specific antigen) (Chronic) ICD-10-CM: Z12.5 ICD-9-CM: V76.44  5/30/2018 History of tobacco abuse (Chronic) ICD-10-CM: E28.284 ICD-9-CM: V15.82  8/18/2016 PLAN: 
Dysphagia/vomiting 
- GI evaluation. Planning EGD tomorrow (cannot do dilation due to recent avastin). Started pepcid BID 
- Continue IV fluids 7/26 Barium swallow today with aspiration and extrinsic soft tissue mass creating filling defect in distal esophagus 7/29 Spoke with Ignacia Painting (out of town) and Gurpreet Elliott. Needs palliative radiation to mediastinal adenopathy that is compressing esophagus. Planned for consult this afternoon. Pt agreeable to PEG given length of time it may take for radiation to improve his ability to swallow. GI reconsulted for placement 7/30 S/p simulation with rad onc yesterday - planning to start radiation (today but held due to procedure). PEG placement possibly tomorrow 8/1 PEG is no longer an option. TPN starting today. Aspiration pneumonia 
- Continue levaquin. BCx pending. UA negative. CT chest with no acute findings 7/26 D3 LVQ 
7/29 Continues zosyn. Noted leukocytosis. Met rectal ca - S/p FOLFIRI-Avastin, last given 7/9 7/29 Suspicion for progression on recent CT. Family is aware. Further treatment TBD outpatient with Dr. Ashok Tan Leukopenia/anemia 
- Secondary to chemotherapy. Granix x 1 today 7/26 WBC up to 4.5 
7/29 Continues with elevated counts Hx hydronephrosis - Planned for cystoscopy and stent placement tomorrow with urology (was scheduled for outpatient). Ok from [employer]. Discussed with pulmonary and no contraindication from their perspective. Discussed with Kathrine Barron NP 
7/30 S/p cystoscopy with right ureteral stent placed and right ureteral biopsy. To ICU following for tachypnea Respiratory failure 8/2 Intubated -bilateral ground glass densities on CXR suspicious for infection. Pulmonary taking over as primary. Patient is partial code status. Family is still hopeful that patient will recover. Kaylynn Wisdom NP [facility] Hematology [facility] Oncology [address] [address] Office : [phone] Fax : [phone] I personally saw, exammed and counselled the patient, and discussed with NP, agree with above history/assessment/plan. 53 y.o.male rectal cancer s/p chemorad in 2018 but refractory, developed mediastinal LAD, received FOLFOX/avastin and progressed, change to FOLFIRI/avastin but admitted with esophageal dysphagia, had aspiration in barium swallow and had to be care in ICU, now respiratory status stable, pt wants to excalate to \" out-of-box rx\" and we discussed his disease had shown great resistance to major regimens, there are oral options but he has to solve the dysphagia problem for both rx and feeding, agreed to pursue palliative XRT of mediastinal LN. He decompensated after exchanging stent and moved to ICU, tried dobhoff but pulled out and PEG pending respiratory status stable, XRT simulated and pending clinically stable, discussed with pt and family as much as we understand he still wants aggressive rx, his tolerance appears poor. He had been difficult to manage the BiPAP for agitation and got intubated 8/1 overnight, again addressed family members the poor prognosis and the challenging situation, started TPN temporarily bridge to PEG.   
 
Rectal cancer Alex Charleston Goldmann, M.D. [place] [address] [address] Office : [phone] Fax : [phone]

## 2019-08-02 NOTE — PROGRESS NOTES
Paged and discussed gross hematuria with Marla Sparks NP from urology. Hematuria to be expected from stent placement - Schultz OK. Ordered to flush and irrigate if clotting occurs - no new orders received for now.

## 2019-08-02 NOTE — PROGRESS NOTES
A follow up visit was made to the patient. Emotional support and spiritual presence were provided. His wife and daughter were present. His wife appears to be worn down with the severity of her 's health decline. Shelia Diego

## 2019-08-02 NOTE — PROGRESS NOTES
Bedside and verbal shift report received from [place], [address] [facility]. Dual signed fentanyl gtt.

## 2019-08-02 NOTE — PROGRESS NOTES
Chart reviewed and pt discussed in am IDR. Remains in ICU, not intubated/vent. CM continues to follow for any assist and d/c POC when medically stable.

## 2019-08-02 NOTE — PROGRESS NOTES
Critical Care Daily Progress Note: 8/2/2019 Admission Date: 7/24/2019 The patient's chart is reviewed and the patient is discussed with the staff. 
48 y.o. CM seen and evaluated at the request of Dr. Gadiel Lubin for acute respiratory distress.   
Chronic Medical:  Colon cancer, DM, HTN, rectal cancer, MYRON, former tobacco abuse and GERD. Was admitted on 7/24/2019 by Oncology for dehydration and rectal adenocarcinoma confirmed to be Stage IV per biopsy. Jacqueline Situ was initiated on 5FU-A and recently switched to FOLFIRI-A.  XRT planned for mediastinal node. Barium swallow revealed aspiration of contrast as well as extrinsic soft tissue mass creating filling defect in distal esophagus. Mechanical soft diet with chopped/ground meats and nectar thick liquids recommended per speech therapy.   
  On abx for suspected aspiration pneumonia. Has been hypoxic and requiring oxygen support.  
  Seen by urology for right hydroureteronephrosis and cystoscopy 7/30 with stent placement/biopsy-no malignancy per path. Blocking ureter. GI was consulted for PEG placement but due to decline in respiratory status this is on hold. Intubated 8/1 for respiratory support. Subjective:  
 
Sedated but quickly arouses to stimulation and begins thrashing his head and pulling on wrist restraints. Wife at the bedside. Intubated last night for respiratory support. Current Facility-Administered Medications Medication Dose Route Frequency  insulin regular (NOVOLIN R, HUMULIN R) injection   SubCUTAneous Q6H  
 TPN ADULT-CENTRAL - dextrose 15% amino acid 5%   IntraVENous QPM  
 propofol (DIPRIVAN) infusion  0-50 mcg/kg/min IntraVENous TITRATE  fentaNYL in normal saline (pf) 25 mcg/mL infusion   mcg/hr IntraVENous TITRATE  methylPREDNISolone (PF) (SOLU-MEDROL) injection 40 mg  40 mg IntraVENous Q12H  
 morphine injection 2 mg  2 mg IntraVENous Q4H PRN  
  HYDROcodone-acetaminophen (HYCET) 0.5-21.7 mg/mL oral solution 5 mg  5 mg Oral Q4H PRN  
 albuterol (PROVENTIL VENTOLIN) nebulizer solution 2.5 mg  2.5 mg Nebulization QID RT  
 hydrALAZINE (APRESOLINE) 20 mg/mL injection 10 mg  10 mg IntraVENous Q6H PRN  
 iothalamate meglumine (CONRAY 60) 60 % contrast solution    PRN  
 albuterol (PROVENTIL VENTOLIN) nebulizer solution 2.5 mg  2.5 mg Nebulization Q6H PRN  
 NUTRITIONAL SUPPORT ELECTROLYTE PRN ORDERS   Does Not Apply PRN  potassium chloride (KLOR-CON) packet for solution 20 mEq  20 mEq Oral BID WITH MEALS  piperacillin-tazobactam (ZOSYN) 4.5 g in 0.9% sodium chloride (MBP/ADV) 100 mL  4.5 g IntraVENous Q8H  
 diphenoxylate-atropine (LOMOTIL) tablet 1 Tab  1 Tab Oral QID PRN  
 magic mouthwash (SIMON) oral suspension 5 mL  5 mL Oral Q4H PRN  
 oxyCODONE IR (ROXICODONE) tablet 5 mg  5 mg Oral Q6H PRN  
 guaiFENesin-dextromethorphan (ROBITUSSIN DM) 100-10 mg/5 mL syrup 5 mL  5 mL Oral Q6H PRN  
 benzonatate (TESSALON) capsule 100 mg  100 mg Oral TID PRN  
 LORazepam (ATIVAN) injection 1 mg  1 mg IntraVENous Q4H PRN  
 central line flush (saline) syringe 10 mL  10 mL InterCATHeter PRN  prochlorperazine (COMPAZINE) injection 10 mg  10 mg IntraVENous Q6H PRN  
 enoxaparin (LOVENOX) injection 100 mg  100 mg SubCUTAneous Q24H Review of Systems Unobtainable due to patient status. Objective:  
 
Vitals:  
 08/02/19 0618 08/02/19 0644 08/02/19 0700 08/02/19 0740 BP: (!) 87/59 (!) 85/57 (!) 85/58 Pulse: 65 64 64 61 Resp: 24 (!) 31 20 19 Temp:      
SpO2: 100% 100% 100% 100% Weight:      
 
 
Intake and Output:  
07/31 1901 - 08/02 0700 In: 2830.6 [I.V.:2830.6] Out: 1700 [[id number]] No intake/output data recorded. Physical Exam:         
Constitutional:  intubated and mechanically ventilated. Restless and thrashing with stimulation EENMT:  Sclera clear, pupils equal, oral mucosa moist 
Respiratory: expiratory wheeze Cardiovascular:  RRR with no M,G,R; 
Gastrointestinal:  flat with right colostomy; hypoactive bowel sounds present Musculoskeletal:  warm with no cyanosis, no lower extremity edema Skin:  no jaundice or ecchymosis Neurologic: no gross neuro deficits Psychiatric:  Restless--receiving sedation LINES:   
ETT 8/1/19 Venous port 10/29/18 PIV sites Schultz 8/1/19 DRIPS:   
Fentanyl 100 mcg/hr Diprivan 10 mcg/kg/min CXR:   
8/2/19:  
1. Bilateral groundglass densities, suspicious for infection. ET tube tip is in good position. 2. No significant change compared to prior exam 
 
 
Ventilator Settings Mode FIO2 Rate Tidal Volume Pressure PEEP Pressure control  35 %          8 cm H20 Peak airway pressure: 27 cm H2O Minute ventilation: 9.6 l/min ABG:  
Recent Labs 08/02/19 
0304 08/01/19 
2030 08/01/19 
1734 PHI 7.422 7.395 7.341* PCO2I 47.6* 47.9* 56.2*  
PO2I 115* 118* 90  
HCO3I 31.0* 29.3* 30.4* LAB Recent Labs 08/02/19 
0830 GLUCPOC 171* Recent Labs 08/02/19 
0305 08/01/19 
0337 07/31/19 
0400 WBC 28.3* 40.0* 38.9* HGB 9.8* 10.4* 10.3* HCT 32.2* 34.7* 33.8*  
 273 341 Recent Labs 08/02/19 
0305 08/01/19 
0337 07/31/19 
0400 * 140 139  
K 4.1 4.7 4.4 CL 99 101 101 CO2 31 33* 31  
* 133* 130* BUN 28* 23 25* CREA 0.92 0.86 1.00  
MG 2.7* 2.7* 2.9*  
CA 8.4 8.7 8.5 ALB 2.5* 2.5* 2.5* SGOT 16 23 20 No results for input(s): LCAD, LAC in the last 72 hours. Assessment:  (Medical Decision Making) Patient Active Problem List  
Diagnosis Code  History of tobacco abuse Z87.891  
 Hypercholesterolemia E78.00  Screening PSA (prostate specific antigen) Z12.5  Erectile dysfunction due to arterial insufficiency N52.01  
 Benign essential HTN I10  
 Rectal adenocarcinoma (HCC) C20  
 Colostomy status (HCC) Z93.3  
 S/P radiation therapy < 4 weeks ago Z92.3  Dehydration E86.0  Mediastinal lymphadenopathy R59.0  Pulmonary nodules R91.8  Dysphagia R13.10  Intractable vomiting R11.10  Metastatic cancer to lung ([facility] C78.00  Acute respiratory failure (HCC) J96.00  Aspiration into airway T17.908A  Hydroureteronephrosis N13.30  Hx pulmonary embolism Z86.711  
 History of DVT of lower extremity Z86.718  Hypoxia R09.02 Plan:  (Medical Decision Making) Hospital Problems  Date Reviewed: 8/2/2019 Codes Class Noted POA Hydroureteronephrosis ICD-10-CM: N13.30 ICD-9-CM: 524  7/30/2019 Clinically Undetermined Per urology Hx pulmonary embolism (Chronic) ICD-10-CM: T64.899 ICD-9-CM: V12.55  7/30/2019 Yes  
 chronic History of DVT of lower extremity (Chronic) ICD-10-CM: N16.877 ICD-9-CM: V12.51  7/30/2019 Yes  
 chronic Hypoxia ICD-10-CM: R09.02 
ICD-9-CM: 799.02  7/30/2019 No  
 Sat acceptable on vent support Metastatic cancer to lung [facility]) (Chronic) ICD-10-CM: C78.00 ICD-9-CM: 197.0  7/26/2019 Yes Overview Signed 7/26/2019 11:06 AM by Jett Eastman NP Per EBUS in June 2019 Per oncology Acute respiratory failure ([facility] ICD-10-CM: J96.00 
ICD-9-CM: 518.81  7/26/2019 Yes Continue vent support Aspiration into airway ICD-10-CM: T17.908A ICD-9-CM: 934.9  7/26/2019 Yes On antibiotics Dysphagia ICD-10-CM: R13.10 ICD-9-CM: 787.20  7/25/2019 Yes NPO on vent support--receiving TPN Intractable vomiting ICD-10-CM: R11.10 ICD-9-CM: 536.2  7/25/2019 Yes  
 resolved * (Principal) Rectal adenocarcinoma ([facility] (Chronic) ICD-10-CM: C20 
ICD-9-CM: 154.1  7/6/2018 Yes  
 unchanged --Albuterol 
--Solu Medrol 40mg q12h 
--Zosyn day 8--cultures negative 
--WBC trended down 28.3 from 40.0 yesterday 
--Nutritional support:  TPN/lipids, PEG on hold 
--Oncology following 
--Palliative Care following--partial code-no CPR 
--GI following for possible PEG placement --Urology--recent cysto and with gross hematuria More than 50% of the time documented was spent in face-to-face contact with the patient and in the care of the patient on the floor/unit where the patient is located. Rosetta Cushing, NP Lungs:  Wheezing bilaterally Heart:  RRR with no Murmur/Rubs/Gallops Additional Comments:  Intubated last night with worsening hypercapneic respiratory failure despite BIPAP. Still wheezing and obstructed on vent flow loops. Will increase steroids to 60mg Q8 IV, add pulmicort. WBC improving today. Consider stopping antibiotics tomorrow and monitoring if WBC down more. No CPR given ongoing PC consult. Will accept as primary. Appreciate oncology ongoing discussions with patient as options for additional therapy seem limited and prognosis is extremely poor despite our ongoing aggressive ICU care. Could potentially get PEG tube next week on vent if desired, but if not significantly improving over weekend don't think it would significantly help patients outcome. Minimize Propofol with hypotension, PRN Ativan. Had bradycardia with precedex. I have spoken with and examined the patient. I agree with the above assessment and plan as documented.  
 
Sergo Singh MD

## 2019-08-02 NOTE — PROGRESS NOTES
Interdisciplinary team rounds were held 8/2/2019 with the following team members:Care Management, Nursing, Nutrition, Palliative Care, Pastoral Care, Patient Relations, Pharmacy, Physical Therapy, Physician, Respiratory Therapy and Clinical Coordinator and the patient and spouse. Plan of care discussed. See clinical pathway and/or care plan for interventions and desired outcomes.

## 2019-08-02 NOTE — PROGRESS NOTES
Increased resp. Distress and tachypnea with respiratory rate in 40s. Discussed with family- still wanted intubation, at least temporarily. So the pt was intubated and placed on vent support.

## 2019-08-02 NOTE — ROUTINE PROCESS
Ventilator check complete; patient has a #7.5 ET tube secured at the 24 at the lip. Patient is sedated. Patient is not able to follow commands. Breath sounds are expiratory wheezes. Trachea is midline, Negative for subcutaneous air, and chest excursion is symmetric. Patient is also Negative for cyanosis and is Negative for pitting edema. All alarms are set and audible. Resuscitation bag is at the head of the bed. Ventilator Settings Mode FIO2 Rate Tidal Volume Pressure PEEP I:E Ratio Pressure control  35 %(weaned to 35 per ABG)   22 
      18 8 cm H20  1:2.1 Peak airway pressure: 27 cm H2O Minute ventilation: 11.5 l/min Hugh Waldrop, RT

## 2019-08-02 NOTE — PROGRESS NOTES
Bedside shift change report given to [phone] (oncoming nurse) by Dina Dolan (offgoing nurse). Report included the following information SBAR, Kardex, ED Summary, Procedure Summary, Intake/Output, MAR, Accordion, Recent Results, Med Rec Status, Cardiac Rhythm NSR and Alarm Parameters .

## 2019-08-02 NOTE — PROGRESS NOTES
Head to toe assessment completed on pt this AM. Neuro: Pt eyes open to stimulation / voice. No command following - pt agitated easily. With stimulation, pt thrashes head in bed and pulls at restraints - RASS +2. Without stimulation, pt rests quietly in bed. PERRLA - 2 mm and sluggish to react. SOTELO spontaneously. \ 
Resp: PC FiO2 30%. Coarse upper / diminished lower breath sounds. Clear thin secretions upon suctioning. Cardiac: NSR - slight hypotension. GI: NPO - TPN infusing through port. Ostomy patent and draining. Hypoactive bowel sounds. : Schultz - draining and patent. Gross hematuria upon examination. Skin: 1+ edema all extremities. Allevyn and SCDs intact.

## 2019-08-02 NOTE — PROGRESS NOTES
Interdisciplinary team rounds were held 8/2/2019 with the following team members:Care Management, Nursing, Nutrition, Palliative Care, Pastoral Care, Pharmacy, Physical Therapy, Physician, Respiratory Therapy and Clinical Coordinator and the patient and spouse. Plan of care discussed. See clinical pathway and/or care plan for interventions and desired outcomes.

## 2019-08-02 NOTE — PROGRESS NOTES
Palliative Care Progress Note Patient: Kimi Mosher MRN: [id number]  SSN: [id number] YOB: 1965  Age: 48 y.o. Sex: male Assessment/Plan: Chief Complaint/Interval History: sedated, intubated and ventilated Principal Diagnosis: · Debility, Unspecified  R53.81 Additional Diagnoses: · Acute Respiratory Failure, Unspecified  J96.00 
· Agitation  R45.1 · Dysphagia  R13.10 · Fatigue, Lethargy  R53.83 
· Frailty  R54 · Counseling, Encounter for Medical Advice  Z71.9 
· Encounter for Palliative Care  Z51.5 Palliative Performance Scale (PPS) PPS: 30 Medical Decision Making:  
Reviewed and summarized notes over last 24 hours Discussed case with appropriate providers- ICU IDT; Luiza Bro NP;  Ryan Georges RN Reviewed laboratory and x-ray data- CBC, CMP Pt sedated, intubated and ventilated. Wife and friends at bedside. Pt required intubation late last night. Per notes, wife agreeable to no CPR if his heart stops. Wife very withdrawn and flat today, and very difficult to engage in conversation. Attempted to elicit how she felt things were going but she would only answer with one word answers to questions. Assured her of our ongoing care, and efforts to give them more time with him. Discussed with Luiza Bro NP and Dutch Freitas. This is likely a terminal situation, and wife is having difficulty processing the events. Will continue to provide ongoing support and goals discussions. Will continue to follow. Will discuss findings with members of the interdisciplinary team.   
 
  
. 
 
Subjective:  
 
Review of Systems: 
Review of systems not obtained due to patient factors- sedated, intubated and ventilated Objective:  
 
Visit Vitals BP (!) 85/58 Pulse 61 Temp 97.4 °F (36.3 °C) Resp 19 Wt 130 lb (59 kg) SpO2 100% BMI 18.93 kg/m² Physical Exam: 
 
General:  Sedated. Intubated and ventilated. Eyes:  Conjunctivae/corneas clear Nose: Nares normal. Septum midline Neck: Supple, symmetrical, trachea midline Lungs:   Clear to auscultation bilaterally, unlabored Heart:  Regular rate and rhythm Abdomen:   Soft, non-tender, non-distended Extremities: Normal, atraumatic, no cyanosis or edema Skin: Skin color, texture, turgor normal.   
Neurologic: Sedated Psych: Sedated Signed By: Jayna Tsai NP August 2, 2019

## 2019-08-02 NOTE — PROGRESS NOTES
Nutrition F/U: 
TPN Management for Oncology. Assessment: 
Weight 59 kg (ICU bed 19), edema - trace BLE. The patient's status declined yesterday and he is now intubated, ventilated and sedated with Diprivan. TPN was started last evening due to inability to place enteral access. IVF was stopped at that time since order . Labs are remarkable for AM glucose 226 and  (Diprivan was started last evening and was infusing this morning when blood was drawn); POC glucose () 171. Solumedrol dose was increased this morning. IV Access: 
           Single port. Macronutrient Needs (59.7 kg): EER:  9146-6456 kcal /day (25-30 kcal/kg) EPR:  72-90 grams protein/day (1.2-1.5 grams/kg) Max CHO:  225 grams/day (50% kcal) Fluid:  1ml/kcal 
Intake/Comparative Standards: 
Current intake of TPN (1 liter 5%AA/15%DEX - 710 calories/day (39% calorie goal), 50 grams protein/day (56% protein goal), 150 grams CHO/day (does not exceed max CHO limit) and 1000 ml water/day (56% fluid goal). Diprivan is currently contributing 140 calories/day. Intervention:  
Meals and Snacks: NPO. Parenteral Nutrition: 1) Advance TPN to its goal volume/rate - 1.8 liters/79 ml/hr 5%AA/15%DEX, hold lipids for now since receiving Diprivan. 2) Adjust TPN to increase the sodium to 120 meq/liter and add 15 units insulin/liter. 3) Additives/liter: 120 sodium (chloride), 20 potassium (phosphate), 4.5 calcium, 5 magnesium, 20 phosphorus (potassium), 15 insulin. 4) MVI, MTE and Pepcid daily. Mineral Supplement Therapy: Nutrition Support Orders/Electrolyte Management Replacement Protocols are active on the MAR. Coordination of Nutrition Care by a Nutrition Professional: AM ICU rounds, collaboration with Albina Barragan RN. Nutrition Discharge Plan: Too soon to determine. Genet Avendano. Anthony Corporal 
[phone]

## 2019-08-02 NOTE — PROGRESS NOTES
Ventilator check complete; patient has a #7.5 ET tube secured at the 24 at the lip. Patient is not able to follow commands. Breath sounds are coarse. Trachea is midline, Negative for subcutaneous air, and chest excursion is symmetric. Patient is also Negative for cyanosis and is Negative for pitting edema. All alarms are set and audible. Resuscitation bag is at the head of the bed. Ventilator Settings Mode FIO2 Rate Tidal Volume Pressure PEEP I:E Ratio Pressure control  35 %   22      18 8 cm H20  1:4.1 Peak airway pressure: 27 cm H2O Minute ventilation: 9.6 l/min ABG: No results for input(s): PH, PCO2, PO2, HCO3 in the last 72 hours.  
 
 
Gonzalo Cordova, RT

## 2019-08-02 NOTE — PROCEDURES
Procedure: elective/emergent intubation Indication: respiratory insufficiency Anesthesia- Rapid sequence:  Fentanyl 50mcg(1.5mcg/kg), Etomidate 40mg(0.5mg'kg) ,,  
 
 
After assessing the airway, the patient underwent preoxygenation with 100% FiO2 for 5 min. Fentanyl was then given and after 3 min, etomidate  was given sequentially in rapid IV push. After adequate sedation and paralisys emergent intubation was performed. A /4.0 glidescope  blade  laryngoscope was used to visualize the epiglottis and vocal chords. After positive identification of the vocal chords, an #.7.5 ET tube was placed into the trachea with direct visualization. The tube was seen passing through the vocal chords without /  difficulty. CO2 colorimetry was employed immediately to verify tube in airway with /without appropriate color change indicating detection/lack of CO2. Water vapor was seen within the ET tube, and auscultation of the abdomen revealed no bubbling souds. Auscultation  and inspection of the chest after intubation showed symmetric chest excursion and symmetric air entry bilaterally. Chest X-ray has been ordered and is pending. The patient has been placed on a mechanical ventilator. There were no complications.  
 
Mery Garza MD

## 2019-08-02 NOTE — PROGRESS NOTES
Problem: Falls - Risk of 
Goal: *Absence of Falls Description Document Haleyemilee Roscoeemperatriz Fall Risk and appropriate interventions in the flowsheet. Outcome: Progressing Towards Goal 
Note:  
Fall Risk Interventions: 
Mobility Interventions: Bed/chair exit alarm, Communicate number of staff needed for ambulation/transfer, OT consult for ADLs, Patient to call before getting OOB, PT Consult for mobility concerns, Strengthening exercises (ROM-active/passive) Mentation Interventions: Adequate sleep, hydration, pain control, Bed/chair exit alarm, Door open when patient unattended, Evaluate medications/consider consulting pharmacy, Family/sitter at bedside, More frequent rounding, Reorient patient, Room close to nurse's station, Update white board Medication Interventions: Bed/chair exit alarm, Evaluate medications/consider consulting pharmacy Elimination Interventions: Bed/chair exit alarm, Call light in reach, Toilet paper/wipes in reach, Toileting schedule/hourly rounds History of Falls Interventions: Bed/chair exit alarm, Evaluate medications/consider consulting pharmacy, Room close to nurse's station Problem: Patient Education: Go to Patient Education Activity Goal: Patient/Family Education Outcome: Progressing Towards Goal 
  
Problem: Gas Exchange - Impaired Goal: *Absence of hypoxia Outcome: Progressing Towards Goal 
  
Problem: Pressure Injury - Risk of 
Goal: *Prevention of pressure injury Description Document Prosper Scale and appropriate interventions in the flowsheet. Outcome: Progressing Towards Goal 
Note:  
Pressure Injury Interventions: 
Sensory Interventions: Assess changes in LOC, Assess need for specialty bed, Avoid rigorous massage over bony prominences, Check visual cues for pain, Float heels, Keep linens dry and wrinkle-free, Minimize linen layers, Pressure redistribution bed/mattress (bed type), Turn and reposition approx. every two hours (pillows and wedges if needed) Moisture Interventions: Absorbent underpads, Apply protective barrier, creams and emollients, Assess need for specialty bed, Check for incontinence Q2 hours and as needed, Internal/External urinary devices, Limit adult briefs, Minimize layers Activity Interventions: Assess need for specialty bed, Pressure redistribution bed/mattress(bed type), PT/OT evaluation Mobility Interventions: Assess need for specialty bed, HOB 30 degrees or less, Pressure redistribution bed/mattress (bed type), Turn and reposition approx. every two hours(pillow and wedges) Nutrition Interventions: Document food/fluid/supplement intake, Discuss nutritional consult with provider Friction and Shear Interventions: Apply protective barrier, creams and emollients, Foam dressings/transparent film/skin sealants, HOB 30 degrees or less, Minimize layers Problem: Patient Education: Go to Patient Education Activity Goal: Patient/Family Education Outcome: Progressing Towards Goal 
  
Problem: Patient Education: Go to Patient Education Activity Goal: Patient/Family Education Outcome: Progressing Towards Goal 
  
Problem: Nutrition Deficit Goal: *Optimize nutritional status Outcome: Progressing Towards Goal 
  
Problem: Anxiety Goal: *Alleviation of anxiety Outcome: Progressing Towards Goal 
Goal: *Alleviation of anxiety (Palliative Care) Outcome: Progressing Towards Goal 
  
Problem: Ventilator Management Goal: *Adequate oxygenation and ventilation Outcome: Progressing Towards Goal 
Goal: *Patient maintains clear airway/free of aspiration Outcome: Progressing Towards Goal 
Goal: *Absence of infection signs and symptoms Outcome: Progressing Towards Goal 
Goal: *Normal spontaneous ventilation Outcome: Progressing Towards Goal 
  
Problem: Patient Education: Go to Patient Education Activity Goal: Patient/Family Education Outcome: Progressing Towards Goal 
  
Problem: Non-Violent Restraints Goal: *Removal from restraints as soon as assessed to be safe Outcome: Progressing Towards Goal 
Goal: *No harm/injury to patient while restraints in use Outcome: Progressing Towards Goal 
Goal: *Patient's dignity will be maintained Outcome: Progressing Towards Goal 
Goal: *Patient Specific Goal (EDIT GOAL, INSERT TEXT) Outcome: Progressing Towards Goal 
Goal: Non-violent Restaints:Standard Interventions Outcome: Progressing Towards Goal 
Goal: Non-violent Restraints:Patient Interventions Outcome: Progressing Towards Goal 
Goal: Patient/Family Education Outcome: Progressing Towards Goal 
  
Problem: Delirium Treatment Goal: *Level of consciousness restored to baseline Outcome: Progressing Towards Goal 
Goal: *Level of environmental perceptions restored to baseline Outcome: Progressing Towards Goal 
Goal: *Sensory perception restored to baseline Outcome: Progressing Towards Goal 
Goal: *Emotional stability restored to baseline Outcome: Progressing Towards Goal 
Goal: *Functional assessment restored to baseline Outcome: Progressing Towards Goal 
Goal: *Absence of falls Outcome: Progressing Towards Goal 
Goal: *Cognitive status will be restored to baseline Outcome: Progressing Towards Goal 
Goal: Interventions Outcome: Progressing Towards Goal 
  
Problem: Patient Education: Go to Patient Education Activity Goal: Patient/Family Education Outcome: Progressing Towards Goal

## 2019-08-02 NOTE — H&P
Date of Surgery Update: 
Magdaleno Lefort was seen and examined. History and physical has been reviewed. The patient has been examined.  There have been no significant clinical changes since the completion of the originally dated History and Physical. 
 
Signed By: Luna Alarcon MD   
 August 1, 2019 9:37 PM

## 2019-08-03 NOTE — PROGRESS NOTES
Ventilator check complete; patient has a #7.5 ET tube secured at the 24 at the lip. Patient is not able to follow commands. Breath sounds are coarse. Trachea is midline, Negative for subcutaneous air, and chest excursion is symmetric. Patient is also Negative for cyanosis and is Negative for pitting edema. All alarms are set and audible. Resuscitation bag is at the head of the bed. Ventilator Settings Mode FIO2 Rate Tidal Volume Pressure PEEP I:E Ratio Pressure control  28 %(weaned to 28%)   26      28 8 cm H20  1:2.1 Peak airway pressure: 36 cm H2O Minute ventilation: 10.1 l/min ABG: No results for input(s): PH, PCO2, PO2, HCO3 in the last 72 hours.  
 
 
Toya Cordova, RT

## 2019-08-03 NOTE — PROGRESS NOTES
[facility] Inpatient Hematology / Oncology Progress Note Admission Date: 2019  3:18 PM 
Reason for Admission/Hospital Course: Dysphagia [R13.10] Intractable vomiting [R11.10] 24 Hour Events: 
Intubated  Family at bedside Worsened ABG and CXR 
 
ROS: Unable to obtain. Patient sedated on vent 10 point review of systems is otherwise negative with the exception of the elements mentioned above in the HPI. Allergies Allergen Reactions  Zithromax [Azithromycin] Rash OBJECTIVE: 
Patient Vitals for the past 8 hrs: 
 BP Temp Pulse Resp SpO2  
19 1144 (!) 134/91  (!) 102 21 94 % 19 1130 98/62  (!) 103 (!) 40 93 % 19 1127   89 26 93 % 19 1118 118/66  89 23 94 % 19 1059 107/71  87 26 93 % 19 1044 109/73  90 23 93 % 19 1031 117/73  89 26 93 % 19 0959 105/76  94 26 94 % 19 0945 112/75  94 26 93 % 19 0930 (!) 166/104  (!) 112 27 93 % 19 0915 101/69  89 30 95 % 19 0859 99/68  86 27 94 % 19 0844 95/68  88 29 95 % 19 0830 103/68  93 26 95 % 19 0815 104/65  85 26 95 % 19 0805   82 26 95 % 19 0759 110/75  85 24 96 % 19 0744 112/75  78 24 95 % 19 0730 111/76 97 °F (36.1 °C) 83 24 94 % 19 0716 (!) 129/99  83 22 97 % 19 0659 98/67  84 26 96 % 19 0644 99/65  86 24 96 % 19 0630 93/64  88 26 96 % 19 0615 101/65  88 24 95 % Temp (24hrs), Av.8 °F (36 °C), Min:96.4 °F (35.8 °C), Max:97.9 °F (36.6 °C) 
 
 07 -  1900 In: -  
Out: 80 [Urine:85] Physical Exam: 
Constitutional: Acutely ill male in respiratory distress, lying in the hospital bed in ICU Family at bedside. HEENT: Normocephalic and atraumatic. Intubated. Lymph node Deferred Skin Warm and dry. No bruising and no rash noted. No erythema. No pallor. Respiratory Expiratory wheezes on vent CVS Reg rate, regular rhythm and normal S1 and S2. No murmurs, gallops, or rubs. Abdomen Soft, nontender and nondistended, hypoactive bowel sounds. Neuro Sedated Psych Sedated Labs: 
   
Recent Labs 08/03/19 
1005 08/02/19 
0098 08/01/19 
9492 WBC 38.7* 28.3* 40.0*  
RBC 3.52* 3.39* 3.62* HGB 10.1* 9.8* 10.4* HCT 34.4* 32.2* 34.7*  
MCV 97.7 95.0 95.9 MCH 28.7 28.9 28.7 MCHC 29.4* 30.4* 30.0*  
RDW 17.1* 16.6* 16.6*  
 232 273 GRANS 62 58 52 LYMPH 1* 1* 1*  
MONOS 9 6 7 EOS 0* 0* 0*  
BASOS 0 1 0 IG 28* 34* 40* DF AUTOMATED AUTOMATED AUTOMATED ANEU 24.0* 16.4* 20.8* ABL 0.4* 0.3* 0.4* ABM 3.5* 1.7* 2.8* YESI 0.0 0.0 0.0 ABB 0.0 0.3* 0.0 AIG 10.8* 9.6* 16.0* Recent Labs 08/03/19 
3489 08/02/19 
3764 08/01/19 
2247  135* 140  
K 4.2 4.1 4.7  99 101 CO2 30 31 33* AGAP 8 5* 6*  
* 226* 133* BUN 37* 28* 23  
CREA 1.02 0.92 0.86 GFRAA >60 >60 >60 GFRNA >60 >60 >60  
CA 8.7 8.4 8.7 SGOT 29 16 23 AP 74 58 69  
TP 6.6 6.1* 6.4 ALB 2.5* 2.5* 2.5*  
GLOB 4.1* 3.6* 3.9* AGRAT 0.6* 0.7* 0.6* MG 2.9* 2.7* 2.7* PHOS  --  3.3  --   
 
 
 
Imaging: XR CHEST SNGL V [[id number]] Collected: 07/26/19 1159 Order Status: Completed Updated: 07/26/19 1202 Narrative:    
Portable chest x-ray CLINICAL INDICATION: Shortness of breath FINDINGS: Single AP view the chest compared to a similar exam dated 7/11/2019 
shows new reticular nodular interstitial densities throughout both lungs with 
more patchy airspace opacity in the right upper lobe. A right-sided chest port 
is in place. The cardiac silhouette and mediastinum are stable. Impression:    
IMPRESSION: New, diffuse reticular nodular densities throughout the lungs with 
patchy opacity in the right upper lobe. Atypical pneumonia or pulmonary edema 
should be considered. XR BA SWALLOW ESOPHOGRAM [[id number]] Collected: 07/26/19 0947 Order Status: Completed Updated: 07/26/19 1026 Narrative:    
History: Dysphasia. Stage IV rectal cancer. EXAM: Barium swallow TECHNIQUE: 1.3 minutes fluoroscopy time is utilized. The patient ingests 
effervescent granules followed by thick and thin consistencies of barium. 18 
fluoroscopic images are available for review. No comparison FINDINGS: The exam is markedly limited. The patient is unable to perform the 
exam the upright position, and has very limited mobility. There is aspiration of 
the thin contrast. Contrast flows through the esophagus into the stomach without 
difficulty. There is a hiatal hernia present. Just above the hiatal hernia, 
there is a filling defect of the distal esophagus, corresponding to a soft 
tissue mass adjacent to the esophagus on the recent CT chest. No definite 
gastroesophageal reflux, or ulceration. Impression:    
IMPRESSION: 
1. Just above the hiatal hernia, there is an extrinsic soft tissue mass creating 
a filling defect within the distal esophagus, corresponding to a soft tissue 
mass seen on the recent CT chest. 
2. Aspiration of oral contrast. Correlation with modified barium swallow 
recommended. CT CHEST W CONT [[id number]] Collected: 07/24/19 2133 Order Status: Completed Updated: 07/24/19 2138 Narrative:    
CT OF THE CHEST WITH INTRAVENOUS CONTRAST. INDICATION: Shortness of breath. [place] adenocarcinoma with metastatic 
disease. COMPARISON: None. TECHNIQUE:   2.5 mm axial scans from above the aortic arch to the lung bases 
with 100 cc nonionic intravenous contrast without acute complication. Intravenous contrast was given to evaluate for pulmonary embolism. FINDINGS:  The degree of opacification of the pulmonary arteries is adequate.   
No intraluminal filling defects within the pulmonary arterial tree to suggest 
 pulmonary embolism. Pete Valentín is normal caliber with a uniform lumen without 
evidence of dissection. Lungs demonstrate multiple nodules, groundglass 
opacities and small pneumonic consolidation in the bases. There is extensive 
mediastinal adenopathy which has increased a great deal from the prior exam.. Included portion of the upper abdomen are unremarkable. No gross bony lesions. Victoriano Platt Impression:    
IMPRESSION:  Mediastinal adenopathy is increased great deal from the prior exam. 
There is patchy airspace disease in both lung bases. Pulmonary metastases. Negative for pulmonary embolism. Medications: 
Current Facility-Administered Medications Medication Dose Route Frequency  TPN ADULT-CENTRAL - dextrose 15% amino acid 5%   IntraVENous QPM  
 fat emulsion 20% (LIPOSYN, INTRAlipid) infusion 250 mL  250 mL IntraVENous QPM  
 insulin regular (NOVOLIN R, HUMULIN R) injection   SubCUTAneous Q6H  
 LORazepam (ATIVAN) injection 2 mg  2 mg IntraVENous Q2H PRN  
 methylPREDNISolone (PF) (SOLU-MEDROL) injection 60 mg  60 mg IntraVENous Q8H  
 budesonide (PULMICORT) 500 mcg/2 ml nebulizer suspension  500 mcg Nebulization BID RT  
 TPN ADULT-CENTRAL - dextrose 15% amino acid 5%   IntraVENous QPM  
 fentaNYL in normal saline (pf) 25 mcg/mL infusion   mcg/hr IntraVENous TITRATE  morphine injection 2 mg  2 mg IntraVENous Q4H PRN  
 albuterol (PROVENTIL VENTOLIN) nebulizer solution 2.5 mg  2.5 mg Nebulization QID RT  
 hydrALAZINE (APRESOLINE) 20 mg/mL injection 10 mg  10 mg IntraVENous Q6H PRN  
 albuterol (PROVENTIL VENTOLIN) nebulizer solution 2.5 mg  2.5 mg Nebulization Q6H PRN  
 NUTRITIONAL SUPPORT ELECTROLYTE PRN ORDERS   Does Not Apply PRN  piperacillin-tazobactam (ZOSYN) 4.5 g in 0.9% sodium chloride (MBP/ADV) 100 mL  4.5 g IntraVENous Q8H  
 magic mouthwash (SIMON) oral suspension 5 mL  5 mL Oral Q4H PRN  
 central line flush (saline) syringe 10 mL  10 mL InterCATHeter PRN  
  prochlorperazine (COMPAZINE) injection 10 mg  10 mg IntraVENous Q6H PRN  
 enoxaparin (LOVENOX) injection 100 mg  100 mg SubCUTAneous Q24H  
 
 
 
ASSESSMENT: 
 
Problem List  Date Reviewed: 8/2/2019 Codes Class Noted Hydroureteronephrosis ICD-10-CM: N13.30 ICD-9-CM: 254  7/30/2019 Hx pulmonary embolism (Chronic) ICD-10-CM: V12.860 ICD-9-CM: V12.55  7/30/2019 History of DVT of lower extremity (Chronic) ICD-10-CM: V12.465 ICD-9-CM: V12.51  7/30/2019 Hypoxia ICD-10-CM: R09.02 
ICD-9-CM: 799.02  7/30/2019 Metastatic cancer to lung [facility]) (Chronic) ICD-10-CM: C78.00 ICD-9-CM: 197.0  7/26/2019 Overview Signed 7/26/2019 11:06 AM by Rory Roy NP Per EBUS in June 2019 [facility] ([facility] ICD-10-CM: J96.00 
ICD-9-CM: 518.81  7/26/2019 Aspiration into airway ICD-10-CM: T17.908A ICD-9-CM: 934.9  7/26/2019 Dysphagia ICD-10-CM: R13.10 ICD-9-CM: 787.20  7/25/2019 Intractable vomiting ICD-10-CM: R11.10 ICD-9-CM: 536.2  7/25/2019 Mediastinal lymphadenopathy ICD-10-CM: R59.0 ICD-9-CM: 785.6  6/11/2019 Pulmonary nodules ICD-10-CM: R91.8 ICD-9-CM: 793.19  6/11/2019 Dehydration ICD-10-CM: E86.0 ICD-9-CM: 276.51  4/2/2019 S/P radiation therapy < 4 weeks ago ICD-10-CM: Z92.3 ICD-9-CM: V15.3  10/1/2018 * (Principal) Rectal adenocarcinoma ([facility] (Chronic) ICD-10-CM: C20 
ICD-9-CM: 154.1  7/6/2018 Colostomy status (HCC) (Chronic) ICD-10-CM: Z93.3 ICD-9-CM: V44.3  6/29/2018 Erectile dysfunction due to arterial insufficiency (Chronic) ICD-10-CM: N52.01 
ICD-9-CM: 607.84  5/31/2018 Benign essential HTN (Chronic) ICD-10-CM: I10 
ICD-9-CM: 401.1  5/31/2018 Hypercholesterolemia (Chronic) ICD-10-CM: E78.00 ICD-9-CM: 272.0  5/30/2018 Overview Signed 6/4/2018  2:20 PM by Katiana Ross  
  ASCVD 10 year risk is 16.0%. Mod to high dose statin indicated.  (based on b/p 180/90, 6/4/18) Screening PSA (prostate specific antigen) (Chronic) ICD-10-CM: Z12.5 ICD-9-CM: V76.44  5/30/2018 History of tobacco abuse (Chronic) ICD-10-CM: N86.632 ICD-9-CM: V15.82  8/18/2016 PLAN: 
Dysphagia/vomiting 
- GI evaluation. Planning EGD tomorrow (cannot do dilation due to recent avastin). Started pepcid BID 
- Continue IV fluids 7/26 Barium swallow today with aspiration and extrinsic soft tissue mass creating filling defect in distal esophagus 7/29 Spoke with Ignacia Lazaro (out of town) and Yared. Needs palliative radiation to mediastinal adenopathy that is compressing esophagus. Planned for consult this afternoon. Pt agreeable to PEG given length of time it may take for radiation to improve his ability to swallow. GI reconsulted for placement 7/30 S/p simulation with rad onc yesterday - planning to start radiation (today but held due to procedure). PEG placement possibly tomorrow 8/1 PEG is no longer an option. TPN starting today. Aspiration pneumonia 
- Continue levaquin. BCx pending. UA negative. CT chest with no acute findings 7/26 D3 LVQ 
7/29 Continues zosyn. Noted leukocytosis. 8/2 broaden coverage, add LVQ and Vanc Met rectal ca - S/p FOLFIRI-Avastin, last given 7/9 7/29 Suspicion for progression on recent CT. Family is aware. Further treatment TBD outpatient with Dr. Cyndi Bishop Leukopenia/anemia 
- Secondary to chemotherapy. Granix x 1 today 7/26 WBC up to 4.5 
7/29 Continues with elevated counts Hx hydronephrosis - Planned for cystoscopy and stent placement tomorrow with urology (was scheduled for outpatient). Ok from [employer]. Discussed with pulmonary and no contraindication from their perspective. Discussed with Constance Severin NP 
7/30 S/p cystoscopy with right ureteral stent placed and right ureteral biopsy. To ICU following for tachypnea Respiratory failure 8/2 Intubated -bilateral ground glass densities on CXR suspicious for infection. Pulmonary taking over as primary. 8/3 Remains intubated, unresponsive, worsened ABG/CXR Patient is partial code status. Daughter is hopeful for improvement but aware of poor prognosis. Cole Palaciso, [facility] [address] [address] Office : [phone] Fax : [phone] I personally saw, exammed and counselled the patient, and discussed with NP, agree with above history/assessment/plan. 53 y.o.male rectal cancer s/p chemorad in 2018 but refractory, developed mediastinal LAD, received FOLFOX/avastin and progressed, change to FOLFIRI/avastin but admitted with esophageal dysphagia, had aspiration in barium swallow and had to be care in ICU, now respiratory status stable, pt wants to excalate to \" out-of-box rx\" and we discussed his disease had shown great resistance to major regimens, there are oral options but he has to solve the dysphagia problem for both rx and feeding, agreed to pursue palliative XRT of mediastinal LN. He decompensated after exchanging stent and moved to ICU, tried dobhoff but pulled out and PEG pending respiratory status stable, XRT simulated and pending clinically stable, discussed with pt and family as much as we understand he still wants aggressive rx, his tolerance appears poor. He had been difficult to manage the BiPAP for agitation and got intubated 8/1 overnight and continue to decline, discussed with ICU to increase abx for HAP coverage, again addressed family members the poor prognosis and the challenging situation, started TPN temporarily bridge to PEG.   
 
Rectal cancer Randell Lozada M.D. [facility] [address] [address] Office : [phone] Fax : [phone]

## 2019-08-03 NOTE — PROGRESS NOTES
Ventilator check complete; patient has a #7.5 ET tube secured at the 24 at the lip. Patient is not able to follow commands. Breath sounds are coarse. Trachea is midline, Negative for subcutaneous air, and chest excursion is symmetric. Patient is also Negative for cyanosis and is Negative for pitting edema. All alarms are set and audible. Resuscitation bag is at the head of the bed. Ventilator Settings Mode FIO2 Rate Tidal Volume Pressure PEEP I:E Ratio Pressure control  30 %          8 cm H20  1:2.2 Peak airway pressure: 26 cm H2O Minute ventilation: 10.8 l/min Quan Light RT

## 2019-08-03 NOTE — PROGRESS NOTES
Bedside, Verbal and Written shift change report given to NATHANAEL Guzmán (oncoming nurse) by Celeste Martinez RN (offgoing nurse). Report included the following information SBAR, Kardex, ED Summary, Intake/Output, MAR, Accordion, Recent Results, Med Rec Status, Cardiac Rhythm SR/ST and Alarm Parameters . Fentanyl gtt signed off.

## 2019-08-03 NOTE — PROGRESS NOTES
Nutrition F/U: 
TPN Management for Oncology. Assessment: 
Weight 62.8 kg (ICU bed 8/2/19), edema - trace BUE, 1+-2+ pitting BLEs. The patient remains intubated, ventilated, sedated and TPN-dependent at this time. He remains TPN-dependent due to inability to place enteral access. Labs are remarkable for AM glucose 192, magnesium 2.9, corrected calcium 9.9 and  (Diprivan was stopped yesterday; POC glucose (8/2) 171,146,224,239 and (8/3) 190. Solumedrol dose was increased yesterday. IV Access: 
           Single port. Macronutrient Needs (59.7 kg): EER:  6858-4543 kcal /day (25-30 kcal/kg) EPR:  72-90 grams protein/day (1.2-1.5 grams/kg) Max CHO:  344 grams/day (4 mg/kg/min/day) Fluid:  1ml/kcal 
Intake/Comparative Standards: 
Current intake of TPN (1.8 liters 5%AA/15%DEX - 1278 calories/day (72% calorie goal), 90 grams protein/day (100% protein goal), 270 grams CHO/day (does not exceed max CHO limit) and 1800 ml water/day (100% fluid goal).  Diprivan was contributing ~140 calories/day. Intervention:  
Meals and Snacks: NPO. Parenteral Nutrition: 1) Continue TPN (1.8 liters) of 5%AA/15%DEX, add daily lipids - 1778 calories/day (100% calorie goal), 90 grams protein/day (100% protein goal), 270 grams CHO/day (does not exceed max CHO limit) and ~2250 ml water/day (100% fluid goal). Veena Molina 2) Adjust TPN to remove all calcium and magnesium, increase the insulin to 20 units/liter and decrease the infusion rate to 78 ml/hr. 3) Additives/liter: 120 sodium (chloride), 20 potassium (phosphate), 20 phosphorus (potassium), 20 insulin. 4) Additives/bag: MVI, MTE and Pepcid. Mineral Supplement Therapy: Nutrition Support Orders/Electrolyte Management Replacement Protocols are active on the MAR. Coordination of Nutrition Care by a Nutrition Professional: AM ICU rounds, collaboration with Kim Batista RN. Nutrition Discharge Plan: Too soon to determine. Gabby Cruz. [facility] 
[phone]

## 2019-08-03 NOTE — PROGRESS NOTES
Pt became very agitated during mouth care, sitting up in bed, thrashing head back and forth, not following commands, breathing 40 times a minute. PRN ativan given, wife at bedside. Pt now resting quietly in bed, compliant with vent.

## 2019-08-03 NOTE — PROGRESS NOTES
Critical Care Daily Progress Note: 8/3/2019 Admission Date: 7/24/2019 The patient's chart is reviewed and the patient is discussed with the staff. Chronic Medical:  Colon cancer, DM, HTN, rectal cancer, MYRON, former tobacco abuse and GERD. 
  
Was admitted on 7/24/2019 by Oncology for dehydration and rectal adenocarcinoma confirmed to be Stage IV per biopsy. Jacqueline Situ was initiated on 5FU-A and recently switched to FOLFIRI-A.  XRT planned for mediastinal node. Barium swallow revealed aspiration of contrast as well as extrinsic soft tissue mass creating filling defect in distal esophagus. Mechanical soft diet with chopped/ground meats and nectar thick liquids recommended per speech therapy.   
  On abx for suspected aspiration pneumonia. Has been hypoxic and requiring oxygen support.  
  Seen by urology for right hydroureteronephrosis and cystoscopy 7/30 with stent placement/biopsy-no malignancy per path. Blocking ureter. GI was consulted for PEG placement but due to decline in respiratory status this is on hold. Intubated 8/1 for respiratory support. Subjective:  
 
Sedated ,on vent Current Facility-Administered Medications Medication Dose Route Frequency  insulin regular (NOVOLIN R, HUMULIN R) injection   SubCUTAneous Q6H  
 LORazepam (ATIVAN) injection 2 mg  2 mg IntraVENous Q2H PRN  
 methylPREDNISolone (PF) (SOLU-MEDROL) injection 60 mg  60 mg IntraVENous Q8H  
 budesonide (PULMICORT) 500 mcg/2 ml nebulizer suspension  500 mcg Nebulization BID RT  
 TPN ADULT-CENTRAL - dextrose 15% amino acid 5%   IntraVENous QPM  
 propofol (DIPRIVAN) infusion  0-50 mcg/kg/min IntraVENous TITRATE  fentaNYL in normal saline (pf) 25 mcg/mL infusion   mcg/hr IntraVENous TITRATE  morphine injection 2 mg  2 mg IntraVENous Q4H PRN  
 albuterol (PROVENTIL VENTOLIN) nebulizer solution 2.5 mg  2.5 mg Nebulization QID RT  
  hydrALAZINE (APRESOLINE) 20 mg/mL injection 10 mg  10 mg IntraVENous Q6H PRN  
 iothalamate meglumine (CONRAY 60) 60 % contrast solution    PRN  
 albuterol (PROVENTIL VENTOLIN) nebulizer solution 2.5 mg  2.5 mg Nebulization Q6H PRN  
 NUTRITIONAL SUPPORT ELECTROLYTE PRN ORDERS   Does Not Apply PRN  piperacillin-tazobactam (ZOSYN) 4.5 g in 0.9% sodium chloride (MBP/ADV) 100 mL  4.5 g IntraVENous Q8H  
 magic mouthwash (SIMON) oral suspension 5 mL  5 mL Oral Q4H PRN  
 central line flush (saline) syringe 10 mL  10 mL InterCATHeter PRN  prochlorperazine (COMPAZINE) injection 10 mg  10 mg IntraVENous Q6H PRN  
 enoxaparin (LOVENOX) injection 100 mg  100 mg SubCUTAneous Q24H Review of Systems Unobtainable due to patient status. Objective:  
 
Vitals:  
 08/03/19 6466 08/03/19 0559 08/03/19 1088 08/03/19 1007 BP: 92/62 101/65 101/65 Pulse: 92 93 88 82 Resp: 24 24 24 26 Temp:      
SpO2: 95% 96% 95% 95% Weight:      
 
 
Intake and Output:  
08/01 1901 - 08/03 0700 In: 4268.3 [I.V.:4268.3] Out: 1080 [HAXLZ:8711] No intake/output data recorded. Physical Exam:         
Constitutional:  intubated and mechanically ventilated. EENMT:  Sclera clear, pupils equal, oral mucosa moist 
Respiratory: wheezing Cardiovascular:  RRR with no M,G,R; 
Gastrointestinal:  soft with no tenderness; positive bowel sounds present Musculoskeletal:  warm with no cyanosis, no lower extremity edema Skin:  no jaundice or ecchymosis Neurologic: no gross neuro deficits Psychiatric:  Sedated LINES:   
ETT 8/1/19 Venous port 10/29/18 PIV sites Schultz 8/1/19 
  
DRIPS:   
Fentanyl 100 mcg/hr CXR:   
 
 
Ventilator Settings Mode FIO2 Rate Tidal Volume Pressure PEEP Pressure control  28 %(weaned to 28%)          8 cm H20 Peak airway pressure: 36 cm H2O Minute ventilation: 10.1 l/min ABG:  
Recent Labs 08/03/19 
6213 08/03/19 
4994 08/03/19 
4118 PHI 7.277* 7.294* 7.272* PCO2I 61.0* 60.1* 58.8*  
PO2I 81 83 91 HCO3I 28.4* 29.1* 27.1*  
  
 
LAB Recent Labs 08/03/19 
0521 08/02/19 2317 08/02/19 
1700 08/02/19 
1132 08/02/19 
0830 GLUCPOC 190* 239* 224* 146* 171* Recent Labs 08/03/19 
7136 08/02/19 
8685 08/01/19 
1253 WBC 38.7* 28.3* 40.0* HGB 10.1* 9.8* 10.4* HCT 34.4* 32.2* 34.7*  
 232 273 Recent Labs 08/03/19 
5350 08/02/19 
9815 08/01/19 
2817  135* 140  
K 4.2 4.1 4.7  99 101 CO2 30 31 33* * 226* 133* BUN 37* 28* 23  
CREA 1.02 0.92 0.86  
MG 2.9* 2.7* 2.7* PHOS  --  3.3  --   
CA 8.7 8.4 8.7 ALB 2.5* 2.5* 2.5* SGOT 29 16 23 Assessment:  (Medical Decision Making) Hospital Problems  Date Reviewed: 8/2/2019 Codes Class Noted POA Hydroureteronephrosis 
 per urology  ICD-10-CM: N13.30 ICD-9-CM: 602  7/30/2019 Clinically Undetermined Hx pulmonary embolism (Chronic) chronic  ICD-10-CM: Z86.711 ICD-9-CM: V12.55  7/30/2019 Yes History of DVT of lower extremity (Chronic) ICD-10-CM: C00.257 ICD-9-CM: V12.51  7/30/2019 Yes Hypoxia ICD-10-CM: R09.02 
ICD-9-CM: 799.02  7/30/2019 No  
   
 Metastatic cancer to lung [facility]) (Chronic) ICD-10-CM: C78.00 ICD-9-CM: 197.0  7/26/2019 Yes Overview Signed 7/26/2019 11:06 AM by Yordy Ayala NP Per EBUS in June 2019 Acute respiratory failure ([address]  
on vent  ICD-10-CM: J96.00 
ICD-9-CM: 518.81  7/26/2019 Yes Aspiration into airway  
on ABX ICD-10-CM: T17.908A ICD-9-CM: 934.9  7/26/2019 Yes Dysphagia 
 need PEG, now on TPN ICD-10-CM: R13.10 ICD-9-CM: 787.20  7/25/2019 Yes * (Principal) [facility] (Ch[facility]) per oncology, stage IV, not responding to therapy, poor prognosis per oncology ICD-10-CM: C20 
ICD-9-CM: 154.1  7/6/2018 Yes Plan:  (Medical Decision Making) -- continue ABX, steroids - --Nutritional support:  TPN/lipids, PEG on hold 
--Oncology following 
--Palliative Care following--partial code-no CPR 
- Urology--recent cysto and with gross hematuria  
-Appreciate oncology ongoing discussions with patient as options for additional therapy seem limited and prognosis is extremely poor despite our ongoing aggressive ICU care. I dont think he should go through PEG placement , not helpful and potentially harmful unless significantly improves which is unlikely 
-updated daughter which she seems to ready  Yet to accept his terminal situation 
-remains critically ill, spent 38 min patient care More than 50% of the time documented was spent in face-to-face contact with the patient and in the care of the patient on the floor/unit where the patient is located.  
 
Bertha Mitchell MD

## 2019-08-03 NOTE — PROGRESS NOTES
Pharmacokinetic Consult to Pharmacist 
 
Ez Lorna is a 48 y.o. male being treated for worsening respiratory failure with Vancomycin, levofloxacin, and Pip-Tazo. Weight: 62.8 kg (138 lb 6.4 oz) Lab Results Component Value Date/Time BUN 37 (H) 08/03/2019 03:15 AM  
 Creatinine 1.02 08/03/2019 03:15 AM  
 WBC 38.7 (H) 08/03/2019 03:15 AM  
 Procalcitonin 0.4 06/30/2018 05:06 AM  
 Lactic acid 2.2 (HH) 06/30/2018 06:59 PM  
 Lactic Acid (POC) 1.40 03/23/2019 06:28 PM  
  
Estimated Creatinine Clearance: 74.4 mL/min (based on SCr of 1.02 mg/dL). Day 1 of vancomycin. Goal trough is 15-20. Vancomycin dose initiated at 1500 mg X 1, then 1000 mg Q12H. Plan trough prior to the 4th dose. Will continue to follow patient. Thank you, 
Brina Parson, PharmD Clinical Pharmacist 
[phone]

## 2019-08-03 NOTE — PROGRESS NOTES
Bedside, Verbal and Written shift change report given to Yvrose Cm RN (oncoming nurse) by Cecilio Jensen RN (offgoing nurse). Report included the following information SBAR, Kardex, ED Summary, Intake/Output, MAR, Accordion, Med Rec Status, Cardiac Rhythm st and Alarm Parameters .

## 2019-08-03 NOTE — PROGRESS NOTES
Head to toe assessment completed on pt this AM. 
  
Neuro: Pt eyes open to stimulation / voice. No command following - pt agitated easily. With stimulation, pt thrashes head in bed and pulls at restraints - RASS +2. Without stimulation, pt rests quietly in bed. PERRLA - 2 mm and sluggish to react - Eyes deviated upwards. SOTELO spontaneously. Resp: PC FiO2 30%. Coarse, expiratory wheezing upper / diminished lower breath sounds. Clear thin secretions upon suctioning. Cardiac: NSR - slight hypotension. GI: NPO - TPN infusing through port. Ostomy patent and draining. Hypoactive bowel sounds. : Schultz - draining and patent. Gross hematuria upon examination. Skin: 1+ edema all extremities. Allevyn, Prevalon boots  and SCDs intact.

## 2019-08-04 NOTE — PROGRESS NOTES
Ventilator check complete; patient has a #7.5 ET tube secured at the 24 at the lip. Patient is not able to follow commands. Breath sounds are expiratory wheeze. Trachea is midline, Negative for subcutaneous air, and chest excursion is symmetric. Patient is also Negative for cyanosis and is Negative for pitting edema. All alarms are set and audible. Resuscitation bag is at the head of the bed. Ventilator Settings Mode FIO2 Rate Tidal Volume Pressure PEEP I:E Ratio Pressure control  28 % 30      35 10 cm H20  1:1.9 Peak airway pressure: 45 cm H2O Minute ventilation: 12.5 l/min ABG: No results for input(s): PH, PCO2, PO2, HCO3 in the last 72 hours.  
 
 
Toya Cordova, RT

## 2019-08-04 NOTE — PROGRESS NOTES
Pressure was increased to 40 to increase Ve after ABG results: pH 7.26, CO2 57 PEEP was increased to 10 to help eliminate auto-peep

## 2019-08-04 NOTE — PROGRESS NOTES
[facility] Inpatient Hematology / Oncology Progress Note Admission Date: 2019  3:18 PM 
Reason for Admission/Hospital Course: Dysphagia [R13.10] Intractable vomiting [R11.10] 24 Hour Events: 
Intubated  Family at bedside No clinical changes ROS: Unable to obtain. Patient sedated on vent 10 point review of systems is otherwise negative with the exception of the elements mentioned above in the HPI. Allergies Allergen Reactions  Zithromax [Azithromycin] Rash OBJECTIVE: 
Patient Vitals for the past 8 hrs: 
 BP Temp Pulse Resp SpO2  
19 1137  97.8 °F (36.6 °C) (!) 101 30 94 % 19 1029 101/66  (!) 102 30 94 % 19 1014 99/65  (!) 102 27 94 % 19 1000 96/65  (!) 103 30 94 % 19 0945 106/67  (!) 102 30 94 % 19 0929 108/69  (!) 105 30 94 % 19 0914 106/63  (!) 107 30 94 % 19 0900 115/72  (!) 107 30 94 % 19 0845 113/70  (!) 108 (!) 31 93 % 19 0829 125/83  (!) 110 30 94 % 19 0822   (!) 117 (!) 31 92 % 19 0814 129/74  95 10 94 % 19 0800 108/69  95 (!) 32 96 % 19 0753   92 30 96 % 19 0745 106/68  92 30 96 % 19 0729 115/70  98 24 95 % 19 0714 105/70  99 30 96 % 19 0700 97/64  99 30 96 % Temp (24hrs), Av.9 °F (36.6 °C), Min:97.2 °F (36.2 °C), Max:98.9 °F (37.2 °C) 
 
 07 -  1900 In: 15.7 [I.V.:15.7] Out: 450 [Urine:450] Physical Exam: 
Constitutional: Acutely ill male in respiratory distress, lying in the hospital bed in ICU Family at bedside. HEENT: Normocephalic and atraumatic. Intubated. Lymph node Deferred Skin Warm and dry. No bruising and no rash noted. No erythema. No pallor. Respiratory Expiratory wheezes on vent CVS Reg rate, regular rhythm and normal S1 and S2. No murmurs, gallops, or rubs. Abdomen Soft, nontender and nondistended, hypoactive bowel sounds. Neuro Sedated Psych Sedated Labs: 
   
Recent Labs 08/04/19 
2714 08/03/19 
0315 08/02/19 
3051 WBC 48.6* 38.7* 28.3*  
RBC 3.23* 3.52* 3.39* HGB 9.6* 10.1* 9.8* HCT 32.7* 34.4* 32.2*  
.2* 97.7 95.0 MCH 29.7 28.7 28.9 MCHC 29.4* 29.4* 30.4*  
RDW 17.2* 17.1* 16.6*  
 225 232 GRANS 70 62 58 LYMPH 1* 1* 1*  
MONOS 8 9 6 EOS 0* 0* 0*  
BASOS 0 0 1 IG 21* 28* 34* DF AUTOMATED AUTOMATED AUTOMATED ANEU 34.0* 24.0* 16.4* ABL 0.5 0.4* 0.3* ABM 3.9* 3.5* 1.7* YEIS 0.0 0.0 0.0 ABB 0.0 0.0 0.3* AIG 10.2* 10.8* 9.6* Recent Labs 08/04/19 
6721 08/03/19 
0315 08/02/19 
8486  142 135* K 4.3 4.2 4.1 * 104 99 CO2 27 30 31 AGAP 7 8 5* * 192* 226* BUN 47* 37* 28* CREA 1.21 1.02 0.92 GFRAA >60 >60 >60 GFRNA >60 >60 >60  
CA 8.5 8.7 8.4 SGOT 25 29 16 AP 72 74 58 TP 6.5 6.6 6.1* ALB 2.1* 2.5* 2.5*  
GLOB 4.4* 4.1* 3.6* AGRAT 0.5* 0.6* 0.7* MG 2.7* 2.9* 2.7* PHOS  --   --  3.3 Imaging: XR CHEST SNGL V [[id number]] Collected: 07/26/19 1151 Order Status: Completed Updated: 07/26/19 1202 Narrative:    
Portable chest x-ray CLINICAL INDICATION: Shortness of breath FINDINGS: Single AP view the chest compared to a similar exam dated 7/11/2019 
shows new reticular nodular interstitial densities throughout both lungs with 
more patchy airspace opacity in the right upper lobe. A right-sided chest port 
is in place. The cardiac silhouette and mediastinum are stable. Impression:    
IMPRESSION: New, diffuse reticular nodular densities throughout the lungs with 
patchy opacity in the right upper lobe. Atypical pneumonia or pulmonary edema 
should be considered. XR BA SWALLOW ESOPHOGRAM [[id number]] Collected: 07/26/19 9990 Order Status: Completed Updated: 07/26/19 1026 Narrative:    
History: Dysphasia. Stage IV rectal cancer. EXAM: Barium swallow TECHNIQUE: 1.3 minutes fluoroscopy time is utilized. The patient ingests 
effervescent granules followed by thick and thin consistencies of barium. 18 
fluoroscopic images are available for review. No comparison FINDINGS: The exam is markedly limited. The patient is unable to perform the 
exam the upright position, and has very limited mobility. There is aspiration of 
the thin contrast. Contrast flows through the esophagus into the stomach without 
difficulty. There is a hiatal hernia present. Just above the hiatal hernia, 
there is a filling defect of the distal esophagus, corresponding to a soft 
tissue mass adjacent to the esophagus on the recent CT chest. No definite 
gastroesophageal reflux, or ulceration. Impression:    
IMPRESSION: 
1. Just above the hiatal hernia, there is an extrinsic soft tissue mass creating 
a filling defect within the distal esophagus, corresponding to a soft tissue 
mass seen on the recent CT chest. 
2. Aspiration of oral contrast. Correlation with modified barium swallow 
recommended. CT CHEST W CONT [[id number]] Collected: 07/24/19 2133 Order Status: Completed Updated: 07/24/19 2138 Narrative:    
CT OF THE CHEST WITH INTRAVENOUS CONTRAST. INDICATION: Shortness of breath. Colby Mikes adenocarcinoma with metastatic 
disease. COMPARISON: None. TECHNIQUE:   2.5 mm axial scans from above the aortic arch to the lung bases 
with 100 cc nonionic intravenous contrast without acute complication. Intravenous contrast was given to evaluate for pulmonary embolism. FINDINGS:  The degree of opacification of the pulmonary arteries is adequate. No intraluminal filling defects within the pulmonary arterial tree to suggest 
pulmonary embolism. Remi Schaumann is normal caliber with a uniform lumen without 
evidence of dissection. Lungs demonstrate multiple nodules, groundglass 
opacities and small pneumonic consolidation in the bases. There is extensive mediastinal adenopathy which has increased a great deal from the prior exam.. Included portion of the upper abdomen are unremarkable. No gross bony lesions. Dewane Newness Impression:    
IMPRESSION:  Mediastinal adenopathy is increased great deal from the prior exam. 
There is patchy airspace disease in both lung bases. Pulmonary metastases. Negative for pulmonary embolism. Medications: 
Current Facility-Administered Medications Medication Dose Route Frequency  vancomycin (VANCOCIN) 1,000 mg in 0.9% sodium chloride (MBP/ADV) 250 mL  1,000 mg IntraVENous Q18H  
 midazolam (VERSED) 100 mg in 0.9% sodium chloride 100 mL infusion  0-10 mg/hr IntraVENous TITRATE  TPN ADULT-CENTRAL - dextrose 15% amino acid 5%   IntraVENous QPM  
 fat emulsion 20% (LIPOSYN, INTRAlipid) infusion 250 mL  250 mL IntraVENous QPM  
 levoFLOXacin (LEVAQUIN) 750 mg in D5W IVPB  750 mg IntraVENous Q24H  polyvinyl alcohol (LIQUIFILM TEARS) 1.4 % ophthalmic solution 1 Drop  1 Drop Both Eyes PRN  
 insulin regular (NOVOLIN R, HUMULIN R) injection   SubCUTAneous Q6H  
 LORazepam (ATIVAN) injection 2 mg  2 mg IntraVENous Q2H PRN  
 methylPREDNISolone (PF) (SOLU-MEDROL) injection 60 mg  60 mg IntraVENous Q8H  
 budesonide (PULMICORT) 500 mcg/2 ml nebulizer suspension  500 mcg Nebulization BID RT  
 fentaNYL in normal saline (pf) 25 mcg/mL infusion   mcg/hr IntraVENous TITRATE  morphine injection 2 mg  2 mg IntraVENous Q4H PRN  
 albuterol (PROVENTIL VENTOLIN) nebulizer solution 2.5 mg  2.5 mg Nebulization QID RT  
 hydrALAZINE (APRESOLINE) 20 mg/mL injection 10 mg  10 mg IntraVENous Q6H PRN  
 albuterol (PROVENTIL VENTOLIN) nebulizer solution 2.5 mg  2.5 mg Nebulization Q6H PRN  
 NUTRITIONAL SUPPORT ELECTROLYTE PRN ORDERS   Does Not Apply PRN  piperacillin-tazobactam (ZOSYN) 4.5 g in 0.9% sodium chloride (MBP/ADV) 100 mL  4.5 g IntraVENous Q8H  
 magic mouthwash (SIMON) oral suspension 5 mL  5 mL Oral Q4H PRN  
  central line flush (saline) syringe 10 mL  10 mL InterCATHeter PRN  prochlorperazine (COMPAZINE) injection 10 mg  10 mg IntraVENous Q6H PRN  
 enoxaparin (LOVENOX) injection 100 mg  100 mg SubCUTAneous Q24H  
 
 
 
ASSESSMENT: 
 
Problem List  Date Reviewed: 8/2/2019 Codes Class Noted Hydroureteronephrosis ICD-10-CM: N13.30 ICD-9-CM: 364  7/30/2019 Hx pulmonary embolism (Chronic) ICD-10-CM: Z61.924 ICD-9-CM: V12.55  7/30/2019 History of DVT of lower extremity (Chronic) ICD-10-CM: P41.212 ICD-9-CM: V12.51  7/30/2019 Hypoxia ICD-10-CM: R09.02 
ICD-9-CM: 799.02  7/30/2019 Metastatic cancer to lung [facility]) (Chronic) ICD-10-CM: C78.00 ICD-9-CM: 197.0  7/26/2019 Overview Signed 7/26/2019 11:06 AM by Albin Howard NP Per EBUS in June 2019 [facility] ([facility] ICD-10-CM: J96.00 
ICD-9-CM: 518.81  7/26/2019 Aspiration into airway ICD-10-CM: T17.908A ICD-9-CM: 934.9  7/26/2019 Dysphagia ICD-10-CM: R13.10 ICD-9-CM: 787.20  7/25/2019 Intractable vomiting ICD-10-CM: R11.10 ICD-9-CM: 536.2  7/25/2019 Mediastinal lymphadenopathy ICD-10-CM: R59.0 ICD-9-CM: 785.6  6/11/2019 Pulmonary nodules ICD-10-CM: R91.8 ICD-9-CM: 793.19  6/11/2019 Dehydration ICD-10-CM: E86.0 ICD-9-CM: 276.51  4/2/2019 S/P radiation therapy < 4 weeks ago ICD-10-CM: Z92.3 ICD-9-CM: V15.3  10/1/2018 * (Principal) Rectal adenocarcinoma ([facility] (Chronic) ICD-10-CM: C20 
ICD-9-CM: 154.1  7/6/2018 Colostomy status (HCC) (Chronic) ICD-10-CM: Z93.3 ICD-9-CM: V44.3  6/29/2018 Erectile dysfunction due to arterial insufficiency (Chronic) ICD-10-CM: N52.01 
ICD-9-CM: 607.84  5/31/2018 Benign essential HTN (Chronic) ICD-10-CM: I10 
ICD-9-CM: 401.1  5/31/2018 Hypercholesterolemia (Chronic) ICD-10-CM: E78.00 ICD-9-CM: 272.0  5/30/2018  Overview Signed 6/4/2018  2:20 PM by Antonio Marinelli  
 ASCVD 10 year risk is 16.0%. Mod to high dose statin indicated. (based on b/p 180/90, 6/4/18) Screening PSA (prostate specific antigen) (Chronic) ICD-10-CM: Z12.5 ICD-9-CM: V76.44  5/30/2018 History of tobacco abuse (Chronic) ICD-10-CM: B39.095 ICD-9-CM: V15.82  8/18/2016 PLAN: 
Dysphagia/vomiting 
- GI evaluation. Planning EGD tomorrow (cannot do dilation due to recent avastin). Started pepcid BID 
- Continue IV fluids 7/26 Barium swallow today with aspiration and extrinsic soft tissue mass creating filling defect in distal esophagus 7/29 Spoke with Ignacia Gaines (out of town) and Jennifer White. Needs palliative radiation to mediastinal adenopathy that is compressing esophagus. Planned for consult this afternoon. Pt agreeable to PEG given length of time it may take for radiation to improve his ability to swallow. GI reconsulted for placement 7/30 S/p simulation with rad onc yesterday - planning to start radiation (today but held due to procedure). PEG placement possibly tomorrow 8/1 PEG is no longer an option. TPN starting today. Aspiration pneumonia 
- Continue levaquin. BCx pending. UA negative. CT chest with no acute findings 7/26 D3 LVQ 
7/29 Continues zosyn. Noted leukocytosis. 8/2 broaden coverage, add LVQ and Vanc Met rectal ca - S/p FOLFIRI-Avastin, last given 7/9 7/29 Suspicion for progression on recent CT. Family is aware. Further treatment TBD outpatient with Dr. Whitfield Sandhoff Leukopenia/anemia 
- Secondary to chemotherapy. Granix x 1 today 7/26 WBC up to 4.5 
7/29 Continues with elevated counts Hx hydronephrosis - Planned for cystoscopy and stent placement tomorrow with urology (was scheduled for outpatient). Ok from [employer]. Discussed with pulmonary and no contraindication from their perspective. Discussed with Kylee German NP 
7/30 S/p cystoscopy with right ureteral stent placed and right ureteral biopsy. To ICU following for tachypnea Respiratory failure 8/2 Intubated -bilateral ground glass densities on CXR suspicious for infection. Pulmonary taking over as primary. 8/3 Remains intubated, unresponsive, worsened ABG/CXR  
8/4 No clinical changes, intubated and sedated, CXR unchanged Patient is partial code status. Daughter is hopeful for improvement but aware of poor prognosis. [facility] [address] [address] Office : [phone] Fax : [phone] I personally saw, exammed and counselled the patient, and discussed with NP, agree with above history/assessment/plan. 53 y.o.male rectal cancer s/p chemorad in 2018 but refractory, developed mediastinal LAD, received FOLFOX/avastin and progressed, change to FOLFIRI/avastin but admitted with esophageal dysphagia, had aspiration in barium swallow and had to be care in ICU, pt wants to excalate to \" out-of-box rx\" and we discussed his disease had shown great resistance to major regimens, there are oral options but he has to solve the dysphagia problem for both rx and feeding, agreed to pursue palliative XRT of mediastinal LN. He decompensated after exchanging stent and moved to ICU, tried dobhoff but pulled out and PEG pending respiratory status stable, XRT simulated and pending clinically stable, discussed with pt and family as much as we understand he still wants aggressive rx, his tolerance appears poor. He had been difficult to manage the BiPAP for agitation and got intubated 8/1 overnight and continue to decline, discussed with ICU to increase abx for HAP coverage, continue to be intubated with versed drip for high agitation, again addressed family members the poor prognosis and the challenging situation, will follow. 
  
 
Rectal cancer Randell Perez M.D. [facility] [address] [address] Office : [phone] Fax : [phone]

## 2019-08-04 NOTE — PROGRESS NOTES
Critical Care Daily Progress Note: 8/4/2019 Admission Date: 7/24/2019 The patient's chart is reviewed and the patient is discussed with the staff. Chronic Medical:  Colon cancer, DM, HTN, rectal cancer, MYRON, former tobacco abuse and GERD. 
  
Was admitted on 7/24/2019 by Oncology for dehydration and rectal adenocarcinoma confirmed to be Stage IV per biopsy. Amber Burton was initiated on 5FU-A and recently switched to FOLFIRI-A.  XRT planned for mediastinal node. Barium swallow revealed aspiration of contrast as well as extrinsic soft tissue mass creating filling defect in distal esophagus. Mechanical soft diet with chopped/ground meats and nectar thick liquids recommended per speech therapy.   
  On abx for suspected aspiration pneumonia. Has been hypoxic and requiring oxygen support.  
  Seen by urology for right hydroureteronephrosis and cystoscopy 7/30 with stent placement/biopsy-no malignancy per path. Blocking ureter. GI was consulted for PEG placement but due to decline in respiratory status this is on hold. Intubated 8/1 for respiratory support. Subjective:  
 
Sedated ,on vent agitated at times Current Facility-Administered Medications Medication Dose Route Frequency  vancomycin (VANCOCIN) 1,000 mg in 0.9% sodium chloride (MBP/ADV) 250 mL  1,000 mg IntraVENous Q18H  
 TPN ADULT-CENTRAL - dextrose 15% amino acid 5%   IntraVENous QPM  
 fat emulsion 20% (LIPOSYN, INTRAlipid) infusion 250 mL  250 mL IntraVENous QPM  
 levoFLOXacin (LEVAQUIN) 750 mg in D5W IVPB  750 mg IntraVENous Q24H  polyvinyl alcohol (LIQUIFILM TEARS) 1.4 % ophthalmic solution 1 Drop  1 Drop Both Eyes PRN  
 insulin regular (NOVOLIN R, HUMULIN R) injection   SubCUTAneous Q6H  
 LORazepam (ATIVAN) injection 2 mg  2 mg IntraVENous Q2H PRN  
 methylPREDNISolone (PF) (SOLU-MEDROL) injection 60 mg  60 mg IntraVENous Q8H  
 budesonide (PULMICORT) 500 mcg/2 ml nebulizer suspension  500 mcg Nebulization BID RT  
 fentaNYL in normal saline (pf) 25 mcg/mL infusion   mcg/hr IntraVENous TITRATE  morphine injection 2 mg  2 mg IntraVENous Q4H PRN  
 albuterol (PROVENTIL VENTOLIN) nebulizer solution 2.5 mg  2.5 mg Nebulization QID RT  
 hydrALAZINE (APRESOLINE) 20 mg/mL injection 10 mg  10 mg IntraVENous Q6H PRN  
 albuterol (PROVENTIL VENTOLIN) nebulizer solution 2.5 mg  2.5 mg Nebulization Q6H PRN  
 NUTRITIONAL SUPPORT ELECTROLYTE PRN ORDERS   Does Not Apply PRN  piperacillin-tazobactam (ZOSYN) 4.5 g in 0.9% sodium chloride (MBP/ADV) 100 mL  4.5 g IntraVENous Q8H  
 magic mouthwash (SIMON) oral suspension 5 mL  5 mL Oral Q4H PRN  
 central line flush (saline) syringe 10 mL  10 mL InterCATHeter PRN  prochlorperazine (COMPAZINE) injection 10 mg  10 mg IntraVENous Q6H PRN  
 enoxaparin (LOVENOX) injection 100 mg  100 mg SubCUTAneous Q24H Review of Systems Unobtainable due to patient status. Objective:  
 
Vitals:  
 08/04/19 0945 08/04/19 1000 08/04/19 1014 08/04/19 1029 BP: 106/67 96/65 99/65 101/66 Pulse: (!) 102 (!) 103 (!) 102 (!) 102 Resp: 30 30 27 30 Temp:      
SpO2: 94% 94% 94% 94% Weight:      
 
 
Intake and Output:  
08/02 1901 - 08/04 0700 In: 4930.5 [I.V.:4930.5] Out: 1260 [BFAVK:4208] 08/04 0701 - 08/04 1900 In: 15.7 [I.V.:15.7] Out: - Physical Exam:         
Constitutional:  intubated and mechanically ventilated. EENMT:  Sclera clear, pupils equal, oral mucosa moist 
Respiratory: wheezing Cardiovascular:  RRR with no M,G,R; 
Gastrointestinal:  soft with no tenderness; positive bowel sounds present Musculoskeletal:  warm with no cyanosis, no lower extremity edema Skin:  no jaundice or ecchymosis Neurologic: no gross neuro deficits Psychiatric:  Sedated LINES:   
ETT 8/1/19 Venous port 10/29/18 PIV sites Schultz 8/1/19 
  
DRIPS:   
Fentanyl 100 mcg/hr CXR:   
 
 
Ventilator Settings Mode FIO2 Rate Tidal Volume Pressure PEEP Pressure control  28 %          10 cm H20 Peak airway pressure: 45 cm H2O Minute ventilation: 12.5 l/min ABG:  
Recent Labs 08/04/19 
0518 08/04/19 2123 08/03/19 
7925 PHI 7.331* 7.268* 7.277* PCO2I 42.9 57.8* 61.0*  
PO2I 79 72* 81  
HCO3I 22.7 26.4* 28.4* LAB Recent Labs 08/04/19 
0423 08/03/19 
2308 08/03/19 
1834 08/03/19 
1130 08/03/19 
2884 GLUCPOC 250* 211* 223* 215* 190* Recent Labs 08/04/19 
6845 08/03/19 
0315 08/02/19 
9329 WBC 48.6* 38.7* 28.3* HGB 9.6* 10.1* 9.8* HCT 32.7* 34.4* 32.2*  
 225 232 Recent Labs 08/04/19 
8939 08/03/19 0315 08/02/19 
3081  142 135* K 4.3 4.2 4.1 * 104 99 CO2 27 30 31 * 192* 226* BUN 47* 37* 28* CREA 1.21 1.02 0.92  
MG 2.7* 2.9* 2.7* PHOS  --   --  3.3 CA 8.5 8.7 8.4 ALB 2.1* 2.5* 2.5* SGOT 25 29 16 Assessment:  (Medical Decision Making) Hospital Problems  Date Reviewed: 8/2/2019 Codes Class Noted POA Hydroureteronephrosis 
 per urology  ICD-10-CM: N13.30 ICD-9-CM: 835  7/30/2019 Clinically Undetermined Hx pulmonary embolism (Chronic) chronic  ICD-10-CM: Z86.711 ICD-9-CM: V12.55  7/30/2019 Yes History of DVT of lower extremity (Chronic) ICD-10-CM: A91.507 ICD-9-CM: V12.51  7/30/2019 Yes Hypoxia ICD-10-CM: R09.02 
ICD-9-CM: 799.02  7/30/2019 No  
   
 Metastatic cancer to lung [facility]) (Chronic) ICD-10-CM: C78.00 ICD-9-CM: 197.0  7/26/2019 Yes Overview Signed 7/26/2019 11:06 AM by Theron Cartwright NP Per EBUS in June 2019 Acute respiratory failure ([address]  
on vent  ICD-10-CM: J96.00 
ICD-9-CM: 518.81  7/26/2019 Yes Aspiration into airway  
on ABX ICD-10-CM: T17.908A ICD-9-CM: 934.9  7/26/2019 Yes Dysphagia 
 need PEG, now on TPN ICD-10-CM: R13.10 ICD-9-CM: 787.20  7/25/2019 Yes * (Principal) Rectal adenocarcinoma ([facility] (Chronic) per oncology, stage IV, not responding to therapy, poor prognosis per oncology ICD-10-CM: C20 
ICD-9-CM: 154.1  7/6/2018 Yes Plan:  (Medical Decision Making) -- continue ABX, steroids 
-add versed gtt as he is getting ativan almost q2 hours and helping  
- --Nutritional support:  TPN/lipids, PEG on hold 
--Oncology following 
--Palliative Care following--partial code-no CPR 
- Urology--recent cysto and with gross hematuria  
-Appreciate oncology ongoing discussions with patient as options for additional therapy seem limited and prognosis is extremely poor despite our ongoing aggressive ICU care. I dont think he should go through PEG placement , not helpful and potentially harmful unless significantly improves which is unlikely 
-updated daughter which she seems to ready  Yet to accept his terminal situation 
-remains critically ill, spent 38 min patient care More than 50% of the time documented was spent in face-to-face contact with the patient and in the care of the patient on the floor/unit where the patient is located.  
 
Lieutenant Carlito MD

## 2019-08-04 NOTE — PROGRESS NOTES
Bedside shift change report given to NATHANAEL Huitron (oncoming nurse) by Jaerk Raygoza RN (offgoing nurse). Report included the following information SBAR, Kardex, Procedure Summary, Intake/Output, MAR, Recent Results, Med Rec Status, Cardiac Rhythm NSR, Alarm Parameters  and Quality Measures. Dual sign off on fentanyl gtt.

## 2019-08-04 NOTE — PROGRESS NOTES
Nutrition F/U: 
TPN Management for Oncology. Assessment: 
JFRPKU 91.7 BU (ICU bed 8/4/19), edema - trace BUE, 1+ BLEs. The patient remains intubated, ventilated, sedated and TPN-dependent at this time. He remains TPN-dependent due to inability to place enteral access. Labs are remarkable for sodium 144, AM glucose 226 and magnesium 2.9; POC glucose (8/3) 190,215,223,211 and (8/4) 250. Solumedrol dose remains as 60 mg three-times-daily. IV Access: 
           Single port and 3 peripheral lines. Macronutrient Needs (59.7 kg): EER:  8066-7298 kcal /day (25-30 kcal/kg) EPR:  72-90 grams protein/day (1.2-1.5 grams/kg) Max CHO:  344 grams/day (4 mg/kg/min/day) Fluid:  1ml/kcal 
Intake/Comparative Standards: 
Current intake of TPN (1.8 liters 5%AA/15%DEX and daily 250 ml 20% lipids provides 1778 calories/day (100% calorie goal), 90 grams protein/day (100% protein goal), 270 grams CHO/day (does not exceed max CHO limit) and ~2250 ml water/day (100% fluid goal).   Intervention:  
Meals and Snacks: NPO. Parenteral Nutrition: 1) Continue current TPN and daily lipids. 2) Adjust TPN to decrease the sodium content to 80 meq/liter and increase the insulin content to 30 units/liter. 3) Additives/liter: 80 sodium (chloride), 20 potassium (phosphate), 20 phosphorus (potassium), 30 insulin. 4) Additives/bag: MVI, MTE and Pepcid. Mineral Supplement Therapy: Nutrition Support Orders/Electrolyte Management Replacement Protocols are active on the MAR. Nutrition Discharge Plan: Too soon to determine. Cecil Pena.  [facility] 
[phone]

## 2019-08-04 NOTE — PROGRESS NOTES
Pharmacokinetic Consult to Pharmacist 
 
Antelmo Gum is a 48 y.o. male being treated for worsening respiratory failure with Vancomycin, levofloxacin, and Pip-Tazo. Weight: 63.5 kg (140 lb) Lab Results Component Value Date/Time BUN 47 (H) 08/04/2019 03:46 AM  
 Creatinine 1.21 08/04/2019 03:46 AM  
 WBC 48.6 (H) 08/04/2019 03:46 AM  
 Procalcitonin 0.4 06/30/2018 05:06 AM  
 Lactic acid 2.2 (HH) 06/30/2018 06:59 PM  
 Lactic Acid (POC) 1.40 03/23/2019 06:28 PM  
  
Estimated Creatinine Clearance: 63.4 mL/min (based on SCr of 1.21 mg/dL). Day 2 of vancomycin. Goal trough is 15-20. Vancomycin dose initiated at 1500 mg X 1, then 1000 mg Q12H. As renal function noted to be worsening today, will adjust empirically to 1000 mg Q18H. Plan trough prior to the 4th dose. Will continue to follow patient. Thank you, 
Rosa Garcia, PharmD Clinical Pharmacist 
[phone]

## 2019-08-05 NOTE — PROGRESS NOTES
Nutrition F/U: 
TPN Management for Oncology. Assessment: 
PXBJLI 31.7 BY (ICU bed 8/5/19), edema - trace, 2+ pitting RLE. The patient remains intubated, ventilated, sedated and TPN-dependent at this time due to inability to place enteral access. Abdominal Status: active bowel sounds; Last BM: 150 ml in colostomy over past 24 hours. Pertinent Medications: Precedex, Fentanyl, SSI Pertinent Labs:  
Lab Results Component Value Date/Time Sodium 147 (H) 08/05/2019 03:47 AM  
 Potassium 4.5 08/05/2019 03:47 AM  
 Chloride 114 (H) 08/05/2019 03:47 AM  
 CO2 23 08/05/2019 03:47 AM  
 Anion gap 10 08/05/2019 03:47 AM  
 Glucose 170 (H) 08/05/2019 03:47 AM  
 BUN 71 (H) 08/05/2019 03:47 AM  
 Creatinine 2.02 (H) 08/05/2019 03:47 AM  
 Calcium 8.1 (L) 08/05/2019 03:47 AM  
 Albumin 1.8 (L) 08/05/2019 03:47 AM  
 Phosphorus 3.3 08/05/2019 03:47 AM  
POC glucose (8/4) 250, 218, 200, 150 and (8/5) 172. Solumedrol dose remains as 60 mg three-times-daily. Anthropometrics:  Weight Source: Bed, Weight: 64.3 kg (141 lb 12.8 oz) IV Access: 
           Single port and 3 peripheral lines. Macronutrient Needs (59.7 kg): EER:  2780-2836 kcal /day (25-30 kcal/kg) EPR:  72-90 grams protein/day (1.2-1.5 grams/kg) Max CHO:  344 grams/day (4 mg/kg/min/day) Fluid:  1ml/kcal 
Intake/Comparative Standards: 
Current intake of TPN (1.8 liters 5%AA/15%DEX and daily 250 ml 20% lipids provides 1778 calories/day (100% calorie goal), 90 grams protein/day (100% protein goal), 270 grams CHO/day (does not exceed max CHO limit) and ~2250 ml water/day (100% fluid goal).   Intervention:  
Meals and Snacks: NPO. Parenteral Nutrition: 1) Continue current TPN and daily lipids. 2) Adjust TPN to decrease the sodium content to 0 meq/liter, decrease KPhos to 15 mEq/L and increase the insulin content to 34 units/liter. 3) Additives/liter: 0 sodium, 15 potassium (phosphate), 34 insulin. 4) Additives/bag: MVI, MTE and Pepcid. Mineral Supplement Therapy: Nutrition Support Orders/Electrolyte Management Replacement Protocols are active on the MAR. Nutrition Discharge Plan: Too soon to determine. Bill Kappa Vena Severs, [address], [address] [place] [address], LD  
[phone] Addendum: 
Consult received for Tube Feeding Management (Dr. Ashlyn Novoa) At this time the patient is with a feeding tube and tube feeding is warranted at this time. Plan to wean TPN tomorrow if patient tolerates tube feeding well. Intervention: Enteral Nutrition: Start TF of Glucerna 1.2 (due to elevated blood sugars) at 10 ml/hr and advance by 10 ml q 4 hours to a goal rate of 60 ml/hr with a water flush of 30 ml q hourly. This will provide: 1728 kcals (100% of estimated needs), 86 grams of protein (100% of estimated needs), 165 grams of CHO (does not exceed max CHO/day) and ~1930 ml of free water (100% of estimated needs). Coordination of Care: Nellie Robertson Bill Kappa Vena Severs, [address], [address] [place] [address], [place]  
[phone]

## 2019-08-05 NOTE — PROGRESS NOTES
Ventilator check complete; patient has a #7.5 ET tube secured at the 25 at the teeth. Patient is sedated. Patient is not able to follow commands. Breath sounds are expiratory wheezes. Trachea is midline, Negative for subcutaneous air, and chest excursion is symmetric. Patient is also Negative for cyanosis and is Negative for pitting edema. All alarms are set and audible. Resuscitation bag is at the head of the bed. Ventilator Settings Mode FIO2 Rate Tidal Volume Pressure PEEP I:E Ratio Pressure control  28 % 30    34 10 cm H20  1:1.9 Peak airway pressure: 44 cm H2O Minute ventilation: 11.6 l/min ABG: No results for input(s): PH, PCO2, PO2, HCO3 in the last 72 hours.  
 
 
Avani Gimenez, RT

## 2019-08-05 NOTE — PROGRESS NOTES
[facility] Hematology & Oncology Inpatient Hematology / Oncology Progress Note Admission Date: 2019  3:18 PM 
Reason for Admission/Hospital Course: Dysphagia [R13.10] Intractable vomiting [R11.10] 24 Hour Events: 
Intubated  Remains sedated Wife at side No fevers, BP borderline ROS: Unable to obtain. Patient sedated on vent 10 point review of systems is otherwise negative with the exception of the elements mentioned above in the HPI. Allergies Allergen Reactions  Zithromax [Azithromycin] Rash OBJECTIVE: 
Patient Vitals for the past 8 hrs: 
 BP Temp Pulse Resp SpO2  
19 1234 137/80  95 (!) 41 95 % 19 1215 117/66  95 (!) 33 96 % 19 1159 96/57 97.9 °F (36.6 °C) 91 (!) 32 99 % 19 1145   85 (!) 32 96 % 19 1144 95/57  85 (!) 35 97 % 19 1130 97/59  86 (!) 31 96 % 19 1115 100/59  86 30 97 % 19 1059 92/57  88 (!) 31 96 % 19 1044 92/59  90 27 95 % 19 1030 95/56  93 (!) 35 95 % 19 1015 (!) 127/93  (!) 101 20 95 % 05 0959 104/65  98 30 96 % 19 0944 107/66  100 27 95 % 19 0930 130/69  100 30 94 % 05 0915 106/62  99 (!) 35 94 % 19 0859 112/68  99 (!) 31 93 % 19 0844 94/55  100 30 96 % 05 0830 95/58  (!) 101 30 97 % 05 0815 96/62  99 30 97 % 19 0759 107/65  (!) 101 30 96 % 05 0744 111/69  100 26 95 % 08 0729 115/72 97.7 °F (36.5 °C) 99 22 92 % 19 0728   100 19 92 % 19 0726   (!) 101 (!) 32 91 % 19 0715 120/75  90 24 92 % 19 0659 99/61  93 30 94 % 19 0644 93/58  92 30 93 % Temp (24hrs), Av °F (36.7 °C), Min:97.6 °F (36.4 °C), Max:98.8 °F (37.1 °C) 
 
701 -  1900 In: -  
Out: 125 [Urine:125] Physical Exam: 
Constitutional: Acutely ill male in respiratory distress, lying in the hospital bed in ICU Family at bedside. HEENT: Normocephalic and atraumatic. Intubated. Lymph node Deferred Skin Warm and dry. No bruising and no rash noted. No erythema. No pallor. Respiratory Expiratory wheezes on vent CVS Reg rate, regular rhythm and normal S1 and S2. No murmurs, gallops, or rubs. Abdomen Soft, nontender and nondistended, hypoactive bowel sounds. Neuro Sedated Psych Sedated Labs: 
   
Recent Labs 08/05/19 
5300 08/04/19 
7945 08/03/19 
0315 WBC 53.4* 48.6* 38.7*  
RBC 2.85* 3.23* 3.52* HGB 8.7* 9.6* 10.1* HCT 29.1* 32.7* 34.4*  
.1* 101.2* 97.7 MCH 30.5 29.7 28.7 MCHC 29.9* 29.4* 29.4*  
RDW 17.6* 17.2* 17.1*  
 221 225 GRANS 74 70 62 LYMPH 1* 1* 1*  
MONOS 7 8 9 EOS 0* 0* 0*  
BASOS 0 0 0 IG 18* 21* 28* DF AUTOMATED AUTOMATED AUTOMATED ANEU 39.6* 34.0* 24.0* ABL 0.5 0.5 0.4* ABM 3.7* 3.9* 3.5* YESI 0.0 0.0 0.0 ABB 0.0 0.0 0.0 AIG 9.6* 10.2* 10.8* Recent Labs 08/05/19 
0129 08/04/19 
5377 08/03/19 
0315 * 144 142  
K 4.5 4.3 4.2 * 110* 104 CO2 23 27 30 AGAP 10 7 8 * 226* 192* BUN 71* 47* 37* CREA 2.02* 1.21 1.02  
GFRAA 45* >60 >60 GFRNA 37* >60 >60  
CA 8.1* 8.5 8.7 SGOT 21 25 29 AP 74 72 74  
TP 6.1* 6.5 6.6 ALB 1.8* 2.1* 2.5*  
GLOB 4.3* 4.4* 4.1* AGRAT 0.4* 0.5* 0.6* MG 2.3 2.7* 2.9*  
PHOS 3.3  --   --   
 
 
 
Imaging: XR CHEST SNGL V [[id number]] Collected: 07/26/19 1159 Order Status: Completed Updated: 07/26/19 1202 Narrative:    
Portable chest x-ray CLINICAL INDICATION: Shortness of breath FINDINGS: Single AP view the chest compared to a similar exam dated 7/11/2019 
shows new reticular nodular interstitial densities throughout both lungs with 
more patchy airspace opacity in the right upper lobe. A right-sided chest port 
is in place. The cardiac silhouette and mediastinum are stable.   
Impression:    
 IMPRESSION: New, diffuse reticular nodular densities throughout the lungs with 
patchy opacity in the right upper lobe. Atypical pneumonia or pulmonary edema 
should be considered. XR BA SWALLOW ESOPHOGRAM [[id number]] Collected: 07/26/19 3143 Order Status: Completed Updated: 07/26/19 1026 Narrative:    
History: Dysphasia. Stage IV rectal cancer. EXAM: Barium swallow TECHNIQUE: 1.3 minutes fluoroscopy time is utilized. The patient ingests 
effervescent granules followed by thick and thin consistencies of barium. 18 
fluoroscopic images are available for review. No comparison FINDINGS: The exam is markedly limited. The patient is unable to perform the 
exam the upright position, and has very limited mobility. There is aspiration of 
the thin contrast. Contrast flows through the esophagus into the stomach without 
difficulty. There is a hiatal hernia present. Just above the hiatal hernia, 
there is a filling defect of the distal esophagus, corresponding to a soft 
tissue mass adjacent to the esophagus on the recent CT chest. No definite 
gastroesophageal reflux, or ulceration. Impression:    
IMPRESSION: 
1. Just above the hiatal hernia, there is an extrinsic soft tissue mass creating 
a filling defect within the distal esophagus, corresponding to a soft tissue 
mass seen on the recent CT chest. 
2. Aspiration of oral contrast. Correlation with modified barium swallow 
recommended. CT CHEST W CONT [[id number]] Collected: 07/24/19 2133 Order Status: Completed Updated: 07/24/19 2138 Narrative:    
CT OF THE CHEST WITH INTRAVENOUS CONTRAST. INDICATION: Shortness of breath. Bambi To adenocarcinoma with metastatic 
disease. COMPARISON: None. TECHNIQUE:   2.5 mm axial scans from above the aortic arch to the lung bases 
with 100 cc nonionic intravenous contrast without acute complication. Intravenous contrast was given to evaluate for pulmonary embolism. FINDINGS:  The degree of opacification of the pulmonary arteries is adequate. No intraluminal filling defects within the pulmonary arterial tree to suggest 
pulmonary embolism. Rolm [employer] is normal caliber with a uniform lumen without 
evidence of dissection. Lungs demonstrate multiple nodules, groundglass 
opacities and small pneumonic consolidation in the bases. There is extensive 
mediastinal adenopathy which has increased a great deal from the prior exam.. Included portion of the upper abdomen are unremarkable. No gross bony lesions. Princess Lopez Impression:    
IMPRESSION:  Mediastinal adenopathy is increased great deal from the prior exam. 
There is patchy airspace disease in both lung bases. Pulmonary metastases. Negative for pulmonary embolism. Medications: 
Current Facility-Administered Medications Medication Dose Route Frequency  dexmedeTOMidine (PRECEDEX) 400 mcg in 0.9% sodium chloride 100 mL infusion  0.2-0.7 mcg/kg/hr IntraVENous TITRATE  albumin human 25% (BUMINATE) solution 12.5 g  12.5 g IntraVENous Q8H  
 [START ON 8/6/2019] levoFLOXacin (LEVAQUIN) 750 mg in D5W IVPB  750 mg IntraVENous Q48H  
 vancomycin (VANCOCIN) 1,000 mg in 0.9% sodium chloride (MBP/ADV) 250 mL  1,000 mg IntraVENous See Admin Instructions  TPN ADULT-CENTRAL - dextrose 15% amino acid 5%   IntraVENous QPM  
 midazolam (VERSED) 100 mg in 0.9% sodium chloride 100 mL infusion  0-10 mg/hr IntraVENous TITRATE  TPN ADULT-CENTRAL - dextrose 15% amino acid 5%   IntraVENous QPM  
 fat emulsion 20% (LIPOSYN, INTRAlipid) infusion 250 mL  250 mL IntraVENous QPM  
 polyvinyl alcohol (LIQUIFILM TEARS) 1.4 % ophthalmic solution 1 Drop  1 Drop Both Eyes PRN  
 insulin regular (NOVOLIN R, HUMULIN R) injection   SubCUTAneous Q6H  
 LORazepam (ATIVAN) injection 2 mg  2 mg IntraVENous Q2H PRN  
 methylPREDNISolone (PF) (SOLU-MEDROL) injection 60 mg  60 mg IntraVENous Q8H  
  budesonide (PULMICORT) 500 mcg/2 ml nebulizer suspension  500 mcg Nebulization BID RT  
 fentaNYL in normal saline (pf) 25 mcg/mL infusion   mcg/hr IntraVENous TITRATE  morphine injection 2 mg  2 mg IntraVENous Q4H PRN  
 albuterol (PROVENTIL VENTOLIN) nebulizer solution 2.5 mg  2.5 mg Nebulization QID RT  
 hydrALAZINE (APRESOLINE) 20 mg/mL injection 10 mg  10 mg IntraVENous Q6H PRN  
 albuterol (PROVENTIL VENTOLIN) nebulizer solution 2.5 mg  2.5 mg Nebulization Q6H PRN  
 NUTRITIONAL SUPPORT ELECTROLYTE PRN ORDERS   Does Not Apply PRN  piperacillin-tazobactam (ZOSYN) 4.5 g in 0.9% sodium chloride (MBP/ADV) 100 mL  4.5 g IntraVENous Q8H  
 magic mouthwash (SIMON) oral suspension 5 mL  5 mL Oral Q4H PRN  
 central line flush (saline) syringe 10 mL  10 mL InterCATHeter PRN  prochlorperazine (COMPAZINE) injection 10 mg  10 mg IntraVENous Q6H PRN  
 enoxaparin (LOVENOX) injection 100 mg  100 mg SubCUTAneous Q24H  
 
 
 
ASSESSMENT: 
 
Problem List  Date Reviewed: 8/5/2019 Codes Class Noted Hydroureteronephrosis ICD-10-CM: N13.30 ICD-9-CM: 869  7/30/2019 Hx pulmonary embolism (Chronic) ICD-10-CM: G23.894 ICD-9-CM: V12.55  7/30/2019 History of DVT of lower extremity (Chronic) ICD-10-CM: I36.877 ICD-9-CM: V12.51  7/30/2019 Hypoxia ICD-10-CM: R09.02 
ICD-9-CM: 799.02  7/30/2019 Metastatic cancer to lung [facility]) (Chronic) ICD-10-CM: C78.00 ICD-9-CM: 197.0  7/26/2019 Overview Signed 7/26/2019 11:06 AM by Royce Sharp NP Per SOL in June 2019 Acute respiratory failure ([facility] [address] ICD-10-CM: J96.00 
ICD-9-CM: 518.81  7/26/2019 Aspiration into airway ICD-10-CM: T17.908A ICD-9-CM: 934.9  7/26/2019 Dysphagia ICD-10-CM: R13.10 ICD-9-CM: 787.20  7/25/2019 Intractable vomiting ICD-10-CM: R11.10 ICD-9-CM: 536.2  7/25/2019 Mediastinal lymphadenopathy ICD-10-CM: R59.0 ICD-9-CM: 785.6  6/11/2019 Pulmonary nodules ICD-10-CM: R91.8 ICD-9-CM: 793.19  6/11/2019 Dehydration ICD-10-CM: E86.0 ICD-9-CM: 276.51  4/2/2019 S/P radiation therapy < 4 weeks ago ICD-10-CM: Z92.3 ICD-9-CM: V15.3  10/1/2018 * (Principal) Rectal adenocarcinoma ([facility] [address] (Chronic) ICD-10-CM: C20 
ICD-9-CM: 154.1  7/6/2018 Colostomy status (HCC) (Chronic) ICD-10-CM: Z93.3 ICD-9-CM: V44.3  6/29/2018 Erectile dysfunction due to arterial insufficiency (Chronic) ICD-10-CM: N52.01 
ICD-9-CM: 607.84  5/31/2018 Benign essential HTN (Chronic) ICD-10-CM: I10 
ICD-9-CM: 401.1  5/31/2018 Hypercholesterolemia (Chronic) ICD-10-CM: E78.00 ICD-9-CM: 272.0  5/30/2018 Overview Signed 6/4/2018  2:20 PM by Hollie Gomez  
  ASCVD 10 year risk is 16.0%. Mod to high dose statin indicated. (based on b/p 180/90, 6/4/18) Screening PSA (prostate specific antigen) (Chronic) ICD-10-CM: Z12.5 ICD-9-CM: V76.44  5/30/2018 History of tobacco abuse (Chronic) ICD-10-CM: I30.538 ICD-9-CM: V15.82  8/18/2016 PLAN: 
Dysphagia/vomiting 
- GI evaluation. Planning EGD tomorrow (cannot do dilation due to recent avastin). Started pepcid BID 
- Continue IV fluids 7/26 Barium swallow today with aspiration and extrinsic soft tissue mass creating filling defect in distal esophagus 7/29 Spoke with Drs. Verner Bath (out of town) and Yared. Needs palliative radiation to mediastinal adenopathy that is compressing esophagus. Planned for consult this afternoon. Pt agreeable to PEG given length of time it may take for radiation to improve his ability to swallow. GI reconsulted for placement 7/30 S/p simulation with rad onc yesterday - planning to start radiation (today but held due to procedure). PEG placement possibly tomorrow 8/1 PEG is no longer an option. TPN starting today. Aspiration pneumonia 
- Continue levaquin. BCx pending. UA negative. CT chest with no acute findings 7/26 D3 LVQ 7/29 Continues zosyn. Noted leukocytosis. 8/2 broaden coverage, add LVQ and Vanc Met rectal ca - S/p FOLFIRI-Avastin, last given 7/9 7/29 Suspicion for progression on recent CT. Family is aware. Further treatment TBD outpatient with Dr. Basilio Smith Leukopenia/anemia 
- Secondary to chemotherapy. Granix x 1 today 7/26 WBC up to 4.5 
7/29 Continues with elevated counts Hx hydronephrosis - now hematuria - Planned for cystoscopy and stent placement tomorrow with urology (was scheduled for outpatient). Ok from [employer]. Discussed with pulmonary and no contraindication from their perspective. Discussed with Umberto Da Silva NP 
7/30 S/p cystoscopy with right ureteral stent placed and right ureteral biopsy. To ICU following for tachypnea 8/5 Seen by urology this morning - hematuria is expected from his stent Respiratory failure 8/2 Intubated -bilateral ground glass densities on CXR suspicious for infection. Pulmonary taking over as primary. 8/3 Remains intubated, unresponsive, worsened ABG/CXR  
8/5 No clinical changes, intubated and sedated, CXR unchanged. Pulmonology following [facility] 
8/5 Creatinine up today. If persistent, consider nephrology consult Patient is partial code status. Palliative care assisting with goals of care. No clinical change overnight. Continue supportive care including antibiotics for HCAP and ventilator support Brittaney Melendez NP [facility] Hematology & Oncology [address] [address] Office : [phone] Fax : [phone] Attending Addendum: 
I have personally performed a face to face diagnostic evaluation on this patient.  I have reviewed and agree with the care plan as documented by Yajaira Garcia N.P. My findings are as follows:  He has metastatic rectal adenocarcinoma, appears sedated and intubated, heart rate regular without murmurs, abdomen is non-tender, bowel sounds are positive, we will continue to follow this patient, his prognosis is very poor and family is aware. Ya Obrien MD 
 
 
[facility] Hematology/Oncology [address] [place] [address] Office : [phone] Fax : [phone]

## 2019-08-05 NOTE — PROGRESS NOTES
Admit Date: 7/24/2019 Subjective:  
 
Kristie Lewis is currently vented in the ICU. We were called due to ongoing hematuria. Objective:  
 
Patient Vitals for the past 8 hrs: 
 BP Temp Pulse Resp SpO2 Weight 08/05/19 0930 130/69  100 30 94 %   
08/05/19 0915 106/62  99 (!) 35 94 %   
08/05/19 0859 112/68  99 (!) 31 93 %   
08/05/19 0844 94/55  100 30 96 %   
08/05/19 0830 95/58  (!) 101 30 97 %   
08/05/19 0815 96/62  99 30 97 %   
08/05/19 0759 107/65  (!) 101 30 96 %   
08/05/19 0744 111/69  100 26 95 %   
08/05/19 0729 115/72  99  92 %   
08/05/19 0728   100 19 92 %   
08/05/19 0726   (!) 101 (!) 32 91 %   
08/05/19 0715 120/75  90 24 92 %   
08/05/19 0659 99/61  93 30 94 %   
08/05/19 0644 93/58  92 30 93 %   
08/05/19 0629 93/58  94 30 93 %   
08/05/19 0615 90/59  94 30 93 %   
08/05/19 0559 (!) 89/61  97 30 94 %   
08/05/19 0544 93/62  98 30 94 %   
08/05/19 0529 98/65  98 30 93 %   
08/05/19 0515 101/65  95 30 92 %   
08/05/19 0459 112/72  97 30 93 %   
08/05/19 0444 93/60  96 30 93 %   
08/05/19 0429 103/65  97 30 92 %   
08/05/19 0415 108/68  93 (!) 31 91 %   
08/05/19 0400      141 lb 12.8 oz (64.3 kg) 08/05/19 0359 108/65 97.7 °F (36.5 °C) 98 (!) 31 91 %   
08/05/19 0344 112/71  95 30 95 %   
08/05/19 0329 107/65  100 30 92 %   
08/05/19 0315 104/64  96 30 93 %   
08/05/19 0306   97 30 94 %   
08/05/19 0300 96/62  99 30 94 %   
08/05/19 0244 93/60  96 30 94 %   
08/05/19 0229 93/59  99 30 94 %   
08/05/19 0215 94/61  100 30 94 %   
08/05/19 0159 93/58  99 30 94 %   
08/05/19 0144 (!) 88/56  97 30 94 %  No intake/output data recorded. 08/03 1901 - 08/05 0700 In: 4310.1 [I.V.:4310.1] Out: 1360 [Urine:1210] Physical Exam: 
GENERAL: sedated, on ventilator LUNG: clear to auscultation bilaterally HEART: regular rate and rhythm, S1, S2  
ABDOMEN: soft, non-tender NEUROLOGIC: sedated Data Review Recent Results (from the past 24 hour(s)) GLUCOSE, POC Collection Time: 08/04/19  2:00 PM  
Result Value Ref Range Glucose (POC) 218 (H) 65 - 100 mg/dL GLUCOSE, POC Collection Time: 08/04/19  5:53 PM  
Result Value Ref Range Glucose (POC) 200 (H) 65 - 100 mg/dL GLUCOSE, POC Collection Time: 08/04/19 11:09 PM  
Result Value Ref Range Glucose (POC) 150 (H) 65 - 100 mg/dL POC G3 Collection Time: 08/05/19  3:08 AM  
Result Value Ref Range Device: VENT    
 FIO2 (POC) 28 % pH (POC) 7.222 (LL) 7.35 - 7.45    
 pCO2 (POC) 50.7 (H) 35 - 45 MMHG  
 pO2 (POC) 77 75 - 100 MMHG  
 HCO3 (POC) 20.8 (L) 22 - 26 MMOL/L  
 sO2 (POC) 92 (L) 95 - 98 % Base deficit (POC) 7 mmol/L Mode ASSIST CONTROL Set Rate 30 bpm  
 PEEP/CPAP (POC) 10 cmH2O Allens test (POC) YES Inspiratory Time 0.7 sec Site RIGHT RADIAL Patient temp. 98.6 Specimen type (POC) ARTERIAL Performed by Aniket   
 CO2, POC 22 MMOL/L Pressure control 34 Respiratory comment: PhysicianNotified Exhaled minute volume 11.50 L/min COLLECT TIME 309 CBC WITH AUTOMATED DIFF Collection Time: 08/05/19  3:47 AM  
Result Value Ref Range WBC 53.4 (HH) 4.3 - 11.1 K/uL  
 RBC 2.85 (L) 4.23 - 5.6 M/uL HGB 8.7 (L) 13.6 - 17.2 g/dL HCT 29.1 (L) 41.1 - 50.3 % .1 (H) 79.6 - 97.8 FL  
 MCH 30.5 26.1 - 32.9 PG  
 MCHC 29.9 (L) 31.4 - 35.0 g/dL  
 RDW 17.6 (H) 11.9 - 14.6 % PLATELET 415 221 - 116 K/uL MPV 11.3 9.4 - 12.3 FL ABSOLUTE NRBC 0.32 (H) 0.0 - 0.2 K/uL NEUTROPHILS 74 43 - 78 % LYMPHOCYTES 1 (L) 13 - 44 % MONOCYTES 7 4.0 - 12.0 % EOSINOPHILS 0 (L) 0.5 - 7.8 % BASOPHILS 0 0.0 - 2.0 % IMMATURE GRANULOCYTES 18 (H) 0.0 - 5.0 %  
 ABS. NEUTROPHILS 39.6 (H) 1.7 - 8.2 K/UL  
 ABS. LYMPHOCYTES 0.5 0.5 - 4.6 K/UL  
 ABS. MONOCYTES 3.7 (H) 0.1 - 1.3 K/UL  
 ABS. EOSINOPHILS 0.0 0.0 - 0.8 K/UL  
 ABS. BASOPHILS 0.0 0.0 - 0.2 K/UL ABS. IMM. GRANS. 9.6 (H) 0.0 - 0.5 K/UL  
 RBC COMMENTS SLIGHT 
ANISOCYTOSIS + POIKILOCYTOSIS 
    
 WBC COMMENTS Result Confirmed By Smear PLATELET COMMENTS ADEQUATE    
 DF AUTOMATED MAGNESIUM Collection Time: 08/05/19  3:47 AM  
Result Value Ref Range Magnesium 2.3 1.8 - 2.4 mg/dL METABOLIC PANEL, COMPREHENSIVE Collection Time: 08/05/19  3:47 AM  
Result Value Ref Range Sodium 147 (H) 136 - 145 mmol/L Potassium 4.5 3.5 - 5.1 mmol/L Chloride 114 (H) 98 - 107 mmol/L  
 CO2 23 21 - 32 mmol/L Anion gap 10 7 - 16 mmol/L Glucose 170 (H) 65 - 100 mg/dL BUN 71 (H) 6 - 23 MG/DL Creatinine 2.02 (H) 0.8 - 1.5 MG/DL  
 GFR est AA 45 (L) >60 ml/min/1.73m2 GFR est non-AA 37 (L) >60 ml/min/1.73m2 Calcium 8.1 (L) 8.3 - 10.4 MG/DL Bilirubin, total 0.3 0.2 - 1.1 MG/DL  
 ALT (SGPT) 15 12 - 65 U/L  
 AST (SGOT) 21 15 - 37 U/L Alk. phosphatase 74 50 - 136 U/L Protein, total 6.1 (L) 6.3 - 8.2 g/dL Albumin 1.8 (L) 3.5 - 5.0 g/dL Globulin 4.3 (H) 2.3 - 3.5 g/dL A-G Ratio 0.4 (L) 1.2 - 3.5 PHOSPHORUS Collection Time: 08/05/19  3:47 AM  
Result Value Ref Range Phosphorus 3.3 2.5 - 4.5 MG/DL  
GLUCOSE, POC Collection Time: 08/05/19  6:16 AM  
Result Value Ref Range Glucose (POC) 172 (H) 65 - 100 mg/dL Assessment:  
 
Principal Problem: 
  Rectal adenocarcinoma ([address] (7/6/2018) Active Problems: Dysphagia (7/25/2019) Intractable vomiting (7/25/2019) Metastatic cancer to lung ([address] (7/26/2019) Overview: Per EBUS in June 2019 Acute respiratory failure ([address] (7/26/2019) Aspiration into airway (7/26/2019) Hydroureteronephrosis (7/30/2019) Hx pulmonary embolism (7/30/2019) History of DVT of lower extremity (7/30/2019) Hypoxia (7/30/2019) S/P: 
 
POSTOPERATIVE DIAGNOSES:  Right ureteral mass and right hydroureteronephrosis. 
  
OPERATIONS AND PROCEDURES:  Cystoscopy with right retrograde pyelogram, right diagnostic ureteroscopy, right ureteral mass biopsies x4, right ureteral stent placement. 
  
 
Ureteral Biopsies: hemorrhagic connective tissue 
  
Plan:  
  
Schultz catheter irrigated x 2 with adequate return of clear pink urine. Schultz is draining well. Pt with expected hematuria from ureteral stent. Yasemin Munson, NP [facility] Irrigated light pink with no clots. Gross hematuria and stent should not be attributing to acute worsening of renal function. Overall prognosis poor. Dr. Aram Jones made aware. I have reviewed the above note and examined the patient. I agree with the exam, assessment and plan. Blanca Lagos.  Kiana Morrow MD

## 2019-08-05 NOTE — H&P
Date of Surgery Update: 
Toya Davison was seen and examined. History and physical has been reviewed. The patient has been examined.  There have been no significant clinical changes since the completion of the originally dated History and Physical. 
 
Signed By: Mery Garza MD   
 August 5, 2019 4:32 PM

## 2019-08-05 NOTE — PROGRESS NOTES
Palliative Care Progress Note Patient: Eulalio Kolb MRN: [id number]  SSN: [id number] YOB: 1965  Age: 48 y.o. Sex: male Assessment/Plan: Chief Complaint/Interval History: sedated, intubated and ventilated. Ongoing hematuria. Principal Diagnosis: · Debility, Unspecified  R53.81 Additional Diagnoses: · Acute Respiratory Failure, Unspecified  J96.00 
· Agitation  R45.1 · Dysphagia  R13.10 · Fatigue, Lethargy  R53.83 
· Frailty  R54 · Counseling, Encounter for Medical Advice  Z71.9 
· Encounter for Palliative Care  Z51.5 Palliative Performance Scale (PPS) PPS: 30 Medical Decision Making:  
Reviewed and summarized notes from last palliative care visit to present. Discussed case with appropriate providers: NATHANAEL Saldaña Reviewed lab and X-ray data from last palliative care visit to present. Pt sedated, intubated and ventilated. Wife and friend Rolf Le at bedside. Per NATHANAEL Chavez has determined pt not stable enough for PEG to be placed. Pt has been receiving TPN; Dobhoff has been dropped and awaiting reading of Xray before using. The pt is now a partial code, meaning no CPR. Creatine has been rising and is now 2.02. Ms Renetta Hall says that she is doing \"OK\", and reports that their two dtrs provide a lot of support. Ms. Renetta Hall wants to continue current care and is finding it difficult to discuss goals in any greater detail. Provided emotional support. Ms. Renetta Hall talked about her 6month-old grandchild, who was born 1 months premature. We agreed that, between her  and her grandchildren, Ms. Renetta Hall has had a very difficult year. Will discuss findings with members of the interdisciplinary team.   
 
  
. 
 
Subjective:  
 
Review of Systems: 
Review of systems not obtained due to patient factors- sedated, intubated and ventilated Objective:  
 
Visit Vitals /66 Pulse 100 Temp 97.7 °F (36.5 °C) Resp 27 Wt 141 lb 12.8 oz (64.3 kg) SpO2 95% BMI 20.65 kg/m² Physical Exam: 
 
General:  Sedated. Intubated and ventilated. Eyes:  Conjunctivae/corneas clear Nose: Nares normal. Septum midline Neck: Supple, symmetrical, trachea midline Lungs: Inspiratory and expiratory wheeze bilaterally Heart:  Regular rate and rhythm Abdomen:   Soft, non-tender, non-distended Extremities: Normal, atraumatic, no cyanosis or edema Skin: Skin color, texture, turgor normal.   
Neurologic: Does not follow commands. Psych: Sedated Signed By: Parker Moe NP August 5, 2019

## 2019-08-05 NOTE — PROGRESS NOTES
Ventilator check complete; patient has a #7.5 ET tube secured at the 24 at the teeth. Patient is sedated. Patient is not able to follow commands. Breath sounds are expiratory wheezes. Trachea is midline, Negative for subcutaneous air, and chest excursion is symmetric. Patient is also Negative for cyanosis and is Negative for pitting edema. All alarms are set and audible. Resuscitation bag is at the head of the bed. Ventilator Settings Mode FIO2 Rate Tidal Volume Pressure PEEP I:E Ratio Pressure control  28 %  CMV 30      34  10 cm H20  1:1.9 Peak airway pressure: 43 cm H2O Minute ventilation: 12.7 l/min ABG: No results for input(s): PH, PCO2, PO2, HCO3 in the last 72 hours. Oleta Apgar

## 2019-08-05 NOTE — PROGRESS NOTES
Bedside shift change report given to Jenna Menjivar (oncoming nurse) by Margret Phalen, RN (offgoing nurse). Report included the following information SBAR, Kardex, ED Summary, Intake/Output, MAR, Recent Results, Cardiac Rhythm Sinus Tach and Alarm Parameters .

## 2019-08-05 NOTE — PROGRESS NOTES
Head to toe assessment completed on pt this AM. 
  
Neuro: Pt eyes open to stimulation / voice. No command following - pt agitated easily. With stimulation, pt thrashes head in bed and pulls at restraints - RASS +2. Without stimulation, pt rests quietly in bed. PERRLA - 2 mm and sluggish to react - Eyes deviated upwards. SOTELO spontaneously. Resp: PC FiO2 28%. Coarse, expiratory wheezing upper / diminished lower breath sounds. Clear thick secretions upon suctioning. Cardiac: ST to NSR - slight hypotension. GI: NPO - TPN infusing through port. Ostomy patent and draining - changed waiver and bag. Stoma pink and patent. Hypoactive bowel sounds. : Schultz - draining and patent. Gross hematuria upon examination. Skin: 2+ pitting edema BUEs 3+ edema LLE.  Allevyn, Prevalon boots intact.

## 2019-08-05 NOTE — PROGRESS NOTES
Dr. Burkett Comp irrigated Schultz catheter - asked about urine culture and Schultz replacement protocol. Advised to not exchange Schultz catheter and to draw cultures from current Schultz.

## 2019-08-05 NOTE — PROGRESS NOTES
A follow up visit was made to the patient. Emotional support, spiritual presence and  
prayer were provided to the patient and his wife, Radha Mosley.  told Radha Mosley about the in house tv station, [employer]. Radha Mosley has been experiencing anticipatory grief over the past weak. Nikita Diego

## 2019-08-05 NOTE — PROGRESS NOTES
Chart reviewed and pt discussed in am IDR. Pt remains in ICU intubated with poor prognosis. CM following for any assist. Palliative care following.

## 2019-08-05 NOTE — PROGRESS NOTES
Critical Care Daily Progress Note: 8/5/2019 Admission Date: 7/24/2019 The patient's chart is reviewed and the patient is discussed with the staff. Chronic Medical:  Colon cancer, DM, HTN, rectal cancer, MYRON, former tobacco abuse and GERD. 
  
Was admitted on 7/24/2019 by Oncology for dehydration and rectal adenocarcinoma confirmed to be Stage IV per biopsy. [facility] was initiated on 5FU-A and recently switched to FOLFIRI-A.  XRT planned for mediastinal node. Barium swallow revealed aspiration of contrast as well as extrinsic soft tissue mass creating filling defect in distal esophagus. Mechanical soft diet with chopped/ground meats and nectar thick liquids recommended per speech therapy.   
  On abx for suspected aspiration pneumonia. Has been hypoxic and requiring oxygen support.  
  Seen by urology for right hydroureteronephrosis and cystoscopy 7/30 with stent placement/biopsy-no malignancy per path. Blocking ureter. GI was consulted for PEG placement but due to decline in respiratory status this is on hold. Intubated 8/1 for respiratory support. Subjective:  
 
Sedated ,on vent agitated at times in restraints both chemical and physical. Wife at bedside. Current Facility-Administered Medications Medication Dose Route Frequency  vancomycin (VANCOCIN) 1,000 mg in 0.9% sodium chloride (MBP/ADV) 250 mL  1,000 mg IntraVENous Q18H  
 midazolam (VERSED) 100 mg in 0.9% sodium chloride 100 mL infusion  0-10 mg/hr IntraVENous TITRATE  TPN ADULT-CENTRAL - dextrose 15% amino acid 5%   IntraVENous QPM  
 fat emulsion 20% (LIPOSYN, INTRAlipid) infusion 250 mL  250 mL IntraVENous QPM  
 levoFLOXacin (LEVAQUIN) 750 mg in D5W IVPB  750 mg IntraVENous Q24H  polyvinyl alcohol (LIQUIFILM TEARS) 1.4 % ophthalmic solution 1 Drop  1 Drop Both Eyes PRN  
 insulin regular (NOVOLIN R, HUMULIN R) injection   SubCUTAneous Q6H  
 LORazepam (ATIVAN) injection 2 mg  2 mg IntraVENous Q2H PRN  
  methylPREDNISolone (PF) (SOLU-MEDROL) injection 60 mg  60 mg IntraVENous Q8H  
 budesonide (PULMICORT) 500 mcg/2 ml nebulizer suspension  500 mcg Nebulization BID RT  
 fentaNYL in normal saline (pf) 25 mcg/mL infusion   mcg/hr IntraVENous TITRATE  morphine injection 2 mg  2 mg IntraVENous Q4H PRN  
 albuterol (PROVENTIL VENTOLIN) nebulizer solution 2.5 mg  2.5 mg Nebulization QID RT  
 hydrALAZINE (APRESOLINE) 20 mg/mL injection 10 mg  10 mg IntraVENous Q6H PRN  
 albuterol (PROVENTIL VENTOLIN) nebulizer solution 2.5 mg  2.5 mg Nebulization Q6H PRN  
 NUTRITIONAL SUPPORT ELECTROLYTE PRN ORDERS   Does Not Apply PRN  piperacillin-tazobactam (ZOSYN) 4.5 g in 0.9% sodium chloride (MBP/ADV) 100 mL  4.5 g IntraVENous Q8H  
 magic mouthwash (SIMON) oral suspension 5 mL  5 mL Oral Q4H PRN  
 central line flush (saline) syringe 10 mL  10 mL InterCATHeter PRN  prochlorperazine (COMPAZINE) injection 10 mg  10 mg IntraVENous Q6H PRN  
 enoxaparin (LOVENOX) injection 100 mg  100 mg SubCUTAneous Q24H Review of Systems Unobtainable due to patient status. Objective:  
 
Vitals:  
 08/05/19 0696 08/05/19 4804 08/05/19 6481 08/05/19 2901 BP: 90/59 93/58 93/58 Pulse: 94 94 92 (!) 101 Resp: 30 30 30 (!) 32 Temp:      
SpO2: 93% 93% 93% 91% Weight:      
 
Ventilator Settings Mode FIO2 Rate Tidal Volume Pressure PEEP Pressure control  28 %          10 cm H20 Peak airway pressure: 43 cm H2O Minute ventilation: 12.7 l/min Intake and Output:  
08/03 1901 - 08/05 0700 In: 4310.1 [I.V.:4310.1] Out: 1360 [Urine:1210] No intake/output data recorded. Physical Exam:         
Constitutional:  intubated and mechanically ventilated. EENMT:  Sclera clear, pupils equal, oral mucosa moist 
Respiratory: wheezing b/l 
Cardiovascular:  RRR with no M,G,R; 
Gastrointestinal:  soft with no tenderness; positive bowel sounds present Musculoskeletal:  warm with no cyanosis, no lower extremity edema Skin:  no jaundice or ecchymosis, mottling of legs Neurologic: no gross neuro deficits Psychiatric:  Sedated LINES:   
ETT 8/1/19 Venous port 10/29/18 PIV sites Schultz 8/1/19 
  
DRIPS:   
Fentanyl   
versed CXR: today with b/l inflates Vs 2 days ago Ventilator Settings Mode FIO2 Rate Tidal Volume Pressure PEEP Pressure control  28 %          10 cm H20 Peak airway pressure: 43 cm H2O Minute ventilation: 12.7 l/min ABG:  
Recent Labs 08/05/19 
0308 08/04/19 
0518 08/04/19 
3397 PHI 7.222* 7.331* 7.268* PCO2I 50.7* 42.9 57.8*  
PO2I 77 79 72* HCO3I 20.8* 22.7 26.4*  
  
 
LAB Recent Labs 08/05/19 
6054 08/04/19 
2309 08/04/19 
1753 08/04/19 
1400 08/04/19 
0990 GLUCPOC 172* 150* 200* 218* 250* Recent Labs 08/05/19 
7598 08/04/19 
7141 08/03/19 
0315 WBC 53.4* 48.6* 38.7* HGB 8.7* 9.6* 10.1* HCT 29.1* 32.7* 34.4*  
 221 225 Recent Labs 08/05/19 
2018 08/04/19 
8178 08/03/19 
0315 * 144 142  
K 4.5 4.3 4.2 * 110* 104 CO2 23 27 30 * 226* 192* BUN 71* 47* 37* CREA 2.02* 1.21 1.02  
MG 2.3 2.7* 2.9*  
PHOS 3.3  --   --   
CA 8.1* 8.5 8.7 ALB 1.8* 2.1* 2.5* SGOT 21 25 29 Assessment:  (Medical Decision Making) Hospital Problems  Date Reviewed: 8/2/2019 Codes Class Noted POA Hydroureteronephrosis 
 per urology  ICD-10-CM: N13.30 ICD-9-CM: 520  7/30/2019 Clinically Undetermined Hx pulmonary embolism (Chronic) chronic  ICD-10-CM: Z86.711 ICD-9-CM: V12.55  7/30/2019 Yes History of DVT of lower extremity (Chronic) ICD-10-CM: H56.061 ICD-9-CM: V12.51  7/30/2019 Yes Hypoxia ICD-10-CM: R09.02 
ICD-9-CM: 799.02  7/30/2019 No  
   
 Metastatic cancer to lung [facility]) (Chronic) ICD-10-CM: C78.00 ICD-9-CM: 197.0  7/26/2019 Yes  Overview Signed 7/26/2019 11:06 AM by Remo Gowers, NP  
 Per EBUS in June 2019 Acute respiratory failure ([facility] [address]  
on vent  ICD-10-CM: J96.00 
ICD-9-CM: 518.81  7/26/2019 Yes Aspiration into airway  
on ABX ICD-10-CM: T17.908A ICD-9-CM: 934.9  7/26/2019 Yes Dysphagia 
 need PEG, now on TPN ICD-10-CM: R13.10 ICD-9-CM: 787.20  7/25/2019 Yes * (Principal) Rectal adenocarcinoma ([facility] [address] (Chronic) per oncology, stage IV, not responding to therapy, poor prognosis per oncology ICD-10-CM: C20 
ICD-9-CM: 154.1  7/6/2018 Yes Unfortunate male with metastatic CA with sepsis/dysphagia/agiatation on Vent. On TPN currently. BP borderline Plan:  (Medical Decision Making) --continue vent and tolerating PC currently. ABG noted. -- continue ABX and noted WBC is up. cutlures negative but x-ray noted with ?left sided infiltrates. Will get new cultures 
--steroids likely contributing to elevated WBC will monitor 
--continue nebs 
--add precedex to see if can taper versed/fentaly. Continue restraints with ativan prn 
- -Nutritional support:  TPN/lipids, PEG on hold and will see if can place back NGT for now 
--BP lower and albumin is 1.8, will give few doses of albumin 
--will likely need central access 
--Cr worse and s/p recent ureteral stent and has hematuria. WIll get urology to f/u. ?need for CBI 
--Oncology following 
--Palliative Care following--partial code-no CPR Gave wife update and aware of currently condition. Condition is critical 
Time spent 36 min patient care More than 50% of the time documented was spent in face-to-face contact with the patient and in the care of the patient on the floor/unit where the patient is located.  
 
Ricardo Pope MD

## 2019-08-05 NOTE — PROGRESS NOTES
Bedside and verbal shift report received from [address]. Dual signed fentanyl and versed gtt.

## 2019-08-05 NOTE — PROCEDURES
The pt was placed in the supine position The left neck was prepped and drapped Time out was done 1% lidocaine was injected locally Using ultrasound guidance, the left ij was accessed with 18 gauge needle. A triple lumen catheter was placed using Seldinger technique. Good blood return. cxr pending

## 2019-08-06 NOTE — PROGRESS NOTES
Palliative Care Progress Note Patient: Milagro Sanchez MRN: [id number]  SSN: [id number] YOB: 1965  Age: 48 y.o. Sex: male Assessment/Plan: Chief Complaint/Interval History: pt remains intubated. Developing oliguric renal failure. Nursing notes hematuria and drop in Hb. Principal Diagnosis: · Debility, Unspecified  R53.81 Additional Diagnoses: · Acute Respiratory Failure, Unspecified  J96.00 · Altered Mental Status R41.82 
· Frailty  R54 · Counseling, Encounter for Medical Advice  Z71.9 
· Encounter for Palliative Care  Z51.5 Palliative Performance Scale (PPS) PPS: 30 Medical Decision Making:  
Reviewed and summarized labs and imaging. I met with patient's spouse at bedside this am.  We reviewed ongoing medical care and pt overall decline. Spouse with very short answers but stated the pt has struggled since being diagnosed with cancer. Spouse also stated pt would not wish to live on mechanical ventilation. I recognized the difficulty in making decisions for her  and encouraged her to protect him from procedures that will not benefit him recognizing that we are unable to change the conditions that have led to his decline. Spouse was tearful but stated she understands. Will continue to follow and assist with goals of care Will discuss findings with members of the interdisciplinary team.   
 
More than 50% of this 25 minute visit was spent counseling and coordination of care as outlined above. Subjective:  
 
Review of Systems unable to obtain due to mentation Objective:  
 
Visit Vitals /75 Pulse 97 Temp 97.3 °F (36.3 °C) Resp 14 Wt 148 lb (67.1 kg) SpO2 97% BMI 21.55 kg/m² Physical Exam: 
 
General:  Unresponsive on vent Eyes:  Conjunctivae/corneas clear Nose: Nares normal. Septum midline. Neck: Supple, symmetrical, trachea midline, no JVD Lungs:   Coarse bilateral BS  
 Heart:  Regular rate and rhythm, no murmur Abdomen:   Soft, non-tender, non-distended Extremities: Normal, atraumatic, no cyanosis or edema Skin: Skin color, texture, turgor normal. No rash or lesions. Neurologic: sedated Psych: sedated Signed By: Reynaldo Murphy MD   
 August 6, 2019

## 2019-08-06 NOTE — PROGRESS NOTES
Dressing on central line changed. Permission to remove sutures from central line given by Dr. Rina Ortiz as suspected that the bleeding was coming from both insertion site and suture site. Pressure held for a few minutes and surgicel placed on suture site and insertion site. Primary RN aware to notify physician about bleeding if continues.

## 2019-08-06 NOTE — PROGRESS NOTES
Paged regarding renal ultrasound results today. Renal ultrasound shows mild R hydronephrosis with some debris that is likely blood clot in the renal pelvis. Mild hydronephrosis can happen in the setting of a R ureteral stent and is not concerning for stent failure at this time. No further urologic intervention recommended at this time. If stent does fail, only other option would be IR consult for nephrostomy tube placement. Celso Hernandez M.D. [facility] [address] [place] [address],[address] 
[address] [place] [address] Phone: [phone] Fax: [phone]

## 2019-08-06 NOTE — PROGRESS NOTES
Pharmacokinetic Consult to Pharmacist 
 
Jaiden Da Silva is a 48 y.o. male being treated for worsening respiratory failure with Vancomycin, levofloxacin, and Pip-Tazo. Weight: 67.1 kg (148 lb) Lab Results Component Value Date/Time  (H) 08/06/2019 03:14 AM  
 Creatinine 3.67 (H) 08/06/2019 03:14 AM  
 WBC 47.4 (H) 08/06/2019 03:14 AM  
 Procalcitonin 0.4 06/30/2018 05:06 AM  
 Lactic acid 2.2 (HH) 06/30/2018 06:59 PM  
 Lactic Acid (POC) 1.40 03/23/2019 06:28 PM  
  
Estimated Creatinine Clearance: 22.1 mL/min (A) (based on SCr of 3.67 mg/dL (H)). Lab Results Component Value Date/Time Vancomycin, random 32.5 08/06/2019 03:14 AM  
 
Day 4 of vancomycin. Goal trough is 15-20. Random this AM elevated at 32.5 - will not re dose today and continue intermittent dosing. Will order another random when clinically indicated. Thank you, Diaz Ramirez, PharmD Clinical Pharmacist 
[phone]

## 2019-08-06 NOTE — PROGRESS NOTES
[facility] Inpatient Hematology / Oncology Progress Note Admission Date: 7/24/2019  3:18 PM 
Reason for Admission/Hospital Course: Dysphagia [R13.10] Intractable vomiting [R11.10] 24 Hour Events: 
Intubated 8/1 Ongoing hematuria - decreased urine output, increased creatinine Remains sedated Hgb down to 7.9 Wife at side Afebrile ROS: Unable to obtain. Patient sedated on vent 10 point review of systems is otherwise negative with the exception of the elements mentioned above in the HPI. Allergies Allergen Reactions  Zithromax [Azithromycin] Rash OBJECTIVE: 
Patient Vitals for the past 8 hrs: 
 BP Temp Pulse Resp SpO2  
08/06/19 1345 107/71  (!) 103 (!) 31 96 % 08/06/19 1329 117/75  (!) 101 22 96 % 08/06/19 1314 112/75  (!) 107 17 96 % 08/06/19 1259 114/73  (!) 109 20 96 % 08/06/19 1245 112/75  (!) 110 (!) 36 95 % 08/06/19 1229 111/76  (!) 103 (!) 42 97 % 08/06/19 1225   96 27 94 % 08/06/19 1214 113/76  94 (!) 37 94 % 08/06/19 1159 109/74 97.7 °F (36.5 °C) 97 (!) 36 96 % 08/06/19 1145 120/78  97 22 97 % 08/06/19 1129 129/72  100 24 100 % 08/06/19 1114 125/64  99 23 92 % 08/06/19 1059 (!) 174/104  97 (!) 33 91 % 08/06/19 1045 133/68  94 13 96 % 08/06/19 1029 127/75  97 14 97 % 08/06/19 1014 128/75  97 16 96 % 08/06/19 0959 127/75  97 16 97 % 08/06/19 0945 127/73  100 16 97 % 08/06/19 0929 121/69  (!) 103 19 96 % 08/06/19 0914 125/66  (!) 103 19 94 % 08/06/19 0859 134/71  (!) 105 29 94 % 08/06/19 0845 126/71  (!) 101 20 94 % 08/06/19 0830 133/77  (!) 104 20 95 % 08/06/19 0814 127/77  (!) 105 20 95 % 08/06/19 0759 126/73  (!) 107 20 94 % 08/06/19 0745 125/74  (!) 109 22 94 % 08/06/19 0730 117/70  (!) 113 18 95 % 08/06/19 0714 116/70 97.3 °F (36.3 °C) 97 15 99 % 08/06/19 0710   97 (!) 32 95 % 08/06/19 0659 112/66  99 16 95 % 08/06/19 0645 111/67  100 16 95 % 19 0630 112/70  99 16 95 % 19 0614 97/67  100 17 94 % Temp (24hrs), Av.6 °F (36.4 °C), Min:96.8 °F (36 °C), Max:98.5 °F (36.9 °C) 
 
701 -  1900 In: -  
Out: 45 [Urine:38] Physical Exam: 
Constitutional: Acutely ill male in respiratory distress, lying in the hospital bed in ICU Family at bedside. HEENT: Normocephalic and atraumatic. Intubated. Lymph node Deferred Skin Warm and dry. No bruising and no rash noted. No erythema. No pallor. Respiratory Coarse sounds, on vent. CVS Reg rate, regular rhythm and normal S1 and S2. No murmurs, gallops, or rubs. Abdomen Soft, nontender and nondistended, hypoactive bowel sounds. Neuro Sedated Psych Sedated Labs: 
   
Recent Labs 19 
1128 19 
0314 19 
1662 WBC 42.9* 47.4* 53.4*  
RBC 2.46* 2.46* 2.85* HGB 7.2* 7.9* 8.7* HCT 24.2* 24.7* 29.1*  
MCV 98.4* 100.4* 102.1*  
MCH 29.3 32.1 30.5 MCHC 29.8* 32.0 29.9*  
RDW 18.1* 17.9* 17.6*  
 183 200 GRANS 75 73 74 LYMPH 0* 1* 1*  
MONOS 7 7 7 EOS 0* 0* 0*  
BASOS 0 0 0 IG 18* 19* 18* DF AUTOMATED AUTOMATED AUTOMATED ANEU 32.2* 34.6* 39.6* ABL 0.0* 0.5 0.5 ABM 3.0* 3.3* 3.7* YESI 0.0 0.0 0.0 ABB 0.0 0.0 0.0 AIG 7.7* 9.0* 9.6* Recent Labs 19 
2522 19 
3834 19 
2168  147* 144  
K 4.6 4.5 4.3 * 114* 110* CO2 18* 23 27 AGAP 13 10 7 * 170* 226* * 71* 47* CREA 3.67* 2.02* 1.21  
GFRAA 22* 45* >60  
GFRNA 19* 37* >60  
CA 8.2* 8.1* 8.5 SGOT 41* 21 25 AP 88 74 72 TP 6.3 6.1* 6.5 ALB 2.2* 1.8* 2.1*  
GLOB 4.1* 4.3* 4.4* AGRAT 0.5* 0.4* 0.5* MG 2.0 2.3 2.7* PHOS  --  3.3  --   
 
 
 
Imaging: XR CHEST SNGL V [[id number]] Collected: 19 1159 Order Status: Completed Updated: 19 1202 Narrative:    
Portable chest x-ray CLINICAL INDICATION: Shortness of breath FINDINGS: Single AP view the chest compared to a similar exam dated 7/11/2019 
shows new reticular nodular interstitial densities throughout both lungs with 
more patchy airspace opacity in the right upper lobe. A right-sided chest port 
is in place. The cardiac silhouette and mediastinum are stable. Impression:    
IMPRESSION: New, diffuse reticular nodular densities throughout the lungs with 
patchy opacity in the right upper lobe. Atypical pneumonia or pulmonary edema 
should be considered. XR BA SWALLOW ESOPHOGRAM [[id number]] Collected: 07/26/19 4684 Order Status: Completed Updated: 07/26/19 1026 Narrative:    
History: Dysphasia. Stage IV rectal cancer. EXAM: Barium swallow TECHNIQUE: 1.3 minutes fluoroscopy time is utilized. The patient ingests 
effervescent granules followed by thick and thin consistencies of barium. 18 
fluoroscopic images are available for review. No comparison FINDINGS: The exam is markedly limited. The patient is unable to perform the 
exam the upright position, and has very limited mobility. There is aspiration of 
the thin contrast. Contrast flows through the esophagus into the stomach without 
difficulty. There is a hiatal hernia present. Just above the hiatal hernia, 
there is a filling defect of the distal esophagus, corresponding to a soft 
tissue mass adjacent to the esophagus on the recent CT chest. No definite 
gastroesophageal reflux, or ulceration. Impression:    
IMPRESSION: 
1. Just above the hiatal hernia, there is an extrinsic soft tissue mass creating 
a filling defect within the distal esophagus, corresponding to a soft tissue 
mass seen on the recent CT chest. 
2. Aspiration of oral contrast. Correlation with modified barium swallow 
recommended. CT CHEST W CONT [[id number]] Collected: 07/24/19 2133 Order Status: Completed Updated: 07/24/19 2138 Narrative:    
CT OF THE CHEST WITH INTRAVENOUS CONTRAST. INDICATION: Shortness of breath. Rosalva Hanson adenocarcinoma with metastatic 
disease. COMPARISON: None. TECHNIQUE:   2.5 mm axial scans from above the aortic arch to the lung bases 
with 100 cc nonionic intravenous contrast without acute complication. Intravenous contrast was given to evaluate for pulmonary embolism. FINDINGS:  The degree of opacification of the pulmonary arteries is adequate. No intraluminal filling defects within the pulmonary arterial tree to suggest 
pulmonary embolism. Sarbjit Duel is normal caliber with a uniform lumen without 
evidence of dissection. Lungs demonstrate multiple nodules, groundglass 
opacities and small pneumonic consolidation in the bases. There is extensive 
mediastinal adenopathy which has increased a great deal from the prior exam.. Included portion of the upper abdomen are unremarkable. No gross bony lesions. Mary Aguilera Impression:    
IMPRESSION:  Mediastinal adenopathy is increased great deal from the prior exam. 
There is patchy airspace disease in both lung bases. Pulmonary metastases. Negative for pulmonary embolism. Medications: 
Current Facility-Administered Medications Medication Dose Route Frequency  LORazepam (ATIVAN) injection 2 mg  2 mg IntraVENous Q2H PRN  piperacillin-tazobactam (ZOSYN) 4.5 g in 0.9% sodium chloride (MBP/ADV) 100 mL  4.5 g IntraVENous Q12H  TPN ADULT-CENTRAL - dextrose 15% amino acid 5%   IntraVENous QPM  
 dexmedeTOMidine (PRECEDEX) 400 mcg in 0.9% sodium chloride 100 mL infusion  0.2-0.7 mcg/kg/hr IntraVENous TITRATE  albumin human 25% (BUMINATE) solution 12.5 g  12.5 g IntraVENous Q8H  
 levoFLOXacin (LEVAQUIN) 750 mg in D5W IVPB  750 mg IntraVENous Q48H  
 vancomycin (VANCOCIN) 1,000 mg in 0.9% sodium chloride (MBP/ADV) 250 mL  1,000 mg IntraVENous See Admin Instructions  TPN ADULT-CENTRAL - dextrose 15% amino acid 5%   IntraVENous QPM  
 fat emulsion 20% (LIPOSYN, INTRAlipid) infusion 250 mL  250 mL IntraVENous QPM  
 polyvinyl alcohol (LIQUIFILM TEARS) 1.4 % ophthalmic solution 1 Drop  1 Drop Both Eyes PRN  
 insulin regular (NOVOLIN R, HUMULIN R) injection   SubCUTAneous Q6H  
 methylPREDNISolone (PF) (SOLU-MEDROL) injection 60 mg  60 mg IntraVENous Q8H  
 budesonide (PULMICORT) 500 mcg/2 ml nebulizer suspension  500 mcg Nebulization BID RT  
 fentaNYL in normal saline (pf) 25 mcg/mL infusion   mcg/hr IntraVENous TITRATE  morphine injection 2 mg  2 mg IntraVENous Q4H PRN  
 albuterol (PROVENTIL VENTOLIN) nebulizer solution 2.5 mg  2.5 mg Nebulization QID RT  
 hydrALAZINE (APRESOLINE) 20 mg/mL injection 10 mg  10 mg IntraVENous Q6H PRN  
 albuterol (PROVENTIL VENTOLIN) nebulizer solution 2.5 mg  2.5 mg Nebulization Q6H PRN  
 NUTRITIONAL SUPPORT ELECTROLYTE PRN ORDERS   Does Not Apply PRN  
 magic mouthwash (SIMON) oral suspension 5 mL  5 mL Oral Q4H PRN  
 central line flush (saline) syringe 10 mL  10 mL InterCATHeter PRN  prochlorperazine (COMPAZINE) injection 10 mg  10 mg IntraVENous Q6H PRN  
 
 
 
ASSESSMENT: 
 
Problem List  Date Reviewed: 8/5/2019 Codes Class Noted Hydroureteronephrosis ICD-10-CM: N13.30 ICD-9-CM: 040  7/30/2019 Hx pulmonary embolism (Chronic) ICD-10-CM: Y14.449 ICD-9-CM: V12.55  7/30/2019 History of DVT of lower extremity (Chronic) ICD-10-CM: E75.351 ICD-9-CM: V12.51  7/30/2019 Hypoxia ICD-10-CM: R09.02 
ICD-9-CM: 799.02  7/30/2019 Metastatic cancer to lung [facility]) (Chronic) ICD-10-CM: C78.00 ICD-9-CM: 197.0  7/26/2019 Overview Signed 7/26/2019 11:06 AM by Thalia Galicia NP Per EB in June 2019 Acute respiratory failure ([address] ICD-10-CM: J96.00 
ICD-9-CM: 518.81  7/26/2019 Aspiration into airway ICD-10-CM: T17.908A ICD-9-CM: 934.9  7/26/2019 Dysphagia ICD-10-CM: R13.10 ICD-9-CM: 787.20  7/25/2019 Intractable vomiting ICD-10-CM: R11.10 ICD-9-CM: 536.2  7/25/2019 Mediastinal lymphadenopathy ICD-10-CM: R59.0 ICD-9-CM: 785.6  6/11/2019 Pulmonary nodules ICD-10-CM: R91.8 ICD-9-CM: 793.19  6/11/2019 Dehydration ICD-10-CM: E86.0 ICD-9-CM: 276.51  4/2/2019 S/P radiation therapy < 4 weeks ago ICD-10-CM: Z92.3 ICD-9-CM: V15.3  10/1/2018 * (Principal) Rectal adenocarcinoma ([facility] [address] (Chronic) ICD-10-CM: C20 
ICD-9-CM: 154.1  7/6/2018 Colostomy status (HCC) (Chronic) ICD-10-CM: Z93.3 ICD-9-CM: V44.3  6/29/2018 Erectile dysfunction due to arterial insufficiency (Chronic) ICD-10-CM: N52.01 
ICD-9-CM: 607.84  5/31/2018 Benign essential HTN (Chronic) ICD-10-CM: I10 
ICD-9-CM: 401.1  5/31/2018 Hypercholesterolemia (Chronic) ICD-10-CM: E78.00 ICD-9-CM: 272.0  5/30/2018 Overview Signed 6/4/2018  2:20 PM by Prasad Lozano  
  ASCVD 10 year risk is 16.0%. Mod to high dose statin indicated. (based on b/p 180/90, 6/4/18) Screening PSA (prostate specific antigen) (Chronic) ICD-10-CM: Z12.5 ICD-9-CM: V76.44  5/30/2018 History of tobacco abuse (Chronic) ICD-10-CM: F79.091 ICD-9-CM: V15.82  8/18/2016 PLAN: 
Dysphagia/vomiting 
- GI evaluation. Planning EGD tomorrow (cannot do dilation due to recent avastin). Started pepcid BID 
- Continue IV fluids 7/26 Barium swallow today with aspiration and extrinsic soft tissue mass creating filling defect in distal esophagus 7/29 Spoke with Ignacia English (out of town) and Yared. Needs palliative radiation to mediastinal adenopathy that is compressing esophagus. Planned for consult this afternoon. Pt agreeable to PEG given length of time it may take for radiation to improve his ability to swallow. GI reconsulted for placement 7/30 S/p simulation with rad onc yesterday - planning to start radiation (today but held due to procedure). PEG placement possibly tomorrow 8/1 PEG is no longer an option. TPN starting today. Aspiration pneumonia - Continue levaquin. BCx pending. UA negative. CT chest with no acute findings 7/26 D3 LVQ 
7/29 Continues zosyn. Noted leukocytosis. 8/2 broaden coverage, add LVQ and Vanc Met rectal ca - S/p FOLFIRI-Avastin, last given 7/9 7/29 Suspicion for progression on recent CT. Family is aware. Further treatment TBD outpatient with Dr. Sophia Nuñez Leukopenia/anemia 
- Secondary to chemotherapy. Granix x 1 today 7/26 WBC up to 4.5 
7/29 Continues with elevated counts 8/6 GI consulted for drop in hgb. No plans for endoscopic evaluation at this time. Continue transfusions and monitor serial H&H Hx hydronephrosis - now hematuria - Planned for cystoscopy and stent placement tomorrow with urology (was scheduled for outpatient). Ok from [employer]. Discussed with pulmonary and no contraindication from their perspective. Discussed with Nirav Bhatia NP 
7/30 S/p cystoscopy with right ureteral stent placed and right ureteral biopsy. To ICU following for tachypnea 8/5 Seen by urology this morning - hematuria is expected from his stent Respiratory failure 8/2 Intubated -bilateral ground glass densities on CXR suspicious for infection. Pulmonary taking over as primary. 8/3 Remains intubated, unresponsive, worsened ABG/CXR  
8/5 No clinical changes, intubated and sedated, CXR unchanged. Pulmonology following [facility] 
8/5 Creatinine up today. If persistent, consider nephrology consult 8/6 Creatinine up, decreased urine output. Nephrology consulted Patient is partial code status. Continues with clinical decline. Multiple family members present and discussing goals of care. Continue supportive care including antibiotics for HCAP and ventilator support. Not a candidate for any cancer directed therapy at this time [facility] [id number] Office : [phone] Fax : [phone] Attending Addendum: I have personally performed a face to face diagnostic evaluation on this patient. I have reviewed and agree with the care plan as documented by Inga Ferris N.P. My findings are as follows:  He has metastatic rectal cancer with multi-organ failure, appears intubated and sedated, heart rate regular without murmurs, abdomen is non-tender, bowel sounds are positive, we will encourage the family to consider hospice. Yani Foss MD 
 
 
[facility] [address] [place] [address] Office : [phone] Fax : [phone]

## 2019-08-06 NOTE — PROGRESS NOTES
Pt still severely agitated, lifting off of the bed, thrashing head with no command following, ativan 2mg IV given.

## 2019-08-06 NOTE — PROGRESS NOTES
Critical Care Daily Progress Note: 8/6/2019 Admission Date: 7/24/2019 The patient's chart is reviewed and the patient is discussed with the staff. Chronic Medical:  Colon cancer, DM, HTN, rectal cancer, MYRON, former tobacco abuse and GERD. 
  
Was admitted on 7/24/2019 by Oncology for dehydration and rectal adenocarcinoma confirmed to be Stage IV per biopsy. Kushal Tracy was initiated on 5FU-A and recently switched to FOLFIRI-A.  XRT planned for mediastinal node. Barium swallow revealed aspiration of contrast as well as extrinsic soft tissue mass creating filling defect in distal esophagus. Mechanical soft diet with chopped/ground meats and nectar thick liquids recommended per speech therapy.   
  On abx for suspected aspiration pneumonia. Has been hypoxic and requiring oxygen support.  
  Seen by urology for right hydroureteronephrosis and cystoscopy 7/30 with stent placement/biopsy-no malignancy per path. Blocking ureter. GI was consulted for PEG placement but due to decline in respiratory status this is on hold. Intubated 8/1 for respiratory support. Subjective:  
 
Sedated ,on vent agitated at times in restraints. Wife at bedside. Since yesterday. Noted bleeding from lines and also have lower hg today. Seen by urology and felt ureteral stent was fine. Only 65 ml output last night and is over 16.5 L positive balance. Current Facility-Administered Medications Medication Dose Route Frequency  dexmedeTOMidine (PRECEDEX) 400 mcg in 0.9% sodium chloride 100 mL infusion  0.2-0.7 mcg/kg/hr IntraVENous TITRATE  albumin human 25% (BUMINATE) solution 12.5 g  12.5 g IntraVENous Q8H  
 levoFLOXacin (LEVAQUIN) 750 mg in D5W IVPB  750 mg IntraVENous Q48H  
 vancomycin (VANCOCIN) 1,000 mg in 0.9% sodium chloride (MBP/ADV) 250 mL  1,000 mg IntraVENous See Admin Instructions  TPN ADULT-CENTRAL - dextrose 15% amino acid 5%   IntraVENous QPM  
  fat emulsion 20% (LIPOSYN, INTRAlipid) infusion 250 mL  250 mL IntraVENous QPM  
 polyvinyl alcohol (LIQUIFILM TEARS) 1.4 % ophthalmic solution 1 Drop  1 Drop Both Eyes PRN  
 insulin regular (NOVOLIN R, HUMULIN R) injection   SubCUTAneous Q6H  
 LORazepam (ATIVAN) injection 2 mg  2 mg IntraVENous Q2H PRN  
 methylPREDNISolone (PF) (SOLU-MEDROL) injection 60 mg  60 mg IntraVENous Q8H  
 budesonide (PULMICORT) 500 mcg/2 ml nebulizer suspension  500 mcg Nebulization BID RT  
 fentaNYL in normal saline (pf) 25 mcg/mL infusion   mcg/hr IntraVENous TITRATE  morphine injection 2 mg  2 mg IntraVENous Q4H PRN  
 albuterol (PROVENTIL VENTOLIN) nebulizer solution 2.5 mg  2.5 mg Nebulization QID RT  
 hydrALAZINE (APRESOLINE) 20 mg/mL injection 10 mg  10 mg IntraVENous Q6H PRN  
 albuterol (PROVENTIL VENTOLIN) nebulizer solution 2.5 mg  2.5 mg Nebulization Q6H PRN  
 NUTRITIONAL SUPPORT ELECTROLYTE PRN ORDERS   Does Not Apply PRN  piperacillin-tazobactam (ZOSYN) 4.5 g in 0.9% sodium chloride (MBP/ADV) 100 mL  4.5 g IntraVENous Q8H  
 magic mouthwash (SIMON) oral suspension 5 mL  5 mL Oral Q4H PRN  
 central line flush (saline) syringe 10 mL  10 mL InterCATHeter PRN  prochlorperazine (COMPAZINE) injection 10 mg  10 mg IntraVENous Q6H PRN Review of Systems Unobtainable due to patient status. Objective:  
 
Vitals:  
 08/06/19 0530 08/06/19 0545 08/06/19 0559 08/06/19 0710 BP: 113/64 105/60 97/62 Pulse: (!) 101 (!) 101 100 97 Resp:    (!) 32 Temp:      
SpO2: 93% 94% 94% 95% Weight:      
 
Ventilator Settings Mode FIO2 Rate Tidal Volume Pressure PEEP Pressure control  28 %          10 cm H20 Peak airway pressure: 41 cm H2O Minute ventilation: 13.4 l/min Intake and Output:  
08/04 1901 - 08/06 0700 In: 4584.2 [I.V.:4056.2] Out: 147 [[facility]:655] No intake/output data recorded. Physical Exam:         
Constitutional:  intubated and mechanically ventilated. EENMT:  Sclera clear, pupils equal, oral mucosa moist 
Respiratory: coarse b/l. Cardiovascular:  RRR with no M,G,R; 
Gastrointestinal:  soft with no tenderness; positive bowel sounds present Musculoskeletal:  warm with no cyanosis, no lower extremity edema Skin:  no jaundice or ecchymosis, mottling of legs Neurologic: no gross neuro deficits Psychiatric:  Sedated. LINES:   
ETT 8/1/19 Venous port 10/29/18 Central line since 8/5/19 Schultz 8/1/19 
  
DRIPS:   
Fentanyl   
precedex CXR: pending today 
 
yesterday with b/l inflates Ventilator Settings Mode FIO2 Rate Tidal Volume Pressure PEEP Pressure control  28 %          10 cm H20 Peak airway pressure: 41 cm H2O Minute ventilation: 13.4 l/min ABG:  
Recent Labs 08/06/19 
2840 08/05/19 
0308 08/04/19 
0518 PHI 7.214* 7.222* 7.331* PCO2I 37.6 50.7* 42.9 PO2I 86 77 79 HCO3I 15.2* 20.8* 22.7 LAB Recent Labs 08/06/19 
6888 08/06/19 
0024 08/05/19 
1743 08/05/19 
1222 08/05/19 
8748 GLUCPOC 163* 160* 166* 180* 172* Recent Labs 08/06/19 
2735 08/05/19 
9186 08/04/19 
6525 WBC 47.4* 53.4* 48.6* HGB 7.9* 8.7* 9.6* HCT 24.7* 29.1* 32.7*  
 200 221 Recent Labs 08/06/19 
2302 08/05/19 
1661 08/04/19 
5523  147* 144  
K 4.6 4.5 4.3 * 114* 110* CO2 18* 23 27 * 170* 226* * 71* 47* CREA 3.67* 2.02* 1.21  
MG 2.0 2.3 2.7* PHOS  --  3.3  --   
CA 8.2* 8.1* 8.5 ALB 2.2* 1.8* 2.1*  
SGOT 41* 21 25 Assessment:  (Medical Decision Making) Hospital Problems  Date Reviewed: 8/2/2019 Codes Class Noted POA Hydroureteronephrosis ICD-10-CM: N13.30 ICD-9-CM: 550  7/30/2019 Clinically Undetermined  
 --s/p stent by urology Hx pulmonary embolism (Chronic) chronic  ICD-10-CM: Z86.711 ICD-9-CM: V12.55  7/30/2019 Yes History of DVT of lower extremity (Chronic) ICD-10-CM: A53.298 ICD-9-CM: V12.51  7/30/2019 Yes Hypoxia ICD-10-CM: R09.02 
ICD-9-CM: 799.02  7/30/2019 No  
 --on 28 % Metastatic cancer to lung [facility]) (Chronic) ICD-10-CM: C78.00 ICD-9-CM: 197.0  7/26/2019 Yes Overview Signed 7/26/2019 11:06 AM by Arnol Brown NP Per [facility]US in June 2019 Acute respiratory failure ([facility] [address]  
on vent  ICD-10-CM: J96.00 
ICD-9-CM: 518.81  7/26/2019 Yes Aspiration into airway  
on ABX ICD-10-CM: T17.908A ICD-9-CM: 934.9  7/26/2019 Yes Dysphagia 
 need PEG, on tube feeding ICD-10-CM: R13.10 ICD-9-CM: 787.20  7/25/2019 Yes * (Principal) Rectal adenocarcinoma ([facility] [address] (Chronic) per oncology, stage IV, not responding to therapy, poor prognosis per oncology ICD-10-CM: C20 
ICD-9-CM: 154.1  7/6/2018 Yes Unfortunate male with metastatic CA with sepsis/dysphagia/agiatation on Vent. On TPN currently. BP borderline. Has ureteral sent this admission due to hydronephrosis with some hematuria. Bleeding from central line today and hg dropping today. More agitated. Plan:  (Medical Decision Making)  
 
--bleeding today. GI following may need EGD. F/u hg and may need transfusion if dropping. Check coags --cxr just done and still with b/l infiltrates. F/u abg today. Ph is in 7.2 range but like mostly from renal failure --> anion gap met acidosis (noted expected Anion-gap is 6 given alb of 2) --continue vent and having some difficulty with  PC currently and changed to [facility]. ABG noted and will sedated more -- continue ABX and noted WBC is down slightly. cutlures negative  
--steroids likely contributing to elevated WBC will monitor 
--continue nebs 
--continue sedation and hold sedation holiday today since clinically worse and having bleeding issues. Will given more ativan prn but may need to restart versed drip 
--Continue restraints with ativan prn 
-- Nutritional support:  hold tube feeding given lower HG today and will f/u with GI today -- will get US of bladder to r/o recurrence of hydronephrosis. If present will need to f/u with urology --f/u with remaining specialities -- Oncology/palliativecare/renal. 
--Palliative Care following--partial code-no CPR Gave wife update and aware of currently critical condition. Condition is critical 
Time spent 31min patient care More than 50% of the time documented was spent in face-to-face contact with the patient and in the care of the patient on the floor/unit where the patient is located.  
 
Jermaine Smith MD

## 2019-08-06 NOTE — ROUTINE PROCESS
Ventilator check complete; patient has a #7.5 ET tube secured at the 24 at the teeth. Patient is sedated. Patient is not able to follow commands. Breath sounds are expiratory wheezes. Trachea is midline, Negative for subcutaneous air, and chest excursion is symmetric. Patient is also Negative for cyanosis and is Negative for pitting edema. All alarms are set and audible. Resuscitation bag is at the head of the bed. Ventilator Settings Mode FIO2 Rate Tidal Volume Pressure PEEP I:E Ratio Pressure control  28 %  30 
       30 10 cm H20  1:1.8 Peak airway pressure: 40 cm H2O Minute ventilation: 17.7 l/min Hugh Waldrop, RT

## 2019-08-06 NOTE — PROGRESS NOTES
Admit Date: 7/24/2019 Subjective:  
 
Delvin Bhakta is currently intubated in ICU. Catheter draining scant amount of cherry urine. Remains in critical condition. Objective:  
 
Patient Vitals for the past 8 hrs: 
 BP Pulse Resp SpO2 Weight 08/06/19 0559 97/62 100  94 %   
08/06/19 0545 105/60 (!) 101  94 %   
08/06/19 0530 113/64 (!) 101  93 %   
08/06/19 0526 123/64 100  93 %   
08/06/19 0524     148 lb (67.1 kg) 08/06/19 0430 120/65 100  94 %   
08/06/19 0414 127/80 (!) 101  94 %   
08/06/19 0359 129/61 100  94 %   
08/06/19 0345 147/83 (!) 101  94 %   
08/06/19 0330 115/60 97  95 %   
08/06/19 0314 115/65 98  96 %   
08/06/19 0301  (!) 101 (!) 38 95 %   
08/06/19 0259 102/63 100  95 %   
08/06/19 0245 103/62 100  95 %   
08/06/19 0230 102/64 (!) 101  95 %   
08/06/19 0214 107/62 (!) 102  95 %   
08/06/19 0159 113/61 (!) 103  95 %   
08/06/19 0145 108/61 (!) 103  94 %   
08/06/19 0130 104/65 (!) 102 (!) 34 94 %   
08/06/19 0114 127/62 (!) 103 (!) 34 95 %   
08/06/19 0030 110/63 (!) 101 (!) 33 96 %   
08/06/19 0014 102/60 (!) 102 (!) 33 96 %   
08/05/19 2359 100/60 (!) 104 (!) 34 96 %   
08/05/19 2345 90/50 (!) 106 (!) 36 95 %   
08/05/19 2331  (!) 107 (!) 33 95 %   
08/05/19 2330 104/59 (!) 108 (!) 34 95 %   
08/05/19 2314 108/63 (!) 108 (!) 36 94 %   
08/05/19 2259 105/60 (!) 107 (!) 36 94 %   
08/05/19 2244 112/72 (!) 109 (!) 35 94 %   
 
08/05 1901 - 08/06 0700 In: 2030.1 [I.V.:1502.1] Out: 65 [Urine:65] 
08/04 0701 - 08/05 1900 In: 4436.1 [I.V.:4436.1] Out: 1100 [Urine:950] Physical Exam: 
GENERAL: sedated, on ventilator LUNG: clear to auscultation bilaterally HEART: regular rate and rhythm, S1, S2  
ABDOMEN: soft, non-tender NEUROLOGIC: sedated Data Review Recent Results (from the past 24 hour(s)) GLUCOSE, POC Collection Time: 08/05/19 12:22 PM  
Result Value Ref Range Glucose (POC) 180 (H) 65 - 100 mg/dL GLUCOSE, POC Collection Time: 08/05/19  5:43 PM  
Result Value Ref Range Glucose (POC) 166 (H) 65 - 100 mg/dL GLUCOSE, POC Collection Time: 08/06/19 12:24 AM  
Result Value Ref Range Glucose (POC) 160 (H) 65 - 100 mg/dL POC G3 Collection Time: 08/06/19  3:06 AM  
Result Value Ref Range Device: VENT    
 FIO2 (POC) 28 % pH (POC) 7.214 (LL) 7.35 - 7.45    
 pCO2 (POC) 37.6 35 - 45 MMHG  
 pO2 (POC) 86 75 - 100 MMHG  
 HCO3 (POC) 15.2 (L) 22 - 26 MMOL/L  
 sO2 (POC) 94 (L) 95 - 98 % Base deficit (POC) 12 mmol/L Mode ASSIST CONTROL Set Rate 30 bpm  
 PEEP/CPAP (POC) 10 cmH2O Allens test (POC) YES Inspiratory Time 0.7 sec Site RIGHT RADIAL Patient temp. 98.6 Specimen type (POC) ARTERIAL Performed by  CO2, POC 16 MMOL/L Pressure control 30 Respiratory comment: NurseNotified Exhaled minute volume 12.70 L/min COLLECT TIME 303 CBC WITH AUTOMATED DIFF Collection Time: 08/06/19  3:14 AM  
Result Value Ref Range WBC 47.4 (H) 4.3 - 11.1 K/uL  
 RBC 2.46 (L) 4.23 - 5.6 M/uL HGB 7.9 (L) 13.6 - 17.2 g/dL HCT 24.7 (L) 41.1 - 50.3 % .4 (H) 79.6 - 97.8 FL  
 MCH 32.1 26.1 - 32.9 PG  
 MCHC 32.0 31.4 - 35.0 g/dL  
 RDW 17.9 (H) 11.9 - 14.6 % PLATELET 145 488 - 033 K/uL MPV 11.8 9.4 - 12.3 FL ABSOLUTE NRBC 0.16 0.0 - 0.2 K/uL NEUTROPHILS 73 43 - 78 % LYMPHOCYTES 1 (L) 13 - 44 % MONOCYTES 7 4.0 - 12.0 % EOSINOPHILS 0 (L) 0.5 - 7.8 % BASOPHILS 0 0.0 - 2.0 % IMMATURE GRANULOCYTES 19 (H) 0.0 - 5.0 %  
 ABS. NEUTROPHILS 34.6 (H) 1.7 - 8.2 K/UL  
 ABS. LYMPHOCYTES 0.5 0.5 - 4.6 K/UL  
 ABS. MONOCYTES 3.3 (H) 0.1 - 1.3 K/UL  
 ABS. EOSINOPHILS 0.0 0.0 - 0.8 K/UL  
 ABS. BASOPHILS 0.0 0.0 - 0.2 K/UL  
 ABS. IMM. GRANS. 9.0 (H) 0.0 - 0.5 K/UL  
 RBC COMMENTS SLIGHT 
ANISOCYTOSIS + POIKILOCYTOSIS 
    
 RBC COMMENTS OCCASIONAL 
POLYCHROMASIA 
    
 WBC COMMENTS Result Confirmed By Smear PLATELET COMMENTS ADEQUATE    
 DF AUTOMATED MAGNESIUM Collection Time: 08/06/19  3:14 AM  
Result Value Ref Range Magnesium 2.0 1.8 - 2.4 mg/dL METABOLIC PANEL, COMPREHENSIVE Collection Time: 08/06/19  3:14 AM  
Result Value Ref Range Sodium 143 136 - 145 mmol/L Potassium 4.6 3.5 - 5.1 mmol/L Chloride 112 (H) 98 - 107 mmol/L  
 CO2 18 (L) 21 - 32 mmol/L Anion gap 13 7 - 16 mmol/L Glucose 169 (H) 65 - 100 mg/dL  (H) 6 - 23 MG/DL Creatinine 3.67 (H) 0.8 - 1.5 MG/DL  
 GFR est AA 22 (L) >60 ml/min/1.73m2 GFR est non-AA 19 (L) >60 ml/min/1.73m2 Calcium 8.2 (L) 8.3 - 10.4 MG/DL Bilirubin, total 0.3 0.2 - 1.1 MG/DL  
 ALT (SGPT) 38 12 - 65 U/L  
 AST (SGOT) 41 (H) 15 - 37 U/L Alk. phosphatase 88 50 - 136 U/L Protein, total 6.3 6.3 - 8.2 g/dL Albumin 2.2 (L) 3.5 - 5.0 g/dL Globulin 4.1 (H) 2.3 - 3.5 g/dL A-G Ratio 0.5 (L) 1.2 - 3.5 Carlus Route Collection Time: 08/06/19  3:14 AM  
Result Value Ref Range Vancomycin, random 32.5 UG/ML Assessment:  
 
Principal Problem: 
  Rectal adenocarcinoma ([address] (7/6/2018) Active Problems: Dysphagia (7/25/2019) Intractable vomiting (7/25/2019) Metastatic cancer to lung ([address] (7/26/2019) Overview: Per EBUS in June 2019 Acute respiratory failure ([address] (7/26/2019) Aspiration into airway (7/26/2019) Hydroureteronephrosis (7/30/2019) Hx pulmonary embolism (7/30/2019) History of DVT of lower extremity (7/30/2019) Hypoxia (7/30/2019) S/P: 
 
POSTOPERATIVE DIAGNOSES:  Right ureteral mass and right hydroureteronephrosis. 
  
OPERATIONS AND PROCEDURES:  Cystoscopy with right retrograde pyelogram, right diagnostic ureteroscopy, right ureteral mass biopsies x4, right ureteral stent placement. 
  
 
Ureteral Biopsies: hemorrhagic connective tissue 
  
Plan:  
  
-Hematuria expected and may be intermittent with stent in place -OK for RN to irrigate catheter with 60 cc sterile water as needed for poor catheter drainage to rule out blood clot in bladder. 
-Continue care per primary team 
-Will sign off. Call with questions. Celso Dobbs M.D. [facility] [address] [place] [address],[address] 
[address] [address] [address] [facility] Phone: [phone] Fax: [phone]

## 2019-08-06 NOTE — PROGRESS NOTES
Nutrition F/U: 
TPN Management for Oncology/[facility]. Assessment: 
SHSOHZ 58.7 VZ (ICU bed 8/6/19), edema - 1+ pitting to BUEs and LLE, 2+ pitting RLE. The patient remains intubated, ventilated, sedated and TPN-dependent at this time. NGT was placed yesterday for tube feeding. Tube feeding has been stopped at this time per Dr. Bharath Hoskins and patient is to remain on TPN as sole source of nutrition. Hgb is trending down with blood coming out of central line and drew catheter. TF was stopped due to low Hgb. Abdominal Status: active bowel sounds; Last BM: 150 ml in colostomy over past 24 hours. Pertinent Medications: Precedex, Fentanyl, SSI Pertinent Labs:  
Lab Results Component Value Date/Time Sodium 143 08/06/2019 03:14 AM  
 Potassium 4.6 08/06/2019 03:14 AM  
 Chloride 112 (H) 08/06/2019 03:14 AM  
 CO2 18 (L) 08/06/2019 03:14 AM  
 Anion gap 13 08/06/2019 03:14 AM  
 Glucose 169 (H) 08/06/2019 03:14 AM  
  (H) 08/06/2019 03:14 AM  
 Creatinine 3.67 (H) 08/06/2019 03:14 AM  
 Calcium 8.2 (L) 08/06/2019 03:14 AM  
 Albumin 2.2 (L) 08/06/2019 03:14 AM  
 Phosphorus 3.3 08/05/2019 03:47 AM  
POC glucose (8/5) 172, 180, 166, 160 and (8/6) 163. Solumedrol dose remains as 60 mg three-times-daily. Anthropometrics:  Weight Source: Bed, Weight: 67.1 kg (148 lb) IV Access: 
           Single port and triple lumen to left internal jugular Macronutrient Needs (59.7 kg): EER:  9560-8022 kcal /day (25-30 kcal/kg) EPR:  72-90 grams protein/day (1.2-1.5 grams/kg) Max CHO:  344 grams/day (4 mg/kg/min/day) Fluid:  1ml/kcal 
Intake/Comparative Standards: 
Current intake of TPN (1.8 liters 5%AA/15%DEX and daily 250 ml 20% lipids provides 1778 calories/day (100% calorie goal), 90 grams protein/day (100% protein goal), 270 grams CHO/day (does not exceed max CHO limit) and ~2250 ml water/day (100% fluid goal).  
TF was stopped Intervention:  
Meals and Snacks: NPO. Parenteral Nutrition: 1) Continue current TPN and daily lipids. 2) Adjust TPN to decrease the KPhos to 10 mEq/L 
3) Additives/liter: 0 sodium, 10 potassium (phosphate), 34 insulin. 4) Additives/bag: MVI, MTE and Pepcid. Enteral Nutrition: Once TF can be restarted recommend: Start TF of Glucerna 1.2 (due to elevated blood sugars) at 10 ml/hr and advance by 10 ml q 4 hours to a goal rate of 60 ml/hr with a water flush of 30 ml q hourly. This will provide: 1728 kcals (100% of estimated needs), 86 grams of protein (100% of estimated needs), 165 grams of CHO (does not exceed max CHO/day) and ~1930 ml of free water (100% of estimated needs). (however with recent renal issues patient may need Nepro over Glucerna). Mineral Supplement Therapy: Nutrition Support Orders/Electrolyte Management Replacement Protocols are active on the MAR. Nutrition Discharge Plan: Too soon to determine. Zeeshan Bran [address], [address], [place]  
[phone]

## 2019-08-06 NOTE — PROGRESS NOTES
A follow up visit was made to the patient. Emotional support, spiritual presence and  
prayer were provided.  talked with the patient's wife for an extended time.  provided active listening and comfort. She was tearful discussing where her  was medically and how she was doing emotionally. Tellis Prader Chaplain

## 2019-08-06 NOTE — PROGRESS NOTES
Pt very agitated, thrashing head side to side, will not follow commands, stacking breaths. Morphine 2mg IV given.

## 2019-08-06 NOTE — PROGRESS NOTES
Head to toe assessment completed on pt this AM. 
  
Neuro: Pt eyes open to stimulation / voice. No command following - pt agitated easily. With stimulation, pt thrashes head in bed and pulls at restraints - RASS +2. Without stimulation, pt rests quietly in bed. PERRLA - 2 mm and sluggish to react - Eyes deviated upwards. SOTELO spontaneously. Resp: PC FiO2 28%. Coarse, expiratory wheezing upper / diminished lower breath sounds. Clear thick secretions upon suctioning. Cardiac: ST - BP adequate. GI: Dobhoff with Glucerna infusing - TPN infusing through port. Ostomy patent and draining - changed waiver and bag. Stoma pink and patent. Hypoactive bowel sounds. : Schultz - draining and patent. Gross hematuria upon examination. Skin: 2+ pitting edema BUEs 3+ edema LLE. Allevyn, Prevalon boots intact.

## 2019-08-06 NOTE — PROGRESS NOTES
Ventilator check complete; patient has a #7.5 ET tube secured at the 24 at the lip. Patient is sedated. Patient is not able to follow commands. Breath sounds are diminished with inspiratory and expiratory wheezes. Trachea is midline, Negative for subcutaneous air, and chest excursion is symmetric. Patient is also Negative for cyanosis and is Negative for pitting edema. All alarms are set and audible. Resuscitation bag is at the head of the bed. Ventilator Settings Mode FIO2 Rate Tidal Volume Pressure PEEP I:E Ratio Pressure Control  28 %  30     30  10 cm H20  1:3.3 Peak airway pressure: 30 cm H2O Minute ventilation: 14.2 l/min ABG: No results for input(s): PH, PCO2, PO2, HCO3 in the last 72 hours. Brannon Galindo

## 2019-08-06 NOTE — CONSULTS
[facility] CONSULT NOTE Admission Date: 
7/24/2019 Admission Diagnosis: Dysphagia [R13.10] Intractable vomiting [R11.10] Consulting physician: Sepideh Bush Reason for consult:  
49 y/o male with PMH of metastatic rectal ca /Dm/HTn/Anemia / H/o smoking /GERD . HE is currently in ICU intubated . For his cancer he was on FOLFIRI -A and XRT planned for mediastinal node . Metastatic spread seen in recent scans . As per oncology his prognosis is grave ON abx for aspiration pneumonia /sepsis S/p Rt Ureteral stent placement on 7/30 for Rt hydroureteronephrosis and s/p biopsy ( no malignat cells present ) of Rt ureteral mass Planned for rescan of abdomen today In regards to [facility] - He had normal renal function until 8/3 - increased to 1.2- 2- 3.6 today with significant rise in BUN as well Subjective:  
History of Present Illness: 
 
Past Medical History:  
Diagnosis Date  Acute respiratory failure with hypoxia ([address] 7/30/2019  Chest pain 8/18/2016  Colon cancer ([address]  Diabetes ([address]  GERD (gastroesophageal reflux disease)   
 resolved per pt-- prn OTC meds  Hypertension   
 no current treatment, states Coreg dropped BP too much and is not taking  IGT (impaired glucose tolerance) 5/30/2018  Iron deficiency anemia due to chronic blood loss 07/26/2018  
 denies hx of blood transfusion  Plantar fasciitis, right 5/31/2018  PONV (postoperative nausea and vomiting)  Rectal cancer ([address]  Rectal obstruction 6/27/2018  Thyroid disease  Tobacco abuse 08/18/2016 Quit 6/28/18--- 1 ppd x 30 yrs Past Surgical History:  
Procedure Laterality Date  FLEXIBLE SIGMOIDOSCOPY N/A 6/27/2018 SIGMOIDOSCOPY FLEXIBLE performed by Tamika Johnson MD at [address]  2003  HX VASCULAR ACCESS  VASCULAR SURGERY PROCEDURE UNLIST Right   
 artery tied off after accident Current Facility-Administered Medications Medication Dose Route Frequency  LORazepam (ATIVAN) injection 2 mg  2 mg IntraVENous Q2H PRN  piperacillin-tazobactam (ZOSYN) 4.5 g in 0.9% sodium chloride (MBP/ADV) 100 mL  4.5 g IntraVENous Q12H  
 dexmedeTOMidine (PRECEDEX) 400 mcg in 0.9% sodium chloride 100 mL infusion  0.2-0.7 mcg/kg/hr IntraVENous TITRATE  albumin human 25% (BUMINATE) solution 12.5 g  12.5 g IntraVENous Q8H  
 levoFLOXacin (LEVAQUIN) 750 mg in D5W IVPB  750 mg IntraVENous Q48H  
 vancomycin (VANCOCIN) 1,000 mg in 0.9% sodium chloride (MBP/ADV) 250 mL  1,000 mg IntraVENous See Admin Instructions  TPN ADULT-CENTRAL - dextrose 15% amino acid 5%   IntraVENous QPM  
 fat emulsion 20% (LIPOSYN, INTRAlipid) infusion 250 mL  250 mL IntraVENous QPM  
 polyvinyl alcohol (LIQUIFILM TEARS) 1.4 % ophthalmic solution 1 Drop  1 Drop Both Eyes PRN  
 insulin regular (NOVOLIN R, HUMULIN R) injection   SubCUTAneous Q6H  
 methylPREDNISolone (PF) (SOLU-MEDROL) injection 60 mg  60 mg IntraVENous Q8H  
 budesonide (PULMICORT) 500 mcg/2 ml nebulizer suspension  500 mcg Nebulization BID RT  
 fentaNYL in normal saline (pf) 25 mcg/mL infusion   mcg/hr IntraVENous TITRATE  morphine injection 2 mg  2 mg IntraVENous Q4H PRN  
 albuterol (PROVENTIL VENTOLIN) nebulizer solution 2.5 mg  2.5 mg Nebulization QID RT  
 hydrALAZINE (APRESOLINE) 20 mg/mL injection 10 mg  10 mg IntraVENous Q6H PRN  
 albuterol (PROVENTIL VENTOLIN) nebulizer solution 2.5 mg  2.5 mg Nebulization Q6H PRN  
 NUTRITIONAL SUPPORT ELECTROLYTE PRN ORDERS   Does Not Apply PRN  
 magic mouthwash (SIMON) oral suspension 5 mL  5 mL Oral Q4H PRN  
 central line flush (saline) syringe 10 mL  10 mL InterCATHeter PRN  prochlorperazine (COMPAZINE) injection 10 mg  10 mg IntraVENous Q6H PRN Allergies Allergen Reactions  Zithromax [Azithromycin] Rash Social History Tobacco Use  Smoking status: Former Smoker Packs/day: 1.00 Years: 30.00 Pack years: 30.00 Types: Cigarettes Start date: 1988 Last attempt to quit: 2018 Years since quittin.1  Smokeless tobacco: Never Used Substance Use Topics  Alcohol use: Yes Alcohol/week: 14.0 standard drinks Types: 14 Cans of beer per week Family History Problem Relation Age of Onset  Hypertension Mother  Heart Disease Mother Review of Systems 
uto Objective:  
Vitals:  
 19 0830 19 0845 19 9169 19 6930 BP: 133/77 126/71 134/71 125/66 Pulse: (!) 104 (!) 101 (!) 105 (!) 103 Resp:  Temp:      
SpO2: 95% 94% 94% 94% Weight:      
 
 
Intake/Output Summary (Last 24 hours) at 2019 1033 Last data filed at 2019 8966 Gross per 24 hour Intake 3468.8 ml Output 215 ml Net 3253.8 ml Physical Exam 
GEN :intubated HEENT: anicteric sclerae, eomi. Oropharynx without lesions. Mucous membranes are moist. 
Neck - supple without JVD, no thyromegaly. No lymphadenopathy. CV - regular rate and rhythm, no murmur, no rub Lung - clear bilaterally, lungs expand symmetrically Chest wall - normal appearance Abd - soft, nontender, bowel sounds present, no hepatosplenomegaly Ext - no clubbing, no cyanosis, no edema Neurologic - nonfocal 
Genitourinary - bladder nonpalpable Skin - no rashes, no purpura, no ecchymoses Psychiatric: Normal mood and affect. Data Review:  
Recent Labs 19 
2893 19 
7577 19 
7070 WBC 47.4* 53.4* 48.6* HGB 7.9* 8.7* 9.6* HCT 24.7* 29.1* 32.7*  
 200 221 Recent Labs 19 
5708 19 
0966 19 
4681  147* 144  
K 4.6 4.5 4.3 * 114* 110* CO2 18* 23 27 * 71* 47* CREA 3.67* 2.02* 1.21  
* 170* 226* CA 8.2* 8.1* 8.5 MG 2.0 2.3 2.7* PHOS  --  3.3  -- No results for input(s): PH, PCO2, PO2, PCO2 in the last 72 hours.  
 
Problem List:  
 
Patient Active Problem List  
 Diagnosis Date Noted  Hydroureteronephrosis 07/30/2019  Hx pulmonary embolism 07/30/2019  
 History of DVT of lower extremity 07/30/2019  Hypoxia 07/30/2019  Metastatic cancer to lung ([facility] 07/26/2019  Acute respiratory failure ([facility] 07/26/2019  Aspiration into airway 07/26/2019  Dysphagia 07/25/2019  Intractable vomiting 07/25/2019  Mediastinal lymphadenopathy 06/11/2019  Pulmonary nodules 06/11/2019  Dehydration 04/02/2019  S/P radiation therapy < 4 weeks ago 10/01/2018  Rectal adenocarcinoma ([facility] 07/06/2018  Colostomy status ([facility] 06/29/2018  Erectile dysfunction due to arterial insufficiency 05/31/2018  Benign essential HTN 05/31/2018  Hypercholesterolemia 05/30/2018  Screening PSA (prostate specific antigen) 05/30/2018  History of tobacco abuse 08/18/2016 Assessment and Plan: 1. RA with a H/O  metastatic rectal ca and S/p Rt Ureteral stent placement on 7/30 for Rt hydroureteronephrosis and s/p biopsy ( no malignant cells present ) of Rt ureteral mass ? Ra from ATN from sepsis ( significant hypotension noted on 8/5 ) versus obstructive nephropathy Oliguria present Will await imaging studies to r/o post obstructive nephropathy again with the recent h/o Avoid nephrotoxic agents - on vanco /levo / zosyn for pneumonia . High random level vanco seen today - advise to deescalate abx as soon as possible . vanco on hold . Per pharmacy 2. Metastatic Rectal ca - per oncology 3. Aspiration pneumonia - On abx 4. NAGMA - Sec to RA Flynn MD

## 2019-08-06 NOTE — CONSULTS
[facility] Consult Note Primary GI Physician: Dr. Homero Bashir Referring Provider: Dr. Candy Morin Consult Date:  8/6/2019 Admit Date:  7/24/2019 Chief Complaint:  Drop in hgb. ? GIB Subjective:  
 
History of Present Illness:  Patient is a 48 y.o. male with PMH of (but not limited to) regular ETOH use (1-2 beers per day), h/o GERD, bilateral inguinal hernia repair, now being managed for Rectal cancer s/p diverting colostomy, biopsy proven stage 4 (locally advanced unresectable on chemotherapy, LAD abd and mediastinum), PE 05/8/19 and RLE DVT 5/29/19 on enoxaparin, Renal mass (biopsy -ve), RT sided ureteral obstruction w stent placement followed by urology. He was admitted with  7/24/19 with fatigue and inability to tolerate PO intake. We were consulted initially on 7/25 for dysphagia. He was unable to have EGD with dilation due to recent Avastin 7/9. He had BS w extrinsic compression on esophagus on 7/29. CT chest 7/24/19 with increased mediastinal adenopathy from prior exam. Patchy airspace disease in both lung based. Pulmonary metastases. No PE noted. We were then consulted on 7/29 for PEG placement. Plan for PEG placement once 28 days s/p Avastin which is after 8/6. Unfortunately, his respiratory status has remained poor and is currently not a PEG candidate. He has a NG with TF currently. We have been asked to see patient today for a drop in his Hgb. His current Hgb is 7.9 (down from 8.7). He has worsening renal failure, bleeding from both his central line and his drew cath. He has hematuria, but no melena, hematochezia or hematemesis. Rickman 6/27/19 with rectal mass with pathology consistent with poorly differentiated adenocarcinoma.   
  
BS 7/29/19 IMPRESSION: 
1. Patient with aspiration with thin liquids. Please see full report from speech 
language pathologist. 
  
XR BA SWALLOW Gaby Guillory [[id number]] Collected: 07/26/19 6688 Order Status: Completed Updated: 07/26/19 1026 Narrative:    
 
EXAM: Barium swallow 7/26/19 FINDINGS: The exam is markedly limited. The patient is unable to perform the 
exam the upright position, and has very limited mobility. There is aspiration of 
the thin contrast. Contrast flows through the esophagus into the stomach without 
difficulty. There is a hiatal hernia present. Just above the hiatal hernia, 
there is a filling defect of the distal esophagus, corresponding to a soft 
tissue mass adjacent to the esophagus on the recent CT chest. No definite 
gastroesophageal reflux, or ulceration. Impression:    
IMPRESSION: 
1. Just above the hiatal hernia, there is an extrinsic soft tissue mass creating 
a filling defect within the distal esophagus, corresponding to a soft tissue 
mass seen on the recent CT chest. 
2. Aspiration of oral contrast. Correlation with modified barium swallow 
recommended.  
  
. PMH: 
Past Medical History:  
Diagnosis Date  Acute respiratory failure with hypoxia ([address] 7/30/2019  Chest pain 8/18/2016  Colon cancer ([address]  Diabetes ([address]  GERD (gastroesophageal reflux disease)   
 resolved per pt-- prn OTC meds  Hypertension   
 no current treatment, states Coreg dropped BP too much and is not taking  IGT (impaired glucose tolerance) 5/30/2018  Iron deficiency anemia due to chronic blood loss 07/26/2018  
 denies hx of blood transfusion  Plantar fasciitis, right 5/31/2018  PONV (postoperative nausea and vomiting)  Rectal cancer ([address]  Rectal obstruction 6/27/2018  Thyroid disease  Tobacco abuse 08/18/2016 Quit 6/28/18--- 1 ppd x 30 yrs PSH: 
Past Surgical History:  
Procedure Laterality Date  FLEXIBLE SIGMOIDOSCOPY N/A 6/27/2018 SIGMOIDOSCOPY FLEXIBLE performed by Amber Mckeon MD at [address]  2003  HX VASCULAR ACCESS  VASCULAR SURGERY PROCEDURE UNLIST Right artery tied off after accident Allergies: Allergies Allergen Reactions  Zithromax [Azithromycin] Rash Home Medications: 
Prior to Admission medications Medication Sig Start Date End Date Taking? Authorizing Provider  
magic mouthwash solution Magic mouth wash Maalox Lidocaine 2% viscous Diphenhydramine oral solution Pharmacy to mix equal portions of ingredients to a total volume as indicated in the dispense amount. 7/17/19  Yes Contreras Mosher MD  
magic mouthwash solution Magic mouth wash Maalox Lidocaine 2% viscous Diphenhydramine oral solution Pharmacy to mix equal portions of ingredients to a total volume as indicated in the dispense amount. 7/17/19  Yes Contreras Mosher MD  
fluticasone propionate (FLOVENT HFA) 110 mcg/actuation inhaler Take 1 Puff by inhalation every twelve (12) hours. 7/8/19  Yes Jaxson Irby NP  
albuterol (PROVENTIL HFA, VENTOLIN HFA, PROAIR HFA) 90 mcg/actuation inhaler Take 1 Puff by inhalation every four (4) hours as needed for Wheezing or Shortness of Breath. 7/8/19  Yes Jaxson Irby NP  
ondansetron hcl (ZOFRAN) 8 mg tablet Take 1 Tab by mouth every eight (8) hours as needed for Nausea. Indications: Prevent Nausea and Vomiting from Cancer Chemotherapy 6/19/19  Yes Contreras Mosher MD  
prochlorperazine (COMPAZINE) 10 mg tablet Take 1 Tab by mouth every six (6) hours as needed for Nausea. Indications: Prevent Nausea and Vomiting from Cancer Chemotherapy 6/19/19  Yes Contreras Mosher MD  
carvedilol (COREG) 3.125 mg tablet Take 1 Tab by mouth two (2) times daily (with meals). 5/22/19  Yes Contreras Mosher MD  
lisinopril (PRINIVIL, ZESTRIL) 10 mg tablet Take 1 Tab by mouth daily. 5/22/19  Yes Contreras Mosher MD  
DISABLED PLACARD ([address] [place] [address]) DMV Dx. Colon Cancer An Inability to walk 100 feet nonstop without aggravating existing medical condition 4/10/19  Yes Contreras Mosher MD  
 cimetidine (TAGAMET) 300 mg tab Take 1 Tab by mouth two (2) times a day. 1/16/19  Yes Jazlyn Escalante NP  
enoxaparin (LOVENOX) 100 mg/mL 100 mg by SubCUTAneous route daily. Indications: blood clot in a deep vein of the extremities 6/25/19   Osvaldo Shah MD  
ibuprofen (MOTRIN IB) 200 mg tablet Take 400 mg by mouth. Provider, Historical  
lidocaine-prilocaine (EMLA) topical cream Apply  to affected area as needed for Pain. Apply to port 30-45 min prior to port needle stick 6/5/19   Jazlyn Escalante NP  
oxyCODONE IR (ROXICODONE) 5 mg immediate release tablet Take 1 Tab by mouth every six (6) hours as needed for Pain for up to 30 days. Max Daily Amount: 20 mg. Take 1-2 tabs every 6 hrs for pain as needed 5/22/19 7/8/19  Ngozi Goldberg NP  
diphenoxylate-atropine (LOMOTIL) 2.5-0.025 mg per tablet One tab 4 times a day as needed for diarrhea 4/2/19   Jazlyn Escalante NP Hospital Medications: 
Current Facility-Administered Medications Medication Dose Route Frequency  LORazepam (ATIVAN) injection 2 mg  2 mg IntraVENous Q2H PRN  piperacillin-tazobactam (ZOSYN) 4.5 g in 0.9% sodium chloride (MBP/ADV) 100 mL  4.5 g IntraVENous Q12H  
 dexmedeTOMidine (PRECEDEX) 400 mcg in 0.9% sodium chloride 100 mL infusion  0.2-0.7 mcg/kg/hr IntraVENous TITRATE  albumin human 25% (BUMINATE) solution 12.5 g  12.5 g IntraVENous Q8H  
 levoFLOXacin (LEVAQUIN) 750 mg in D5W IVPB  750 mg IntraVENous Q48H  
 vancomycin (VANCOCIN) 1,000 mg in 0.9% sodium chloride (MBP/ADV) 250 mL  1,000 mg IntraVENous See Admin Instructions  TPN ADULT-CENTRAL - dextrose 15% amino acid 5%   IntraVENous QPM  
 fat emulsion 20% (LIPOSYN, INTRAlipid) infusion 250 mL  250 mL IntraVENous QPM  
 polyvinyl alcohol (LIQUIFILM TEARS) 1.4 % ophthalmic solution 1 Drop  1 Drop Both Eyes PRN  
 insulin regular (NOVOLIN R, HUMULIN R) injection   SubCUTAneous Q6H  
 methylPREDNISolone (PF) (SOLU-MEDROL) injection 60 mg  60 mg IntraVENous Q8H  
 budesonide (PULMICORT) 500 mcg/2 ml nebulizer suspension  500 mcg Nebulization BID RT  
 fentaNYL in normal saline (pf) 25 mcg/mL infusion   mcg/hr IntraVENous TITRATE  morphine injection 2 mg  2 mg IntraVENous Q4H PRN  
 albuterol (PROVENTIL VENTOLIN) nebulizer solution 2.5 mg  2.5 mg Nebulization QID RT  
 hydrALAZINE (APRESOLINE) 20 mg/mL injection 10 mg  10 mg IntraVENous Q6H PRN  
 albuterol (PROVENTIL VENTOLIN) nebulizer solution 2.5 mg  2.5 mg Nebulization Q6H PRN  
 NUTRITIONAL SUPPORT ELECTROLYTE PRN ORDERS   Does Not Apply PRN  
 magic mouthwash (SIMON) oral suspension 5 mL  5 mL Oral Q4H PRN  
 central line flush (saline) syringe 10 mL  10 mL InterCATHeter PRN  prochlorperazine (COMPAZINE) injection 10 mg  10 mg IntraVENous Q6H PRN Social History: 
Social History Tobacco Use  Smoking status: Former Smoker Packs/day: 1.00 Years: 30.00 Pack years: 30.00 Types: Cigarettes Start date: 1988 Last attempt to quit: 2018 Years since quittin.1  Smokeless tobacco: Never Used Substance Use Topics  Alcohol use: Yes Alcohol/week: 14.0 standard drinks Types: 14 Cans of beer per week Pt denies any history of drug use, blood transfusions, or tattoos. Family History: 
Family History Problem Relation Age of Onset  Hypertension Mother  Heart Disease Mother Review of Systems: A detailed 10 system ROS is obtained, with pertinent positives as listed above. All others are negative. Diet:  TF 
 
Objective:  
 
Physical Exam: 
Vitals: 
Visit Vitals /66 Pulse (!) 103 Temp 97.3 °F (36.3 °C) Resp 19 Wt 67.1 kg (148 lb) SpO2 94% BMI 21.55 kg/m² Gen:  Pt is alert, cooperative, no acute distress SPOUSE AT BEDSIDE; SEDATED ON VENT; NURSING AT BEDSIDE Skin:  Extremities and face reveal no rashes. HEENT: Sclerae anicteric. Extra-occular muscles are intact. Cardiovascular TACHYCARDIC No murmurs, gallops, or rubs. BRIGHT RED BLOOD FROM CENTRAL LINE Respiratory:  ON VENT 
GI:  Abdomen nondistended, soft, and nontender. Normal active bowel sounds. No enlargement of the liver or spleen. No masses palpable. COLOSTOMY IN PLACE; NO STOOL PRESENT 
:HEMATURIA; BLOOD AT [facility] Rectal:  Deferred Musculoskeletal:  SCD IN PLACE; EDEMA LUCAS 1+ Neurological:  SEDATED Psychiatric:  SEDATED Laboratory:   
Recent Labs 08/06/19 
2983 08/06/19 
2871 08/05/19 
0326 08/04/19 
4409 WBC  --  47.4* 53.4* 48.6* HGB  --  7.9* 8.7* 9.6* HCT  --  24.7* 29.1* 32.7* PLT  --  183 200 221 MCV  --  100.4* 102.1* 101.2* NA  --  143 147* 144 K  --  4.6 4.5 4.3 CL  --  112* 114* 110* CO2  --  18* 23 27 BUN  --  107* 71* 47* CREA  --  3.67* 2.02* 1.21  
CA  --  8.2* 8.1* 8.5 MG  --  2.0 2.3 2.7*  
GLU  --  169* 170* 226* AP  --  88 74 72 SGOT  --  41* 21 25 ALT  --  38 15 14 TBILI  --  0.3 0.3 0.2 ALB  --  2.2* 1.8* 2.1*  
TP  --  6.3 6.1* 6.5 PTP 15.3*  --   --   --   
INR 1.2  --   --   --   
APTT 47.6*  --   --   --   
  
 
 
Assessment:  
 
Principal Problem: 
  Rectal adenocarcinoma ([facility] (7/6/2018) Active Problems: Dysphagia (7/25/2019) Intractable vomiting (7/25/2019) Metastatic cancer to lung ([address] (7/26/2019) Overview: Per EBUS in June 2019 Acute respiratory failure ([address] (7/26/2019) Aspiration into airway (7/26/2019) Hydroureteronephrosis (7/30/2019) Hx pulmonary embolism (7/30/2019) History of DVT of lower extremity (7/30/2019) Hypoxia (7/30/2019) 48year old known male patient with metastatic unresectable rectal cancer with diverting colostomy on chemotherapy including Avastin with last dose 7/9, mediastinal adenopathy with extrisinc compression of the esophagus,uretal obstruction with recent stent placement who we have been asked to see in re-consultation for drop in Hgb with ? GIB. He does not have overt bleeding from the GI tract to include melena or hematochezia. He does have gross hematuria, bleeding at his drew cath site and bleeding at his central line site. This is likely the cause of his current drop in Hgb, not GI bleeding. Plan: 1. No plans for endoscopy/colonoscopy currently without overt GI bleeding and increased risk from recent Avastin 2. Management of hematuria/bleeding from drew and central line site per primary care 3. Serial H&H and transfuse as needed. 4.No plans for PEG at this time due to respiratory compromise and recent Avastin. Catia Winters. Maren Rothman, [address] [facility] Patient is seen and examined in collaboration with Dr.W. Anh Taveras. Assessment and plan as per Dr. Karrie Moya.

## 2019-08-06 NOTE — PROGRESS NOTES
Bedside and verbal shift report received from [place], [facility]. Dual signed fentanyl gtt.

## 2019-08-07 NOTE — PROGRESS NOTES
Discussed with family plan to extubate pt when the rest of the family arrives and visits pt. Plan to withdraw care later this afternoon. Family requested to be alone with pt until extubation.

## 2019-08-07 NOTE — PROGRESS NOTES
Spoke with Dr. Carlton Berkowitz, family ready to proceed with compassionate extubation. Orders, dced, ativan and morphine ordered for comfort.  at the bedside.

## 2019-08-07 NOTE — PROGRESS NOTES
Head to toe assessment completed on pt this AM. 
  
Neuro: Pt eyes open to stimulation / voice. No command following - pt agitated easily. With stimulation, pt thrashes head in bed and pulls at restraints - RASS +2. Without stimulation, pt rests quietly in bed. PERRLA - 2 mm and sluggish to react - Eyes deviated upwards. SOTELO spontaneously. Resp: SIMV/ PC FiO2 28%. Coarse, expiratory wheezing upper / diminished lower breath sounds. Bloody thick secretions upon suctioning. Cardiac: NSR/ ST - BP adequate. GI: NPO. Dobhoff clamped. TPN infusing through port. Ostomy patent and draining. Stoma pink and patent. Hypoactive bowel sounds. : Schultz - draining and patent. Gross hematuria upon examination. Skin: 2+ pitting edema BUEs 3+ edema BLEs.  Raine Polka boots intact.

## 2019-08-07 NOTE — PROGRESS NOTES
[facility] PROGRESS NOTE Follow up for: Lisa Ferrara Subjective:  
Patient seen and examined. Chart, notes, labs, imaging, results all reviewed. Intubated and sedated Made comfort care - wife and other family members bedside ROS: 
UTO Objective:  
Exam: 
Vitals:  
 08/07/19 0501 08/07/19 0734 08/07/19 0800 08/07/19 2588 BP: 99/64  104/61 103/65 Pulse: 93 (!) 101 (!) 106 (!) 103 Resp: 22 16 22 19 Temp:      
SpO2: 96% 100% 96% 96% Weight:      
 
 
 
Intake/Output Summary (Last 24 hours) at 8/7/2019 0910 Last data filed at 8/7/2019 2466 Gross per 24 hour Intake 2120.44 ml Output 328 ml Net 1792.44 ml Current Facility-Administered Medications Medication Dose Route Frequency  LORazepam (ATIVAN) injection 2 mg  2 mg IntraVENous Q2H PRN  piperacillin-tazobactam (ZOSYN) 4.5 g in 0.9% sodium chloride (MBP/ADV) 100 mL  4.5 g IntraVENous Q12H  
 dexmedeTOMidine (PRECEDEX) 400 mcg in 0.9% sodium chloride 100 mL infusion  0.2-0.7 mcg/kg/hr IntraVENous TITRATE  levoFLOXacin (LEVAQUIN) 750 mg in D5W IVPB  750 mg IntraVENous Q48H  
 vancomycin (VANCOCIN) 1,000 mg in 0.9% sodium chloride (MBP/ADV) 250 mL  1,000 mg IntraVENous See Admin Instructions  polyvinyl alcohol (LIQUIFILM TEARS) 1.4 % ophthalmic solution 1 Drop  1 Drop Both Eyes PRN  
 insulin regular (NOVOLIN R, HUMULIN R) injection   SubCUTAneous Q6H  
 methylPREDNISolone (PF) (SOLU-MEDROL) injection 60 mg  60 mg IntraVENous Q8H  
 budesonide (PULMICORT) 500 mcg/2 ml nebulizer suspension  500 mcg Nebulization BID RT  
 fentaNYL in normal saline (pf) 25 mcg/mL infusion   mcg/hr IntraVENous TITRATE  morphine injection 2 mg  2 mg IntraVENous Q4H PRN  
 albuterol (PROVENTIL VENTOLIN) nebulizer solution 2.5 mg  2.5 mg Nebulization QID RT  
 hydrALAZINE (APRESOLINE) 20 mg/mL injection 10 mg  10 mg IntraVENous Q6H PRN  
 albuterol (PROVENTIL VENTOLIN) nebulizer solution 2.5 mg  2.5 mg Nebulization Q6H PRN  
 NUTRITIONAL SUPPORT ELECTROLYTE PRN ORDERS   Does Not Apply PRN  
 magic mouthwash (SIMON) oral suspension 5 mL  5 mL Oral Q4H PRN  
 central line flush (saline) syringe 10 mL  10 mL InterCATHeter PRN  prochlorperazine (COMPAZINE) injection 10 mg  10 mg IntraVENous Q6H PRN  
 
 
EXAM 
GEN - intubated CV - S1, S2, RRR, no rub, murmur, or gallop Lung - clear to auscultation bilaterally Abd - soft, nontender, BS present Ext - no edema Recent Labs 08/07/19 
8918 08/06/19 
2134 08/06/19 
1532 08/06/19 
1128 08/06/19 
9797 WBC 38.4*  --   --  42.9* 47.4* HGB 6.8* 7.3* 7.5* 7.2* 7.9*  
HCT 22.4* 24.3* 25.7* 24.2* 24.7*  
*  --   --  159 183 Recent Labs 08/07/19 
1124 08/06/19 
4325 08/05/19 
9416  143 147*  
K 5.6* 4.6 4.5  
* 112* 114* CO2 17* 18* 23 * 107* 71* CREA 5.01* 3.67* 2.02* CA 8.5 8.2* 8.1*  
* 169* 170* MG 2.1 2.0 2.3 PHOS 6.7*  --  3.3 Assessment and Plan: 1. RA with a H/O  metastatic rectal ca and S/p Rt Ureteral stent placement on 7/30 for Rt hydroureteronephrosis and s/p biopsy ( no malignant cells present ) of Rt ureteral mass ? Ra from ATN from sepsis ( significant hypotension noted on 8/5 ) versus obstructive nephropathy Comfort care Oliguria present Will await imaging studies to r/o post obstructive nephropathy again with the recent h/o Avoid nephrotoxic agents - on vanco /levo / zosyn for pneumonia . High random level vanco seen today - advise to deescalate abx as soon as possible . vanco on hold . Per pharmacy 
  
2. Metastatic Rectal ca - per oncology  
  
3. Aspiration pneumonia - On abx  
  
4.  NAGMA - Sec to RA  
  
Nurys Avitia MD

## 2019-08-07 NOTE — PROGRESS NOTES
Ventilator check complete; patient has a #7.5 ET tube secured at the 24 at the lip. Breath sounds are expiratory wheezes. Trachea is midline, Negative for subcutaneous air, and chest excursion is symmetric. Patient is also Negative for cyanosis and is Negative for pitting edema. All alarms are set and audible. Resuscitation bag is at the head of the bed. Ventilator Settings Mode FIO2 Rate Tidal Volume Pressure PEEP I:E Ratio SIMV, PRVC  28 %    550 ml  15 cm H2O  10 cm H20  1:2.1 Peak airway pressure: 42 cm H2O Minute ventilation: 9.9 l/min ABG: No results for input(s): PH, PCO2, PO2, HCO3 in the last 72 hours.  
 
 
Maile Vines, RT

## 2019-08-07 NOTE — INTERDISCIPLINARY ROUNDS
Interdisciplinary team rounds were held 8/7/2019 with the following team members:Care Management, Nursing, Nurse Practitioner, Nutrition, Palliative Care, Pastoral Care, Patient Relations, Pharmacy, Physical Therapy, Physician, Respiratory Therapy and Clinical Coordinator and the patient and spouse. Plan of care discussed. See clinical pathway and/or care plan for interventions and desired outcomes.

## 2019-08-07 NOTE — PROGRESS NOTES
Critical Care Daily Progress Note: 8/7/2019 Admission Date: 7/24/2019 The patient's chart is reviewed and the patient is discussed with the staff. Chronic Medical:  Colon cancer, DM, HTN, rectal cancer, MYRON, former tobacco abuse and GERD. 
  
Was admitted on 7/24/2019 by Oncology for dehydration and rectal adenocarcinoma confirmed to be Stage IV per biopsy. Torrey Nelson was initiated on 5FU-A and recently switched to FOLFIRI-A.  XRT planned for mediastinal node. Barium swallow revealed aspiration of contrast as well as extrinsic soft tissue mass creating filling defect in distal esophagus. Mechanical soft diet with chopped/ground meats and nectar thick liquids recommended per speech therapy.   
  On abx for suspected aspiration pneumonia. Has been hypoxic and requiring oxygen support.  
  Seen by urology for right hydroureteronephrosis and cystoscopy 7/30 with stent placement/biopsy-no malignancy per path. Blocking ureter. GI was consulted for PEG placement but due to decline in respiratory status this is on hold. Intubated 8/1 for respiratory support. Subjective:  
 
Sedated, remains on vent support. Wife at bedside and states \"his daughters are on the way here\". Current Facility-Administered Medications Medication Dose Route Frequency  LORazepam (ATIVAN) injection 2 mg  2 mg IntraVENous Q2H PRN  piperacillin-tazobactam (ZOSYN) 4.5 g in 0.9% sodium chloride (MBP/ADV) 100 mL  4.5 g IntraVENous Q12H  
 dexmedeTOMidine (PRECEDEX) 400 mcg in 0.9% sodium chloride 100 mL infusion  0.2-0.7 mcg/kg/hr IntraVENous TITRATE  levoFLOXacin (LEVAQUIN) 750 mg in D5W IVPB  750 mg IntraVENous Q48H  
 vancomycin (VANCOCIN) 1,000 mg in 0.9% sodium chloride (MBP/ADV) 250 mL  1,000 mg IntraVENous See Admin Instructions  polyvinyl alcohol (LIQUIFILM TEARS) 1.4 % ophthalmic solution 1 Drop  1 Drop Both Eyes PRN  
 insulin regular (NOVOLIN R, HUMULIN R) injection   SubCUTAneous Q6H  
  methylPREDNISolone (PF) (SOLU-MEDROL) injection 60 mg  60 mg IntraVENous Q8H  
 budesonide (PULMICORT) 500 mcg/2 ml nebulizer suspension  500 mcg Nebulization BID RT  
 fentaNYL in normal saline (pf) 25 mcg/mL infusion   mcg/hr IntraVENous TITRATE  morphine injection 2 mg  2 mg IntraVENous Q4H PRN  
 albuterol (PROVENTIL VENTOLIN) nebulizer solution 2.5 mg  2.5 mg Nebulization QID RT  
 hydrALAZINE (APRESOLINE) 20 mg/mL injection 10 mg  10 mg IntraVENous Q6H PRN  
 albuterol (PROVENTIL VENTOLIN) nebulizer solution 2.5 mg  2.5 mg Nebulization Q6H PRN  
 NUTRITIONAL SUPPORT ELECTROLYTE PRN ORDERS   Does Not Apply PRN  
 magic mouthwash (SIMON) oral suspension 5 mL  5 mL Oral Q4H PRN  
 central line flush (saline) syringe 10 mL  10 mL InterCATHeter PRN  prochlorperazine (COMPAZINE) injection 10 mg  10 mg IntraVENous Q6H PRN Review of Systems Unobtainable due to patient status. Objective:  
 
Vitals:  
 08/07/19 9401 08/07/19 0734 08/07/19 0800 08/07/19 4302 BP: 99/64  104/61 103/65 Pulse: 93 (!) 101 (!) 106 (!) 103 Resp: 22 16 22 19 Temp:      
SpO2: 96% 100% 96% 96% Weight:      
 
Ventilator Settings Mode FIO2 Rate Tidal Volume Pressure PEEP  
SIMV, PRVC  28 %    550 ml  15 cm H2O  10 cm H20 Peak airway pressure: 42 cm H2O Minute ventilation: 9.9 l/min Intake/Output Summary (Last 24 hours) at 8/7/2019 1012 Last data filed at 8/7/2019 1509 Gross per 24 hour Intake 2120.44 ml Output 328 ml Net 1792.44 ml Physical Exam:         
Constitutional:  intubated and mechanically ventilated. EENMT:  Sclera clear, pupils equal, oral mucosa moist 
Respiratory: coarse breath sounds. Cardiovascular:  RRR with no M,G,R; 
Gastrointestinal:  soft with positive bowel sounds present Musculoskeletal:  warm with no cyanosis, no lower extremity edema Skin:  no jaundice or ecchymosis, mottling of legs Neurologic: no gross neuro deficits Psychiatric:  Sedated. LINES:   
ETT 8/1/19 Venous port 10/29/18 Left IJ triple lumen 8/5/19 NG 8/5/19 Schultz 8/1/19 
  
DRIPS:   
Fentanyl 75 mg/hr Precedex 0.7 mcg/kg/min Renal ultrasound 8/6/19:  Right hydronephrosis with debris or tumor in the collecting system. CXR: 
8/7/19:  Pulmonary edema unchanged. 8/5/19 Ventilator Settings Mode FIO2 Rate Tidal Volume Pressure PEEP  
SIMV, PRVC  28 %    550 ml  15 cm H2O  10 cm H20 Peak airway pressure: 42 cm H2O Minute ventilation: 9.9 l/min ABG:  
Recent Labs 08/07/19 
8535 08/06/19 
1224 08/06/19 
3324 PHI 7.202* 7.244* 7.214* PCO2I 41.0 39.4 37.6 PO2I 88 82 86 HCO3I 16.1* 17.1* 15.2*  
  
 
LAB Recent Labs 08/07/19 
8061 08/07/19 
0038 08/06/19 
1717 08/06/19 
1210 08/06/19 
4622 GLUCPOC 141* 129* 176* 152* 163* Recent Labs 08/07/19 
5211 08/06/19 
2134 08/06/19 
1532 08/06/19 
1128 08/06/19 
5224 08/06/19 
3555 WBC 38.4*  --   --  42.9*  --  47.4* HGB 6.8* 7.3* 7.5* 7.2*  --  7.9*  
HCT 22.4* 24.3* 25.7* 24.2*  --  24.7*  
*  --   --  159  --  183 INR  --   --   --   --  1.2  --   
 
Recent Labs 08/07/19 
8893 08/06/19 
4124 08/05/19 
0672  143 147*  
K 5.6* 4.6 4.5  
* 112* 114* CO2 17* 18* 23 * 169* 170* * 107* 71* CREA 5.01* 3.67* 2.02* MG 2.1 2.0 2.3 PHOS 6.7*  --  3.3 CA 8.5 8.2* 8.1* ALB 2.5* 2.2* 1.8* SGOT 41* 41* 21 Assessment:  (Medical Decision Making) Hospital Problems  Date Reviewed: 8/7/2019 Codes Class Noted POA Hydroureteronephrosis ICD-10-CM: N13.30 ICD-9-CM: 604  7/30/2019 Clinically Undetermined  
 chronic Hx pulmonary embolism (Chronic) ICD-10-CM: X57.926 ICD-9-CM: V12.55  7/30/2019 Yes  
 chronic History of DVT of lower extremity (Chronic) ICD-10-CM: N66.453 ICD-9-CM: V12.51  7/30/2019 Yes  
 chronic Hypoxia ICD-10-CM: R09.02 
ICD-9-CM: 799.02  7/30/2019 No  
 Sat accepted Metastatic cancer to lung [facility]) (Chronic) ICD-10-CM: C78.00 ICD-9-CM: 197.0  7/26/2019 Yes Overview Signed 7/26/2019 11:06 AM by Huan Carl NP Per EBUS in June 2019 
  
  
 unchanged Acute respiratory failure ([facility] [address] ICD-10-CM: J96.00 
ICD-9-CM: 518.81  7/26/2019 Yes  
 unchanged Aspiration into airway ICD-10-CM: T17.908A ICD-9-CM: 934.9  7/26/2019 Yes Remains on antibiotics Dysphagia ICD-10-CM: R13.10 ICD-9-CM: 787.20  7/25/2019 Yes Intubated Intractable vomiting ICD-10-CM: R11.10 ICD-9-CM: 536.2  7/25/2019 Yes * (Principal) Rectal adenocarcinoma ([facility] [address] (Chronic) ICD-10-CM: C20 
ICD-9-CM: 154.1  7/6/2018 Yes Chronic--hospice recommended Unfortunate male with metastatic CA with sepsis/dysphagia/agiatation on Vent. On TPN currently. BP borderline. Has ureteral sent this admission due to hydronephrosis with some hematuria. Bleeding from central line today and hg dropping today. Plan:  (Medical Decision Making) --Albuterol, Pulmicort 
--Solu Medrol 60mg q8h 
--Zosyn day 13, Vancomycin day 5, Levaquin day 5 
--WBC down to 38.4 from 53.4 
--Hgb down to 6.8 
--Nephrology following, Worsening renal failure, creat 5.01 
--GI following--no plans for procedure or PEG placement 
--Renal US reviewed 
--Continuing vent support at this time--poor prognosis 
--Oncology note:  encourage the family to consider hospice 
--Palliative Care following--partial code-no CPR. Discussion with wife for possible withdrawal of care in setting of metastatic cancer and MSOF. More than 50% of the time documented was spent in face-to-face contact with the patient and in the care of the patient on the floor/unit where the patient is located. Santo Birmingham NP I have spoken with and examined the patient. I agree with the above assessment and plan as documented. Gen: unresponsive, gazing upward. Lungs: wheezes, prolonged exhalation phase Heart:  RRR with no Murmur/Rubs/Gallops Ext: 2+ edema Spoke with family and recommended compassionate extubation when family has gathered and spent time with the patient. Volume overload and renal failure are worsening. Oncology has recommended comfort measures and I agree. Liberalize prn morphine for symptoms of respiratory distress.  
 
Jp Slater MD

## 2019-08-07 NOTE — PROGRESS NOTES
Spoke with pt's family in regards to current status. Family wishes to withdraw TPN infusion at this time. Spoke with Dr. Gaye Downey, order received.

## 2019-08-07 NOTE — ROUTINE PROCESS
Ventilator check complete; patient has a #7.5 ET tube secured at the 24 at the teeth. Patient is sedated. Patient is not able to follow commands. Breath sounds are expiratory wheezes. Trachea is midline, Negative for subcutaneous air, and chest excursion is symmetric. Patient is also Negative for cyanosis and is Negative for pitting edema. All alarms are set and audible. Resuscitation bag is at the head of the bed. Ventilator Settings Mode FIO2 Rate Tidal Volume Pressure PEEP I:E Ratio PRVC  28 %    550 ml     10 cm H20  1:   
 
Peak airway pressure: 30 cm H2O Minute ventilation: 11.8 l/min Frankey Pap, RT

## 2019-08-07 NOTE — PROGRESS NOTES
[facility] Inpatient Hematology / Oncology Progress Note Admission Date: 7/24/2019  3:18 PM 
Reason for Admission/Hospital Course: Dysphagia [R13.10] Intractable vomiting [R11.10] 24 Hour Events: 
Remains sedated, intubated Ongoing hematuria - decreased urine output, increased creatinine Hgb down to 6.8 Palliative following--moving towards comfort care when family arrives ROS: Unable to obtain. Patient sedated on vent 10 point review of systems is otherwise negative with the exception of the elements mentioned above in the HPI. Allergies Allergen Reactions  Zithromax [Azithromycin] Rash OBJECTIVE: 
Patient Vitals for the past 8 hrs: 
 BP Temp Pulse Resp SpO2  
08/07/19 1300 117/77  (!) 103 17 92 % 08/07/19 1244 126/81  (!) 103 14 93 % 08/07/19 1229 120/71  (!) 104 16 94 % 08/07/19 1214 109/61  (!) 104 12 95 % 08/07/19 1200 128/66 98.4 °F (36.9 °C) (!) 104 15 93 % 08/07/19 1144 (!) 164/95  (!) 110 27 92 % 08/07/19 1129 101/58  (!) 108 23 95 % 08/07/19 1114 98/59  (!) 109 20 95 % 08/07/19 1103   100 20 95 % 08/07/19 1100 91/59  99 26 96 % 08/07/19 1044 91/60  100 20 95 % 08/07/19 1029 93/60  (!) 101 15 96 % 08/07/19 1014 92/58  (!) 101 18 96 % 08/07/19 1000 100/60  (!) 103 18 95 % 08/07/19 0945 101/61  (!) 102 19 95 % 08/07/19 0929 102/57  (!) 104 26 95 % 08/07/19 0914 113/57  (!) 102 19 94 % 08/07/19 0900 157/77  (!) 106 20 92 % 08/07/19 0845 122/69  (!) 104 15 93 % 08/07/19 0829 158/82  (!) 108 21 95 % 08/07/19 0814 103/65  (!) 103 19 96 % 08/07/19 0800 104/61  (!) 106 22 96 % 08/07/19 0734   (!) 101 16 100 % 08/07/19 0729 99/64  93 22 96 % 08/07/19 0714 98/62 98.4 °F (36.9 °C) 95 21 96 % 08/07/19 0700 100/64  96 21 95 % 08/07/19 0645 95/62  97 20 95 % 08/07/19 0629 91/62  93 27 95 % 08/07/19 0614 98/63  95 18 95 % Temp (24hrs), Av.8 °F (36.6 °C), Min:97 °F (36.1 °C), Max:98.4 °F (36.9 °C) 
 
701 - 1900 In: -  
Out: 20 [Urine:20] Physical Exam: 
Constitutional: Acutely ill male in respiratory distress, lying in the hospital bed in ICU Family at bedside. HEENT: Normocephalic and atraumatic. Intubated. Lymph node Deferred Skin Warm and dry. No bruising and no rash noted. No erythema. No pallor. Respiratory Coarse sounds, on vent. CVS Reg rate, regular rhythm and normal S1 and S2. No murmurs, gallops, or rubs. Abdomen Soft, nontender and nondistended, hypoactive bowel sounds. Neuro Sedated Psych Sedated Labs: 
   
Recent Labs 19 
7092 19 
2134 19 
1532 19 
1128 19 
4969 WBC 38.4*  --   --  42.9* 47.4*  
RBC 2.29*  --   --  2.46* 2.46* HGB 6.8* 7.3* 7.5* 7.2* 7.9*  
HCT 22.4* 24.3* 25.7* 24.2* 24.7*  
MCV 97.8  --   --  98.4* 100.4* MCH 29.7  --   --  29.3 32.1 MCHC 30.4*  --   --  29.8* 32.0  
RDW 18.4*  --   --  18.1* 17.9*  
*  --   --  159 183 GRANS 75  --   --  75 73 LYMPH 1*  --   --  0* 1*  
MONOS 6  --   --  7 7 EOS 0*  --   --  0* 0*  
BASOS 1  --   --  0 0 IG 17*  --   --  18* 19* DF AUTOMATED  --   --  AUTOMATED AUTOMATED ANEU 28.8*  --   --  32.2* 34.6* ABL 0.4*  --   --  0.0* 0.5 ABM 2.3*  --   --  3.0* 3.3* YESI 0.0  --   --  0.0 0.0 ABB 0.4*  --   --  0.0 0.0 AIG 6.5*  --   --  7.7* 9.0* Recent Labs 19 
1194 19 
9746 19 
9391  143 147*  
K 5.6* 4.6 4.5  
* 112* 114* CO2 17* 18* 23 AGAP 14 13 10 * 169* 170* * 107* 71* CREA 5.01* 3.67* 2.02* GFRAA 16* 22* 45* GFRNA 13* 19* 37* CA 8.5 8.2* 8.1*  
SGOT 41* 41* 21 AP 90 88 74  
TP 6.2* 6.3 6.1* ALB 2.5* 2.2* 1.8*  
GLOB 3.7* 4.1* 4.3* AGRAT 0.7* 0.5* 0.4* MG 2.1 2.0 2.3 PHOS 6.7*  --  3.3 Imaging: XR CHEST SNGL V [[id number]] Collected: 19 0526 Order Status: Completed Updated: 08/07/19 3425 Narrative:    
EXAM: TEMPORARY INDICATION: Respiratory failure COMPARISON: 8/6/2019 FINDINGS: A portable AP radiograph of the chest was obtained at 0516 hours. The 
patient is on a cardiac monitor. Bilateral interstitial disease and pulmonary 
vascular prominence unchanged. . The cardiac and mediastinal contours and 
pulmonary vascularity are normal.  The bones and soft tissues are grossly within 
normal limits. Impression:    
IMPRESSION: Pulmonary edema unchanged. XR CHEST SNGL V [[id number]] Order Status: No result XR CHEST SNGL V [[id number]] Order Status: No result Weston Lopez [[id number]] Collected: 08/06/19 1313 Order Status: Completed Updated: 08/06/19 1325 Narrative:    
INDICATION:  Hydronephrosis TECHNIQUE: 
Real-time sonography of the kidneys, retroperitoneum and bladder was performed 
with multiple static images obtained. FINDINGS: 
The right kidney measures 11.0 cm and the left kidney measures 11.2 cm in 
length.  The kidneys have a normal echotexture.  There is mild right 
hydronephrosis with some debris in the right collecting system.   There is no 
left renal mass.  Round 3.2 cm mass adjacent to the left kidney, likely an 
accessory spleen. The aorta and IVC are normal in caliber.  The urinary bladder is collapsed 
around a Schultz catheter. Ramona Mock Impression:    
IMPRESSION: Right hydronephrosis with debris or tumor in the collecting system. XR CHEST SNGL V [[id number]] Collected: 08/06/19 3984 Order Status: Completed Updated: 08/06/19 6466 Narrative:    
Portable chest:  
 
History: intubated - check central line placement.   
 
Comparison: 08/05/2019 Findings: A single view of the chest was obtained at 8:57 AM. Endotracheal tube 
tip is approximately 5.5 cm above the jan. Right internal jugular catheter 
tip overlies the distal superior vena cava/right atrial junction. A left internal jugular catheter terminates in a similar location. Nasoenteric tube 
courses below the diaphragm with its tip not included on this image. There is no 
pneumothorax. There is mild vascular congestion. The cardiac silhouette is 
normal in size. Impression:    
Impression: 1. Tubes and lines appear unchanged and in satisfactory position. 2. Pulmonary vascular congestion. [address] ABD COMP [[id number]] Order Status: Canceled XR CHEST SNGL V [[id number]] Collected: 08/05/19 1714 Order Status: Completed Updated: 08/05/19 1718 Narrative:    
EXAM: Single view chest radiograph. INDICATION: 53 years central line placement COMPARISON: Chest radiograph dated August 05, 2019. FINDINGS: 
Interval insertion of a left IJ central venous catheter with tip terminating in 
the cavoatrial junction. Right-sided chest port with tip terminating in the 
right atrium. Endotracheal tube is in appropriate position. No pneumothorax or 
pleural effusion. No focal lung consolidation. Heart is normal in size. Enteric 
tube courses below the diaphragm. Unchanged coarse reticular lung markings. Impression:    
IMPRESSION:  
1. Interval insertion of a left IJ central venous catheter with tip terminating 
in the cavoatrial junction. Appropriate position of support lines and tubes. 2. Unchanged coarsened reticular lung markings. XR ABD (KUB) [[id number]] Collected: 08/05/19 1301 Order Status: Completed Updated: 08/05/19 1322 Narrative:    
History: Dobbhoff feeding tube placement EXAM: KUB FINDINGS: There is a Dobbhoff feeding tube, kinked, with its tip in the body of 
the stomach. Impression:    
IMPRESSION: Dobbhoff feeding tube as described XR ABD (KUB) [[id number]] Collected: 08/05/19 1118 Order Status: Completed Updated: 08/05/19 1121 Narrative:    
History: Dobbhoff feeding tube placement EXAM: Single view abdomen FINDINGS: There is no Dobbhoff feeding tube present. A port overlies the right chest wall. There is a right-sided ureteral stent. Impression:    
IMPRESSION: No Dobbhoff feeding tube. Suggest repositioning and repeat exam.  
XR CHEST SNGL V [[id number]] Collected: 08/05/19 0547 Order Status: Completed Updated: 08/05/19 8596 Narrative:    
EXAM: TEMPORARY INDICATION: Respiratory failure COMPARISON: 8/4/2019 FINDINGS: A portable AP radiograph of the chest was obtained at 0434 hours. The 
patient is on a cardiac monitor. Right upper lobe airspace disease worse in the 
interval. Left lower lobe airspace disease improved. The cardiac and mediastinal 
contours and pulmonary vascularity are normal.  The bones and soft tissues are 
grossly within normal limits. Impression:    
IMPRESSION: Right upper lobe atelectasis or pneumonia worse in the interval. 
 
Left lower lobe atelectasis or pneumonia improved. XR CHEST SNGL V [[id number]] Collected: 08/04/19 0543 Order Status: Completed Updated: 08/04/19 1798 Narrative:    
EXAM: XR CHEST SNGL V 
 
INDICATION: Respiratory failure COMPARISON: 8/3/2019 FINDINGS: A portable AP radiograph of the chest was obtained at 0359 hours. The 
patient is on a cardiac monitor. Left lower lobe airspace disease unchanged. The 
cardiac and mediastinal contours and pulmonary vascularity are normal.  The 
bones and soft tissues are grossly within normal limits. Impression:    
IMPRESSION: Left lower lobe pneumonia unchanged XR CHEST SNGL V [[id number]] Collected: 08/03/19 0537 Order Status: Completed Updated: 08/03/19 0540 Narrative:    
EXAM: XR CHEST SNGL V 
 
INDICATION: Respiratory failure COMPARISON: 8/2/2019 FINDINGS: A portable AP radiograph of the chest was obtained at 0406 hours. The 
patient is on a cardiac monitor. Left lower lobe and left perihilar airspace 
disease worse in the interval. The cardiac and mediastinal contours and 
pulmonary vascularity are normal.  The bones and soft tissues are grossly within normal limits. Impression:    
IMPRESSION: Left lower lobe pneumonia worse in the interval.  
XR CHEST SNGL V [[id number]] Collected: 08/02/19 0518 Order Status: Completed Updated: 08/02/19 2399 Narrative:    
Clinical history increased oxygen needs TECHNIQUE: AP portable chest 
 
COMPARISON: Yesterday. FINDINGS: 
 
Endotracheal tube and right-sided chest port are stable. There are bilateral 
groundglass densities. No pneumothorax is seen. Cardiac mediastinal contour 
appears within normal limits. Surrounding bones are stable. Impression:    
IMPRESSION: 
 
1. Bilateral groundglass densities, suspicious for infection. ET tube tip is in 
good position. 2. No significant change compared to prior exam.  
XR CHEST SNGL V [[id number]] Collected: 08/01/19 1841 Order Status: Completed Updated: 08/01/19 1845 Narrative:    
AP chest radiograph History: intubation, 48 years Male Comparison: Chest radiograph July 31, 2019 Findings:  Interval intubation, the endotracheal tube tip terminates 
approximately 6 cm above the jan.  Right chest wall kirsten catheter appears to 
remain in anatomic position.   Normal cardiomediastinal silhouette.  Improved 
lung volumes.  Patchy bilateral airspace opacities otherwise probably unchanged, 
consistent with multifocal pneumonia.  Trace bilateral pleural effusions 
unchanged.  No evidence of pneumothorax.  Visualized soft tissue and osseous 
structures otherwise unremarkable.    
Impression:    
Impression:  Interval intubation, with improved lung volumes. XR RETROGRADE PYELOGRAM [[id number]] Collected: 08/01/19 0914 Order Status: Completed Updated: 08/01/19 9699 Narrative:    
Retrograde pyelogram 
 
HISTORY: Right ureteral mass with hydronephrosis. 21 fluoroscopic images were obtained from a retrograde pyelogram performed by 
Dr. Ghassan Brown. COMPARISON: None. Correlation is made to the renal ultrasound 06/20/2019. FINDINGS: There is contrast material overlying the right abdomen within bowel 
loops. There is cannulation of the right ureter. There is a long stricture 
involving the mid right ureter. There is proximal hydronephrosis although 
partially obscured due to contrast within bowel. There is subsequent placement 
of a ureteral stent appearing in satisfactory position. 1 minute and 33 seconds of fluoroscopy was used for intraoperative control. Impression:    
IMPRESSION: Long segment ureteral stricture with resultant hydronephrosis. Please refer to the procedural report for further description and detail. XR CHEST SNGL V [[id number]] Collected: 07/31/19 1630 Order Status: Completed Updated: 07/31/19 4554 Narrative:    
EXAM: Single view chest radiograph. INDICATION: 53 years respiratory failure COMPARISON: Chest radiograph dated July 30, 2019. FINDINGS: 
No pneumothorax or pleural effusion. There is diffuse pulmonary interstitial 
edema. Right-sided chest port tip with terminating in the right atrium. Heart is 
normal in size. Right basilar heterogeneous air space opacity increased from 
prior. Impression:    
IMPRESSION:  
1. Diffuse pulmonary interstitial edema. 2. Right basilar heterogeneous air space opacity increased from prior, possibly 
atelectatic change. XR CHEST PORT [[id number]] Collected: 07/30/19 1317 Order Status: Completed Updated: 07/30/19 1326 Narrative:    
EXAM: XR CHEST PORT INDICATION: post op hypoxia COMPARISON: 7/30/2019 FINDINGS: A portable AP radiograph of the chest was obtained at 1254 hours. The 
patient is on a cardiac monitor. Right upper and right lower lobe airspace 
disease unchanged. . The cardiac and mediastinal contours and pulmonary 
vascularity are normal.  The bones and soft tissues are grossly within normal 
limits. Impression:    
IMPRESSION: Right upper and right lower lobe atelectasis or pneumonia unchanged. XR CHEST SNGL V [[id number]] Collected: 07/30/19 2333 Order Status: Completed Updated: 07/30/19 5335 Narrative:    
 Portable view of the chest  
 
COMPARISON: July 27, 2019 CLINICAL HISTORY: Shortness of breath. FINDINGS: 
 
Stable right-sided chest port. Bilateral groundglass opacities, slightly more 
prominent compared to prior exam. No pneumothorax. Cardiac mediastinal contour 
and the surrounding bones are stable. Impression:    
IMPRESSION: 
 
Bilateral groundglass opacities, slightly more prominent compared to prior exam. 
Differential diagnosis includes pneumonia and pulmonary edema. XR Hattie Harris VIDEO [[id number]] Collected: 07/29/19 1100 Order Status: Completed Updated: 07/29/19 1103 Narrative:    
Exam: Modified Barium Swallow INDICATION: Oropharyngeal dysphagia with recent aspiration. Fluoro time: 2.0 minutes Spot films:  0 
 
Fluoroscopy was used to evaluate pharyngeal function while the patient was given 
barium solutions and barium coated solids. FINDINGS: 
Patient with aspiration with thin liquids and deep penetration with thick 
liquids. Impression:    
IMPRESSION: 
1. Patient with aspiration with thin liquids. Please see full report from speech 
language pathologist.  
XR ABD (KUB) [[id number]] Collected: 07/27/19 1426 Order Status: Completed Updated: 07/27/19 1430 Narrative:    
KUB supine single portable view History:  Dobbhoff insertion, 48 years Male Comparison:  CT torso May 08, 2019, chest radiograph earlier today Findings: Interval insertion of an esophageal tube, the tip of which appears to 
terminate in the region of the duodenal bulb.  No free air is evident.  Retained 
oral contrast is seen in the right colon.  The pelvis is not fully visualized 
here.  The bowel gas pattern is nonspecific and there is no evidence of ileus or 
obstruction.  No suspicious renal or ureteral calculi are evident.  Visualized 
osseous structures unremarkable. Impression:    
Impression: New esophageal tube tip appears to terminate in the region of the 
duodenal bulb. XR CHEST SNGL V [[id number]] Collected: 07/27/19 0645 Order Status: Completed Updated: 07/27/19 2668 Narrative:    
 Portable view of the chest  
 
COMPARISON: July 26, 2019. CLINICAL HISTORY: Tachypnea. FINDINGS: 
 
Stable right-sided chest port. There are bibasilar opacities as well as patchy 
densities in the right upper lung. No pneumothorax or pulmonary edema. No large 
pleural effusion. Cardiac mediastinal contour and the surrounding bones are 
stable. Impression:    
IMPRESSION: 
 
1. Patchy bibasilar opacities as well as patchy opacities in the right upper 
lung. Findings suspicious for infection. 2. No significant change compared to prior exam.  
XR CHEST SNGL V [[id number]] Collected: 07/26/19 1159 Order Status: Completed Updated: 07/26/19 1202 Narrative:    
Portable chest x-ray CLINICAL INDICATION: Shortness of breath FINDINGS: Single AP view the chest compared to a similar exam dated 7/11/2019 
shows new reticular nodular interstitial densities throughout both lungs with 
more patchy airspace opacity in the right upper lobe. A right-sided chest port 
is in place. The cardiac silhouette and mediastinum are stable. Impression:    
IMPRESSION: New, diffuse reticular nodular densities throughout the lungs with 
patchy opacity in the right upper lobe. Atypical pneumonia or pulmonary edema 
should be considered. XR BA SWALLOW ESOPHOGRAM [[id number]] Collected: 07/26/19 4428 Order Status: Completed Updated: 07/26/19 1026 Narrative:    
History: Dysphasia. Stage IV rectal cancer. EXAM: Barium swallow TECHNIQUE: 1.3 minutes fluoroscopy time is utilized. The patient ingests 
effervescent granules followed by thick and thin consistencies of barium. 18 
fluoroscopic images are available for review. No comparison FINDINGS: The exam is markedly limited. The patient is unable to perform the 
exam the upright position, and has very limited mobility. There is aspiration of 
the thin contrast. Contrast flows through the esophagus into the stomach without 
difficulty. There is a hiatal hernia present. Just above the hiatal hernia, 
there is a filling defect of the distal esophagus, corresponding to a soft 
tissue mass adjacent to the esophagus on the recent CT chest. No definite 
gastroesophageal reflux, or ulceration. Impression:    
IMPRESSION: 
1. Just above the hiatal hernia, there is an extrinsic soft tissue mass creating 
a filling defect within the distal esophagus, corresponding to a soft tissue 
mass seen on the recent CT chest. 
2. Aspiration of oral contrast. Correlation with modified barium swallow 
recommended. CT CHEST W CONT [[id number]] Collected: 07/24/19 2133 Order Status: Completed Updated: 07/24/19 2138 Narrative:    
CT OF THE CHEST WITH INTRAVENOUS CONTRAST. INDICATION: Shortness of breath. Schulz Redder adenocarcinoma with metastatic 
disease. COMPARISON: None. TECHNIQUE:   2.5 mm axial scans from above the aortic arch to the lung bases 
with 100 cc nonionic intravenous contrast without acute complication. Intravenous contrast was given to evaluate for pulmonary embolism. FINDINGS:  The degree of opacification of the pulmonary arteries is adequate. No intraluminal filling defects within the pulmonary arterial tree to suggest 
pulmonary embolism. Jonda Benjamin is normal caliber with a uniform lumen without 
evidence of dissection. Lungs demonstrate multiple nodules, groundglass 
opacities and small pneumonic consolidation in the bases. There is extensive 
mediastinal adenopathy which has increased a great deal from the prior exam.. Included portion of the upper abdomen are unremarkable. No gross bony lesions. Fredo Mcclain Impression:    
 IMPRESSION:  Mediastinal adenopathy is increased great deal from the prior exam. 
There is patchy airspace disease in both lung bases. Pulmonary metastases. Negative for pulmonary embolism. Medications: 
Current Facility-Administered Medications Medication Dose Route Frequency  morphine injection 4 mg  4 mg IntraVENous Q2H PRN  
 LORazepam (ATIVAN) injection 2 mg  2 mg IntraVENous Q2H PRN  piperacillin-tazobactam (ZOSYN) 4.5 g in 0.9% sodium chloride (MBP/ADV) 100 mL  4.5 g IntraVENous Q12H  
 dexmedeTOMidine (PRECEDEX) 400 mcg in 0.9% sodium chloride 100 mL infusion  0.2-0.7 mcg/kg/hr IntraVENous TITRATE  levoFLOXacin (LEVAQUIN) 750 mg in D5W IVPB  750 mg IntraVENous Q48H  
 vancomycin (VANCOCIN) 1,000 mg in 0.9% sodium chloride (MBP/ADV) 250 mL  1,000 mg IntraVENous See Admin Instructions  polyvinyl alcohol (LIQUIFILM TEARS) 1.4 % ophthalmic solution 1 Drop  1 Drop Both Eyes PRN  
 insulin regular (NOVOLIN R, HUMULIN R) injection   SubCUTAneous Q6H  
 methylPREDNISolone (PF) (SOLU-MEDROL) injection 60 mg  60 mg IntraVENous Q8H  
 budesonide (PULMICORT) 500 mcg/2 ml nebulizer suspension  500 mcg Nebulization BID RT  
 fentaNYL in normal saline (pf) 25 mcg/mL infusion   mcg/hr IntraVENous TITRATE  albuterol (PROVENTIL VENTOLIN) nebulizer solution 2.5 mg  2.5 mg Nebulization QID RT  
 hydrALAZINE (APRESOLINE) 20 mg/mL injection 10 mg  10 mg IntraVENous Q6H PRN  
 albuterol (PROVENTIL VENTOLIN) nebulizer solution 2.5 mg  2.5 mg Nebulization Q6H PRN  
 NUTRITIONAL SUPPORT ELECTROLYTE PRN ORDERS   Does Not Apply PRN  
 magic mouthwash (SIMON) oral suspension 5 mL  5 mL Oral Q4H PRN  
 central line flush (saline) syringe 10 mL  10 mL InterCATHeter PRN  prochlorperazine (COMPAZINE) injection 10 mg  10 mg IntraVENous Q6H PRN  
 
 
 
ASSESSMENT: 
 
Problem List  Date Reviewed: 8/7/2019 Codes Class Noted Hydroureteronephrosis ICD-10-CM: N13.30 ICD-9-CM: 277  7/30/2019 Hx pulmonary embolism (Chronic) ICD-10-CM: A35.494 ICD-9-CM: V12.55  7/30/2019 History of DVT of lower extremity (Chronic) ICD-10-CM: S04.218 ICD-9-CM: V12.51  7/30/2019 Hypoxia ICD-10-CM: R09.02 
ICD-9-CM: 799.02  7/30/2019 Metastatic cancer to lung [facility]) (Chronic) ICD-10-CM: C78.00 ICD-9-CM: 197.0  7/26/2019 Overview Signed 7/26/2019 11:06 AM by Ben Davis NP Per EBUS in June 2019 [facility] ICD-10-CM: J96.00 
ICD-9-CM: 518.81  7/26/2019 Aspiration into airway ICD-10-CM: T17.908A ICD-9-CM: 934.9  7/26/2019 Dysphagia ICD-10-CM: R13.10 ICD-9-CM: 787.20  7/25/2019 Intractable vomiting ICD-10-CM: R11.10 ICD-9-CM: 536.2  7/25/2019 Mediastinal lymphadenopathy ICD-10-CM: R59.0 ICD-9-CM: 785.6  6/11/2019 Pulmonary nodules ICD-10-CM: R91.8 ICD-9-CM: 793.19  6/11/2019 Dehydration ICD-10-CM: E86.0 ICD-9-CM: 276.51  4/2/2019 S/P radiation therapy < 4 weeks ago ICD-10-CM: Z92.3 ICD-9-CM: V15.3  10/1/2018 * (Principal) Rectal adenocarcinoma ([facility] [address] (Chronic) ICD-10-CM: C20 
ICD-9-CM: 154.1  7/6/2018 Colostomy status (HCC) (Chronic) ICD-10-CM: Z93.3 ICD-9-CM: V44.3  6/29/2018 Erectile dysfunction due to arterial insufficiency (Chronic) ICD-10-CM: N52.01 
ICD-9-CM: 607.84  5/31/2018 Benign essential HTN (Chronic) ICD-10-CM: I10 
ICD-9-CM: 401.1  5/31/2018 Hypercholesterolemia (Chronic) ICD-10-CM: E78.00 ICD-9-CM: 272.0  5/30/2018 Overview Signed 6/4/2018  2:20 PM by Sakina Hickey  
  ASCVD 10 year risk is 16.0%. Mod to high dose statin indicated. (based on b/p 180/90, 6/4/18) Screening PSA (prostate specific antigen) (Chronic) ICD-10-CM: Z12.5 ICD-9-CM: V76.44  5/30/2018 History of tobacco abuse (Chronic) ICD-10-CM: S36.670 ICD-9-CM: V15.82  8/18/2016 PLAN: 
Dysphagia/vomiting - GI evaluation. Planning EGD tomorrow (cannot do dilation due to recent avastin). Started pepcid BID 
- Continue IV fluids 7/26 Barium swallow today with aspiration and extrinsic soft tissue mass creating filling defect in distal esophagus 7/29 Spoke with Ignacia Mattson (out of town) and Yared. Needs palliative radiation to mediastinal adenopathy that is compressing esophagus. Planned for consult this afternoon. Pt agreeable to PEG given length of time it may take for radiation to improve his ability to swallow. GI reconsulted for placement 7/30 S/p simulation with rad onc yesterday - planning to start radiation (today but held due to procedure). PEG placement possibly tomorrow 8/1 PEG is no longer an option. TPN starting today. 8/7 DHT discontinued. TPN discontinued. Aspiration pneumonia 
- Continue levaquin. BCx pending. UA negative. CT chest with no acute findings 7/26 D3 LVQ 
7/29 Continues zosyn. Noted leukocytosis. 8/2 broaden coverage, add LVQ and Vanc Met rectal ca - S/p FOLFIRI-Avastin, last given 7/9 7/29 Suspicion for progression on recent CT. Family is aware. Further treatment TBD outpatient with Dr. Jennifer Hernandez. 8/7 Moving towards comfort measures with poor prognosis Leukopenia/anemia 
- Secondary to chemotherapy. Granix x 1 today 7/26 WBC up to 4.5 
7/29 Continues with elevated counts 8/6 GI consulted for drop in hgb. No plans for endoscopic evaluation at this time. Continue transfusions and monitor serial H&H Hx hydronephrosis - now hematuria - Planned for cystoscopy and stent placement tomorrow with urology (was scheduled for outpatient). Ok from [employer]. Discussed with pulmonary and no contraindication from their perspective. Discussed with Uziel Rubio NP 
7/30 S/p cystoscopy with right ureteral stent placed and right ureteral biopsy. To ICU following for tachypnea 8/5 Seen by urology this morning - hematuria is expected from his stent Respiratory failure 8/2 Intubated -bilateral ground glass densities on CXR suspicious for infection. Pulmonary taking over as primary. 8/3 Remains intubated, unresponsive, worsened ABG/CXR  
8/5 No clinical changes, intubated and sedated, CXR unchanged. Pulmonology following [facility] 
8/5 Creatinine up today. If persistent, consider nephrology consult 8/6 Creatinine up, decreased urine output. Nephrology consulted 8/7 Nephrology saw patient, Cr up to 5.01 today Patient is partial code status. Continues with clinical decline. Multiple family members present and discussing goals of care. Continue supportive care including antibiotics for HCAP and ventilator support. Not a candidate for any cancer directed therapy at this time Fito Montalvo NP [facility] Hematology & Oncology [address] [place] [address] Office : [phone] Fax : [phone] Attending Addendum: 
I have personally performed a face to face diagnostic evaluation on this patient. I have reviewed and agree with the care plan as documented by Fito Montalvo N.P. My findings are as follows:  He has metastatic rectal cancer, appears intubated and unresponsive, heart rate regular without murmurs, abdomen is non-tender, bowel sounds are positive, we will encourage the family to consider comfort measures, his prognosis is grave. Gayathri Flores MD 
 
 
[facility] Hematology/Oncology [address] [place] [address] Office : [phone] Fax : [phone]

## 2019-08-07 NOTE — INTERDISCIPLINARY ROUNDS
Interdisciplinary team rounds were held 8/7/2019 with the following team members:Care Management, Nursing, Nurse Practitioner, Nutrition, Palliative Care, Pastoral Care, Pharmacy, Physical Therapy, Physician, Physician's Assistant, Respiratory Therapy and Clinical Coordinator and the patient and spouse. Plan of care discussed. See clinical pathway and/or care plan for interventions and desired outcomes.

## 2019-08-07 NOTE — INTERDISCIPLINARY ROUNDS
Interdisciplinary team rounds were held 8/7/2019 with the following team members:Care Management, Nursing, Nurse Practitioner, Nutrition, Palliative Care, Pastoral Care, Pharmacy, Physical Therapy, Physician, Physician's Assistant, Respiratory Therapy and Wound Care and the patient and spouse. Plan of care discussed. See clinical pathway and/or care plan for interventions and desired outcomes.

## 2019-08-07 NOTE — PROGRESS NOTES
Palliative Care Progress Note Patient: Katharina Whitten MRN: [id number]  SSN: [id number] YOB: 1965  Age: 48 y.o. Sex: male Assessment/Plan: Chief Complaint/Interval History: pt remains intubated with progressing renal failure. Wife and family at bedside. Principal Diagnosis: · Debility, Unspecified  R53.81 Additional Diagnoses: · Acute Respiratory Failure, Unspecified  J96.00 · Altered Mental Status R41.82 
· Failure to Thrive  R62.7 · Frailty  R54 
· Encounter for Palliative Care  Z51.5 Palliative Performance Scale (PPS) PPS: 30 Medical Decision Making:  
Reviewed and summarized labs and imaging. I met with spouse and family at bedside. I updated family on progression of renal failure and ongoing life support measures. Discussed that pt is in active dying phase of life as his body is failing. Spouse was tearful but expressed her understanding. Encouraged her to have family come to bedside who wishes to see him. Discussed making him comfortable not suffering as he passes. Spouse is in agreement once family arrives. Will continue to follow and assist with transition to comfort measures. Will discuss findings with members of the interdisciplinary team.   
 
More than 50% of this 30 minute visit was spent counseling and coordination of care as outlined above. Subjective:  
 
Review of Systems unable to obtain due to mentation Objective:  
 
Visit Vitals /65 Pulse 100 Temp 98.4 °F (36.9 °C) Resp 20 Wt 155 lb 4.8 oz (70.4 kg) SpO2 95% BMI 22.61 kg/m² Physical Exam: 
 
General:  Intubated and sedated Eyes:  Conjunctivae/corneas clear Nose: Nares normal. Septum midline. Neck: Supple, symmetrical, trachea midline, no JVD Lungs:   Coarse bilateral bs Heart:  Regular rate and rhythm, no murmur Abdomen:   Soft, non-tender, non-distended Extremities: Normal, atraumatic, no cyanosis or edema Skin: Skin color, texture, turgor normal. No rash or lesions. Neurologic: sedated Psych: sedated Signed By: Bettina Tariq MD   
 August 7, 2019

## 2019-08-07 NOTE — PROGRESS NOTES
Spoke with Dr. Chiquita Lesches, updated on lab results, will give 2 amps bicarb per orders. Wife updated on patient's condition.

## 2019-08-07 NOTE — PROGRESS NOTES
Nutrition F/U: 
Family requested that TPN be discontinued last night. Noted discussion by Baljit Gordon and Gladis Paige today with the POC to be transition to comfort care after family arrives. No further nutrition intervention at this time. Frederica Duverney. Rakesh Raphael 
[phone]

## 2019-08-07 NOTE — PROGRESS NOTES
Changed patient's settings to PRVC+SIMV for patient comfort; tolerating well at this time; will continue to monitor.

## 2019-08-07 NOTE — PROGRESS NOTES
Bedside and verbal shift report received from Iván Bermudez, [place]. Dual signed fentanyl gtt.

## 2019-08-07 NOTE — PROGRESS NOTES
A follow up visit was made to the patient. Emotional support, spiritual presence and  
prayer were provided. The patient's family have decided to have comfort measures implemented and have him extubated. A large group of family members are present. Some in the patient's room and most in the ICU waiting room. Fish Diego

## 2019-08-07 NOTE — PROGRESS NOTES
Follow up visit to build relationship of connectedness, care & emotional / spiritual support.  
  
______    active listening/ guided questions to understand pt's spiritual needs 
 
______    exploration of hope & the presence of God to normalize struggles as Holy, reframe, introduce coping skills 
 
_______   prayer / blessing of pt & struggles, to convey peace & lessen anxiety Additional  Comments / interventions:    
 
Called to care for family prior to extubation of pt who has been comfort care. Briefed earlier by ronald Duarte. Prayer & presence for gathered family members. Anisa Young MDiv, ThM, PhD 
Eunice Camargo

## 2019-08-07 NOTE — PROGRESS NOTES
A follow up visit was made to the patient. Emotional support, spiritual presence and  
prayer were provided. Family was awaiting additional family members to arrive. Extubation for the patient is scheduled for this afternoon. A large group of family members are present. Priscilla Lopez has met this family and has been brought up to date about the family's needs. She has the afternoon shift, today. Richard iDego

## 2019-08-08 NOTE — PROGRESS NOTES
Death Note Antelmo Da Silva 
Admission date:  2019 Admitting Diagnosis:  Dysphagia [R13.10]; Intractable vomiting [R11.10] Called to evaluate patient who was found by nursing to have . On arrival patient was found to be unresponsive. Physical exam was performed and the patient was noted to be apneic, asystolic, pupils were fixed and dilated, with absent occulocephalic reflex. Patient pronounced dead at 1:10am on 19. Actual time of death 12:20am. 
 
Cause of death felt to be metastatic colon cancer with aspiration pneumonia.  
 
River Valdez MD

## 2019-08-08 NOTE — PROGRESS NOTES
Pt found w/absent breath sounds and no heart beat heard on auscultation. [facility] house supervisor, Tracie wagoner, and Dr. Rosa Hobbs intensivist notified. Family at bedside.

## 2019-08-08 NOTE — PROGRESS NOTES
TRANSFER - IN REPORT: 
 
Verbal report received from Ana Jarquin RN on Maryjo Brood  being received from ICU for routine progression of care Report consisted of patients Situation, Background, Assessment and  
Recommendations(SBAR). Information from the following report(s) SBAR, Kardex, MAR and Accordion was reviewed with the receiving nurse. Opportunity for questions and clarification was provided. Assessment will be completed upon patients arrival to unit and care assumed.

## 2019-08-08 NOTE — DISCHARGE SUMMARY
Death Summary Christy Jones 
Admission date:  7/24/2019 Discharge date:  8/8/2019 Admitting Diagnosis:  Dysphagia [R13.10]; Intractable vomiting [R11.10] Discharge Diagnosis:   
Problem List as of 8/8/2019 Date Reviewed: 8/7/2019 Codes Class Noted - Resolved Hydroureteronephrosis ICD-10-CM: N13.30 ICD-9-CM: 320  7/30/2019 - Present Hx pulmonary embolism (Chronic) ICD-10-CM: N39.389 ICD-9-CM: V12.55  7/30/2019 - Present History of DVT of lower extremity (Chronic) ICD-10-CM: Q95.737 ICD-9-CM: V12.51  7/30/2019 - Present Hypoxia ICD-10-CM: R09.02 
ICD-9-CM: 799.02  7/30/2019 - Present Metastatic cancer to lung [facility]) (Chronic) ICD-10-CM: C78.00 ICD-9-CM: 197.0  7/26/2019 - Present Overview Signed 7/26/2019 11:06 AM by David Singh NP Per EBUS in June 2019 Acute respiratory failure ([facility] [address] ICD-10-CM: J96.00 
ICD-9-CM: 518.81  7/26/2019 - Present Aspiration into airway ICD-10-CM: T17.908A ICD-9-CM: 934.9  7/26/2019 - Present Dysphagia ICD-10-CM: R13.10 ICD-9-CM: 787.20  7/25/2019 - Present Intractable vomiting ICD-10-CM: R11.10 ICD-9-CM: 536.2  7/25/2019 - Present Mediastinal lymphadenopathy ICD-10-CM: R59.0 ICD-9-CM: 785.6  6/11/2019 - Present Pulmonary nodules ICD-10-CM: R91.8 ICD-9-CM: 793.19  6/11/2019 - Present Dehydration ICD-10-CM: E86.0 ICD-9-CM: 276.51  4/2/2019 - Present S/P radiation therapy < 4 weeks ago ICD-10-CM: Z92.3 ICD-9-CM: V15.3  10/1/2018 - Present * (Principal) Rectal adenocarcinoma ([facility] [address] (Chronic) ICD-10-CM: C20 
ICD-9-CM: 154.1  7/6/2018 - Present Colostomy status (HCC) (Chronic) ICD-10-CM: Z93.3 ICD-9-CM: V44.3  6/29/2018 - Present Erectile dysfunction due to arterial insufficiency (Chronic) ICD-10-CM: N52.01 
ICD-9-CM: 607.84  5/31/2018 - Present  Benign essential HTN (Chronic) ICD-10-CM: I10 
 ICD-9-CM: 401.1  5/31/2018 - Present Hypercholesterolemia (Chronic) ICD-10-CM: E78.00 ICD-9-CM: 272.0  5/30/2018 - Present Overview Signed 6/4/2018  2:20 PM by Cece Lane  
  ASCVD 10 year risk is 16.0%. Mod to high dose statin indicated. (based on b/p 180/90, 6/4/18) Screening PSA (prostate specific antigen) (Chronic) ICD-10-CM: Z12.5 ICD-9-CM: V76.44  5/30/2018 - Present History of tobacco abuse (Chronic) ICD-10-CM: Q98.011 ICD-9-CM: V15.82  8/18/2016 - Present RESOLVED: Rectal pain ICD-10-CM: K62.89 ICD-9-CM: 539.00  9/26/2018 - 10/12/2018 RESOLVED: Iron deficiency anemia due to chronic blood loss ICD-10-CM: D50.0 ICD-9-CM: 280.0  7/26/2018 - 10/12/2018 RESOLVED: Bowel obstruction ([facility] [address] ICD-10-CM: S20.035 ICD-9-CM: 560.9  6/26/2018 - 7/17/2018 RESOLVED: Rectal mass ICD-10-CM: K62.9 ICD-9-CM: 787.99  6/26/2018 - 7/17/2018 RESOLVED: Leukocytosis ICD-10-CM: R50.222 ICD-9-CM: 288.60  6/26/2018 - 7/17/2018 RESOLVED: Generalized abdominal pain ICD-10-CM: R10.84 ICD-9-CM: 789.07  6/26/2018 - 7/6/2018 RESOLVED: Nausea with vomiting ICD-10-CM: R11.2 ICD-9-CM: 787.01  6/26/2018 - 7/6/2018 RESOLVED: Polycythemia secondary to smoking ICD-10-CM: D75.1 ICD-9-CM: 289.0  5/30/2018 - 10/12/2018 Consultants: 
Pulmonary GI Palliative Radiation oncology Nephrology Urology Studies/Procedures: 
CT Chest 7/24/19 IMPRESSION:  Mediastinal adenopathy is increased great deal from the prior exam. 
There is patchy airspace disease in both lung bases. Pulmonary metastases. Negative for pulmonary embolism. Barium Esophagram: 7/26/19 IMPRESSION: 
1. Just above the hiatal hernia, there is an extrinsic soft tissue mass creating 
a filling defect within the distal esophagus, corresponding to a soft tissue 
mass seen on the recent CT chest. 
2. Aspiration of oral contrast. Correlation with modified barium swallow Recommended. Cystoscopy with R pyelogram, R ureteral stent 7/30 Intubation 8/1/2019 Central line placement 8/5/19 Hospital course: 
H&P 
46 M ex-smoker, regular ETOH use (1-2 beers per day), h/o GERD, bilateral inguinal hernia repair, now being managed for Rectal cancer s/p diverting colostomy, biopsy proven stage 4 (locally advanced unresectable, LAD abd and mediastinum), PE 05/8/19 and RLE DVT 5/29/19 on enoxaparin, Renal mass (biopsy -ve), RT sided ureteral obstruction w likely malignancy. He had progressed on 5FU-A maintenance and was recently changed over to [facility]. He presented to office for C4 after recent cruise trip. Recently came from a cruise to [facility]. Today reports severe fatigue, as well as inability to keep food down. Reports feeling of food getting stuck in the chest and then immediately vomiting this. Also notes feeling cold. Dyspnea slightly worse. Notes greenish phlegm that he is bringing up but feels related to sinus infection. He is scheduled for cystoscopy with right retrograde pyelogram with possible stent placement 7/30/2019. He is here for cycle 4 AA AvastinFOLFIRI however will need to defer this given ongoing symptoms. 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 MSI was stable, MMR was proficient/expressed, and PDL 1 was negative. Will admit for further work-up including CT chest with contrast, EGD, hydration, panculture, and initiation of antibiotic for ongoing sinusitis/reported phlegm as well as chills. Interval Course: The patient was admitted to the oncology service, started on IV fluids, CT chest performed with above findings, and GI consulted. Barium esophagram was performed with the above findings. He was started on levaquin. After his barium swallow study he developed respiratory distress and was started on bipap and moved to the ICU. Rad onc was consulted for treatment with external beam radiation to the compressed esophagus. On 7/30 he underwent cystoscopy with R ureteral stent for hydronephrosis which was originally scheduled as an outpatient prior to this admission. He was to have a PEG 8/1 but this was postponed due to ongoing instability. He required bipap again 8/1 and was intubated later that night. Palliative care was consulted and the patient was made partial code: no CPR, ongoing vent support. He continued on the vent for the next several days. He continued to do poorly and on 8/7 the family decided to proceed with compassionate extubation. This was performed evening of 8/7 and the patient passed the morning of 8/8. Final: 
--Pronounced dead at 12:20am 8/8/19. --Total discharge greater than 30 minutes in duration.  
 
Radha Ferris MD

## 2019-08-08 NOTE — PROGRESS NOTES
Spiritual Care Visit, follow up visit. Death Received call that 48year old patient, who had been compassionately extubated last evening, had . Came to the hospital as quickly as possible. Found a number of family members gathered in patient's room, grieving his death. Offered support and comfort to them Prayed with patient's wife and other family members for peace and comfort. Visit by Loida Lopez, Staff .  Ness., Th.B., B.A.

## 2019-08-08 NOTE — PROGRESS NOTES
Problem: Falls - Risk of 
Goal: *Absence of Falls Description Document Maisha Powerstony Fall Risk and appropriate interventions in the flowsheet. Outcome: Progressing Towards Goal 
Note:  
Fall Risk Interventions: 
Mobility Interventions: Communicate number of staff needed for ambulation/transfer Mentation Interventions: Door open when patient unattended, Family/sitter at bedside, More frequent rounding, Room close to nurse's station Medication Interventions: Evaluate medications/consider consulting pharmacy Elimination Interventions: Toileting schedule/hourly rounds History of Falls Interventions: Room close to nurse's station Problem: Pressure Injury - Risk of 
Goal: *Prevention of pressure injury Description Document Prosper Scale and appropriate interventions in the flowsheet. Outcome: Progressing Towards Goal 
Note:  
Pressure Injury Interventions: 
Sensory Interventions: Pressure redistribution bed/mattress (bed type), Keep linens dry and wrinkle-free, Suspension boots Moisture Interventions: Absorbent underpads, Internal/External fecal devices, Internal/External urinary devices, Maintain skin hydration (lotion/cream), Minimize layers Activity Interventions: Pressure redistribution bed/mattress(bed type) Mobility Interventions: HOB 30 degrees or less, Pressure redistribution bed/mattress (bed type), Suspension boots Nutrition Interventions: Document food/fluid/supplement intake Friction and Shear Interventions: Foam dressings/transparent film/skin sealants, HOB 30 degrees or less, Minimize layers

## 2019-08-08 NOTE — PROGRESS NOTES
Pt compassionately extubated per MD orders and family request. Family at the bedside, placed on 3L NC.

## 2019-08-08 NOTE — PROGRESS NOTES
TRANSFER - OUT REPORT: 
 
Verbal report given to Boaz Aiken RN on Maribel Olivera  being transferred to [address] [place] [address] for routine progression of care Report consisted of patients Situation, Background, Assessment and  
Recommendations(SBAR). Information from the following report(s) Procedure Summary, Recent Results and Cardiac Rhythm ST was reviewed with the receiving nurse. Lines:  
Triple Lumen 08/05/19 Left Internal jugular (Active) Central Line Being Utilized Yes 8/7/2019  9:31 AM  
Criteria for Appropriate Use Long term IV/antibiotic administration 8/7/2019  9:31 AM  
Site Assessment Clean, dry, & intact 8/7/2019  9:31 AM  
Infiltration Assessment 0 8/7/2019  9:31 AM  
Affected Extremity/Extremities Color distal to insertion site pink (or appropriate for race) 8/7/2019  9:31 AM  
Date of Last Dressing Change 08/07/19 8/7/2019  9:31 AM  
Dressing Status Clean, dry, & intact 8/7/2019  9:31 AM  
Dressing Type Disk with Chlorhexadine gluconate (CHG); Stabilization/securement device;Transparent 8/7/2019  9:31 AM  
Action Taken Other (comment) 8/7/2019  9:31 AM  
Proximal Hub Color/Line Status White;Patent; Infusing 8/7/2019  7:14 AM  
Positive Blood Return (Medial Site) Yes 8/7/2019  7:14 AM  
Medial Hub Color/Line Status Blue;Patent; Infusing 8/7/2019  7:14 AM  
Positive Blood Return (Lateral Site) Yes 8/7/2019  7:14 AM  
Distal Hub Color/Line Status Brown;Patent; Infusing 8/7/2019  7:14 AM  
Positive Blood Return (Site #3) Yes 8/7/2019  7:14 AM  
External Catheter Length (cm) 0 centimeters 8/6/2019 10:22 AM  
Alcohol Cap Used No 8/7/2019  9:31 AM  
  
 
Opportunity for questions and clarification was provided. Patient transported with: 
 O2 @ 3 liters Registered Nurse

## 2019-08-08 NOTE — PROGRESS NOTES
08/07/19 2350 Dual Skin Pressure Injury Assessment Dual Skin Pressure Injury Assessment WDL Second Care Provider (Based on [address] [place] [address]) Lidya Kramer, RN Sacrum  Mid 
(blanchable redness) Skin Integumentary Skin Integumentary (WDL) X Pressure  Injury Documentation No Pressure Injury Noted-Pressure Ulcer Prevention Initiated Skin Color Ecchymosis (comment) (scattered) Skin Condition/Temp Cool;Dry Skin Integrity Scars (comment); Other (comment) (scattered scabs and healing excoriations) Turgor Epidermis thin w/ loss of subcut tissue Hair Growth Present

## 2019-08-09 ENCOUNTER — APPOINTMENT (OUTPATIENT)
Dept: RADIATION ONCOLOGY | Age: 54
End: 2019-08-09

## 2019-08-09 ENCOUNTER — APPOINTMENT (OUTPATIENT)
Dept: INFUSION THERAPY | Age: 54
End: 2019-08-09

## 2019-08-09 LAB
BACTERIA SPEC CULT: ABNORMAL
BACTERIA SPEC CULT: ABNORMAL
GRAM STN SPEC: ABNORMAL
SERVICE CMNT-IMP: ABNORMAL

## 2019-08-12 ENCOUNTER — APPOINTMENT (OUTPATIENT)
Dept: RADIATION ONCOLOGY | Age: 54
End: 2019-08-12

## 2020-08-17 NOTE — PROGRESS NOTES
Saw patient today with Dr Harrison Granger. Chem o will be held. Pt complains of fatigue and difficulty with numbness and tingling. Dr Harrison Granger will omit Oxaliplatin from cycles and pt will be receiving Avastin/5FU in 2 weeks. Pt is accepting of plan.     NGS sent [facility]) as ordered Dr Harrison Granger on specimen [id number] shoulder

## (undated) DEVICE — CANNULA NSL ORAL AD FOR CAPNOFLEX CO2 O2 AIRLFE

## (undated) DEVICE — COLOSTOMY/ILEOSTOMY KIT, FLEXWEAR: Brand: NEW IMAGE

## (undated) DEVICE — CATH URET 5FRX70CM W/OPN END -- BX/20

## (undated) DEVICE — ENDOSCOPIC VALVE WITH ADAPTER.: Brand: SURSEAL® II

## (undated) DEVICE — WOUND RETRACTOR AND PROTECTOR: Brand: ALEXIS O WOUND PROTECTOR-RETRACTOR

## (undated) DEVICE — GDWIRE 3CM FLX-TIP 0.038X150CM -- BX/5 SENSOR

## (undated) DEVICE — TELFA NON-ADHERENT ABSORBENT DRESSING: Brand: TELFA

## (undated) DEVICE — MASTISOL ADHESIVE LIQ 2/3ML

## (undated) DEVICE — REM POLYHESIVE ADULT PATIENT RETURN ELECTRODE: Brand: VALLEYLAB

## (undated) DEVICE — BASIC SINGLE BASIN-LF: Brand: MEDLINE INDUSTRIES, INC.

## (undated) DEVICE — 2000CC GUARDIAN II: Brand: GUARDIAN

## (undated) DEVICE — SUTURE PERMAHAND SZ 2-0 L30IN NONABSORBABLE BLK SILK W/O A305H

## (undated) DEVICE — SURGICAL PROCEDURE PACK BASIC ST FRANCIS

## (undated) DEVICE — Device: Brand: BALLOON

## (undated) DEVICE — BLADE ELECTRODE: Brand: EDGE

## (undated) DEVICE — SOLUTION IRRIG 3000ML H2O STRL BAG

## (undated) DEVICE — SLIM BODY SKIN STAPLER: Brand: APPOSE ULC

## (undated) DEVICE — CUP BIOPSY FORCEPS: Brand: COOK

## (undated) DEVICE — BRONCHOSCOPY PACK: Brand: MEDLINE INDUSTRIES, INC.

## (undated) DEVICE — CYSTO/BLADDER IRRIGATION SET, REGULATING CLAMP

## (undated) DEVICE — KENDALL RADIOLUCENT FOAM MONITORING ELECTRODE RECTANGULAR SHAPE: Brand: KENDALL

## (undated) DEVICE — INTENDED FOR TISSUE SEPARATION, AND OTHER PROCEDURES THAT REQUIRE A SHARP SURGICAL BLADE TO PUNCTURE OR CUT.: Brand: BARD-PARKER SAFETY BLADES SIZE 15, STERILE

## (undated) DEVICE — DRAPE,LITHOTOMY,STERILE: Brand: MEDLINE

## (undated) DEVICE — DRSG AQUACEL SURG 3.5X6IN -- CONVERT TO ITEM 369227

## (undated) DEVICE — BUTTON SWITCH PENCIL BLADE ELECTRODE, HOLSTER: Brand: EDGE

## (undated) DEVICE — JELLY LUBRICATING 10GM PREFIL SYR LUBE

## (undated) DEVICE — TRAY CATH 16F URIN MTR LTX -- CONVERT TO ITEM 363111

## (undated) DEVICE — KENDALL SCD EXPRESS SLEEVES, KNEE LENGTH, MEDIUM: Brand: KENDALL SCD

## (undated) DEVICE — SOLUTION IV 1000ML 0.9% SOD CHL

## (undated) DEVICE — SET EXTN L6IN W/ S STL CLMP

## (undated) DEVICE — CONTAINER SPEC HISTOLOGY 900ML POLYPR

## (undated) DEVICE — SPONGE LAP 18X18IN STRL -- 5/PK

## (undated) DEVICE — CONTAINER PREFIL FRMLN 40ML --

## (undated) DEVICE — SCOPE RECT LED W INSUFFLATOR

## (undated) DEVICE — SINGLE USE SUCTION VALVE MAJ-209: Brand: SINGLE USE SUCTION VALVE (STERILE)

## (undated) DEVICE — SHEET, T, LAPAROTOMY, STERILE: Brand: MEDLINE

## (undated) DEVICE — SCANLAN® SURG-I-LOOP® SILICONE LOOPS - BLUE SUPER MAXI, 5.0X1.27 MM, 16"/40 CM LONG (2/PKG): Brand: SCANLAN® SURG-I-LOOP® SILICONE LOOPS

## (undated) DEVICE — CYSTO: Brand: MEDLINE INDUSTRIES, INC.

## (undated) DEVICE — CONNECTOR TBNG OD5-7MM O2 END DISP

## (undated) DEVICE — SINGLE USE BIOPSY VALVE MAJ-210: Brand: SINGLE USE BIOPSY VALVE (STERILE)

## (undated) DEVICE — DRAPE,UNDERBUTTOCKS,PCH,STERILE: Brand: MEDLINE

## (undated) DEVICE — SUTURE PDS II SZ 1 L54IN ABSRB VLT L65MM TP-1 1/2 CIR Z879G

## (undated) DEVICE — SUTURE PERMAHAND SZ 2-0 L18IN NONABSORBABLE BLK L26MM SH C012D

## (undated) DEVICE — SINGLE USE ASPIRATION NEEDLE: Brand: SINGLE USE ASPIRATION NEEDLE

## (undated) DEVICE — SOL INJ SOD CL0.9% 10ML PREFIL --

## (undated) DEVICE — STAPLER INT L55MM CUT LN L53MM STPL LN L57MM BLU B FRM 8

## (undated) DEVICE — FORCEPS BX L240CM JAW DIA2.8MM L CAP W/ NDL MIC MESH TOOTH

## (undated) DEVICE — SOLUTION IRRIG 1000ML H2O STRL BLT

## (undated) DEVICE — (D)SYR 10ML 1/5ML GRAD NSAF -- PKGING CHANGE USE ITEM 338027

## (undated) DEVICE — MEDI-VAC YANKAUER SUCTION HANDLE W/BULBOUS TIP: Brand: CARDINAL HEALTH

## (undated) DEVICE — TRAY PREP DRY W/ PREM GLV 2 APPL 6 SPNG 2 UNDPD 1 OVERWRAP

## (undated) DEVICE — SUTURE VCRL SZ 3-0 L18IN ABSRB UD L26MM SH 1/2 CIR J864D

## (undated) DEVICE — DRAPE TWL SURG 16X26IN BLU ORB04] ALLCARE INC]

## (undated) DEVICE — GOWN,BREATHABLE SLV,AURORA,LG,STRL: Brand: MEDLINE

## (undated) DEVICE — BLUNT TIP OPEN SEALER/DIVIDER: Brand: LIGASURE